# Patient Record
Sex: MALE | Race: WHITE | Employment: UNEMPLOYED | URBAN - METROPOLITAN AREA
[De-identification: names, ages, dates, MRNs, and addresses within clinical notes are randomized per-mention and may not be internally consistent; named-entity substitution may affect disease eponyms.]

---

## 2020-01-10 ENCOUNTER — HOSPITAL ENCOUNTER (INPATIENT)
Age: 50
LOS: 4 days | Discharge: HOME | DRG: 750 | End: 2020-01-14
Attending: EMERGENCY MEDICINE | Admitting: PSYCHIATRY & NEUROLOGY
Payer: MEDICAID

## 2020-01-10 PROBLEM — F20.0 PARANOID SCHIZOPHRENIA (HCC): Chronic | Status: ACTIVE | Noted: 2020-01-10

## 2020-01-10 LAB
ABSOLUTE EOS #: 0.3 K/UL (ref 0–0.4)
ABSOLUTE IMMATURE GRANULOCYTE: ABNORMAL K/UL (ref 0–0.3)
ABSOLUTE LYMPH #: 2.9 K/UL (ref 1–4.8)
ABSOLUTE MONO #: 1 K/UL (ref 0.1–1.3)
ACETAMINOPHEN LEVEL: <5 UG/ML (ref 10–30)
ALBUMIN SERPL-MCNC: 4.3 G/DL (ref 3.5–5.2)
ALBUMIN/GLOBULIN RATIO: ABNORMAL (ref 1–2.5)
ALP BLD-CCNC: 79 U/L (ref 40–129)
ALT SERPL-CCNC: 14 U/L (ref 5–41)
ANION GAP SERPL CALCULATED.3IONS-SCNC: 12 MMOL/L (ref 9–17)
AST SERPL-CCNC: 15 U/L
BASOPHILS # BLD: 1 % (ref 0–2)
BASOPHILS ABSOLUTE: 0.1 K/UL (ref 0–0.2)
BILIRUB SERPL-MCNC: 0.3 MG/DL (ref 0.3–1.2)
BUN BLDV-MCNC: 19 MG/DL (ref 6–20)
BUN/CREAT BLD: ABNORMAL (ref 9–20)
CALCIUM SERPL-MCNC: 9.5 MG/DL (ref 8.6–10.4)
CHLORIDE BLD-SCNC: 97 MMOL/L (ref 98–107)
CO2: 26 MMOL/L (ref 20–31)
CREAT SERPL-MCNC: 0.96 MG/DL (ref 0.7–1.2)
DIFFERENTIAL TYPE: ABNORMAL
EOSINOPHILS RELATIVE PERCENT: 2 % (ref 0–4)
ETHANOL PERCENT: <0.01 %
ETHANOL: <10 MG/DL
GFR AFRICAN AMERICAN: >60 ML/MIN
GFR NON-AFRICAN AMERICAN: >60 ML/MIN
GFR SERPL CREATININE-BSD FRML MDRD: ABNORMAL ML/MIN/{1.73_M2}
GFR SERPL CREATININE-BSD FRML MDRD: ABNORMAL ML/MIN/{1.73_M2}
GLUCOSE BLD-MCNC: 105 MG/DL (ref 70–99)
HCT VFR BLD CALC: 41.4 % (ref 41–53)
HEMOGLOBIN: 14.1 G/DL (ref 13.5–17.5)
IMMATURE GRANULOCYTES: ABNORMAL %
LYMPHOCYTES # BLD: 20 % (ref 24–44)
MAGNESIUM: 1.8 MG/DL (ref 1.6–2.6)
MCH RBC QN AUTO: 31.7 PG (ref 26–34)
MCHC RBC AUTO-ENTMCNC: 34.1 G/DL (ref 31–37)
MCV RBC AUTO: 92.9 FL (ref 80–100)
MONOCYTES # BLD: 7 % (ref 1–7)
NRBC AUTOMATED: ABNORMAL PER 100 WBC
PDW BLD-RTO: 13.9 % (ref 11.5–14.9)
PLATELET # BLD: 333 K/UL (ref 150–450)
PLATELET ESTIMATE: ABNORMAL
PMV BLD AUTO: 8.2 FL (ref 6–12)
POTASSIUM SERPL-SCNC: 4 MMOL/L (ref 3.7–5.3)
RBC # BLD: 4.45 M/UL (ref 4.5–5.9)
RBC # BLD: ABNORMAL 10*6/UL
SALICYLATE LEVEL: <1 MG/DL (ref 3–10)
SEG NEUTROPHILS: 70 % (ref 36–66)
SEGMENTED NEUTROPHILS ABSOLUTE COUNT: 10.3 K/UL (ref 1.3–9.1)
SODIUM BLD-SCNC: 135 MMOL/L (ref 135–144)
TOTAL PROTEIN: 7 G/DL (ref 6.4–8.3)
WBC # BLD: 14.5 K/UL (ref 3.5–11)
WBC # BLD: ABNORMAL 10*3/UL

## 2020-01-10 PROCEDURE — 6370000000 HC RX 637 (ALT 250 FOR IP): Performed by: NURSE PRACTITIONER

## 2020-01-10 PROCEDURE — 36415 COLL VENOUS BLD VENIPUNCTURE: CPT

## 2020-01-10 PROCEDURE — 80053 COMPREHEN METABOLIC PANEL: CPT

## 2020-01-10 PROCEDURE — 99285 EMERGENCY DEPT VISIT HI MDM: CPT

## 2020-01-10 PROCEDURE — G0480 DRUG TEST DEF 1-7 CLASSES: HCPCS

## 2020-01-10 PROCEDURE — 85025 COMPLETE CBC W/AUTO DIFF WBC: CPT

## 2020-01-10 PROCEDURE — 1240000000 HC EMOTIONAL WELLNESS R&B

## 2020-01-10 PROCEDURE — 80307 DRUG TEST PRSMV CHEM ANLYZR: CPT

## 2020-01-10 PROCEDURE — 83735 ASSAY OF MAGNESIUM: CPT

## 2020-01-10 RX ORDER — HYDROXYZINE HYDROCHLORIDE 25 MG/1
25 TABLET, FILM COATED ORAL 3 TIMES DAILY PRN
Status: DISCONTINUED | OUTPATIENT
Start: 2020-01-10 | End: 2020-01-14 | Stop reason: HOSPADM

## 2020-01-10 RX ORDER — FOLIC ACID 1 MG/1
1 TABLET ORAL DAILY
Status: DISCONTINUED | OUTPATIENT
Start: 2020-01-11 | End: 2020-01-14 | Stop reason: HOSPADM

## 2020-01-10 RX ORDER — PAROXETINE 10 MG/1
10 TABLET, FILM COATED ORAL EVERY MORNING
Status: COMPLETED | OUTPATIENT
Start: 2020-01-10 | End: 2020-01-10

## 2020-01-10 RX ORDER — ARIPIPRAZOLE 5 MG/1
5 TABLET ORAL DAILY
Status: ON HOLD | COMMUNITY
End: 2020-01-14 | Stop reason: SDUPTHER

## 2020-01-10 RX ORDER — TRAZODONE HYDROCHLORIDE 100 MG/1
100 TABLET ORAL NIGHTLY
Status: DISCONTINUED | OUTPATIENT
Start: 2020-01-10 | End: 2020-01-14 | Stop reason: HOSPADM

## 2020-01-10 RX ORDER — CLONIDINE HYDROCHLORIDE 0.1 MG/1
0.1 TABLET ORAL 2 TIMES DAILY
Status: ON HOLD | COMMUNITY
End: 2021-01-25 | Stop reason: HOSPADM

## 2020-01-10 RX ORDER — ACETAMINOPHEN 325 MG/1
650 TABLET ORAL EVERY 4 HOURS PRN
Status: DISCONTINUED | OUTPATIENT
Start: 2020-01-10 | End: 2020-01-14 | Stop reason: HOSPADM

## 2020-01-10 RX ORDER — MAGNESIUM HYDROXIDE/ALUMINUM HYDROXICE/SIMETHICONE 120; 1200; 1200 MG/30ML; MG/30ML; MG/30ML
30 SUSPENSION ORAL EVERY 6 HOURS PRN
Status: DISCONTINUED | OUTPATIENT
Start: 2020-01-10 | End: 2020-01-14 | Stop reason: HOSPADM

## 2020-01-10 RX ORDER — DOCUSATE SODIUM 100 MG/1
100 CAPSULE, LIQUID FILLED ORAL 2 TIMES DAILY
Status: ON HOLD | COMMUNITY
End: 2021-01-25 | Stop reason: HOSPADM

## 2020-01-10 RX ORDER — DOCUSATE SODIUM 100 MG/1
100 CAPSULE, LIQUID FILLED ORAL 2 TIMES DAILY
Status: DISCONTINUED | OUTPATIENT
Start: 2020-01-10 | End: 2020-01-14 | Stop reason: HOSPADM

## 2020-01-10 RX ORDER — PANTOPRAZOLE SODIUM 40 MG/1
40 TABLET, DELAYED RELEASE ORAL DAILY
Status: DISCONTINUED | OUTPATIENT
Start: 2020-01-11 | End: 2020-01-14 | Stop reason: HOSPADM

## 2020-01-10 RX ORDER — M-VIT,TX,IRON,MINS/CALC/FOLIC 27MG-0.4MG
1 TABLET ORAL DAILY
Status: DISCONTINUED | OUTPATIENT
Start: 2020-01-11 | End: 2020-01-14 | Stop reason: HOSPADM

## 2020-01-10 RX ORDER — BENZTROPINE MESYLATE 1 MG/ML
2 INJECTION INTRAMUSCULAR; INTRAVENOUS 2 TIMES DAILY PRN
Status: DISCONTINUED | OUTPATIENT
Start: 2020-01-10 | End: 2020-01-14 | Stop reason: HOSPADM

## 2020-01-10 RX ORDER — NICOTINE 21 MG/24HR
1 PATCH, TRANSDERMAL 24 HOURS TRANSDERMAL DAILY
Status: DISCONTINUED | OUTPATIENT
Start: 2020-01-10 | End: 2020-01-10 | Stop reason: ALTCHOICE

## 2020-01-10 RX ORDER — PAROXETINE 10 MG/1
10 TABLET, FILM COATED ORAL EVERY MORNING
Status: ON HOLD | COMMUNITY
End: 2020-01-14 | Stop reason: HOSPADM

## 2020-01-10 RX ORDER — TRAZODONE HYDROCHLORIDE 50 MG/1
50 TABLET ORAL NIGHTLY PRN
Status: DISCONTINUED | OUTPATIENT
Start: 2020-01-10 | End: 2020-01-10 | Stop reason: ALTCHOICE

## 2020-01-10 RX ORDER — ARIPIPRAZOLE 5 MG/1
5 TABLET ORAL DAILY
Status: DISCONTINUED | OUTPATIENT
Start: 2020-01-10 | End: 2020-01-14 | Stop reason: HOSPADM

## 2020-01-10 RX ORDER — TRAZODONE HYDROCHLORIDE 100 MG/1
100 TABLET ORAL NIGHTLY
Status: ON HOLD | COMMUNITY
End: 2021-01-25 | Stop reason: HOSPADM

## 2020-01-10 RX ORDER — M-VIT,TX,IRON,MINS/CALC/FOLIC 27MG-0.4MG
1 TABLET ORAL DAILY
Status: ON HOLD | COMMUNITY
End: 2020-01-14 | Stop reason: HOSPADM

## 2020-01-10 RX ORDER — ALBUTEROL SULFATE 90 UG/1
2 AEROSOL, METERED RESPIRATORY (INHALATION) EVERY 6 HOURS PRN
Status: DISCONTINUED | OUTPATIENT
Start: 2020-01-10 | End: 2020-01-14 | Stop reason: HOSPADM

## 2020-01-10 RX ORDER — PAROXETINE HYDROCHLORIDE 20 MG/1
20 TABLET, FILM COATED ORAL DAILY
Status: DISCONTINUED | OUTPATIENT
Start: 2020-01-11 | End: 2020-01-14 | Stop reason: HOSPADM

## 2020-01-10 RX ORDER — DOCUSATE SODIUM 100 MG/1
100 CAPSULE, LIQUID FILLED ORAL 2 TIMES DAILY
COMMUNITY
End: 2020-01-10

## 2020-01-10 RX ORDER — THIAMINE MONONITRATE (VIT B1) 100 MG
100 TABLET ORAL DAILY
Status: DISCONTINUED | OUTPATIENT
Start: 2020-01-11 | End: 2020-01-14 | Stop reason: HOSPADM

## 2020-01-10 RX ADMIN — NICOTINE POLACRILEX 2 MG: 2 GUM, CHEWING BUCCAL at 18:03

## 2020-01-10 RX ADMIN — ARIPIPRAZOLE 5 MG: 5 TABLET ORAL at 15:47

## 2020-01-10 RX ADMIN — PAROXETINE HYDROCHLORIDE 10 MG: 10 TABLET, FILM COATED ORAL at 15:47

## 2020-01-10 ASSESSMENT — ENCOUNTER SYMPTOMS
COUGH: 0
ABDOMINAL PAIN: 0
BACK PAIN: 0

## 2020-01-10 ASSESSMENT — PAIN - FUNCTIONAL ASSESSMENT: PAIN_FUNCTIONAL_ASSESSMENT: ACTIVITIES ARE NOT PREVENTED

## 2020-01-10 ASSESSMENT — SLEEP AND FATIGUE QUESTIONNAIRES
DO YOU HAVE DIFFICULTY SLEEPING: YES
DIFFICULTY STAYING ASLEEP: YES
AVERAGE NUMBER OF SLEEP HOURS: 2
DIFFICULTY FALLING ASLEEP: YES
RESTFUL SLEEP: NO
DIFFICULTY ARISING: NO
SLEEP PATTERN: DIFFICULTY FALLING ASLEEP;DISTURBED/INTERRUPTED SLEEP;RESTLESSNESS
DO YOU USE A SLEEP AID: YES

## 2020-01-10 ASSESSMENT — LIFESTYLE VARIABLES: HISTORY_ALCOHOL_USE: YES

## 2020-01-10 ASSESSMENT — PAIN DESCRIPTION - ONSET: ONSET: ON-GOING

## 2020-01-10 ASSESSMENT — PAIN DESCRIPTION - PROGRESSION: CLINICAL_PROGRESSION: NOT CHANGED

## 2020-01-10 ASSESSMENT — PAIN DESCRIPTION - LOCATION: LOCATION: GENERALIZED

## 2020-01-10 ASSESSMENT — PAIN SCALES - GENERAL: PAINLEVEL_OUTOF10: 3

## 2020-01-10 ASSESSMENT — PAIN DESCRIPTION - ORIENTATION: ORIENTATION: OTHER (COMMENT)

## 2020-01-10 ASSESSMENT — PAIN DESCRIPTION - PAIN TYPE: TYPE: CHRONIC PAIN

## 2020-01-10 ASSESSMENT — PAIN DESCRIPTION - DESCRIPTORS: DESCRIPTORS: ACHING

## 2020-01-10 ASSESSMENT — PAIN DESCRIPTION - FREQUENCY: FREQUENCY: CONTINUOUS

## 2020-01-10 ASSESSMENT — PATIENT HEALTH QUESTIONNAIRE - PHQ9: SUM OF ALL RESPONSES TO PHQ QUESTIONS 1-9: 16

## 2020-01-10 NOTE — PROGRESS NOTES
Medication History completed:    New medications: aripiprazole, docusate, multivitamin, paroxetine, trazodone    Medications discontinued: Maalox, bupropion, pantoprazole 20 mg,simethicone, tizanidine    Changes to dosing: none    Stated allergies: NKDA    Other pertinent information: Medications confirmed with patient's prescription packaging and Rescue discharge paperwork.      Thank you,  Meka Tinsley PharmD, BCPS  [phone]

## 2020-01-10 NOTE — ED NOTES
Report given to MYRA Hutchinson from [facility]. Report method by phone   The following was reviewed with receiving RN:   Current vital signs:  BP (!) 144/89   Pulse 84   Temp 98.6 °F (37 °C) (Temporal)   Resp 16   Ht 5' 8\" (1.727 m)   Wt 164 lb (74.4 kg)   SpO2 99%   BMI 24.94 kg/m²                MEWS Score: 1     Any medication or safety alerts were reviewed. Any pending diagnostics and notifications were also reviewed, as well as any safety concerns or issues, abnormal labs, abnormal imaging, and abnormal assessment findings. Questions were answered.             Eunice Dominguez RN  01/10/20 9231

## 2020-01-10 NOTE — GROUP NOTE
Group Therapy Note    Date: 1/10/2020    Group Start Time: 1430  Group End Time: 4191  Group Topic: Psychoeducation    ALESSANDRA Long    Patient was unable to attend due to admission processing with nursing staff.      Signature:  Ya Cervantes

## 2020-01-10 NOTE — ED TRIAGE NOTES
C= \"Have you ever felt that you should Cut down on your drinking? \"  Yes  A= \"Have people Annoyed you by criticizing your drinking? \"  Yes  G= \"Have you ever felt bad or Guilty about your drinking? \"  Yes  E= \"Have you ever had a drink as an Eye-opener first thing in the morning to steady your nerves or to help a hangover? \"  Yes      Deferred []      Reason for deferring: SW notified. *If yes to two or more: probable alcohol abuse. *

## 2020-01-10 NOTE — ED PROVIDER NOTES
surgical history. CURRENT MEDICATIONS       Previous Medications    ALBUTEROL (PROVENTIL HFA) 108 (90 BASE) MCG/ACT INHALER    Inhale 2 puffs into the lungs every 6 hours as needed for Wheezing    ALUMINUM & MAGNESIUM HYDROXIDE-SIMETHICONE (MAALOX MAX) 400-400-40 MG/5ML SUSP    Take 30 mLs by mouth every 6 hours as needed    BUPROPION (WELLBUTRIN) 75 MG TABLET    Take 1 tablet by mouth 2 times daily    CALCIUM CARBONATE ANTACID (MAALOX) 600 MG CHEW    Take by mouth As needed for stomach pain, heartburn    FOLIC ACID (FOLVITE) 1 MG TABLET    Take 1 tablet by mouth daily    PANTOPRAZOLE (PROTONIX) 20 MG TABLET    Take 2 tablets by mouth daily for 30 doses    PANTOPRAZOLE (PROTONIX) 20 MG TABLET    Take 2 tablets by mouth daily for 30 doses    PANTOPRAZOLE (PROTONIX) 40 MG TABLET    Take 1 tablet by mouth daily    SIMETHICONE (MYLICON) 80 MG CHEWABLE TABLET    Take 1 tablet by mouth 4 times daily as needed for Flatulence    TIZANIDINE (ZANAFLEX) 2 MG CAPSULE    Take 1 capsule by mouth 3 times daily as needed for Muscle spasms    VITAMIN B-1 100 MG TABLET    Take 1 tablet by mouth daily       ALLERGIES     is allergic to toradol [ketorolac tromethamine] and compazine [prochlorperazine maleate]. FAMILY HISTORY     He indicated that his mother is . He indicated that his father is . He indicated that both of his sisters are alive. SOCIAL HISTORY      reports that he has been smoking. He has a 60.00 pack-year smoking history. He has never used smokeless tobacco. He reports current alcohol use of about 90.0 standard drinks of alcohol per week. He reports current drug use. Drug: Marijuana.     PHYSICAL EXAM     INITIAL VITALS: BP (!) 144/89   Pulse 84   Temp 98.6 °F (37 °C) (Temporal)   Resp 16   Ht 5' 8\" (1.727 m)   Wt 164 lb (74.4 kg)   SpO2 99%   BMI 24.94 kg/m²   Gen: NAD  Head: Normocephalic, atraumatic  Eye: Pupils equal round reactive to light, no conjunctivitis  Heart: Regular rate and rhythm no murmurs  Lungs: Clear to auscultation bilaterally, no respiratory distress  Chest wall: No crepitus, no tenderness palpation  Abdomen: Soft, nontender, nondistended, with no peritoneal signs  Skin: No diaphoresis. No lacerations. Neurologic: Patient is alert and oriented x3, motor and sensation is intact in all 4 extremities, speech is fluent  Extremities: Full range of motion, no cyanosis, no edema, scars to the left forearm from previous cutting, no tenderness to palpation    MEDICAL DECISION MAKING:     MDM  47 y.o. male with depression, suicidal thoughts, suicidal plan. previous suicide attempt. The patient does not appear intoxicated. The patient is showing no signs of any acute active toxidrome. The patient denies any overdose attempt or  medical complaints at this time. We'll screen for any acetaminophen or salicylate ingestion, we'll check baseline renal function, liver function, a drug screen, cbc and reassess. Hospital Course:   1:15 PM  Laboratory studies reviewed, no significant abnormalities. Pt medically clear for psychiatric evaluation.  will d/w psychiatry service, anticipate inpatient admission. DIAGNOSTIC RESULTS     RADIOLOGY:All plain film, CT, MRI, and formal ultrasound images (except ED bedside ultrasound) are read by the radiologist and the images and interpretations are directly viewed by the emergency physician. No orders to display       LABS: All lab results were reviewed by myself, and all abnormals are listed below.   Labs Reviewed   CBC WITH AUTO DIFFERENTIAL   COMPREHENSIVE METABOLIC PANEL   MAGNESIUM   ETHANOL   URINE DRUG SCREEN   ACETAMINOPHEN LEVEL   SALICYLATE LEVEL       EMERGENCY DEPARTMENT COURSE:   Vitals:    Vitals:    01/10/20 1154   BP: (!) 144/89   Pulse: 84   Resp: 16   Temp: 98.6 °F (37 °C)   TempSrc: Temporal   SpO2: 99%   Weight: 164 lb (74.4 kg)   Height: 5' 8\" (1.727 m)       The patient was given the following

## 2020-01-10 NOTE — CARE COORDINATION
TEE faxed voluntarily admission form and bed placement form to [facility]. TEE spoke with charge nurse--Cassandra--who states she will call back with unit and room.

## 2020-01-10 NOTE — CARE COORDINATION
[address], being linked to [facility], being medically compliance, and identifies his [facility] --Lam--as a support person. Risk Factors: Patient lacks stable income, has hx of AoD, has hx of trauma, has hx of suicidal ideation, and has poor coping skills. Substance Abuse:  Patient reports history of alcohol and marijuana abuse. Patient states that he will typically drink at least a fifth of vodka and a 12-pack of beer daily; patient began drinking at age [de-identified]. Patient states that he also smoked about three joint daily, in addition to edibles; patient states that he began using marijuana at age 14. Patient states that he has maintained sobriety for over 30-days. Clinical Summary:  Patient was previously admitted to the [facility] in July 2013. Patient presented to the ED voluntarily via his [address] [facility]  due to suicidal ideation with a plan to hang himself. Patient states that he cannot identify any specific trigger, only that he is experiencing auditory command hallucinations calling him worthless and that he should kill himself. Patient also reports visual hallucinations in the form of shadows; patient demonstrates good insight by stating that he typically knows depression is becoming more severe when he experiences visual hallucinations. Patient also reports increased paranoia with thoughts that [facility] are \"out to get [him}; however, patient recognizes that thoughts are not accurate or reality. Patient denies current homicidal ideation. Patient presents upon admission alert and oriented x4, with flat affect, preoccupied, circumstantial thought process, and normal speech. Patient appears cooperative and friendly throughout assessment. Patient is currently in rehab for alcohol and marijuana abuse; patient has reportedly been clean for over 30 days.   Patient was told, prior to admission, by Silke that he can return to the facility upon discharge. Patient reports that he is medication compliant at si time, but does not believe that his medication is working effectively. Patient states that he does not have a form of stable income, nor receives government assistance. Patient currently has Yue Arita. Patient reports paternal family history of mental illness and substance abuse. Patient also reports history of relatives completing suicide--specifically his two uncles on his father's side. Patient denies any current or past legal charges. Patient reports highest level of education completed being 8th grade. Level of Care Disposition:. Writer consulted with Percy Arthur CNP from psychiatry. Patient accepted for an inpatient admission to the [facility] for safety and stabilization.   Attending physician is Heber Ling MD.

## 2020-01-10 NOTE — ED NOTES
Pt arrives to the [facility] stating that he is having suicidal thoughts. Patient states that he has \"been suicidal my whole life\" but reports that over the last couple of weeks the thoughts have gotten worse for him. Patient states that about a month ago he attempted suicide by drinking himself to death but was found by a friend and started living at the [facility] for rehab. Patient states that he has been clean for the last 30 days. Patient reports that he always hears voices but states \"when the shadows come that's when I know it is getting bad. \" Patient reports seeing shadows. Patient states that the voices tell him \"I am worthless, it'd be better off without me. \" Patient states that the voices also tell him to hang himself. Patient reports taking his medications as prescribed. Patient denies homicidal ideation. Patient c/o \"bone pain\" stating that he thinks that he has arthritis.       Tina Castro RN  01/10/20 2580 operating room

## 2020-01-11 LAB
AMPHETAMINE SCREEN URINE: NEGATIVE
BARBITURATE SCREEN URINE: NEGATIVE
BENZODIAZEPINE SCREEN, URINE: NEGATIVE
BUPRENORPHINE URINE: NORMAL
CANNABINOID SCREEN URINE: NEGATIVE
CHOLESTEROL/HDL RATIO: 5.6
CHOLESTEROL: 222 MG/DL
COCAINE METABOLITE, URINE: NEGATIVE
HDLC SERPL-MCNC: 40 MG/DL
LDL CHOLESTEROL: 155 MG/DL (ref 0–130)
MDMA URINE: NORMAL
METHADONE SCREEN, URINE: NEGATIVE
METHAMPHETAMINE, URINE: NORMAL
OPIATES, URINE: NEGATIVE
OXYCODONE SCREEN URINE: NEGATIVE
PHENCYCLIDINE, URINE: NEGATIVE
PROPOXYPHENE, URINE: NORMAL
TEST INFORMATION: NORMAL
THYROXINE, FREE: 0.86 NG/DL (ref 0.93–1.7)
TRICYCLIC ANTIDEPRESSANTS, UR: NORMAL
TRIGL SERPL-MCNC: 135 MG/DL
TSH SERPL DL<=0.05 MIU/L-ACNC: 1.78 MIU/L (ref 0.3–5)
VLDLC SERPL CALC-MCNC: ABNORMAL MG/DL (ref 1–30)

## 2020-01-11 PROCEDURE — 1240000000 HC EMOTIONAL WELLNESS R&B

## 2020-01-11 PROCEDURE — 84439 ASSAY OF FREE THYROXINE: CPT

## 2020-01-11 PROCEDURE — 90792 PSYCH DIAG EVAL W/MED SRVCS: CPT | Performed by: NURSE PRACTITIONER

## 2020-01-11 PROCEDURE — 36415 COLL VENOUS BLD VENIPUNCTURE: CPT

## 2020-01-11 PROCEDURE — 84443 ASSAY THYROID STIM HORMONE: CPT

## 2020-01-11 PROCEDURE — 6370000000 HC RX 637 (ALT 250 FOR IP): Performed by: NURSE PRACTITIONER

## 2020-01-11 PROCEDURE — 80061 LIPID PANEL: CPT

## 2020-01-11 PROCEDURE — 83036 HEMOGLOBIN GLYCOSYLATED A1C: CPT

## 2020-01-11 RX ADMIN — FOLIC ACID 1 MG: 1 TABLET ORAL at 08:39

## 2020-01-11 RX ADMIN — PANTOPRAZOLE SODIUM 40 MG: 40 TABLET, DELAYED RELEASE ORAL at 08:40

## 2020-01-11 RX ADMIN — PAROXETINE HYDROCHLORIDE 20 MG: 20 TABLET, FILM COATED ORAL at 08:40

## 2020-01-11 RX ADMIN — HYDROXYZINE HYDROCHLORIDE 25 MG: 25 TABLET, FILM COATED ORAL at 21:36

## 2020-01-11 RX ADMIN — DOCUSATE SODIUM 100 MG: 100 CAPSULE, LIQUID FILLED ORAL at 08:39

## 2020-01-11 RX ADMIN — TRAZODONE HYDROCHLORIDE 100 MG: 100 TABLET ORAL at 21:36

## 2020-01-11 RX ADMIN — NICOTINE POLACRILEX 2 MG: 2 GUM, CHEWING BUCCAL at 18:13

## 2020-01-11 RX ADMIN — MULTIPLE VITAMINS W/ MINERALS TAB 1 TABLET: TAB at 08:39

## 2020-01-11 RX ADMIN — ARIPIPRAZOLE 5 MG: 5 TABLET ORAL at 08:39

## 2020-01-11 RX ADMIN — THIAMINE HCL TAB 100 MG 100 MG: 100 TAB at 08:39

## 2020-01-11 ASSESSMENT — ENCOUNTER SYMPTOMS
COUGH: 0
FACIAL SWELLING: 0
NAUSEA: 0
DIARRHEA: 0
SHORTNESS OF BREATH: 0
ABDOMINAL PAIN: 0
TROUBLE SWALLOWING: 0
SINUS PRESSURE: 0
VOMITING: 0
SINUS PAIN: 0
CONSTIPATION: 0
RHINORRHEA: 0
WHEEZING: 1

## 2020-01-11 ASSESSMENT — SLEEP AND FATIGUE QUESTIONNAIRES
AVERAGE NUMBER OF SLEEP HOURS: 2
DO YOU USE A SLEEP AID: YES
SLEEP PATTERN: DIFFICULTY FALLING ASLEEP
DO YOU HAVE DIFFICULTY SLEEPING: YES
DIFFICULTY ARISING: NO
DIFFICULTY STAYING ASLEEP: YES
RESTFUL SLEEP: NO
DIFFICULTY FALLING ASLEEP: YES

## 2020-01-11 ASSESSMENT — PATIENT HEALTH QUESTIONNAIRE - PHQ9: SUM OF ALL RESPONSES TO PHQ QUESTIONS 1-9: 13

## 2020-01-11 ASSESSMENT — LIFESTYLE VARIABLES: HISTORY_ALCOHOL_USE: YES

## 2020-01-11 NOTE — PLAN OF CARE
Problem: Altered Mood, Psychotic Behavior:  Goal: Able to verbalize decrease in frequency and intensity of hallucinations  Description  Able to verbalize decrease in frequency and intensity of hallucinations  1/11/2020 1159 by Roberto Montes RN  Outcome: Ongoing  Note:   Pt states he is hearing \"voices that sound like mumbles\". Pt states his voices in his head keep him awake at night. Pt is cooperative with assessments and medications. He is isolative to his room. Safety checks maintained q15 min and irregular rounding maintained. Problem: Altered Mood, Psychotic Behavior:  Goal: Absence of self-harm  Description  Absence of self-harm  1/11/2020 1159 by Roberto Montes RN  Outcome: Ongoing  Note:   Pt denies thoughts to harm himself at this time. He has not attempted self harm thus far during shift. Pt is isolative to his room and reports depression/anxiety. Safety checks maintained q15 min and irregular rounding maintained.

## 2020-01-11 NOTE — H&P
HISTORY and Ganesh Yao [id number]       NAME:  Rocío Ugarte. MRN: [id number]   YOB: 1970   Date: 1/11/2020   Age: 47 y.o. Gender: male     COMPLAINT AND PRESENT HISTORY:      Rocío London is 47 y.o.,  male, admitted because of Schizophrenia. Patient minute via the ED with suicidal ideation, plan to hang himself. Patient does admit to a history of previous suicide attempts. Patient denies homicidal ideation. Patient admits to command auditory hallucinations telling him to hurt himself. States he also has auditory hallucinations asking him Shefali Day can happen next? \"  Reports this causes him to have an increase in racing thoughts difficulty concentrating. Patient also reports seeing shadows frequently. Patient reports he has been on his medication but feels it is not working. Patient has been living at the [facility] for alcohol rehabilitation, sober 30 days. He was living in [facility] previously and was admitted in a psychiatric hospital that referred him to the [facility]. Patient has poor to fair insight. Reports poor sleep, tossing and turning throughout the night. Appetite is fair. Memory is poor after his stroke. Patient denies drug use. States he has been sober for 30 days but prior to this was drinking 1/5 of vodka and a 12 pack of beer day. Patient reports compliance with medication. See psychiatric admission note for further psychiatric history. Patient denies any somatic complaints other than he feels he has arthritis in his hands and his knees. He has a history of COPD, uses inhalers at home. Denies home oxygen use. No chest pain or shortness of breath. No URI symptoms no fever or chills. No nausea, vomiting, diarrhea, constipation. No urinary complaints at this time.     DIAGNOSTIC RESULTS   Labs:  CBC:   Recent Labs     01/10/20  1248   WBC 14.5*   HGB 14.1        BMP:    Recent Labs     01/10/20  1248    Attends meetings of clubs or organizations: None     Relationship status: None    Intimate partner violence:     Fear of current or ex partner: None     Emotionally abused: None     Physically abused: None     Forced sexual activity: None   Other Topics Concern    None   Social History Narrative    None           REVIEW OF SYSTEMS      Allergies   Allergen Reactions    Prochlorperazine Other (See Comments)    Toradol [Ketorolac Tromethamine] Itching and Rash     Able to take Aleve with no allergic reactions      Ketorolac     Compazine [Prochlorperazine Maleate] Anxiety       No current facility-administered medications on file prior to encounter. Current Outpatient Medications on File Prior to Encounter   Medication Sig Dispense Refill    ARIPiprazole (ABILIFY) 5 MG tablet Take 5 mg by mouth daily      PARoxetine (PAXIL) 10 MG tablet Take 10 mg by mouth every morning      docusate sodium (COLACE) 100 MG capsule Take 100 mg by mouth 2 times daily      Multiple Vitamins-Minerals (THERAPEUTIC MULTIVITAMIN-MINERALS) tablet Take 1 tablet by mouth daily      traZODone (DESYREL) 100 MG tablet Take 100 mg by mouth nightly      folic acid (FOLVITE) 1 MG tablet Take 1 tablet by mouth daily 30 tablet 3    pantoprazole (PROTONIX) 40 MG tablet Take 1 tablet by mouth daily 30 tablet 3    vitamin B-1 100 MG tablet Take 1 tablet by mouth daily 30 tablet 3    albuterol (PROVENTIL HFA) 108 (90 BASE) MCG/ACT inhaler Inhale 2 puffs into the lungs every 6 hours as needed for Wheezing 1 Inhaler 3    cloNIDine (CATAPRES) 0.1 MG tablet Take 0.1 mg by mouth 2 times daily          Review of Systems   Constitutional: Negative for chills and fever. HENT: Positive for dental problem (poor). Negative for congestion, facial swelling, postnasal drip, rhinorrhea, sinus pressure, sinus pain and trouble swallowing. Eyes: Positive for visual disturbance (glasses). Respiratory: Positive for wheezing (copd).  Negative for cough and shortness of breath. Cardiovascular: Negative for chest pain and palpitations. Gastrointestinal: Negative for abdominal pain, constipation, diarrhea, nausea and vomiting. Genitourinary: Negative for dysuria, frequency and urgency. Musculoskeletal: Positive for arthralgias (knees, hands hx arthritis). Negative for myalgias. Neurological: Positive for weakness (right sided s/p CVA). Negative for dizziness, speech difficulty and light-headedness. Psychiatric/Behavioral: Positive for decreased concentration, hallucinations, sleep disturbance and suicidal ideas. Negative for agitation. The patient is nervous/anxious. GENERAL PHYSICAL EXAM:     Vitals: BP (!) 119/92   Pulse 80   Temp 97.9 °F (36.6 °C) (Oral)   Resp 14   Ht 5' 8\" (1.727 m)   Wt 158 lb (71.7 kg)   SpO2 100%   BMI 24.02 kg/m²  Body mass index is 24.02 kg/m². Pt was examined with a nurse present in the room. GENERAL APPEARANCE:  Claudene Staple. is 47 y.o.,  male, not obese, nourished, conscious, alert. Does not appear to be distress or pain at this time. SKIN:  Warm, dry, no cyanosis or jaundice. HEAD:  Glasses. Normocephalic, atraumatic, no swelling or tenderness. EYES:  Pupils equal, reactive to light, Conjunctiva is clear, EOMs intact niesha. eyelids WNL. EARS:  No discharge, no marked hearing loss. NOSE:  No rhinorrhea, epistaxis or septal deformity. THROAT:  Poor dentition. Uvula midline. No ulceration bleeding or discharge. NECK:  No stiffness, trachea central.    CHEST:  Symmetrical and equal on expansion. HEART:  Regular rate and rhythm. S1 > S2, No audible murmurs or gallops. LUNGS:  Equal on expansion, expiratory wheezes noted posterior upper lobes, otherwise clear. ABDOMEN:  Soft on palpation. No localized tenderness. No guarding or rigidity. LYMPHATICS:  No palpable anterior cervical lymphadenopathy.      LOCOMOTOR, BACK AND SPINE:  No

## 2020-01-11 NOTE — CARE COORDINATION
BHI Biopsychosocial Assessment    Current Level of Psychosocial Functioning     Independent x  Dependent    Minimal Assist     Comments:    Psychosocial High Risk Factors (check all that apply)    Unable to obtain meds x  Chronic illness/pain  x  Substance abuse x  Lack of Family Support   Financial stress x  Isolation   Inadequate Community Resources  Suicide attempt(s) x  Not taking medications   Victim of crime   Developmental Delay  Unable to manage personal needs    Age 72 or older   Homeless  No transportation   Readmission within 30 days  Unemployment  Traumatic Event    Comments:   Psychiatric Advanced Directives: pt denies     Family to Involve in Treatment: pt states his siblings are supportive but do not live locally     Sexual Orientation:  N/A    Patient Strengths: pt has been receiving treatment and support at [facility], has been sober from the last 30  Days, pt has insurance, supportive siblings     Patient Barriers: pt is not linked with local [facility] Ambassador Liam Garcia since coming to [facility], pt has history of alcohol abuse       Opiate Education Provided:  N/A      CMHC/mental health history: Pt to be linked with Meseret Jon as [place] [facility] [place] [facility]   medication management, group/individual therapies, family meetings, psycho -education, treatment team meetings to assist with stabilization    Initial Discharge Plan:  Pt to return to [facility] at discharge. Clinical Summary:  Mark Rueda is a 47year old single male who has been admitted to [address] [place] [address] Dr. Yohan JACOME with report of increase in auditory hallucinations, feelings of helplessness and hopelessness, and states this date that he has no local access to his outpatient mental health services as he has been living in [place] at [facility] for the last 4 weeks. Pt reports history of alcohol abuse, reports history of hallucinations since his teens. Pt reports family history of alcohol abuse and mental health issues.  Pt reports his parents are , siblings are supportive. Pt reports he feels appreciative of being referred to [facility], states his treatment is going well there and that staff are supportive. Pt is observed as pleasant and cooperative for assessment, with good insight and ability to discuss his symptoms which patient also includes sleep disturbance. Pt reports history of suicidal ideation, history of attempt, last attempt by drinking. Pt denies suicidal ideation this date. SW offered supportive listening and encouragement. SW offered ongoing support.

## 2020-01-11 NOTE — BH NOTE
Patient refused to attend Relaxation Group after encouragement from staff. 1:1 talk time offered but patient refused.

## 2020-01-11 NOTE — BH NOTE
Patient refused to attend Wellness Group after encouragement from staff. 1:1 talk time offered but patient refused.

## 2020-01-11 NOTE — BH NOTE
Psychiatric Admission Note         Rachel Eisenberg is a 47 y.o. male who was admitted from [facility] with suicidal ideation, increase auditory hallucinations, and depression. He states that the increase in voices are bothersome and they are telling him to harm himself. He states the voices are worse at night. He his medication compliant, but feel that he needs adjustments. He ha been sober for 30 days and is currently at [facility]. He states he feels like he cannot turn his mind off at night. He endorses poor sleep and appetite. He denies any symptoms of paranoia and he is not delusional.  Patient feels helpless and hopeless at times and cannot contract for safety outside of the hospital setting. There is no safe alternative at this time than inpatient stabilization. Past Psychiatric History   Patient reports he is linked with [facility] and takes his medications. . Reported history of psychiatric inpatient hospitalizations. Reported history of suicide attempts. History of Substance Abuse     Reports 30 days of sobriety. Currently at [facility] for sober living. Family History of psychiatric disorders    Family history: states that Uncle and Dad have hsitory of mental illness, but not sure of diagnosis. .      Medical History   Allergies:  Prochlorperazine; Toradol [ketorolac tromethamine]; Ketorolac; and Compazine [prochlorperazine maleate]   Past Medical History:   Diagnosis Date    Arthritis     Asthma     [facility]     GERD (gastroesophageal reflux disease)     Psychiatric problem     Schizophrenia ([facility]     Unspecified cerebral artery occlusion with cerebral infarction       History reviewed. No pertinent surgical history.   Neurologic Exam    Labs  Recent Results (from the past 72 hour(s))   CBC with DIFF    Collection Time: 01/10/20 12:48 PM   Result Value Ref Range    WBC 14.5 (H) 3.5 - 11.0 aluminum & magnesium hydroxide-simethicone, nicotine polacrilex, hydrOXYzine, albuterol sulfate HFA    Treatment Plan:    1. Admit to inpatient psychiatric treatment  2. Supportive therapy with medication management. Reviewed risks and benefits as well as potential side effects with patient. 3. Therapeutic activities and groups  4. Follow up at [facility] after symptoms stabilized. 5. Increase Paxil to 20 mg PO daily, continue Abilify 5 mg PO daily. 6. Social work for discharge planning, patient plans to return to [facility] upon discharge. Estimated length of stay: 5-7 days    RINA Rivera - CNP  Psychiatric Nurse Practitioner. Dragon voice recognition software used in portions of this document.  Occasionally words are mis-transcribed

## 2020-01-11 NOTE — PLAN OF CARE
[id number] Cait Arzola  Initial Interdisciplinary Treatment Plan NOTE    Original treatment plan Date & Time: 1/11/2020 0741    Admission Type:  Admission Type: Voluntary    Reason for admission:   Reason for Admission: voices getting worse, seeing shadows and feeling paranoid people are out to get him    Estimated Length of Stay:  5-7days  Estimated Discharge Date:   to be determined by physician    PATIENT STRENGTHS:  Patient Strengths:Strengths: Medication Compliance, Connection to output provider, No significant Physical Illness  Patient Strengths and Limitations:Limitations: Difficult relationships / poor social skills  Addictive Behavior: Addictive Behavior  In the past 3 months, have you felt or has someone told you that you have a problem with:  : None  Do you have a history of Chemical Use?: No  Do you have a history of Alcohol Use?: Yes  Do you have a history of Street Drug Abuse?: No  Histroy of Prescripton Drug Abuse?: No  Medical Problems:  Past Medical History:   Diagnosis Date    Arthritis     Asthma     COPD (chronic obstructive pulmonary disease) ([facility])     GERD (gastroesophageal reflux disease)     Psychiatric problem     [facility]     Unspecified cerebral artery occlusion with cerebral infarction      Status EXAM:Status and Exam  Normal: No  Facial Expression: Flat  Affect: Blunt  Level of Consciousness: Alert  Mood:Normal: No  Mood: Anxious  Motor Activity:Normal: Yes  Interview Behavior: Cooperative  Preception: [place] to Person, [place] to Time, [place] to Place, [place] to Situation  Attention:Normal: No  Attention: Distractible  Thought Processes: Flt.of Ideas, Loose Assoc. Thought Content:Normal: No  Thought Content: Preoccupations, Poverty of Content  Hallucinations:  Auditory (Comment), Visual (Comment)(hearing voices being negative and seeing shadows)  Delusions: Yes  Delusions: Persecution  Memory:Normal: No  Memory: Poor Recent, Poor Remote  Insight and Judgment: Yes  Present Suicidal Ideation: No  Present Homicidal Ideation: No    EDUCATION:   Learner Progress Toward Treatment Goals:  Reviewed group plans and strategies for care    Method:  Group therapy, medication compliance, individualized assessments and care planning    Outcome:  Needs reinforcement    PATIENT GOALS:  Pt did not identify, to be discussed with patient within 72 hours.     PLAN/TREATMENT RECOMMENDATIONS:   Group therapy, medications compliance, goal setting, individualized assessments and care, continue to monitor pt on unit      SHORT-TERM GOALS:   Time frame for Short-Term Goals:  5-7 days    LONG-TERM GOALS:  Time frame for Long-Term Goals:  6 months    Members Present in Team Meeting:       Shawn Adjutant Day

## 2020-01-12 LAB
ESTIMATED AVERAGE GLUCOSE: 120 MG/DL
HBA1C MFR BLD: 5.8 % (ref 4–6)

## 2020-01-12 PROCEDURE — 99232 SBSQ HOSP IP/OBS MODERATE 35: CPT | Performed by: PSYCHIATRY & NEUROLOGY

## 2020-01-12 PROCEDURE — 1240000000 HC EMOTIONAL WELLNESS R&B

## 2020-01-12 PROCEDURE — 6370000000 HC RX 637 (ALT 250 FOR IP): Performed by: NURSE PRACTITIONER

## 2020-01-12 RX ADMIN — THIAMINE HCL TAB 100 MG 100 MG: 100 TAB at 08:58

## 2020-01-12 RX ADMIN — NICOTINE POLACRILEX 2 MG: 2 GUM, CHEWING BUCCAL at 18:37

## 2020-01-12 RX ADMIN — MULTIPLE VITAMINS W/ MINERALS TAB 1 TABLET: TAB at 08:58

## 2020-01-12 RX ADMIN — FOLIC ACID 1 MG: 1 TABLET ORAL at 08:58

## 2020-01-12 RX ADMIN — DOCUSATE SODIUM 100 MG: 100 CAPSULE, LIQUID FILLED ORAL at 20:29

## 2020-01-12 RX ADMIN — PANTOPRAZOLE SODIUM 40 MG: 40 TABLET, DELAYED RELEASE ORAL at 08:58

## 2020-01-12 RX ADMIN — PAROXETINE HYDROCHLORIDE 20 MG: 20 TABLET, FILM COATED ORAL at 08:58

## 2020-01-12 RX ADMIN — ACETAMINOPHEN 650 MG: 325 TABLET, FILM COATED ORAL at 20:29

## 2020-01-12 RX ADMIN — DOCUSATE SODIUM 100 MG: 100 CAPSULE, LIQUID FILLED ORAL at 08:58

## 2020-01-12 RX ADMIN — TRAZODONE HYDROCHLORIDE 100 MG: 100 TABLET ORAL at 20:29

## 2020-01-12 RX ADMIN — ARIPIPRAZOLE 5 MG: 5 TABLET ORAL at 08:58

## 2020-01-12 RX ADMIN — NICOTINE POLACRILEX 2 MG: 2 GUM, CHEWING BUCCAL at 09:00

## 2020-01-12 ASSESSMENT — PAIN SCALES - GENERAL
PAINLEVEL_OUTOF10: 0
PAINLEVEL_OUTOF10: 7

## 2020-01-12 NOTE — PROGRESS NOTES
Department of Psychiatry  Attending Progress Note      SUBJECTIVE:  Found him in his room. Reports still hearing voices. Isolating and withdrawing. Not going to many groups. Medication complaint. OBJECTIVE    Physical  VITALS:  BP (!) 121/91   Pulse 92   Temp 97.4 °F (36.3 °C) (Oral)   Resp 14   Ht 5' 8\" (1.727 m)   Wt 158 lb (71.7 kg)   SpO2 100%   BMI 24.02 kg/m²     Mental Status Examination:    Pt. was alert, fully oriented, and cooperative. Appearance and hygiene weredisheveled, poor hygiene . Mood was depressed. Affect was \"dysthymic and poorly reactive Thought process was linear and well-organized. Patient endorsed auditory hallucinations.     Patient denied suicidal ideations. Patient denied homicidal ideations . Patient's gross cognitive functions were intact. Insight and judgement were poor.  Both recent and remote memory were intact.     Psychomotor status was slowed      Diagnostic Impression  Principal Problem:    Paranoid schizophrenia (HCC)    Medications  Current Facility-Administered Medications: acetaminophen (TYLENOL) tablet 650 mg, 650 mg, Oral, Q4H PRN  benztropine mesylate (COGENTIN) injection 2 mg, 2 mg, Intramuscular, BID PRN  magnesium hydroxide (MILK OF MAGNESIA) 400 MG/5ML suspension 30 mL, 30 mL, Oral, Daily PRN  aluminum & magnesium hydroxide-simethicone (MAALOX) 200-200-20 MG/5ML suspension 30 mL, 30 mL, Oral, Q6H PRN  nicotine polacrilex (NICORETTE) gum 2 mg, 2 mg, Oral, Q2H PRN  hydrOXYzine (ATARAX) tablet 25 mg, 25 mg, Oral, TID PRN  albuterol sulfate  (90 Base) MCG/ACT inhaler 2 puff, 2 puff, Inhalation, Q6H PRN  ARIPiprazole (ABILIFY) tablet 5 mg, 5 mg, Oral, Daily  docusate sodium (COLACE) capsule 100 mg, 100 mg, Oral, BID  folic acid (FOLVITE) tablet 1 mg, 1 mg, Oral, Daily  therapeutic multivitamin-minerals 1 tablet, 1 tablet, Oral, Daily  pantoprazole (PROTONIX) tablet 40 mg, 40 mg, Oral, Daily  traZODone (DESYREL) tablet 100 mg, 100 mg, Oral, Nightly  vitamin B-1 (THIAMINE) tablet 100 mg, 100 mg, Oral, Daily  PARoxetine (PAXIL) tablet 20 mg, 20 mg, Oral, Daily    ASSESSMENT AND PLAN    Will continue to adjust medications accordingly

## 2020-01-12 NOTE — GROUP NOTE
Group Therapy Note    Date: 1/12/2020    Group Start Time: 1600  Group End Time: 1645  Group Topic: Group Therapy    ALESSANDRA Austin        Group Therapy Note    Attendees: 11/21         Patient's Goal:   patient refused to attend Stress managment group at 1600 after encouragement from staff.  1:1 talk time provided as alternative to group session

## 2020-01-12 NOTE — PLAN OF CARE
Persecution  Memory:Normal: Yes  Memory: Poor Recent, Poor Remote  Insight and Judgment: No  Insight and Judgment: Poor Judgment, Poor Insight  Present Suicidal Ideation: No  Present Homicidal Ideation: No    Daily Assessment Last Entry:   Daily Sleep (WDL): Within Defined Limits         Patient Currently in Pain: Denies  Daily Nutrition (WDL): Within Defined Limits    Patient Monitoring:  Frequency of Checks: 4 times per hour, close    Psychiatric Symptoms:   Depression Symptoms  Depression Symptoms: Isolative, Loss of interest  Anxiety Symptoms  Anxiety Symptoms: Generalized  Payton Symptoms  Payton Symptoms: No problems reported or observed. Psychosis Symptoms  Delusion Type: No problems reported or observed. Suicide Risk CSSR-S:  1) Within the past month, have you wished you were dead or wished you could go to sleep and not wake up? : Yes  2) Have you actually had any thoughts of killing yourself? : Yes  3) Have you been thinking about how you might kill yourself? : Yes  5) Have you started to work out or worked out the details of how to kill yourself? Do you intend to carry out this plan? : No  6) Have you ever done anything, started to do anything, or prepared to do anything to end your life?: No  Change in Result:  no Change in Plan of care:  no      EDUCATION:   Learner Progress Toward Treatment Goals:   Reviewed results and recommendations of this team, Reviewed group plan and strategies, Reviewed signs, symptoms and risk of self harm and violent behavior, Reviewed goals and plan of care    Method:  small group, individual verbal education    Outcome:   Verbalized by patient but needs reinforcement to obtain goals    PATIENT GOALS:  Short term:  Medication stabilization. Long term:   Follow up with HC    PLAN/TREATMENT RECOMMENDATIONS UPDATE:  continue with group therapies, increased socialization, continue planning for after discharge goals, continue with medication compliance    SHORT-TERM GOALS

## 2020-01-12 NOTE — GROUP NOTE
Group Therapy Note    Date: 1/12/2020    Group Start Time: 1000  Group End Time: 2359  Group Topic: Psychoeducation    Χαλκοκονδύλη Sugey, LSW        Group Therapy Note    Attendees: 7         Patient's Goal:  Relationship building     Notes:      Status After Intervention:  Unchanged    Participation Level:  Active Listener and Interactive    Participation Quality: Appropriate and Attentive      Speech:  normal      Thought Process/Content: Logical      Affective Functioning: Congruent      Mood: euthymic      Level of consciousness:  Alert and Oriented x4      Response to Learning: Able to verbalize current knowledge/experience and Able to verbalize/acknowledge new learning      Endings: None Reported    Modes of Intervention: Education and Support      Discipline Responsible: /Counselor      Signature:  LINO Wise

## 2020-01-12 NOTE — GROUP NOTE
Group Therapy Note    Date: 1/12/2020    Group Start Time: 1330  Group End Time: 5849  Group Topic: Recreational    STCZ AYAZ GILL    Amando Day    Group Therapy Note    Attendees: 6         Patient's Goal:  To increase interpersonal interaction. Notes:  Pt attended and participated in group. Status After Intervention:  Improved    Participation Level:  Active Listener and Interactive    Participation Quality: Appropriate, Attentive, Sharing and Supportive      Speech:  normal      Thought Process/Content: Logical  Linear      Affective Functioning: Congruent      Mood: euthymic      Level of consciousness:  Alert, Oriented x4 and Attentive      Response to Learning: Able to verbalize current knowledge/experience, Able to verbalize/acknowledge new learning, Able to retain information and Progressing to goal      Endings: None Reported    Modes of Intervention: Education, Support, Socialization, Exploration, Clarifying, Problem-solving, Activity and Reality-testing      Discipline Responsible: Psychoeducational Specialist      Signature:  Chavez Interiano

## 2020-01-12 NOTE — GROUP NOTE
Group Therapy Note    Date: 1/12/2020    Group Start Time: 8851  Group End Time: 8400  Group Topic: Community Meeting    LISE GILL    Amando Day    Group Therapy Note    Attendees: 5         Patient's Goal:  To increase interpersonal interaction and identify a goal for the day. Notes:  Pt attended group and identified a goal of \"attending groups\". Status After Intervention:  Improved    Participation Level:  Active Listener and Interactive    Participation Quality: Appropriate, Attentive, Sharing and Supportive      Speech:  normal      Thought Process/Content: Logical  Linear      Affective Functioning: Congruent      Mood: euthymic      Level of consciousness:  Alert, Oriented x4 and Attentive      Response to Learning: Able to verbalize current knowledge/experience, Able to verbalize/acknowledge new learning, Able to retain information and Progressing to goal      Endings: None Reported    Modes of Intervention: Education, Support, Socialization, Exploration, Clarifying, Problem-solving, Limit-setting and Reality-testing      Discipline Responsible: Psychoeducational Specialist      Signature:  Ignacia Interiano

## 2020-01-12 NOTE — PLAN OF CARE
Patient was able to verbalize a decrease in intensity of hallucinations. Patient remains free from self harm.

## 2020-01-13 PROCEDURE — 99232 SBSQ HOSP IP/OBS MODERATE 35: CPT | Performed by: PSYCHIATRY & NEUROLOGY

## 2020-01-13 PROCEDURE — 6370000000 HC RX 637 (ALT 250 FOR IP): Performed by: NURSE PRACTITIONER

## 2020-01-13 PROCEDURE — 1240000000 HC EMOTIONAL WELLNESS R&B

## 2020-01-13 RX ADMIN — NICOTINE POLACRILEX 2 MG: 2 GUM, CHEWING BUCCAL at 21:17

## 2020-01-13 RX ADMIN — NICOTINE POLACRILEX 2 MG: 2 GUM, CHEWING BUCCAL at 16:45

## 2020-01-13 RX ADMIN — MULTIPLE VITAMINS W/ MINERALS TAB 1 TABLET: TAB at 08:53

## 2020-01-13 RX ADMIN — HYDROXYZINE HYDROCHLORIDE 25 MG: 25 TABLET, FILM COATED ORAL at 21:04

## 2020-01-13 RX ADMIN — NICOTINE POLACRILEX 2 MG: 2 GUM, CHEWING BUCCAL at 06:33

## 2020-01-13 RX ADMIN — DOCUSATE SODIUM 100 MG: 100 CAPSULE, LIQUID FILLED ORAL at 08:53

## 2020-01-13 RX ADMIN — FOLIC ACID 1 MG: 1 TABLET ORAL at 08:53

## 2020-01-13 RX ADMIN — THIAMINE HCL TAB 100 MG 100 MG: 100 TAB at 08:53

## 2020-01-13 RX ADMIN — ACETAMINOPHEN 650 MG: 325 TABLET, FILM COATED ORAL at 16:44

## 2020-01-13 RX ADMIN — TRAZODONE HYDROCHLORIDE 100 MG: 100 TABLET ORAL at 21:04

## 2020-01-13 RX ADMIN — PANTOPRAZOLE SODIUM 40 MG: 40 TABLET, DELAYED RELEASE ORAL at 08:53

## 2020-01-13 RX ADMIN — NICOTINE POLACRILEX 2 MG: 2 GUM, CHEWING BUCCAL at 18:49

## 2020-01-13 RX ADMIN — PAROXETINE HYDROCHLORIDE 20 MG: 20 TABLET, FILM COATED ORAL at 08:53

## 2020-01-13 RX ADMIN — ARIPIPRAZOLE 5 MG: 5 TABLET ORAL at 08:53

## 2020-01-13 RX ADMIN — NICOTINE POLACRILEX 2 MG: 2 GUM, CHEWING BUCCAL at 09:37

## 2020-01-13 ASSESSMENT — PAIN DESCRIPTION - PAIN TYPE: TYPE: CHRONIC PAIN

## 2020-01-13 ASSESSMENT — PAIN SCALES - GENERAL
PAINLEVEL_OUTOF10: 2
PAINLEVEL_OUTOF10: 8

## 2020-01-13 ASSESSMENT — PAIN DESCRIPTION - LOCATION: LOCATION: GENERALIZED

## 2020-01-13 NOTE — GROUP NOTE
Group Therapy Note    Date: 1/13/2020    Group Start Time: 0900  Group End Time: 0930  Group Topic: Community Meeting    ST [address], CTRS        Group Therapy Note    Attendees: 9         Patient's Goal: to increase interpersonal skills     Notes:  Patient attended group and participated     Status After Intervention:  Improved    Participation Level:  Active Listener and Interactive    Participation Quality: Appropriate, Attentive and Sharing      Speech:  normal      Thought Process/Content: Logical      Affective Functioning: Congruent      Mood: euthymic      Level of consciousness:  Alert and Oriented x4      Response to Learning: Progressing to goal      Endings: None Reported    Modes of Intervention: Education, Support and Socialization      Discipline Responsible: Psychoeducational Specialist      Signature:  Rigo Bob

## 2020-01-13 NOTE — PLAN OF CARE
Patient remains in a safe environment, free from self-harm. Reports he has visual/auditory hallucinations but they are manageable at this time. Reports no issues with sleep or appetite. Fifteen minute checks maintained for patient safety. Patient states he feels ready for discharge tomorrow, satisfied with medications.

## 2020-01-13 NOTE — GROUP NOTE
Group Therapy Note    Date: 1/13/2020    Group Start Time: 1000  Group End Time: 2210  Group Topic: Psychoeducation    LINO Hooper    Pt declined to attend psychoeducation at 1000 am despite encouragement. Pt offered 1:1 and refused.      Signature:  LINO Saavedra

## 2020-01-13 NOTE — GROUP NOTE
Group Therapy Note    Date: 1/13/2020    Group Start Time: 1000  Group End Time: 6312  Group Topic: Psychoeducation    ALESSANDRA BHKAYLEEN Ferrell, CHRISS    Group Therapy Note    Attendees: 5    Patient's Goal:  Pt will demonstrate improved interpersonal skills. Notes:  Pt attended group and participated. Status After Intervention:  Improved    Participation Level:  Active Listener and Interactive    Participation Quality: Appropriate, Attentive and Sharing      Speech:  normal      Thought Process/Content: Logical  Linear      Affective Functioning: Congruent      Mood: euthymic      Level of consciousness:  Alert, Oriented x4 and Attentive      Response to Learning: Able to verbalize current knowledge/experience, Able to verbalize/acknowledge new learning, Able to retain information, Able to change behavior and Progressing to goal      Endings: None Reported    Modes of Intervention: Education, Support, Socialization and Exploration      Discipline Responsible: Psychoeducational Specialist      Signature:  Caridad Vicente, [address]

## 2020-01-13 NOTE — PROGRESS NOTES
Department of Psychiatry  Attending Progress Note      SUBJECTIVE:  Found him in his room. Reports feeling better. His psychoses has started resolving. Isolating and withdrawing. Not going to many groups. Medication complaint. OBJECTIVE    Physical  VITALS:  /84   Pulse 76   Temp 97.6 °F (36.4 °C) (Oral)   Resp 14   Ht 5' 8\" (1.727 m)   Wt 158 lb (71.7 kg)   SpO2 100%   BMI 24.02 kg/m²     Mental Status Examination:    Pt. was alert, fully oriented, and cooperative. Appearance and hygiene weredisheveled, poor hygiene . Mood was depressed. Affect was \"dysthymic and poorly reactive Thought process was linear and well-organized. Patient endorsed auditory hallucinations.     Patient denied suicidal ideations. Patient denied homicidal ideations . Patient's gross cognitive functions were intact. Insight and judgement were poor.  Both recent and remote memory were intact.     Psychomotor status was slowed      Diagnostic Impression  Principal Problem:    Paranoid schizophrenia (HCC)    Medications  Current Facility-Administered Medications: acetaminophen (TYLENOL) tablet 650 mg, 650 mg, Oral, Q4H PRN  benztropine mesylate (COGENTIN) injection 2 mg, 2 mg, Intramuscular, BID PRN  magnesium hydroxide (MILK OF MAGNESIA) 400 MG/5ML suspension 30 mL, 30 mL, Oral, Daily PRN  aluminum & magnesium hydroxide-simethicone (MAALOX) 200-200-20 MG/5ML suspension 30 mL, 30 mL, Oral, Q6H PRN  nicotine polacrilex (NICORETTE) gum 2 mg, 2 mg, Oral, Q2H PRN  hydrOXYzine (ATARAX) tablet 25 mg, 25 mg, Oral, TID PRN  albuterol sulfate  (90 Base) MCG/ACT inhaler 2 puff, 2 puff, Inhalation, Q6H PRN  ARIPiprazole (ABILIFY) tablet 5 mg, 5 mg, Oral, Daily  docusate sodium (COLACE) capsule 100 mg, 100 mg, Oral, BID  folic acid (FOLVITE) tablet 1 mg, 1 mg, Oral, Daily  therapeutic multivitamin-minerals 1 tablet, 1 tablet, Oral, Daily  pantoprazole (PROTONIX) tablet 40 mg, 40 mg, Oral, Daily  traZODone (DESYREL) tablet 100 mg, 100 mg, Oral, Nightly  vitamin B-1 (THIAMINE) tablet 100 mg, 100 mg, Oral, Daily  PARoxetine (PAXIL) tablet 20 mg, 20 mg, Oral, Daily    ASSESSMENT AND PLAN    Will continue to adjust medications accordingly

## 2020-01-13 NOTE — GROUP NOTE
Group Therapy Note    Date: 1/13/2020    Group Start Time: 2030  Group End Time: 2115  Group Topic: Wrap-Up    ALESSANDRA GILL    Michelle Rosales LPN        Group Therapy Note    Attendees: 6/20       patient refused to attend wrap up group at 2030 after encouragement from staff.   1:1 talk time provided as alternative to group session    Signature:  Michelle Rosales LPN

## 2020-01-13 NOTE — GROUP NOTE
Group Therapy Note    Date: January 13, 2020    Group Start Time: 1100    Group End Time: 3462  Group Topic: Psychotherapy    [facility]    Group Therapy Note    Attendees: 5/10    Patient's Goal: relationships with family/others and improving insight and support systems     Notes:  participated in group fully    Status After Intervention:  Improved    Participation Level:  Active Listener and Interactive    Participation Quality: Appropriate, Attentive and Sharing    Speech:  Normal    Thought Process/Content: Logical    Affective Functioning: Congruent    Mood: Euthymic     Level of consciousness:  Alert, Oriented x4 and Attentive    Response to Learning: Able to verbalize current knowledge/experience, Able to verbalize/acknowledge new learning, Able to retain information, Capable of insight, Able to change behavior and Progressing to goal    Endings: None Reported    Modes of Intervention: Support, Exploration, Clarifying and Problem-solving    Discipline Responsible: /Counselor    Signature:  Herve Obregon MRC, CRC, LPC

## 2020-01-14 VITALS
RESPIRATION RATE: 14 BRPM | TEMPERATURE: 98 F | HEART RATE: 109 BPM | WEIGHT: 158 LBS | BODY MASS INDEX: 23.95 KG/M2 | HEIGHT: 68 IN | OXYGEN SATURATION: 100 % | SYSTOLIC BLOOD PRESSURE: 143 MMHG | DIASTOLIC BLOOD PRESSURE: 90 MMHG

## 2020-01-14 PROCEDURE — 6370000000 HC RX 637 (ALT 250 FOR IP): Performed by: NURSE PRACTITIONER

## 2020-01-14 PROCEDURE — 99238 HOSP IP/OBS DSCHRG MGMT 30/<: CPT | Performed by: PSYCHIATRY & NEUROLOGY

## 2020-01-14 PROCEDURE — 5130000000 HC BRIDGE APPOINTMENT: Performed by: COUNSELOR

## 2020-01-14 RX ORDER — PAROXETINE HYDROCHLORIDE 20 MG/1
20 TABLET, FILM COATED ORAL DAILY
Qty: 30 TABLET | Refills: 0 | Status: ON HOLD | OUTPATIENT
Start: 2020-01-15 | End: 2021-01-25 | Stop reason: HOSPADM

## 2020-01-14 RX ORDER — ARIPIPRAZOLE 5 MG/1
5 TABLET ORAL DAILY
Qty: 30 TABLET | Refills: 0 | Status: ON HOLD | OUTPATIENT
Start: 2020-01-14 | End: 2021-01-25 | Stop reason: HOSPADM

## 2020-01-14 RX ORDER — M-VIT,TX,IRON,MINS/CALC/FOLIC 27MG-0.4MG
1 TABLET ORAL DAILY
Qty: 30 TABLET | Refills: 0 | Status: ON HOLD | OUTPATIENT
Start: 2020-01-15 | End: 2021-01-25 | Stop reason: HOSPADM

## 2020-01-14 RX ORDER — PANTOPRAZOLE SODIUM 40 MG/1
40 TABLET, DELAYED RELEASE ORAL DAILY
Qty: 30 TABLET | Refills: 0 | Status: ON HOLD | OUTPATIENT
Start: 2020-01-15 | End: 2021-01-25 | Stop reason: HOSPADM

## 2020-01-14 RX ADMIN — DOCUSATE SODIUM 100 MG: 100 CAPSULE, LIQUID FILLED ORAL at 07:52

## 2020-01-14 RX ADMIN — THIAMINE HCL TAB 100 MG 100 MG: 100 TAB at 07:52

## 2020-01-14 RX ADMIN — PAROXETINE HYDROCHLORIDE 20 MG: 20 TABLET, FILM COATED ORAL at 07:52

## 2020-01-14 RX ADMIN — FOLIC ACID 1 MG: 1 TABLET ORAL at 07:52

## 2020-01-14 RX ADMIN — NICOTINE POLACRILEX 2 MG: 2 GUM, CHEWING BUCCAL at 12:35

## 2020-01-14 RX ADMIN — ARIPIPRAZOLE 5 MG: 5 TABLET ORAL at 07:52

## 2020-01-14 RX ADMIN — MULTIPLE VITAMINS W/ MINERALS TAB 1 TABLET: TAB at 07:52

## 2020-01-14 RX ADMIN — PANTOPRAZOLE SODIUM 40 MG: 40 TABLET, DELAYED RELEASE ORAL at 07:52

## 2020-01-14 NOTE — CARE COORDINATION
Bridge Appointment completed: Reviewed Discharge Instructions with patient. Patient verbalizes understanding and agreement with the discharge plan using the teachback method.        Discharge Arrangements: to [facility] and follow up with Ad Dave notified: n/a    Discharge destination/address: [address] [address]    Transported by:  [facility]

## 2020-01-14 NOTE — DISCHARGE SUMMARY
Physician Discharge Summary     Patient ID:  Braydon Garcia  [id number]  47 y.o.  1970    Admit date: 1/10/2020    Discharge date and time: No discharge date for patient encounter. Admitting Physician: Una Miranda MD     Discharge Physician: Tyrone Jenkins MD    Admission Diagnoses: Paranoid schizophrenia ([facility] [F20.0]  Paranoid schizophrenia ([facility] [F20.0]    Discharge Diagnoses: same as above     Admission Condition: serious     Discharged Condition: fair     Indication for Admission: Suicidal ideation     Hospital Course: Admitted to psych unit. Resumed and adjusted medications.  Mood improved and later denied any suicidal ideation.     Consults: none     Significant Diagnostic Studies: labs         Outstanding Order Results      No orders found from 12/3/2019 to 1/2/2020.             Treatments: Mood stabilizers     Discharge Exam:  Orientation: alertness: alert   Mood:. euthymic      Affect:  constricted      Appearance:  bearded and casually dressed   Activity:  Within Normal Limits   Gait/Posture: Normal   Speech:  normal pitch and normal volume   Thought Process:  normal   Thought Content:  normal   Sensorium:  person, place and situation   Cognition:  grossly intact   Memory: intact   Insight:  fair   Judgment: fair   Suicidal Ideations: denies   Homicidal Ideations: Negative for homicidal ideation      Medication Side Effects: absent       Attention Span attention span appeared shorter than expected for age            Disposition: home    Patient Instructions:      Medication List      CHANGE how you take these medications    PARoxetine 20 MG tablet  Commonly known as:  PAXIL  Take 1 tablet by mouth daily  Start taking on:  January 15, 2020  What changed:    · medication strength  · how much to take  · when to take this        CONTINUE taking these medications    albuterol sulfate  (90 Base) MCG/ACT inhaler  Commonly known as:  PROVENTIL HFA  Inhale 2 puffs into the lungs every 6 hours

## 2020-01-14 NOTE — GROUP NOTE
Group Therapy Note    Date: 1/14/2020    Group Start Time: 4912  Group End Time: 3761  Group Topic: Psychoeducation    [facility]    Dago Dias, CTRS    Group Therapy Note    Attendees: 8    Patient's Goal:  Pt will demonstrate understanding of community peer support center [facility]    Notes:  Pt attended group and participated    Status After Intervention:  Improved    Participation Level:  Active Listener    Participation Quality: Appropriate and Attentive      Speech:  normal      Thought Process/Content: Logical  Linear      Affective Functioning: Congruent      Mood: euthymic      Level of consciousness:  Alert, Oriented x4 and Attentive      Response to Learning: Able to verbalize current knowledge/experience, Able to verbalize/acknowledge new learning, Able to retain information, Capable of insight, Able to change behavior and Progressing to goal      Endings: None Reported    Modes of Intervention: Education, Support, Socialization, Exploration, Activity and Limit-setting      Discipline Responsible: Psychoeducational Specialist      Signature:  Dago Dias, [address]

## 2020-01-14 NOTE — DISCHARGE INSTR - COC
Continuity of Care Form    Patient Name: Cleven Favre. :  1970  MRN:  [id number]    Admit date:  1/10/2020  Discharge date:  ***    Code Status Order: Full Code   Advance Directives:   Advance Care Flowsheet Documentation     Date/Time Healthcare Directive Type of Healthcare Directive Copy in [address] Agent's Name Healthcare Agent's Phone Number    01/10/20 1446  No, patient does not have an advance directive for healthcare treatment -- -- -- -- --          Admitting Physician:  Dinah Gotti MD  PCP: No primary care provider on file. Discharging Nurse: [facility] Unit/Room#: 0125/0125-01  Discharging Unit Phone Number: ***    Emergency Contact:   Extended Emergency Contact Information  Primary Emergency Contact: Zeferino De La Cruz  Home Phone: [phone]  Relation: Brother/Sister    Past Surgical History:  History reviewed. No pertinent surgical history.     Immunization History:   Immunization History   Administered Date(s) Administered    Influenza Virus Vaccine 2015, 2015    Pneumococcal Polysaccharide ([id number]) 03/10/2015    Tdap (Boostrix, Adacel) 2013       Active Problems:  Patient Active Problem List   Diagnosis Code    Ethanolism ([facility] F10.20    Alcohol withdrawal ([facility] F10.239    Marijuana use F12.90    Chest pain R07.9    Depression F32.9    Right sided weakness R53.1    Acute diarrhea R19.7    Hypokalemia E87.6    Myalgia M79.10    Impaired functional mobility, balance, gait, and endurance Z74.09    Noncompliance Z91.19    Alcoholism ([facility] F10.20    Syncope and collapse R55    Tobacco use disorder F17.200    [facility] J44.9    Abdominal pain, epigastric R10.13    Chronic obstructive pulmonary disease with acute exacerbation ([facility]) J44.1    Gastritis K29.70    Duodenitis K29.80    Paranoid schizophrenia ([facility]) F20.0       Isolation/Infection:   Isolation

## 2020-01-14 NOTE — GROUP NOTE
Group Therapy Note    Date: 1/14/2020    Group Start Time: 1330  Group End Time: 0175  Group Topic: Recreational    [address] [place] [address] Drive    Patient refused to attend coping skills/ leisure group at 1330 after encouragement from staff. 1:1 talk time provided as alternative to group session.      Signature:  Cande Rollins

## 2020-01-14 NOTE — GROUP NOTE
Group Therapy Note    Date: 1/14/2020    Group Start Time: 1000  Group End Time: 4426  Group Topic: Recreational    STC AYAZ Gage    Patient refused to attend leisure skills group at 1000 after encouragement from staff. 1:1 talk time provided as alternative to group session.      Signature:  Erna Gage

## 2020-01-29 ENCOUNTER — HOSPITAL ENCOUNTER (OUTPATIENT)
Age: 50
Setting detail: SPECIMEN
Discharge: HOME | End: 2020-01-29
Payer: MEDICAID

## 2020-01-29 LAB
ABSOLUTE EOS #: 0.39 K/UL (ref 0–0.44)
ABSOLUTE IMMATURE GRANULOCYTE: 0.03 K/UL (ref 0–0.3)
ABSOLUTE LYMPH #: 2.68 K/UL (ref 1.1–3.7)
ABSOLUTE MONO #: 0.97 K/UL (ref 0.1–1.2)
ALBUMIN SERPL-MCNC: 4.3 G/DL (ref 3.5–5.2)
ALBUMIN/GLOBULIN RATIO: 1.6 (ref 1–2.5)
ALP BLD-CCNC: 66 U/L (ref 40–129)
ALT SERPL-CCNC: 14 U/L (ref 5–41)
ANION GAP SERPL CALCULATED.3IONS-SCNC: 18 MMOL/L (ref 9–17)
AST SERPL-CCNC: 17 U/L
BASOPHILS # BLD: 1 % (ref 0–2)
BASOPHILS ABSOLUTE: 0.08 K/UL (ref 0–0.2)
BILIRUB SERPL-MCNC: 0.33 MG/DL (ref 0.3–1.2)
BUN BLDV-MCNC: 12 MG/DL (ref 6–20)
BUN/CREAT BLD: ABNORMAL (ref 9–20)
CALCIUM SERPL-MCNC: 9.9 MG/DL (ref 8.6–10.4)
CHLORIDE BLD-SCNC: 101 MMOL/L (ref 98–107)
CHOLESTEROL/HDL RATIO: 4.7
CHOLESTEROL: 215 MG/DL
CO2: 22 MMOL/L (ref 20–31)
CREAT SERPL-MCNC: 0.8 MG/DL (ref 0.7–1.2)
DIFFERENTIAL TYPE: NORMAL
EOSINOPHILS RELATIVE PERCENT: 4 % (ref 1–4)
FERRITIN: 96 UG/L (ref 30–400)
FOLATE: >20 NG/ML
GFR AFRICAN AMERICAN: >60 ML/MIN
GFR NON-AFRICAN AMERICAN: >60 ML/MIN
GFR SERPL CREATININE-BSD FRML MDRD: ABNORMAL ML/MIN/{1.73_M2}
GFR SERPL CREATININE-BSD FRML MDRD: ABNORMAL ML/MIN/{1.73_M2}
GLUCOSE BLD-MCNC: 130 MG/DL (ref 70–99)
HAV IGM SER IA-ACNC: NONREACTIVE
HCT VFR BLD CALC: 46.8 % (ref 40.7–50.3)
HDLC SERPL-MCNC: 46 MG/DL
HEMOGLOBIN: 14.7 G/DL (ref 13–17)
HEPATITIS B CORE IGM ANTIBODY: NONREACTIVE
HEPATITIS B SURFACE ANTIGEN: NONREACTIVE
HEPATITIS C ANTIBODY: NONREACTIVE
IMMATURE GRANULOCYTES: 0 %
IRON SATURATION: 44 % (ref 20–55)
IRON: 109 UG/DL (ref 59–158)
LDL CHOLESTEROL: 131 MG/DL (ref 0–130)
LYMPHOCYTES # BLD: 25 % (ref 24–43)
MCH RBC QN AUTO: 30.1 PG (ref 25.2–33.5)
MCHC RBC AUTO-ENTMCNC: 31.4 G/DL (ref 28.4–34.8)
MCV RBC AUTO: 95.9 FL (ref 82.6–102.9)
MONOCYTES # BLD: 9 % (ref 3–12)
NRBC AUTOMATED: 0 PER 100 WBC
PDW BLD-RTO: 12.9 % (ref 11.8–14.4)
PLATELET # BLD: 330 K/UL (ref 138–453)
PLATELET ESTIMATE: NORMAL
PMV BLD AUTO: 10.3 FL (ref 8.1–13.5)
POTASSIUM SERPL-SCNC: 3.9 MMOL/L (ref 3.7–5.3)
RBC # BLD: 4.88 M/UL (ref 4.21–5.77)
RBC # BLD: NORMAL 10*6/UL
SEG NEUTROPHILS: 61 % (ref 36–65)
SEGMENTED NEUTROPHILS ABSOLUTE COUNT: 6.7 K/UL (ref 1.5–8.1)
SODIUM BLD-SCNC: 141 MMOL/L (ref 135–144)
TOTAL IRON BINDING CAPACITY: 247 UG/DL (ref 250–450)
TOTAL PROTEIN: 7 G/DL (ref 6.4–8.3)
TRIGL SERPL-MCNC: 190 MG/DL
TSH SERPL DL<=0.05 MIU/L-ACNC: 2.01 MIU/L (ref 0.3–5)
UNSATURATED IRON BINDING CAPACITY: 138 UG/DL (ref 112–347)
VITAMIN B-12: 489 PG/ML (ref 232–1245)
VITAMIN D 25-HYDROXY: 31.4 NG/ML (ref 30–100)
VLDLC SERPL CALC-MCNC: ABNORMAL MG/DL (ref 1–30)
WBC # BLD: 10.9 K/UL (ref 3.5–11.3)
WBC # BLD: NORMAL 10*3/UL

## 2020-02-02 LAB — VITAMIN B1 WHOLE BLOOD: 243 NMOL/L (ref 70–180)

## 2021-01-13 ENCOUNTER — HOSPITAL ENCOUNTER (INPATIENT)
Age: 51
LOS: 11 days | Discharge: NURSING, REHAB OR LONG-TERM CARE | DRG: 751 | End: 2021-01-25
Attending: PSYCHIATRY & NEUROLOGY | Admitting: PSYCHIATRY & NEUROLOGY
Payer: MEDICAID

## 2021-01-13 DIAGNOSIS — F33.2 MAJOR DEPRESSIVE DISORDER, RECURRENT SEVERE WITHOUT PSYCHOTIC FEATURES (HCC): Primary | ICD-10-CM

## 2021-01-13 LAB
ACETAMINOPHEN LEVEL: < 5 UG/ML (ref 0–20)
ALBUMIN SERPL-MCNC: 3.9 G/DL (ref 3.5–5.1)
ALP BLD-CCNC: 73 U/L (ref 38–126)
ALT SERPL-CCNC: 21 U/L (ref 11–66)
AMPHETAMINE+METHAMPHETAMINE URINE SCREEN: NEGATIVE
ANION GAP SERPL CALCULATED.3IONS-SCNC: 14 MEQ/L (ref 8–16)
AST SERPL-CCNC: 27 U/L (ref 5–40)
BARBITURATE QUANTITATIVE URINE: NEGATIVE
BASOPHILS # BLD: 0.9 %
BASOPHILS ABSOLUTE: 0.1 THOU/MM3 (ref 0–0.1)
BENZODIAZEPINE QUANTITATIVE URINE: NEGATIVE
BILIRUB SERPL-MCNC: 0.7 MG/DL (ref 0.3–1.2)
BILIRUBIN DIRECT: < 0.2 MG/DL (ref 0–0.3)
BILIRUBIN URINE: NEGATIVE
BLOOD, URINE: NEGATIVE
BUN BLDV-MCNC: 9 MG/DL (ref 7–22)
CALCIUM SERPL-MCNC: 9 MG/DL (ref 8.5–10.5)
CANNABINOID QUANTITATIVE URINE: POSITIVE
CHARACTER, URINE: CLEAR
CHLORIDE BLD-SCNC: 99 MEQ/L (ref 98–111)
CO2: 23 MEQ/L (ref 23–33)
COCAINE METABOLITE QUANTITATIVE URINE: NEGATIVE
COLOR: YELLOW
CREAT SERPL-MCNC: 0.5 MG/DL (ref 0.4–1.2)
EKG ATRIAL RATE: 91 BPM
EKG P AXIS: 46 DEGREES
EKG P-R INTERVAL: 124 MS
EKG Q-T INTERVAL: 384 MS
EKG QRS DURATION: 88 MS
EKG QTC CALCULATION (BAZETT): 472 MS
EKG R AXIS: -41 DEGREES
EKG T AXIS: 70 DEGREES
EKG VENTRICULAR RATE: 91 BPM
EOSINOPHIL # BLD: 0.3 %
EOSINOPHILS ABSOLUTE: 0 THOU/MM3 (ref 0–0.4)
ERYTHROCYTE [DISTWIDTH] IN BLOOD BY AUTOMATED COUNT: 12.7 % (ref 11.5–14.5)
ERYTHROCYTE [DISTWIDTH] IN BLOOD BY AUTOMATED COUNT: 44.1 FL (ref 35–45)
ETHYL ALCOHOL, SERUM: 0.21 %
GFR SERPL CREATININE-BSD FRML MDRD: > 90 ML/MIN/1.73M2
GLUCOSE BLD-MCNC: 94 MG/DL (ref 70–108)
GLUCOSE URINE: NEGATIVE MG/DL
HCT VFR BLD CALC: 41.1 % (ref 42–52)
HEMOGLOBIN: 14.3 GM/DL (ref 14–18)
IMMATURE GRANS (ABS): 0.06 THOU/MM3 (ref 0–0.07)
IMMATURE GRANULOCYTES: 0.5 %
KETONES, URINE: NEGATIVE
LEUKOCYTE ESTERASE, URINE: NEGATIVE
LYMPHOCYTES # BLD: 18.7 %
LYMPHOCYTES ABSOLUTE: 2.3 THOU/MM3 (ref 1–4.8)
MCH RBC QN AUTO: 32.8 PG (ref 26–33)
MCHC RBC AUTO-ENTMCNC: 34.8 GM/DL (ref 32.2–35.5)
MCV RBC AUTO: 94.3 FL (ref 80–94)
MONOCYTES # BLD: 6.4 %
MONOCYTES ABSOLUTE: 0.8 THOU/MM3 (ref 0.4–1.3)
NITRITE, URINE: NEGATIVE
NUCLEATED RED BLOOD CELLS: 0 /100 WBC
OPIATES, URINE: NEGATIVE
OSMOLALITY CALCULATION: 270.4 MOSMOL/KG (ref 275–300)
OXYCODONE: NEGATIVE
PH UA: 6.5 (ref 5–9)
PHENCYCLIDINE QUANTITATIVE URINE: NEGATIVE
PLATELET # BLD: 376 THOU/MM3 (ref 130–400)
PMV BLD AUTO: 10 FL (ref 9.4–12.4)
POTASSIUM SERPL-SCNC: 3.6 MEQ/L (ref 3.5–5.2)
PROTEIN UA: NEGATIVE
RBC # BLD: 4.36 MILL/MM3 (ref 4.7–6.1)
SALICYLATE, SERUM: < 0.3 MG/DL (ref 2–10)
SEG NEUTROPHILS: 73.2 %
SEGMENTED NEUTROPHILS ABSOLUTE COUNT: 9 THOU/MM3 (ref 1.8–7.7)
SODIUM BLD-SCNC: 136 MEQ/L (ref 135–145)
SPECIFIC GRAVITY, URINE: < 1.005 (ref 1–1.03)
TOTAL PROTEIN: 7.1 G/DL (ref 6.1–8)
TROPONIN T: < 0.01 NG/ML
TSH SERPL DL<=0.05 MIU/L-ACNC: 0.49 UIU/ML (ref 0.4–4.2)
UROBILINOGEN, URINE: 0.2 EU/DL (ref 0–1)
WBC # BLD: 12.3 THOU/MM3 (ref 4.8–10.8)

## 2021-01-13 PROCEDURE — 85025 COMPLETE CBC W/AUTO DIFF WBC: CPT

## 2021-01-13 PROCEDURE — 99285 EMERGENCY DEPT VISIT HI MDM: CPT

## 2021-01-13 PROCEDURE — 84484 ASSAY OF TROPONIN QUANT: CPT

## 2021-01-13 PROCEDURE — 93005 ELECTROCARDIOGRAM TRACING: CPT | Performed by: NURSE PRACTITIONER

## 2021-01-13 PROCEDURE — 80053 COMPREHEN METABOLIC PANEL: CPT

## 2021-01-13 PROCEDURE — 81003 URINALYSIS AUTO W/O SCOPE: CPT

## 2021-01-13 PROCEDURE — 36415 COLL VENOUS BLD VENIPUNCTURE: CPT

## 2021-01-13 PROCEDURE — 82248 BILIRUBIN DIRECT: CPT

## 2021-01-13 PROCEDURE — 80179 DRUG ASSAY SALICYLATE: CPT

## 2021-01-13 PROCEDURE — 84443 ASSAY THYROID STIM HORMONE: CPT

## 2021-01-13 PROCEDURE — 80143 DRUG ASSAY ACETAMINOPHEN: CPT

## 2021-01-13 PROCEDURE — 82077 ASSAY SPEC XCP UR&BREATH IA: CPT

## 2021-01-13 PROCEDURE — 80307 DRUG TEST PRSMV CHEM ANLYZR: CPT

## 2021-01-13 ASSESSMENT — PAIN DESCRIPTION - LOCATION: LOCATION: CHEST

## 2021-01-13 ASSESSMENT — SLEEP AND FATIGUE QUESTIONNAIRES
RESTFUL SLEEP: NO
AVERAGE NUMBER OF SLEEP HOURS: 0
DIFFICULTY ARISING: YES
DIFFICULTY STAYING ASLEEP: YES
DO YOU HAVE DIFFICULTY SLEEPING: YES

## 2021-01-13 ASSESSMENT — PAIN DESCRIPTION - ONSET: ONSET: GRADUAL

## 2021-01-13 ASSESSMENT — PAIN DESCRIPTION - DESCRIPTORS: DESCRIPTORS: ACHING

## 2021-01-13 ASSESSMENT — PATIENT HEALTH QUESTIONNAIRE - PHQ9: SUM OF ALL RESPONSES TO PHQ QUESTIONS 1-9: 23

## 2021-01-13 ASSESSMENT — PAIN SCALES - GENERAL: PAINLEVEL_OUTOF10: 10

## 2021-01-13 ASSESSMENT — PAIN DESCRIPTION - FREQUENCY: FREQUENCY: CONTINUOUS

## 2021-01-13 NOTE — Clinical Note
Patient Class: Inpatient [101]   REQUIRED: Diagnosis: Major depressive disorder, recurrent severe without psychotic features ([facility] [[id number]]   Estimated Length of Stay: Estimated stay of more than 2 midnights   Admitting Provider: Ephraim Martel [[id number]]   Preferred Department: [facility]   Telemetry Bed Required?: No

## 2021-01-13 NOTE — ED NOTES
Pt to the ED via EMS. Patient presents with complaints of chest pain after trying to starve himself. Patient states that he is trying to kill himself and has been trying to for awhile. Patient expresses abdominal pain after not eating. Patient states that he is an alcoholic. Patient states that he is currently not taking any of his medications. EKG done. Patient is alert and oriented x 4. Respirations are regular and unlabored. Patient provided blanket. Call light within reach.      Rodney Cortes RN  01/13/21 89 Anthony Muñoz RN  01/13/21 [id number]

## 2021-01-13 NOTE — ED NOTES
Bed: 041A  Expected date:   Expected time:   Means of arrival:   Comments:  [address], RN  01/13/21 1436

## 2021-01-13 NOTE — ED NOTES
Patient admits to voices in his head telling him to kill himself here and just end it. Patient moved back to the safe rooms at this time.      Ramsey Guzman RN  01/13/21 2806

## 2021-01-14 PROBLEM — F33.2 MAJOR DEPRESSIVE DISORDER, RECURRENT SEVERE WITHOUT PSYCHOTIC FEATURES (HCC): Status: ACTIVE | Noted: 2021-01-14

## 2021-01-14 PROBLEM — F33.3 SEVERE EPISODE OF RECURRENT MAJOR DEPRESSIVE DISORDER, WITH PSYCHOTIC FEATURES (HCC): Status: ACTIVE | Noted: 2021-01-14

## 2021-01-14 PROBLEM — E43 SEVERE MALNUTRITION (HCC): Chronic | Status: ACTIVE | Noted: 2021-01-14

## 2021-01-14 LAB
ETHYL ALCOHOL, SERUM: 0.05 %
SARS-COV-2, NAAT: NOT DETECTED

## 2021-01-14 PROCEDURE — 36415 COLL VENOUS BLD VENIPUNCTURE: CPT

## 2021-01-14 PROCEDURE — 1240000000 HC EMOTIONAL WELLNESS R&B

## 2021-01-14 PROCEDURE — 6370000000 HC RX 637 (ALT 250 FOR IP): Performed by: REGISTERED NURSE

## 2021-01-14 PROCEDURE — U0002 COVID-19 LAB TEST NON-CDC: HCPCS

## 2021-01-14 PROCEDURE — 6370000000 HC RX 637 (ALT 250 FOR IP): Performed by: PSYCHIATRY & NEUROLOGY

## 2021-01-14 PROCEDURE — 99223 1ST HOSP IP/OBS HIGH 75: CPT | Performed by: PSYCHIATRY & NEUROLOGY

## 2021-01-14 PROCEDURE — 82077 ASSAY SPEC XCP UR&BREATH IA: CPT

## 2021-01-14 RX ORDER — FOLIC ACID 1 MG/1
1 TABLET ORAL DAILY
Status: DISCONTINUED | OUTPATIENT
Start: 2021-01-14 | End: 2021-01-25 | Stop reason: HOSPADM

## 2021-01-14 RX ORDER — ACETAMINOPHEN 325 MG/1
650 TABLET ORAL EVERY 4 HOURS PRN
Status: DISCONTINUED | OUTPATIENT
Start: 2021-01-14 | End: 2021-01-25 | Stop reason: HOSPADM

## 2021-01-14 RX ORDER — ONDANSETRON 4 MG/1
8 TABLET, ORALLY DISINTEGRATING ORAL EVERY 8 HOURS PRN
Status: DISCONTINUED | OUTPATIENT
Start: 2021-01-14 | End: 2021-01-25 | Stop reason: HOSPADM

## 2021-01-14 RX ORDER — LORAZEPAM 2 MG/ML
4 INJECTION INTRAMUSCULAR
Status: DISCONTINUED | OUTPATIENT
Start: 2021-01-14 | End: 2021-01-17

## 2021-01-14 RX ORDER — NICOTINE 21 MG/24HR
1 PATCH, TRANSDERMAL 24 HOURS TRANSDERMAL DAILY
Status: DISCONTINUED | OUTPATIENT
Start: 2021-01-14 | End: 2021-01-25 | Stop reason: HOSPADM

## 2021-01-14 RX ORDER — SUCRALFATE 1 G/1
1 TABLET ORAL
Status: DISCONTINUED | OUTPATIENT
Start: 2021-01-14 | End: 2021-01-25 | Stop reason: HOSPADM

## 2021-01-14 RX ORDER — LANOLIN ALCOHOL/MO/W.PET/CERES
100 CREAM (GRAM) TOPICAL DAILY
Status: DISCONTINUED | OUTPATIENT
Start: 2021-01-14 | End: 2021-01-25 | Stop reason: HOSPADM

## 2021-01-14 RX ORDER — LORAZEPAM 1 MG/1
1 TABLET ORAL
Status: DISCONTINUED | OUTPATIENT
Start: 2021-01-14 | End: 2021-01-17

## 2021-01-14 RX ORDER — LORAZEPAM 2 MG/ML
2 INJECTION INTRAMUSCULAR
Status: DISCONTINUED | OUTPATIENT
Start: 2021-01-14 | End: 2021-01-17

## 2021-01-14 RX ORDER — PANTOPRAZOLE SODIUM 40 MG/1
40 TABLET, DELAYED RELEASE ORAL
Status: DISCONTINUED | OUTPATIENT
Start: 2021-01-15 | End: 2021-01-25 | Stop reason: HOSPADM

## 2021-01-14 RX ORDER — TRAZODONE HYDROCHLORIDE 50 MG/1
50 TABLET ORAL NIGHTLY PRN
Status: DISCONTINUED | OUTPATIENT
Start: 2021-01-14 | End: 2021-01-25 | Stop reason: HOSPADM

## 2021-01-14 RX ORDER — MAGNESIUM HYDROXIDE/ALUMINUM HYDROXICE/SIMETHICONE 120; 1200; 1200 MG/30ML; MG/30ML; MG/30ML
30 SUSPENSION ORAL EVERY 6 HOURS PRN
Status: DISCONTINUED | OUTPATIENT
Start: 2021-01-14 | End: 2021-01-25 | Stop reason: HOSPADM

## 2021-01-14 RX ORDER — LORAZEPAM 1 MG/1
2 TABLET ORAL
Status: DISCONTINUED | OUTPATIENT
Start: 2021-01-14 | End: 2021-01-17

## 2021-01-14 RX ORDER — SUCRALFATE ORAL 1 G/10ML
1 SUSPENSION ORAL
Status: DISCONTINUED | OUTPATIENT
Start: 2021-01-14 | End: 2021-01-14 | Stop reason: RX

## 2021-01-14 RX ORDER — LORAZEPAM 1 MG/1
3 TABLET ORAL
Status: DISCONTINUED | OUTPATIENT
Start: 2021-01-14 | End: 2021-01-17

## 2021-01-14 RX ORDER — LORAZEPAM 2 MG/ML
3 INJECTION INTRAMUSCULAR
Status: DISCONTINUED | OUTPATIENT
Start: 2021-01-14 | End: 2021-01-17

## 2021-01-14 RX ORDER — LORAZEPAM 1 MG/1
4 TABLET ORAL
Status: DISCONTINUED | OUTPATIENT
Start: 2021-01-14 | End: 2021-01-17

## 2021-01-14 RX ORDER — HYDROXYZINE HYDROCHLORIDE 25 MG/1
50 TABLET, FILM COATED ORAL 3 TIMES DAILY PRN
Status: DISCONTINUED | OUTPATIENT
Start: 2021-01-14 | End: 2021-01-25 | Stop reason: HOSPADM

## 2021-01-14 RX ORDER — LORAZEPAM 2 MG/ML
1 INJECTION INTRAMUSCULAR
Status: DISCONTINUED | OUTPATIENT
Start: 2021-01-14 | End: 2021-01-17

## 2021-01-14 RX ADMIN — LORAZEPAM 1 MG: 1 TABLET ORAL at 21:00

## 2021-01-14 RX ADMIN — LORAZEPAM 1 MG: 1 TABLET ORAL at 15:12

## 2021-01-14 RX ADMIN — ACETAMINOPHEN 650 MG: 325 TABLET ORAL at 08:33

## 2021-01-14 RX ADMIN — SUCRALFATE 1 G: 1 TABLET ORAL at 21:00

## 2021-01-14 RX ADMIN — LORAZEPAM 1 MG: 1 TABLET ORAL at 08:33

## 2021-01-14 RX ADMIN — SERTRALINE 25 MG: 50 TABLET, FILM COATED ORAL at 17:45

## 2021-01-14 RX ADMIN — LORAZEPAM 1 MG: 1 TABLET ORAL at 04:47

## 2021-01-14 RX ADMIN — LORAZEPAM 2 MG: 1 TABLET ORAL at 22:41

## 2021-01-14 RX ADMIN — FOLIC ACID 1 MG: 1 TABLET ORAL at 14:10

## 2021-01-14 RX ADMIN — TRAZODONE HYDROCHLORIDE 50 MG: 50 TABLET ORAL at 21:00

## 2021-01-14 RX ADMIN — SUCRALFATE 1 G: 1 TABLET ORAL at 16:38

## 2021-01-14 RX ADMIN — ONDANSETRON 8 MG: 4 TABLET, ORALLY DISINTEGRATING ORAL at 16:27

## 2021-01-14 RX ADMIN — Medication 100 MG: at 14:10

## 2021-01-14 RX ADMIN — LORAZEPAM 1 MG: 1 TABLET ORAL at 12:00

## 2021-01-14 RX ADMIN — ALUMINUM HYDROXIDE, MAGNESIUM HYDROXIDE, AND SIMETHICONE 30 ML: 200; 200; 20 SUSPENSION ORAL at 04:47

## 2021-01-14 RX ADMIN — LORAZEPAM 2 MG: 1 TABLET ORAL at 16:37

## 2021-01-14 RX ADMIN — LORAZEPAM 1 MG: 1 TABLET ORAL at 14:16

## 2021-01-14 ASSESSMENT — PAIN SCALES - GENERAL
PAINLEVEL_OUTOF10: 7
PAINLEVEL_OUTOF10: 10
PAINLEVEL_OUTOF10: 9
PAINLEVEL_OUTOF10: 0

## 2021-01-14 ASSESSMENT — PAIN DESCRIPTION - DIRECTION: RADIATING_TOWARDS: UPPER

## 2021-01-14 ASSESSMENT — ENCOUNTER SYMPTOMS
VOMITING: 0
ABDOMINAL PAIN: 1
COUGH: 0
NAUSEA: 0
RHINORRHEA: 0
BACK PAIN: 0
CHEST TIGHTNESS: 0
SHORTNESS OF BREATH: 0

## 2021-01-14 ASSESSMENT — SLEEP AND FATIGUE QUESTIONNAIRES
DIFFICULTY ARISING: NO
DO YOU HAVE DIFFICULTY SLEEPING: YES
AVERAGE NUMBER OF SLEEP HOURS: 2
DIFFICULTY ARISING: YES
DO YOU HAVE DIFFICULTY SLEEPING: YES
SLEEP PATTERN: DIFFICULTY FALLING ASLEEP;DISTURBED/INTERRUPTED SLEEP;INSOMNIA;DIFFICULTY ARISING
DIFFICULTY STAYING ASLEEP: YES
DO YOU USE A SLEEP AID: NO
DIFFICULTY FALLING ASLEEP: YES
SLEEP PATTERN: DIFFICULTY FALLING ASLEEP;DISTURBED/INTERRUPTED SLEEP;INSOMNIA;RESTLESSNESS;NIGHTMARES/TERRORS

## 2021-01-14 ASSESSMENT — PAIN DESCRIPTION - PROGRESSION: CLINICAL_PROGRESSION: GRADUALLY WORSENING

## 2021-01-14 ASSESSMENT — PAIN DESCRIPTION - FREQUENCY
FREQUENCY: INTERMITTENT
FREQUENCY: CONTINUOUS

## 2021-01-14 ASSESSMENT — PAIN - FUNCTIONAL ASSESSMENT
PAIN_FUNCTIONAL_ASSESSMENT: ACTIVITIES ARE NOT PREVENTED
PAIN_FUNCTIONAL_ASSESSMENT: ACTIVITIES ARE NOT PREVENTED

## 2021-01-14 ASSESSMENT — PAIN DESCRIPTION - DESCRIPTORS
DESCRIPTORS: ACHING;BURNING;CONSTANT
DESCRIPTORS: ACHING
DESCRIPTORS: CONSTANT;BURNING;ACHING
DESCRIPTORS: ACHING

## 2021-01-14 ASSESSMENT — PATIENT HEALTH QUESTIONNAIRE - PHQ9: SUM OF ALL RESPONSES TO PHQ QUESTIONS 1-9: 23

## 2021-01-14 ASSESSMENT — PAIN DESCRIPTION - LOCATION
LOCATION: STERNUM
LOCATION: STERNUM

## 2021-01-14 ASSESSMENT — PAIN DESCRIPTION - ONSET: ONSET: GRADUAL

## 2021-01-14 ASSESSMENT — PAIN DESCRIPTION - ORIENTATION
ORIENTATION: MID
ORIENTATION: MID;UPPER

## 2021-01-14 NOTE — BH NOTE
INPATIENT RECREATIONAL THERAPY  ADULT BEHAVIORAL SERVICES  EVALUATION    REFERRING PHYSICIAN:   Dr. Ml Johnson  DIAGNOSIS:   Major Depressive Disorder, Recurrent Severe Without Psychotic Features  PRECAUTIONS:   Standard precautions    HISTORY OF PRESENT ILLNESS/INJURY:   Patient was admitted to the unit due to suicidal ideation and depression. Patient reported having command auditory hallucinations telling him to kill himself prior to admission. Patient reported that he has been attempting to kill himself by starving himself for the past 4 days. Patient stated that he has recently lost over 40 lbs. Patient reported financial stress and a limited support system. Patient has been abusing alcohol. Patient was cooperative but guarded at time of evaluation. 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 LIVING SITUATION/SUPPORT:  Lives alone. EDUCATIONAL LEVEL:  8th grade educations    MEDICATION/DRUG USE:  Noncompliance with medications. Alcohol abuse. History of cocaine and marijuana. LEISURE INTERESTS:  watching TV and movies, pet care, playing video games, listening to music, playing cards and games   ACTIVITY PREFERENCE:  Individual   ACTIVITY TYPES:  Passive. Indoor. COGNITION:   A&Ox4    COPING:   poor  ATTENTION:  fair  RELAXATION:  Patient reported that he has not eaten or slept for 4 days. SELF-ESTEEM:   poor  MOTIVATION:   Fair to poor    SOCIAL SKILLS:   Fair - guarded  FRUSTRATION TOLERANCE:   Fair - history of cutting. ATTENTION SEEKING: isolating in room -  History of cutting.    COOPERATION:  Cooperative but guarded  AFFECT:  flat  APPEARANCE:  appropriate    HEARING:    No problems noted VISION:   No problems noted   VERBAL COMMUNICATION:   No problems - guarded  WRITTEN COMMUNICATION:   No problems noted    COORDINATION:  Patient reported upper abdominal pain, nausea and vomiting. MOBILITY:  Ambulates independently but currently resting in bed. GOALS:  (1) Increase socialization by attending groups on the unit daily. (2) identify 2 new positive coping skills by time of discharge.

## 2021-01-14 NOTE — PROGRESS NOTES
Comprehensive Nutrition Assessment    Type and Reason for Visit:  Initial, Positive Nutrition Screen, Consult(poor appetite/intake, unplanned weight loss)    Nutrition Recommendations/Plan:   Consider MVI, thiamin, and folic acid supplementation given alcohol abuse history. Continue current diet. ONS initiated: Ensure Enlive TID. Nutrition Assessment:    Pt. severely malnourished AEB criteria listed below. At risk for further nutritional compromise r/t belly pain, poor appetite, alcohol withdrawal, admit with depressive disorder, suicidal thoughts, and underlying medical condition (hx: GERD, COPD, schizophrenia, CVA, HTN, alcohol abuse). Nutrition recommendations/interventions as per above. Malnutrition Assessment:  Malnutrition Status:  Severe malnutrition    Context:  Social/Environmental Circumstances(alcohol abuse)     Findings of the 6 clinical characteristics of malnutrition:  Energy Intake:  7 - 50% or less estimated energy requirements for 1 month or longer  Weight Loss:  7 - Greater than 20% over 1 year(24.7% loss)     Body Fat Loss:  7 - Severe body fat loss Orbital   Muscle Mass Loss:  Unable to assess(pt wrapped up in blanket, not feeling well, alcohol withdrawal)    Fluid Accumulation:  No significant fluid accumulation     Strength:  Not Performed    Estimated Daily Nutrient Needs:  Energy (kcal):  5185-5630 kcals (30-35 kcals/kg/day) or more to promote weight gain; Weight Used for Energy Requirements:  Current(54 kg 1/14/21)     Protein (g):  81 grams or more (1.5 grams/kg/day);  Weight Used for Protein Requirements:  Current(54 kg) Nutrition Related Findings:  Pt seen, thin, lying in bed, going through alcohol withdrawl. States having stomach pain, started 2 days prior to admit, RN questions if possible ulcer/gastritis from alcohol abuse, discussed suggesting carafate trial, she plans to discuss with MD.  Pt reports he was drinking alcohol all day and not eating, has lost 39# in the last year per EMR documentation unplanned. He was agreeable to trial ONS. Has been sipping on Gatorade. Positive for cannabinoid. Rx; maalox, MOM, ativan. Wounds:  None       Current Nutrition Therapies:    DIET GENERAL;  Dietary Nutrition Supplements: Standard High Calorie Oral Supplement    Anthropometric Measures:  · Height: 5' 8\" (172.7 cm)  · Current Body Weight: 119 lb (54 kg)(1/14/21 actual, no edema)   · Admission Body Weight: 119 lb (54 kg)(1/14/21 actual, no edema)    · Usual Body Weight: (~ 176# per pt. Per EMR: 1/10/20: 158# actual.)     · Ideal Body Weight: 154 lbs;   · BMI: 18.1  · Adjusted Body Weight:  ; No Adjustment   · BMI Categories: Underweight (BMI less than 18.5)       Nutrition Diagnosis:   · Severe malnutrition, In context of social or environmental circumstances related to inadequate protein-energy intake(alcohol abuse) as evidenced by poor intake prior to admission, weight loss greater than or equal to 20% in 1 year, severe loss of subcutaneous fat      Nutrition Interventions:   Food and/or Nutrient Delivery:  Continue Current Diet, Start Oral Nutrition Supplement  Nutrition Education/Counseling:  Education initiated(Encouraged oral intake and ONS use.)   Coordination of Nutrition Care:  Continue to monitor while inpatient    Goals:  Pt will consume 75% or more of meals during LOS.        Nutrition Monitoring and Evaluation:   Behavioral-Environmental Outcomes:  None Identified   Food/Nutrient Intake Outcomes:  Food and Nutrient Intake, Supplement Intake Physical Signs/Symptoms Outcomes:  Biochemical Data, GI Status, Nutrition Focused Physical Findings, Skin, Weight     Discharge Planning:     Too soon to determine     Electronically signed by Brown Brand RD, LD on 1/14/21 at 10:47 AM EST    Contact: [phone]

## 2021-01-14 NOTE — H&P
Department of Psychiatry  Attending Physician Psychiatric Assessment     Reason for Admission to Psychiatric Unit:  Threat to self requiring 24 hour professional observation  Concerns about patient's safety in the community    CHIEF COMPLAINT:  Suicidal ideation with plan to starve himself to death    History obtained from:  patient, electronic medical record and family members    HISTORY OF PRESENT ILLNESS:    Nisreen Maxwell is a 45 y.o. male with significant past psychiatric history of alcohol use disorder, schizophrenia, depression who presented to the ED via EMS. Per ED note: \"patient presents with complaints of chest pain after trying to starve himself. Patient states that he is trying to kill himself and has been trying to for awhile. Patient expresses abdominal pain after not eating. Patient states that he is an alcoholic. Patient states that he is currently not taking any of his medications. \"    Per SW note: \"Patient reports that he feels like his is done living because he is so tired. Patient reports that he has not ate or slept the past 4 days. He is connected to [employer] [facility] in [facility]. He reports his sister used to take him to his appointment, but she does not anymore. He has been off his medication for 6 months. Patient reports visual and auditory hallucinations. Patient hears voices telling him to kill himself. Patient describes voices as evil/ horrible. Patient has began acting on these commands by staving himself. Patient sees shadows of people. Patient does identify that he is seeing shadows and hearing voices because he has been off his medication for 6months. Patient denies active visual or auditory hallucinations while present in the [facility]. Last hallucination was prior to coming in. Patient reports that he drinks a 12 pack of beer daily. Patient admits to drinking a 12 pack of beer and a half bottle of vodka today. Patient reports that he smokes marijuana daily. Homicidal specific thoughts and/or plans denied. Patient states that he will kill people if they mess with him. No specific person identified. \"    Upon admission to : Shaggy Lombardi is very difficult to assess this morning. He immediately falls asleep each time he is woken up. He only nods and mumbles throughout interview. History is obtained from medical records for the most part. PSYCHIATRIC HISTORY:  yes   Currently follows with [facility] in [place]  3 lifetime suicide attempts  2 psychiatric hospital admissions--admitted to [facility] 1/10/2020-1/14/2020 and to  7/19/2013-8/01/2013    Past psychiatric medications includes:   Zoloft  Paxil  Abilify  Wellbutrin  buspar  remeron      Adverse reactions from psychotropic medications: no    Lifetime Psychiatric Review of Systems         Payton or Hypomania: denies      Panic Attacks: denies      Phobias: denies     Obsessions and Compulsions:denies     Body or Vocal Tics:  denies     Hallucinations: history of auditory and visual hallucinations; history of command hallucinations     Delusions: history of paranoia      Past Medical History:        Diagnosis Date    Arthritis     Asthma     [facility]     GERD (gastroesophageal reflux disease)     Hypertension     Psychiatric problem     Schizophrenia [facility]     Severe malnutrition ([facility] 1/14/2021    Unspecified cerebral artery occlusion with cerebral infarction        Past Surgical History:    History reviewed. No pertinent surgical history.     Allergies:  Prochlorperazine, Toradol [ketorolac tromethamine], Ketorolac, and Compazine [prochlorperazine maleate]      DRUG USE HISTORY  Social History     Tobacco Use   Smoking Status Current Every Day Smoker    Packs/day: 2.00    Years: 30.00    Pack years: 60.00   Smokeless Tobacco Never Used     Social History     Substance and Sexual Activity Alcohol Use Yes    Comment: 12 pack and 5th vodka daily     Social History     Substance and Sexual Activity   Drug Use Yes    Types: Marijuana    Comment: occasional     denies  LEGAL HISTORY:   HISTORY OF INCARCERATION: unable to assess     Family History:       Problem Relation Age of Onset    Cancer Mother         lung    Other Mother     Cancer Father         copd/lung    Other Sister         pancreatitis    Other Sister         pancreatitis       Psychiatric Family History  denies  denies suicides in family  denies substance use in family    PHYSICAL EXAM:  Vitals:  BP (!) 122/94   Pulse 121   Temp 99.1 °F (37.3 °C) (Oral)   Resp 20   Ht 5' 8\" (1.727 m)   Wt 119 lb (54 kg)   SpO2 98%   BMI 18.09 kg/m²      Review of Systems   Constitutional: Negative for chills and weight loss. HENT: Negative for ear pain and nosebleeds. Eyes: Negative for blurred vision and photophobia. Respiratory: Negative for cough, shortness of breath and wheezing. Cardiovascular: Negative for chest pain and palpitations. Gastrointestinal: Negative for abdominal pain, diarrhea and vomiting. Genitourinary: Negative for dysuria and urgency. Musculoskeletal: Negative for falls and joint pain. Skin: Negative for itching and rash. Neurological: Negative for tremors, seizures and weakness. Endo/Heme/Allergies: Does not bruise/bleed easily. Physical Exam:      Constitutional:  Appears well-developed and well-nourished, no acute distress  HENT:   Head: Normocephalic and atraumatic. Eyes: Conjunctivae are normal. Right eye exhibits no discharge. Left eye exhibits no discharge. No scleral icterus. Neck: Normal range of motion. Neck supple. Pulmonary/Chest:  No respiratory distress or accessory muscle use, no wheezing. Abdominal: Soft. Exhibits no distension. Musculoskeletal: Normal range of motion. Exhibits no edema.    Neurological: cranial nerves II-XII grossly in tact, normal gait and station Skin: Skin is warm and dry. Patient is not diaphoretic. No erythema. Mental Status Exam:   Level of consciousness:  Severe dysattention (reduced clarity of awareness with impaired ability to focus, sustain, or shift attention)  Appearance:  hospital attire, lying in bed, fair grooming and poor hygiene  Behavior/Motor:  no abnormalities noted  Attitude toward examiner:  Withdrawn, unable to wake  Speech:  low productivity  Mood:  \"tired\"  Affect:  mood congruent, blunted and flat  Thought processes:  goal directed and incoherent  Thought content:  Denies homicidal ideation  Suicidal Ideation:  passive, without plan and without intent  Delusions:  no evidence of delusions  Perceptual Disturbance:  denies any perceptual disturbance  Cognition: Patient is oriented to person, place, time and situation  Concentration: poor  Memory: intact  Insight & Judgement: impaired due to severity of condition     DSM-5 Diagnosis  Major depressive disorder recurrent severe with psychosis  Alcohol use disorder, severe dependence  Acute alcohol withdrawals     Psychosocial and Contextual factors:  Financial  Occupational  Relationship  Legal   Living situation  Educational     Past Medical History:   Diagnosis Date    Arthritis     Asthma     [facility]     GERD (gastroesophageal reflux disease)     Hypertension     Psychiatric problem     [facility]     Severe malnutrition ([facility] 1/14/2021    Unspecified cerebral artery occlusion with cerebral infarction         TREATMENT PLAN    Risk Management:  close watch per standard protocol      Psychotherapy:  participation in milieu and group and individual sessions with Attending Physician,  and Physician Assistant/CNP      Estimated length of stay: It might take more than 2 midnights to stabilize patient's mood/thoughts and titrate medications to effect.        GENERAL PATIENT/FAMILY EDUCATION Patient will understand basic signs and symptoms, Patient will understand benefits/risks and potential side effects from proposed meds and Patient will understand their role in recovery. Family is  active in patient's care. Patient assets that may be helpful during treatment include: Intent to participate and engage in treatment, sufficient fund of knowledge and intellect to understand and utilize treatments. Risk level: High     Goals:    Reviewed labs  Reviewed EKG  Will obtain records and review them today. Start CIWA protocol and additional withdrawal protocol  Medication adjustment: start Zoloft 25mg daily  Consults: social work        Behavioral Services  Medicare Certification     Admission Day 1  I certify that this patient's inpatient psychiatric hospital admission is medically necessary for:    x (1) treatment which could reasonably be expected to improve this patient's condition, or    x (2) diagnostic study or its equivalent.        Electronically signed by Emelia Sandhu MD on 1/14/21 at 5:42 PM EST

## 2021-01-14 NOTE — PROGRESS NOTES
Provisional Diagnosis:   Unspecified depressive disorder     Risk, Psychosocial and Contextual Factors:  Financial stressor, Limited support, Lack of transportation, No local outpatient provider    Current  Treatment: [facility] in [facility]      Present Suicidal Behavior:      Verbal:   Yes        Attempt:  Yes -Patient has been attempting to starve himself for the past 4 days. Access to Weapons:  Denied    Current Suicide Risk: Low, Moderate or High:    Moderate    Past Suicidal Behavior:       Verbal: Yes    Attempt:Yes    Self-Injurious/Self-Mutilation: Denied    Traumatic Event Within Past 2 Weeks:   Denied     Current Abuse: Denied     Legal: Denied     Violence: Denied     Protective Factors:  Adequate housing, Connected outpatient     Housing:    Patient is living in [facility] [place]    CPAP/Oxygen/Ambulation Difficulties: Denied    Basic Vital Signs Normal?: Check with Patients Nurse prior to [address]?: Check with Patients Nurse prior to Calling Psychiatry    Clinical Summary:      Patient is a 45year old male who presents to the ED voluntarily reporting suicidal attempt by staving himself for the past 4 days. Patient reports that he feels like his is done living because he is so tired. Patient reports that he has not ate or slept the past 4 days. He is connected to [facility] in [facility]. He reports his sister used to take him to his appointment, but she does not anymore. He has been off his medication for 6 months. Patient reports visual and auditory hallucinations. Patient hears voices telling him to kill himself. Patient describes voices as evil/ horrible. Patient has began acting on these commands by staving himself. Patient sees shadows of people. Patient does identify that he is seeing shadows and hearing voices because he has been off his medication for 6months. Patient denies active visual or auditory hallucinations while present in the [facility]. Last hallucination was prior to coming in. Patient reports that he drinks a 12 pack of beer daily. Patient admits to drinking a 12 pack of beer and a half bottle of vodka today. Patient reports that he smokes marijuana daily. Homicidal specific thoughts and/or plans denied. Patient states that he will kill people if they mess with him. No specific person identified. No delusions noted. Hallucinations denied. AOD denied. Level of Care Disposition:      1819 Consulted with medical provider. Patient BAL is .21 Patient is redraw in 6 1/2 hours from the time of draw. Redraw around 0100  1900 Patient given food   2000 Patient laying in bed. Given blanket. 2032 patient laying in bed. 2132 Patient in bed  2232 Patient in bed   2332 Patient in bed   [id number] Patient in bed. Patient passed to oncoming clinician   4954 Patient passed to oncoming clinician.

## 2021-01-14 NOTE — PROGRESS NOTES
BAL Re-Assess:   Status & Exam & Behavior Support Service Tabs      Present Suicidal Behavior:      Verbal: 'Thoughts    Plan: Denies      Current Suicide Risk: Low, Moderate or High: Moderate    Present Homicidal Behavior:    Verbal: Denies    Plan: Denies      Psychosis:    Hallucinations: Denies    Delusions: Denies      Clinical Re-Assessment Summary including Current Mental State of Patient:     Patient reports history of mental health/substance use concerns. Patient reports he has been consuming alcohol daily for the past six months. Patient reports he was receiving services with [facility] and doing well. Patient reports he has been off his medications for the past six months after returning to [address] [place] [address]. Patient is very cooperative with appropriate eye contact. Patient reports suicidal thoughts but denies a plan. Patient reports future plans to stop drinking and feel well. Patient states with his stay at [facility] he gained' weight and felt good. Updated Level of Care Disposition:    Consulted with Nayana Sorensen CNP concerning the mental status of patient. Consulted with Dr. Jonel Maguire concerning the mental status of patient. Patient will be admitted to the care of Dr. Jonel Maguire under voluntary status. Patient signed voluntary form. Report provided to [employer]. Charge Nurse Alivia Colmenares updated on need for COVID-19.

## 2021-01-14 NOTE — BH NOTE
Behavioral Health   Admission Note     Admission Type:   Admission Type: Voluntary    Reason for admission:  Reason for Admission: suicidal thoughts    PATIENT STRENGTHS:  Strengths: Communication, Motivated    Patient Strengths and Limitations:  Limitations: Tendency to isolate self, Difficulty problem solving/relies on others to help solve problems    Addictive Behavior:   Addictive Behavior  In the past 3 months, have you felt or has someone told you that you have a problem with:  : None  Do you have a history of Chemical Use?: No  Do you have a history of Alcohol Use?: Yes  Do you have a history of Street Drug Abuse?: Yes  Histroy of Prescripton Drug Abuse?: No    Medical Problems:   Past Medical History:   Diagnosis Date    Arthritis     Asthma     [facility]) [facility])     GERD (gastroesophageal reflux disease)     Hypertension     Psychiatric problem     Sch[facility]     Unspecified cerebral artery occlusion with cerebral infarction        Status EXAM:  Status and Exam  Normal: No  Facial Expression: Avoids Gaze, Worried, Sad  Affect: Blunt  Level of Consciousness: Alert  Mood:Normal: No  Mood: Depressed, Anxious  Motor Activity:Normal: Yes  Interview Behavior: Cooperative  Preception: [place] to Person, [place] to Time, [place] to Place, [place] to Situation  Attention:Normal: Yes  Thought Processes: Circumstantial  Thought Content:Normal: Yes  Hallucinations: None, Other (Comment)(was hearing negative voices and seeing shadows pta)  Delusions: No  Memory:Normal: Yes  Insight and Judgment: No  Insight and Judgment: Poor Insight  Present Suicidal Ideation: Yes  Present Homicidal Ideation: No    Pt admitted with followings belongings:  Dentures: None  Vision - Corrective Lenses: None  Hearing Aid: None  Jewelry: None  Body Piercings Removed: N/A  Clothing: Pants, Socks, Other (Comment), Undergarments (Comment)(belt,hat) Were All Patient Medications Collected?: Not Applicable  Other Valuables: Shubham Fitzgerald, Other (Comment), Cell phone(lighter)     Admission order obtained yes. Valuables placed in lock up on 4E. Patient's home medications were none. Patient oriented to surroundings and program expectations and copy of patient rights given. Received admission packet:  yes. Consents reviewed, signed yes. Patient verbalize understanding:  yes. Patient education on precautions: yes           Patient screened positive for suicide risk on CSSR-S (\"yes\" to question #4, 5, OR 6)  Dr POST notified of risk score. Orders received for close observation   . 2 person skin assessment completed upon admission pt refused 2 nurse assessment, denies any skin issues. Explained patients right to have family, representative or physician notified of their admission. Patient has Declined for physician to be notified. Patient has Declined for family/representative to be notified. Provided pt with [employer] handout entitled \"Quitting Smoking. \"  Reviewed handout with pt addressing dangers of smoking, developing coping skills, and providing basic information about quitting.   Pt response to counseling:  Pt does not want to stop smoking at this time 46 yo male admitted from ED, voluntary admit. Pt reports he is depressed, having suicidal thoughts, denies a plan. Pt denies homicidal thoughts, denies hallucinations at present but reports was hearing negative voices and seeing shadows at home. Pt was at [employer] and was sober but 6 months ago pt  returned to [facility] and started drinking and has not taken any meds since then. Pt reports he drinks 12 pack beer and 5th of vodka daily. CIWA=9 and ativan 1 mg po was given. Pt states he has his own house and has electric but no heat or water. Pt has lost over 40# in last 6 months, pt c/o mid to upper abd pain, reports has nausea, vomiting, poor appetite, not sleeping. Pt oriented to room and the unit. Pt is resting in bed at present.           Latisha Ramirez RN

## 2021-01-14 NOTE — PLAN OF CARE
Problem: Depressive Behavior With or Without Suicide Precautions:  Goal: Able to verbalize and/or display a decrease in depressive symptoms  Description: Able to verbalize and/or display a decrease in depressive symptoms  Outcome: Ongoing  Note: Patient unable to participate in programming this shift due to withdrawal symptoms of alcohol. Patient verbalizes continued feelings of depression this shift. Goal: Ability to disclose and discuss suicidal ideas will improve  Description: Ability to disclose and discuss suicidal ideas will improve  Outcome: Ongoing  Note: Patient able verbalize continued thoughts of suicide without plan for action. Patient verbally contracts for safety with writer. Goal: Able to verbalize support systems  Description: Able to verbalize support systems  Outcome: Met This Shift  Note: Patient verbalizes that his kids' mom is his support system this shift. Goal: Participates in care planning  Description: Participates in care planning  Outcome: Ongoing  Note: Patient participates in care planning with  and care team this shift. Problem: Altered Mood, Psychotic Behavior:  Goal: Able to verbalize decrease in frequency and intensity of hallucinations  Description: Able to verbalize decrease in frequency and intensity of hallucinations  Outcome: Met This Shift  Note: Patient denies having hallucinations this shift. Problem: Substance Abuse:  Goal: Absence of drug withdrawal signs and symptoms  Description: Absence of drug withdrawal signs and symptoms  Outcome: Not Met This Shift  Note: Patient experiences elevated blood pressure, excessive sweating, nausea, and abdominal cramping this shift. Patient being treated with Ativan per CIWA protocol this shift. Will continue to monitor.       Problem: Discharge Planning:  Goal: Discharged to appropriate level of care  Description: Discharged to appropriate level of care  Outcome: Not Met This Shift Note: Patient not discharged this shift. Patient continues to work toward discharge goal with social work and care team this shift. Problem: Pain:  Goal: Pain level will decrease  Description: Pain level will decrease  Outcome: Ongoing  Note: Patient reports 10/10 pain in his abdomen and sternum . Patient being treated with protonix and karafate for stomach pain along with tylenol. Patient resting in bed at this time. Respirations are easy. Problem: Nutrition  Goal: Optimal nutrition therapy  Outcome: Not Met This Shift  Note: Patient not eating this shift. Patient reports being too nauseous. Patient offered nutritonal supplement along with treatments for stomach pain. MD aware. Will continue to monitor.

## 2021-01-14 NOTE — PLAN OF CARE
Patient has been isolating in his room this shift and has not come to any of the groups so far today so he has not met his socialization goal. Patient will be encouraged to come out of his room to socialize with others and to attend groups on the unit daily.

## 2021-01-14 NOTE — PROGRESS NOTES
[facility]  Initial Interdisciplinary Treatment Plan NOTE    Review Date & Time: 01/14/21  1510    Patient was in treatment team.  See Multidisciplinary Treatment Team sheet for participants. Admission Type:   Admission Type: Voluntary    Reason for admission:  Reason for Admission: suicidal thoughts      Estimated Length of Stay Update:  01/16/21  Estimated Discharge Date Update: 01/20/21    PATIENT STRENGTHS:  Patient Strengths Strengths: Communication, Motivated  Patient Strengths and Limitations:Limitations: Tendency to isolate self, Difficulty problem solving/relies on others to help solve problems  Addictive Behavior:Addictive Behavior  In the past 3 months, have you felt or has someone told you that you have a problem with:  : None  Do you have a history of Chemical Use?: No  Do you have a history of Alcohol Use?: Yes  Do you have a history of Street Drug Abuse?: Yes  Histroy of Prescripton Drug Abuse?: No  Medical Problems:  Past Medical History:   Diagnosis Date    Arthritis     Asthma     [facility]ch[facility] obstructive [facility] disease) [facility]     GERD (gastroesophageal reflux disease)     Hypertension     Psychiatric problem     Schizophrenia ([facility]     [facility] 1/14/2021    Unspecified cerebral artery occlusion with cerebral infarction        EDUCATION:   Learner Progress Toward Treatment Goals: Reviewed results and recommendations of this team, Reviewed group plan and strategies, Reviewed signs, symptoms and risk of self harm and violent behavior and Reviewed goals and plan of care    Method: Individual    Outcome: Verbalized understanding and Demonstrated Understanding    PATIENT GOALS: See below    PLAN/TREATMENT RECOMMENDATIONS UPDATE:   1. What is the most important thing we can help you with while you are here? To stop drinking, resume medication. 2. Who is your support system? Support available  3. Do you have follow-up providers?  [facility]

## 2021-01-14 NOTE — ED PROVIDER NOTES
[facility] Emergency Department    CHIEF COMPLAINT       Chief Complaint   Patient presents with    Suicidal    Chest Pain       Nurses Notes reviewed and I agree except as noted in the HPI. HISTORY OF PRESENT ILLNESS    Mariely Guallpa. david 45 y.o. male who presents to the ED for evaluation of suicidal ideation. The patient states he has been trying to starve himself to death. Has not eaten in four days. Has been drinking ETOH. Reports chest and epigastric discomfort whenever he tries to eat something now. HPI was provided by the patient. REVIEW OF SYSTEMS     Review of Systems   Constitutional: Negative for chills, fatigue and fever. HENT: Negative for congestion, ear discharge, ear pain, postnasal drip and rhinorrhea. Respiratory: Negative for cough, chest tightness and shortness of breath. Cardiovascular: Positive for chest pain. Gastrointestinal: Positive for abdominal pain. Negative for nausea and vomiting. Genitourinary: Negative for difficulty urinating, dysuria, enuresis, flank pain and hematuria. Musculoskeletal: Negative for back pain and joint swelling. Skin: Negative for rash. Neurological: Negative for dizziness, light-headedness, numbness and headaches. Psychiatric/Behavioral: Positive for self-injury and suicidal ideas. Negative for agitation, behavioral problems and confusion. All other systems negative except as noted. PAST MEDICAL HISTORY     Past Medical History:   Diagnosis Date    Arthritis     Asthma     [facility]) [facility] [facility]     GERD (gastroesophageal reflux disease)     Hypertension     Psychiatric problem     Schizophrenia ([facility] [facility]     Unspecified cerebral artery occlusion with cerebral infarction        SURGICALHISTORY      has no past surgical history on file.     CURRENT MEDICATIONS       Current Discharge Medication List      CONTINUE these medications which have NOT CHANGED    Details PARoxetine (PAXIL) 20 MG tablet Take 1 tablet by mouth daily  Qty: 30 tablet, Refills: 0      Multiple Vitamins-Minerals (THERAPEUTIC MULTIVITAMIN-MINERALS) tablet Take 1 tablet by mouth daily  Qty: 30 tablet, Refills: 0      pantoprazole (PROTONIX) 40 MG tablet Take 1 tablet by mouth daily  Qty: 30 tablet, Refills: 0      ARIPiprazole (ABILIFY) 5 MG tablet Take 1 tablet by mouth daily  Qty: 30 tablet, Refills: 0      docusate sodium (COLACE) 100 MG capsule Take 100 mg by mouth 2 times daily      traZODone (DESYREL) 100 MG tablet Take 100 mg by mouth nightly      cloNIDine (CATAPRES) 0.1 MG tablet Take 0.1 mg by mouth 2 times daily      folic acid (FOLVITE) 1 MG tablet Take 1 tablet by mouth daily  Qty: 30 tablet, Refills: 3      vitamin B-1 100 MG tablet Take 1 tablet by mouth daily  Qty: 30 tablet, Refills: 3      albuterol (PROVENTIL HFA) 108 (90 BASE) MCG/ACT inhaler Inhale 2 puffs into the lungs every 6 hours as needed for Wheezing  Qty: 1 Inhaler, Refills: 3    Associated Diagnoses: COPD (chronic obstructive pulmonary disease) ([facility])             ALLERGIES     is allergic to prochlorperazine; toradol [ketorolac tromethamine]; ketorolac; and compazine [prochlorperazine maleate]. FAMILY HISTORY     He indicated that his mother is . He indicated that his father is . He indicated that both of his sisters are alive. family history includes Cancer in his father and mother; Other in his mother, sister, and sister.     SOCIAL HISTORY       Social History     Socioeconomic History    Marital status: Single     Spouse name: Not on file    Number of children: 2    Years of education: 9    Highest education level: Not on file   Occupational History    Not on file   Social Needs    Financial resource strain: Not on file    Food insecurity     Worry: Not on file     Inability: Not on file    Transportation needs     Medical: Not on file     Non-medical: Not on file   Tobacco Use  Smoking status: Current Every Day Smoker     Packs/day: 2.00     Years: 30.00     Pack years: 60.00    Smokeless tobacco: Never Used   Substance and Sexual Activity    Alcohol use: Yes     Comment: 12 pack and 5th vodka daily    Drug use: Yes     Types: Marijuana     Comment: occasional    Sexual activity: Yes     Partners: Female   Lifestyle    Physical activity     Days per week: Not on file     Minutes per session: Not on file    Stress: Not on file   Relationships    Social connections     Talks on phone: Not on file     Gets together: Not on file     Attends Shinto service: Not on file     Active member of club or organization: Not on file     Attends meetings of clubs or organizations: Not on file     Relationship status: Not on file    Intimate partner violence     Fear of current or ex partner: Not on file     Emotionally abused: Not on file     Physically abused: Not on file     Forced sexual activity: Not on file   Other Topics Concern    Not on file   Social History Narrative    Not on file       PHYSICAL EXAM     INITIAL VITALS:  height is 5' 8\" (1.727 m) and weight is 119 lb (54 kg). His tympanic temperature is 97.4 °F (36.3 °C). His blood pressure is 145/95 (abnormal) and his pulse is 88. His respiration is 18 and oxygen saturation is 96%. Physical Exam  Vitals signs and nursing note reviewed. Constitutional:       General: He is not in acute distress. Appearance: He is well-developed. He is not diaphoretic. HENT:      Head: Normocephalic and atraumatic. Eyes:      General:         Right eye: No discharge. Left eye: No discharge. Conjunctiva/sclera: Conjunctivae normal.   Neck:      Musculoskeletal: Normal range of motion. Trachea: No tracheal deviation. Cardiovascular:      Rate and Rhythm: Normal rate and regular rhythm. Heart sounds: Normal heart sounds. No murmur. No gallop.        Comments: Normal capillary refill  Pulmonary: Effort: Pulmonary effort is normal. No respiratory distress. Breath sounds: Normal breath sounds. No stridor. Abdominal:      General: Bowel sounds are normal.      Palpations: Abdomen is soft. Musculoskeletal: Normal range of motion. General: No tenderness or deformity. Skin:     General: Skin is warm and dry. Capillary Refill: Capillary refill takes less than 2 seconds. Coloration: Skin is not pale. Findings: No erythema or rash. Neurological:      General: No focal deficit present. Mental Status: He is alert and oriented to person, place, and time. Cranial Nerves: No cranial nerve deficit. Psychiatric:         Mood and Affect: Mood is depressed. Affect is flat. Behavior: Behavior normal.         Thought Content: Thought content includes suicidal ideation. Thought content includes suicidal plan.          DIFFERENTIAL DIAGNOSIS:   Depression, SI, ETOH abuse, gastritis    DIAGNOSTIC RESULTS     EKG: All EKG's are interpreted by the Emergency Department Physician who eithersigns or Co-signs this chart in the absence of a cardiologist.     EKG 12 Lead (Preliminary result)   Component (Lab Inquiry)  Collection Time Result Time Ventricular Rate Atrial Rate P-R Interval QRS Duration Q-T Interval QTc Calculation (Bazett) P Axis R Axis T Axis   01/13/21 17:48:23 01/13/21 18:43:56 91 91 124 88 384 472 46 -41 70   Previous Results   11/22/16 17:02:35 11/23/16 20:39:28 85 85 120 90 372 442 63 81 75   12/31/15 13:42:50 01/01/16 11:01:06 103 103 132 84 354 463 78 17 79   12/31/15 13:42:50 12/31/15 17:31:31 103 103 132 84 354 463 78 17 79   11/10/15 17:51:48 11/10/15 22:30:52 68 68 138 92 420 446 69 13 70   11/10/15 17:51:48 11/10/15 19:35:14 68 68 138 92 420 446 69 13 70         Preliminary result                Narrative:    Normal sinus rhythm   Left axis deviation   Septal infarct (cited on or before 22-NOV-2016)   Abnormal ECG When compared with ECG of 22-NOV-2016 17:02,   The axis Shifted left   Questionable change in initial forces of Septal leads                    RADIOLOGY: non-plainfilm images(s) such as CT, Ultrasound and MRI are read by the radiologist.  Plain radiographic images are visualized and preliminarily interpreted by the emergency physician unless otherwise stated below.   No orders to display         LABS:   Labs Reviewed   CBC WITH AUTO DIFFERENTIAL - Abnormal; Notable for the following components:       Result Value    WBC 12.3 (*)     RBC 4.36 (*)     Hematocrit 41.1 (*)     MCV 94.3 (*)     Segs Absolute 9.0 (*)     All other components within normal limits   SALICYLATE LEVEL - Abnormal; Notable for the following components:    Salicylate, Serum < 0.3 (*)     All other components within normal limits   OSMOLALITY - Abnormal; Notable for the following components:    Osmolality Calc 270.4 (*)     All other components within normal limits   BASIC METABOLIC PANEL   ACETAMINOPHEN LEVEL   ETHANOL   HEPATIC FUNCTION PANEL   TSH WITHOUT REFLEX   URINE DRUG SCREEN   URINE RT REFLEX TO CULTURE   TROPONIN   ANION GAP   GLOMERULAR FILTRATION RATE, ESTIMATED   ETHANOL   COVID-19       EMERGENCY DEPARTMENT COURSE:   Vitals:    Vitals:    01/13/21 1743 01/13/21 2210 01/14/21 0233 01/14/21 0413   BP: (!) 140/107 (!) 101/55 134/88 (!) 145/95   Pulse: 90 112 100 88   Resp: 22 15 15 18   Temp: 98.8 °F (37.1 °C)  97.9 °F (36.6 °C) 97.4 °F (36.3 °C)   TempSrc: Oral  Oral Tympanic   SpO2: 94% 93% 95% 96%   Weight: 145 lb (65.8 kg)   119 lb (54 kg)   Height: 5' 8\" (1.727 m)   5' 8\" (1.727 m)          [facility] The patient was seen and evaluated within the ED today for the evaluation of suicidal ideation. Physical exam revealed no significant abnormalities or concerns. I completed a medical evaluation of the patient and ordered appropriate labs which were unremarkable. GABRIELA and social work completed a full psychiatric evaluation of the patient and determined that he met admission criteria. I medically cleared the patient. GABRIELA and social work's noted should be consulted for the psychiatric evaluation and reason for admission to the inpatient psychiatric unit. Chest pain appears to be from not eating and drinking ETOH. Likely a gastritis. Troponin negative, EKG unremarkable.         Medications   LORazepam (ATIVAN) tablet 1 mg (1 mg Oral Given 1/14/21 0447)     Or   LORazepam (ATIVAN) injection 1 mg ( Intravenous See Alternative 1/14/21 0447)     Or   LORazepam (ATIVAN) tablet 2 mg ( Oral See Alternative 1/14/21 0447)     Or   LORazepam (ATIVAN) injection 2 mg ( Intravenous See Alternative 1/14/21 0447)     Or   LORazepam (ATIVAN) tablet 3 mg ( Oral See Alternative 1/14/21 0447)     Or   LORazepam (ATIVAN) injection 3 mg ( Intravenous See Alternative 1/14/21 0447)     Or   LORazepam (ATIVAN) tablet 4 mg ( Oral See Alternative 1/14/21 0447)     Or   LORazepam (ATIVAN) injection 4 mg ( Intravenous See Alternative 1/14/21 0447)   acetaminophen (TYLENOL) tablet 650 mg (has no administration in time range)   magnesium hydroxide (MILK OF MAGNESIA) 400 MG/5ML suspension 30 mL (has no administration in time range)   aluminum & magnesium hydroxide-simethicone (MAALOX) 200-200-20 MG/5ML suspension 30 mL (30 mLs Oral Given 1/14/21 0447)   hydrOXYzine (ATARAX) tablet 50 mg (has no administration in time range)   traZODone (DESYREL) tablet 50 mg (has no administration in time range)   nicotine (NICODERM CQ) 14 MG/24HR 1 patch (has no administration in time range) Patient was seen independently by myself. The patient's final impression and disposition and plan was determined by myself. Strict return precautions and follow up instructions were discussed with the patient prior to discharge, with which the patient agrees. Physical assessment findings, diagnostic testing(s) if applicable, and vital signs reviewed with patient/patient representative. Questions answered. Medications asdirected, including OTC medications for supportive care. Education provided on medications. Differential diagnosis(s) discussed with patient/patient representative. Home care/self care instructions reviewed withpatient/patient representative. Patient is to follow-up with family care provider in 2-3 days if no improvement. Patient is to go to the emergency department if symptoms worsen. Patient/patient representative isaware of care plan, questions answered, verbalizes understanding and is in agreement. CRITICAL CARE:   None    CONSULTS:  silvia    PROCEDURES:  None    FINAL IMPRESSION     1. Major depressive disorder, recurrent severe without psychotic features [facility])          DISPOSITION/PLAN   DISPOSITION Admitted 01/14/2021 02:49:05 AM      PATIENT REFERREDTO:  No follow-up provider specified.     DISCHARGE MEDICATIONS:  Current Discharge Medication List          (Please note that portions of this note were completed with a voice recognition program.  Efforts were made to edit the dictations but occasionally words are mis-transcribed.)         RINA Tirado CNP, APRN - CNP  01/14/21 5837

## 2021-01-14 NOTE — ED NOTES
Upon first contact with patient this RN receives bedside shift report from MYRA [place]. Pt resting on cot with easy respirations and blanket over his face.         Loretta Fields CKGWPI, MYRA  01/13/21 1942

## 2021-01-14 NOTE — ED NOTES
ED to inpatient nurses report    Chief Complaint   Patient presents with    Suicidal    Chest Pain      Present to ED from home  LOC: alert and orientated to name, place, date  Vital signs   Vitals:    01/13/21 1743 01/13/21 2210 01/14/21 0233   BP: (!) 140/107 (!) 101/55 134/88   Pulse: 90 112 100   Resp: 22 15 15   Temp: 98.8 °F (37.1 °C)  97.9 °F (36.6 °C)   TempSrc: Oral  Oral   SpO2: 94% 93% 95%   Weight: 145 lb (65.8 kg)     Height: 5' 8\" (1.727 m)        Oxygen Baseline room air    Current needs required room air Bipap/Cpap No  LDAs:    Mobility: Independent  Pending ED orders: none  Present condition: resting on cot with snacks and unlabored respiration    Electronically signed by Niurka Cameron RN on 1/14/2021 at 3:05 AM       Irma Sullivan RN  01/14/21 [id number]

## 2021-01-14 NOTE — PROGRESS NOTES
Belongings delivered from ED that were left downstairs after patient transferred to . Belongings inventoried by Paula Gonzalez. Belongings placed in locker with exception of dentures. Dentures returned to patient in patient room in denture cup that was marked by writer with patient sticker.

## 2021-01-14 NOTE — PROGRESS NOTES
[facility] Biopsychosocial Assessment    Current Level of Psychosocial Functioning     Independent   Dependent      XXX  Minimal Assist     Comments:      Psychosocial High Risk Factors (check all that apply)    Unable to obtain meds   Chronic illness/pain    Substance abuse     XXX  Lack of Family Support    XXX  Financial stress   Isolation      XXX  Inadequate Community Resources  XXX  Suicide attempt(s)  Not taking medications    XXX  Victim of crime   Developmental Delay  Unable to manage personal needs    XXX  Age 72 or older   Homeless  No transportation   Readmission within 30 days  Unemployment    XXX  Traumatic Event    Psychiatric Advanced Directive:   None    Family to involve in treatment:   None    Sexual Orientation:      Heterosexual    Patient Strengths:     Desire to return to [facility], past sobriety with AA    Patient Barriers:      Chronic alcoholism, poor living environment, nutrition. Opiate education provided:   Not drug of choice.      Safety plan:      Contracts for safety    CMHC/MH history:     XXX    Plan of Care:  medication management, group/individual therapies, family meetings, psycho -education, treatment team meetings to assist with stabilization    Initial Discharge Plan:  [facility] in [place] Clinical Summary:  Luis Enrique Dave is a 45year old male who presented to the ED voluntarily. He describes a life long history of mental illness and chronic alcoholism. He identified onset of auditory hallucinations at the age of 5. He began alcohol and marijuana use by age 14 to 14 due to the auditory hallucinations. Pt had been sober for 1 year with medication compliance while in the [facility] in [place]. Pt returned to [place] and ran out of his medication and resumed drinking. Pt has been drinking a 12 pack of beer and a fifth of vodka daily. He has lost approximately 60 pounds in 6 moths due to his continuous alcohol intake. Pt also identified auditory hallucinations. Pt has a history of alcohol withdrawals including seizures. Denies DT's. Pt describes a very unstable childhood due to his father who was a \"violent alcoholic. \" Pt would like to return to the [facility] in [place] and has reached out to them.

## 2021-01-15 PROCEDURE — 6370000000 HC RX 637 (ALT 250 FOR IP): Performed by: REGISTERED NURSE

## 2021-01-15 PROCEDURE — 99232 SBSQ HOSP IP/OBS MODERATE 35: CPT | Performed by: PSYCHIATRY & NEUROLOGY

## 2021-01-15 PROCEDURE — APPSS30 APP SPLIT SHARED TIME 16-30 MINUTES: Performed by: REGISTERED NURSE

## 2021-01-15 PROCEDURE — 6370000000 HC RX 637 (ALT 250 FOR IP): Performed by: PSYCHIATRY & NEUROLOGY

## 2021-01-15 PROCEDURE — 90833 PSYTX W PT W E/M 30 MIN: CPT | Performed by: PSYCHIATRY & NEUROLOGY

## 2021-01-15 PROCEDURE — 1240000000 HC EMOTIONAL WELLNESS R&B

## 2021-01-15 RX ADMIN — ACETAMINOPHEN 650 MG: 325 TABLET ORAL at 15:14

## 2021-01-15 RX ADMIN — ACETAMINOPHEN 650 MG: 325 TABLET ORAL at 08:09

## 2021-01-15 RX ADMIN — SUCRALFATE 1 G: 1 TABLET ORAL at 21:07

## 2021-01-15 RX ADMIN — SUCRALFATE 1 G: 1 TABLET ORAL at 16:47

## 2021-01-15 RX ADMIN — FOLIC ACID 1 MG: 1 TABLET ORAL at 08:09

## 2021-01-15 RX ADMIN — Medication 100 MG: at 08:09

## 2021-01-15 RX ADMIN — HYDROXYZINE HYDROCHLORIDE 50 MG: 25 TABLET ORAL at 15:13

## 2021-01-15 RX ADMIN — SERTRALINE 25 MG: 50 TABLET, FILM COATED ORAL at 08:09

## 2021-01-15 RX ADMIN — SUCRALFATE 1 G: 1 TABLET ORAL at 11:46

## 2021-01-15 RX ADMIN — SUCRALFATE 1 G: 1 TABLET ORAL at 06:46

## 2021-01-15 RX ADMIN — LORAZEPAM 1 MG: 1 TABLET ORAL at 08:09

## 2021-01-15 RX ADMIN — LORAZEPAM 1 MG: 1 TABLET ORAL at 04:41

## 2021-01-15 RX ADMIN — LORAZEPAM 1 MG: 1 TABLET ORAL at 16:54

## 2021-01-15 RX ADMIN — LORAZEPAM 1 MG: 1 TABLET ORAL at 21:07

## 2021-01-15 RX ADMIN — PANTOPRAZOLE SODIUM 40 MG: 40 TABLET, DELAYED RELEASE ORAL at 06:47

## 2021-01-15 RX ADMIN — TRAZODONE HYDROCHLORIDE 50 MG: 50 TABLET ORAL at 21:07

## 2021-01-15 ASSESSMENT — PAIN DESCRIPTION - LOCATION: LOCATION: OTHER (COMMENT);GENERALIZED

## 2021-01-15 ASSESSMENT — PAIN DESCRIPTION - FREQUENCY: FREQUENCY: INTERMITTENT

## 2021-01-15 ASSESSMENT — PAIN DESCRIPTION - PAIN TYPE: TYPE: ACUTE PAIN

## 2021-01-15 ASSESSMENT — PAIN DESCRIPTION - ONSET
ONSET: GRADUAL
ONSET: GRADUAL

## 2021-01-15 ASSESSMENT — PAIN SCALES - GENERAL
PAINLEVEL_OUTOF10: 10
PAINLEVEL_OUTOF10: 7

## 2021-01-15 ASSESSMENT — PAIN DESCRIPTION - PROGRESSION: CLINICAL_PROGRESSION: NOT CHANGED

## 2021-01-15 NOTE — PROGRESS NOTES
Department of Psychiatry  Progress Note     Chief Complaint:  Severe episode of recurrent major depressive disorder, with psychotic features ([address]     SUBJECTIVE:    PROGRESS:  Lovely Molina is feeling \"pretty bad\" over all. Rates mood as 1/10  Depression and anxiety continue to be quite severe  Slept \"okay\" last night  Appetite is improving, although he is complaining of nausea at times  Has been experiencing withdrawal symptoms: indigestion, shakes and tremors, hot & cold sweats, runny nose, nightmares, body aches, and increased anxiety--using Ativan as prescribed  Reports auditory hallucinations \"before bedtime\"   Has been experiencing some paranoia;  reports it is improving  Reports fleeting thoughts to harm self without plan or intent  Denies homicidal ideations    Caprice Ricks has been isolating in bed most of the day and states \"it's just too much for me--too many people\". He agrees to try to attend groups and spend time out in the common area. He feels the [facility] in [place] will be the best place for him to go upon discharge. He has been taking his meds and denies side effects.        Suicidal ideations: denies    Compliance with medications: good   Medication side effects: absent  ROS: Patient has new complaints:  no  Sleep quality: 7 hours broken per nursing sleep sheet  Attending groups: no      OBJECTIVE      Medications  Current Facility-Administered Medications: LORazepam (ATIVAN) tablet 1 mg, 1 mg, Oral, Q1H PRN **OR** LORazepam (ATIVAN) injection 1 mg, 1 mg, Intravenous, Q1H PRN **OR** LORazepam (ATIVAN) tablet 2 mg, 2 mg, Oral, Q1H PRN **OR** LORazepam (ATIVAN) injection 2 mg, 2 mg, Intravenous, Q1H PRN **OR** LORazepam (ATIVAN) tablet 3 mg, 3 mg, Oral, Q1H PRN **OR** LORazepam (ATIVAN) injection 3 mg, 3 mg, Intravenous, Q1H PRN **OR** LORazepam (ATIVAN) tablet 4 mg, 4 mg, Oral, Q1H PRN **OR** LORazepam (ATIVAN) injection 4 mg, 4 mg, Intravenous, Q1H PRN acetaminophen (TYLENOL) tablet 650 mg, 650 mg, Oral, Q4H PRN  magnesium hydroxide (MILK OF MAGNESIA) 400 MG/5ML suspension 30 mL, 30 mL, Oral, Daily PRN  aluminum & magnesium hydroxide-simethicone (MAALOX) 200-200-20 MG/5ML suspension 30 mL, 30 mL, Oral, Q6H PRN  hydrOXYzine (ATARAX) tablet 50 mg, 50 mg, Oral, TID PRN  traZODone (DESYREL) tablet 50 mg, 50 mg, Oral, Nightly PRN  nicotine (NICODERM CQ) 14 MG/24HR 1 patch, 1 patch, Transdermal, Daily  thiamine tablet 100 mg, 100 mg, Oral, Daily  folic acid (FOLVITE) tablet 1 mg, 1 mg, Oral, Daily  pantoprazole (PROTONIX) tablet 40 mg, 40 mg, Oral, QAM AC  ondansetron (ZOFRAN-ODT) disintegrating tablet 8 mg, 8 mg, Oral, Q8H PRN  sucralfate (CARAFATE) tablet 1 g, 1 g, Oral, 4x Daily AC & HS  sertraline (ZOLOFT) tablet 25 mg, 25 mg, Oral, Daily     Physical     height is 5' 8\" (1.727 m) and weight is 119 lb (54 kg). His tympanic temperature is 96.8 °F (36 °C). His blood pressure is 111/85 and his pulse is 96. His respiration is 18 and oxygen saturation is 96%.    Lab Results   Component Value Date    WBC 12.3 (H) 01/13/2021    HGB 14.3 01/13/2021    HCT 41.1 (L) 01/13/2021     01/13/2021    CHOL 215 (H) 01/29/2020    TRIG 190 (H) 01/29/2020    HDL 46 01/29/2020    ALT 21 01/13/2021    AST 27 01/13/2021     01/13/2021    K 3.6 01/13/2021    CL 99 01/13/2021    CREATININE 0.5 01/13/2021    BUN 9 01/13/2021    CO2 23 01/13/2021    TSH 0.491 01/13/2021    INR 0.83 (L) 03/09/2015    LABA1C 5.8 01/11/2020          Mental Status Exam:   Level of consciousness:  Mild dysattention (reduced clarity of awareness with impaired ability to focus, sustain, or shift attention)  Appearance:  ill-appearing, hospital attire, lying in bed, poor grooming and poor hygiene  Behavior/Motor:  no abnormalities noted  Attitude toward examiner:  cooperative, attentive, good eye contact and withdrawn  Speech:  normal rate and whispered  Mood:  \"not too good\" Affect:  mood congruent, blunted, flat and anxious  Thought processes:  linear, goal directed and coherent  Thought content:  Denies homicidal ideation  Suicidal Ideation:  Passive, without plan, without intent  Delusions:  paranoid  Perceptual Disturbance:  auditory  Cognition: Patient is oriented to person, place, time and situation  Concentration: poor  Memory: intact  Insight & Judgement: fair       ASSESSMENT     Severe episode of recurrent major depressive disorder, with psychotic features ([address]     PLAN    Patient's symptoms are slightly improved as he is better oriented this morning and he is making plans for discharge  Medication adjustments: continue medications as prescribed  Continue CIWA/Ativan protocol  Attempt to develop insight, psycho-education and supportive therapy conducted. Probable discharge: TBD  Follow-up:  [facility] [place]  in [place]. Pt is accepted for readmission when discharged from this unit. He is to be transported to [address] [place] [address]. If pt leaves without physically having his medicaions in hand, they are to be sent to [facility] , [phone]. Pt will need a 30 day supply. Peter Torres asked to be called at discharge, [phone]. Electronically signed by Tejas DE LEON on 1/15/2021 at 3:11 PM Reviewed patient's current plan of care and vital signs with nursing staff. Psychiatry Attending Attestation     I assessed this patient and reviewed the case and plan of care with Tejas Cruz CNP. I have reviewed the above documentation and I agree with the findings and treatment plan with the following updates. Patient continues to go through severe withdrawals. Reports he is having some active visual hallucinations. Tolerating Ativan well and denies any side effects. Continues to have severe tremors restless and dealing with insomnia. His blood pressure continues to fluctuate. We will continue CIWA protocol today. Continues to report active suicidal thoughts this morning. Denies any intent. We will continue to titrate his medications to help with his mood. More than 16 mins of the session was spent doing Motivational enhancement therapy and coordinating patient's care. Session started at 9:30am and ended at 10:00am.     Electronically signed by Dionne Foster MD on 1/15/21 at 4:57 PM EST    **This report has been created using voice recognition software. It may contain minor errors which are inherent in voice recognition technology. **

## 2021-01-15 NOTE — PROGRESS NOTES
Discharge Planning 1012-Telephoned Saranya Levi at the [facility] in [facility]. Pt is accepted for readmission when discharged from this unit. He is to be transported to [address] [place] [address], [address] [address]. If pt leaves without physically having his medicaions in hand, they are to be sent to [facility] , [phone]. Pt will need a 30 day supply. Saranya Levi asked to be called at discharge, [phone].

## 2021-01-15 NOTE — PLAN OF CARE
Problem: Depressive Behavior With or Without Suicide Precautions:  Goal: Able to verbalize and/or display a decrease in depressive symptoms  Description: Able to verbalize and/or display a decrease in depressive symptoms  Outcome: Ongoing  Note: Patient reports continued feelings of depression this shift. Goal: Ability to disclose and discuss suicidal ideas will improve  Description: Ability to disclose and discuss suicidal ideas will improve  Outcome: Ongoing  Note: Patient reports continued suicidal thoughts without plan or intent. Goal: Able to verbalize support systems  Description: Able to verbalize support systems  Outcome: Ongoing  Note: Patient reports he \"has friends\" that are his support system. Goal: Participates in care planning  Description: Participates in care planning  Outcome: Ongoing  Note: Patient participates in care planning with social work and care team this shift. Problem: Altered Mood, Psychotic Behavior:  Goal: Able to verbalize decrease in frequency and intensity of hallucinations  Description: Able to verbalize decrease in frequency and intensity of hallucinations  Outcome: Not Met This Shift  Note: Patient reports hearing voices that are telling him \"Get up you piece of crap. \"     Problem: Substance Abuse:  Goal: Absence of drug withdrawal signs and symptoms  Description: Absence of drug withdrawal signs and symptoms  Outcome: Ongoing  Note: Patient reports continued feelings withdrawal from alcohol. Patient reports these feelings are \"better than yesterday but still there. \"      Problem: Discharge Planning:  Goal: Discharged to appropriate level of care  Description: Discharged to appropriate level of care  Outcome: Not Met This Shift  Note: Patient not discharged this shift.  Patient continues to work toward discharge goal with social work and care team.      Problem: Pain:  Goal: Pain level will decrease  Description: Pain level will decrease  Outcome: Not Met This Shift

## 2021-01-15 NOTE — PROGRESS NOTES
Thought Content:Normal: Yes  Hallucinations: None(Patient reports hearing voices telling him \"get up you piece of crap. \")  Delusions: Yes  Delusions: Other(See Comment)(None observed)  Memory:Normal: No  Memory: Poor Recent  Insight and Judgment: No  Insight and Judgment: Poor Judgment, Poor Insight  Present Suicidal Ideation: Yes(No plan or intent.)  Present Homicidal Ideation: No(Denies)    Daily Assessment Last Entry:   Daily Sleep (WDL): Exceptions to WDL         Patient Currently in Pain: Yes  Daily Nutrition (WDL): Within Defined Limits  Appetite Change: Other (Comment)(Improving.)  Barriers to Nutrition: None  Level of Assistance: Independent/Self    Patient Monitoring:  Frequency of Checks: 4 times per hour, close    Psychiatric Symptoms:   Depression Symptoms  Depression Symptoms: Appetite change, Change in energy level, Crying, Feelings of helplessness, Feelings of worthlessness, Impaired concentration, Isolative, Loss of interest  Anxiety Symptoms  Anxiety Symptoms: Generalized  Payton Symptoms  Payton Symptoms: No problems reported or observed. Psychosis Symptoms  Delusion Type: No problems reported or observed. Suicide Risk CSSR-S:  1) Within the past month, have you wished you were dead or wished you could go to sleep and not wake up? : Yes  2) Have you actually had any thoughts of killing yourself? : No  3) Have you been thinking about how you might kill yourself? : No  5) Have you started to work out or worked out the details of how to kill yourself?  Do you intend to carry out this plan? : No  6) Have you ever done anything, started to do anything, or prepared to do anything to end your life?: Yes          EDUCATION:   Learner Progress Toward Treatment Goals: Reviewed results and recommendations of this team, Reviewed group plan and strategies, Reviewed signs, symptoms and risk of self harm and violent behavior and Reviewed goals and plan of care    Method: Individual Outcome: Verbalized understanding and Demonstrated Understanding    PATIENT GOALS:Stap alcohol, Resume medication. PLAN/TREATMENT RECOMMENDATIONS UPDATE:  1. How are you progressing toward meeting your main treatment goal? Does not feel well, Alcohol withdrawals and auditory hallucinations. 2.  Are there discharge barriers/lingering problems that need to be addressed? None. Accepted into  [facility]      3. Do you have the ability to pay for your medications? Medicaid      4. How is your group participation?   Not attending due to alcohol withdrawal    GOALS UPDATE:   Time frame for Short-Term Goals: Daily      LINO Saldana

## 2021-01-15 NOTE — PLAN OF CARE
Problem: Depressive Behavior With or Without Suicide Precautions:  Goal: Ability to disclose and discuss suicidal ideas will improve  Description: Ability to disclose and discuss suicidal ideas will improve  1/14/2021 2354 by Dana Sun RN  Outcome: Ongoing  Note: Pt denies self harm thoughts and remains safe   1/14/2021 1338 by Karla Chang RN  Outcome: Ongoing  Note: Patient able verbalize continued thoughts of suicide without plan for action. Patient verbally contracts for safety with writer. Goal: Able to verbalize support systems  Description: Able to verbalize support systems  1/14/2021 2354 by Dana Sun RN  Outcome: Ongoing  Note: Pt states he does have a positive support system  1/14/2021 1338 by Karla Chang RN  Outcome: Met This Shift  Note: Patient verbalizes that his kids' mom is his support system this shift. Problem: Discharge Planning:  Goal: Discharged to appropriate level of care  Description: Discharged to appropriate level of care  1/14/2021 2354 by Dana Sun RN  Outcome: Ongoing  Note: Pt plans to be discharged home and follow up with the [facility]  1/14/2021 1338 by Karla Chang RN  Outcome: Not Met This Shift  Note: Patient not discharged this shift. Patient continues to work toward discharge goal with social work and care team this shift. Problem: Nutrition  Goal: Optimal nutrition therapy  1/14/2021 2354 by Dana Sun RN  Outcome: Ongoing  Note: Pt ate 100% of snacks on 3 separate occassions   1/14/2021 1338 by Karla Chang RN  Outcome: Not Met This Shift  Note: Patient not eating this shift. Patient reports being too nauseous. Patient offered nutritonal supplement along with treatments for stomach pain. MD aware. Will continue to monitor.       Problem: Depressive Behavior With or Without Suicide Precautions:  Goal: Able to verbalize and/or display a decrease in depressive symptoms Description: Able to verbalize and/or display a decrease in depressive symptoms  1/14/2021 2354 by Rg Moctezuma RN  Outcome: Not Met This Shift  Note: Pt denies depression or anxiety but this is incongruent, pt is flat, sad and withdrawn  1/14/2021 1338 by Albino Simms RN  Outcome: Ongoing  Note: Patient unable to participate in programming this shift due to withdrawal symptoms of alcohol. Patient verbalizes continued feelings of depression this shift. Goal: Participates in care planning  Description: Participates in care planning  1/14/2021 2354 by Rg Moctezuma RN  Outcome: Not Met This Shift  Note: Pt taking medication, but not attending groups or socializing   1/14/2021 1338 by Albino Simms RN  Outcome: Ongoing  Note: Patient participates in care planning with  and care team this shift. Problem: Altered Mood, Psychotic Behavior:  Goal: Able to verbalize decrease in frequency and intensity of hallucinations  Description: Able to verbalize decrease in frequency and intensity of hallucinations  1/14/2021 2354 by Rg Moctezuma RN  Outcome: Not Met This Shift  Note: Pt reports seeing shadows and hearing voices telling voices telling him he is worthless and not worth living  1/14/2021 1338 by Albino Simms RN  Outcome: Met This Shift  Note: Patient denies having hallucinations this shift.       Problem: Substance Abuse:  Goal: Absence of drug withdrawal signs and symptoms  Description: Absence of drug withdrawal signs and symptoms  1/14/2021 2354 by Rg Moctezuma RN  Outcome: Not Met This Shift  Note: Pt is having alcohol withdrawal symptoms of indigestion, shakes and tremors, hot and cold sweats, runny nose, night harvey, body aches, increased anxiety  1/14/2021 1338 by Albino Simms RN  Outcome: Not Met This Shift Note: Patient experiences elevated blood pressure, excessive sweating, nausea, and abdominal cramping this shift. Patient being treated with Ativan per [facility] protocol this shift. Will continue to monitor. Problem: Pain:  Goal: Pain level will decrease  Description: Pain level will decrease  1/14/2021 2354 by Kristan Mendoza RN  Outcome: Not Met This Shift  Note: Pt reports mid sternal discomfort that has not improved  1/14/2021 1338 by Abhilash Akers RN  Outcome: Ongoing  Note: Patient reports 10/10 pain in his abdomen and sternum . Patient being treated with protonix and karafate for stomach pain along with tylenol. Patient resting in bed at this time. Respirations are easy. Goal: Control of acute pain  Description: Control of acute pain  Outcome: Not Met This Shift  Note: Pt reports mid sternal discomfort that has not improved  Goal: Control of chronic pain  Description: Control of chronic pain  Outcome: Not Met This Shift  Note: Pt reports mid sternal discomfort that has not improved     Problem: OTHER  Goal: Other  Description: No c/o indigestion  Outcome: Not Met This Shift  Note: Pt reports mid sternal pain that does not improve     Problem: Role Relationship:  Goal: Ability to verbalize awareness of feelings that lead to decreased social interaction will improve  Description: Ability to verbalize awareness of feelings that lead to decreased social interaction will improve  Outcome: Not Met This Shift  Note: Pt remained isolative to his bed accept for medication needs   Care plan reviewed with patient and verbalize understanding of the plan of care and contribute to goal setting.

## 2021-01-16 PROCEDURE — APPSS15 APP SPLIT SHARED TIME 0-15 MINUTES: Performed by: NURSE PRACTITIONER

## 2021-01-16 PROCEDURE — 6370000000 HC RX 637 (ALT 250 FOR IP): Performed by: REGISTERED NURSE

## 2021-01-16 PROCEDURE — 99253 IP/OBS CNSLTJ NEW/EST LOW 45: CPT | Performed by: PHYSICIAN ASSISTANT

## 2021-01-16 PROCEDURE — 90833 PSYTX W PT W E/M 30 MIN: CPT | Performed by: PSYCHIATRY & NEUROLOGY

## 2021-01-16 PROCEDURE — 99232 SBSQ HOSP IP/OBS MODERATE 35: CPT | Performed by: PSYCHIATRY & NEUROLOGY

## 2021-01-16 PROCEDURE — 1240000000 HC EMOTIONAL WELLNESS R&B

## 2021-01-16 PROCEDURE — 6370000000 HC RX 637 (ALT 250 FOR IP): Performed by: PSYCHIATRY & NEUROLOGY

## 2021-01-16 RX ORDER — IBUPROFEN 400 MG/1
400 TABLET ORAL EVERY 6 HOURS PRN
Status: DISCONTINUED | OUTPATIENT
Start: 2021-01-16 | End: 2021-01-25 | Stop reason: HOSPADM

## 2021-01-16 RX ORDER — CLONIDINE HYDROCHLORIDE 0.1 MG/1
0.1 TABLET ORAL 2 TIMES DAILY
Status: DISCONTINUED | OUTPATIENT
Start: 2021-01-16 | End: 2021-01-25 | Stop reason: HOSPADM

## 2021-01-16 RX ORDER — DICYCLOMINE HYDROCHLORIDE 10 MG/1
10 CAPSULE ORAL
Status: DISCONTINUED | OUTPATIENT
Start: 2021-01-16 | End: 2021-01-25 | Stop reason: HOSPADM

## 2021-01-16 RX ADMIN — Medication 100 MG: at 09:10

## 2021-01-16 RX ADMIN — CLONIDINE HYDROCHLORIDE 0.1 MG: 0.1 TABLET ORAL at 20:34

## 2021-01-16 RX ADMIN — ACETAMINOPHEN 650 MG: 325 TABLET ORAL at 15:15

## 2021-01-16 RX ADMIN — LORAZEPAM 1 MG: 1 TABLET ORAL at 01:39

## 2021-01-16 RX ADMIN — SUCRALFATE 1 G: 1 TABLET ORAL at 17:02

## 2021-01-16 RX ADMIN — LORAZEPAM 2 MG: 1 TABLET ORAL at 09:13

## 2021-01-16 RX ADMIN — SUCRALFATE 1 G: 1 TABLET ORAL at 09:09

## 2021-01-16 RX ADMIN — SUCRALFATE 1 G: 1 TABLET ORAL at 20:34

## 2021-01-16 RX ADMIN — FOLIC ACID 1 MG: 1 TABLET ORAL at 09:09

## 2021-01-16 RX ADMIN — ONDANSETRON 8 MG: 4 TABLET, ORALLY DISINTEGRATING ORAL at 15:18

## 2021-01-16 RX ADMIN — TRAZODONE HYDROCHLORIDE 50 MG: 50 TABLET ORAL at 20:33

## 2021-01-16 RX ADMIN — DICYCLOMINE HYDROCHLORIDE 10 MG: 10 CAPSULE ORAL at 20:33

## 2021-01-16 RX ADMIN — SUCRALFATE 1 G: 1 TABLET ORAL at 12:24

## 2021-01-16 RX ADMIN — SERTRALINE 25 MG: 50 TABLET, FILM COATED ORAL at 09:09

## 2021-01-16 RX ADMIN — ACETAMINOPHEN 650 MG: 325 TABLET ORAL at 09:11

## 2021-01-16 RX ADMIN — IBUPROFEN 400 MG: 400 TABLET, FILM COATED ORAL at 20:38

## 2021-01-16 RX ADMIN — LORAZEPAM 1 MG: 1 TABLET ORAL at 21:20

## 2021-01-16 RX ADMIN — HYDROXYZINE HYDROCHLORIDE 50 MG: 25 TABLET ORAL at 23:06

## 2021-01-16 RX ADMIN — PANTOPRAZOLE SODIUM 40 MG: 40 TABLET, DELAYED RELEASE ORAL at 09:11

## 2021-01-16 RX ADMIN — LORAZEPAM 4 MG: 1 TABLET ORAL at 17:49

## 2021-01-16 RX ADMIN — LORAZEPAM 2 MG: 1 TABLET ORAL at 15:18

## 2021-01-16 ASSESSMENT — PAIN DESCRIPTION - FREQUENCY: FREQUENCY: CONTINUOUS

## 2021-01-16 ASSESSMENT — PAIN SCALES - GENERAL
PAINLEVEL_OUTOF10: 9
PAINLEVEL_OUTOF10: 10

## 2021-01-16 ASSESSMENT — PAIN DESCRIPTION - ONSET: ONSET: GRADUAL

## 2021-01-16 ASSESSMENT — PAIN DESCRIPTION - PAIN TYPE: TYPE: ACUTE PAIN

## 2021-01-16 ASSESSMENT — PAIN DESCRIPTION - PROGRESSION
CLINICAL_PROGRESSION: NOT CHANGED
CLINICAL_PROGRESSION: NOT CHANGED

## 2021-01-16 ASSESSMENT — PAIN DESCRIPTION - ORIENTATION
ORIENTATION: OTHER (COMMENT)
ORIENTATION: OTHER (COMMENT)

## 2021-01-16 NOTE — PLAN OF CARE
Problem: Depressive Behavior With or Without Suicide Precautions:  Goal: Ability to disclose and discuss suicidal ideas will improve  Description: Ability to disclose and discuss suicidal ideas will improve  1/16/2021 0154 by Taylor Reaves RN  Outcome: Met This Shift  Note: States he has suicidal thoughts with no plan   1/15/2021 1428 by Albino Simms RN  Outcome: Ongoing  Note: Patient reports continued suicidal thoughts without plan or intent. Goal: Able to verbalize support systems  Description: Able to verbalize support systems  1/16/2021 0154 by Taylor Reaves RN  Outcome: Met This Shift  Note: States he has support   1/15/2021 1428 by Albino Simms RN  Outcome: Ongoing  Note: Patient reports he \"has friends\" that are his support system. Goal: Participates in care planning  Description: Participates in care planning  1/16/2021 0154 by Taylor Reaves RN  Outcome: Met This Shift  Note: Care plan reviewed with patient. Patient verbalize understanding of the plan of care and contribute to goal setting. 1/15/2021 1428 by Albino Simms RN  Outcome: Ongoing  Note: Patient participates in care planning with social work and care team this shift. Problem: Discharge Planning:  Goal: Discharged to appropriate level of care  Description: Discharged to appropriate level of care  1/16/2021 0154 by Taylor Reaves RN  Outcome: Met This Shift  Note: Works with an interdisciplinary team towards meeting discharge needs   1/15/2021 1428 by Albino Simms RN  Outcome: Not Met This Shift  Note: Patient not discharged this shift. Patient continues to work toward discharge goal with social work and care team.      Problem: Pain:  Goal: Pain level will decrease  Description: Pain level will decrease  1/16/2021 0154 by Taylor Reaves RN  Outcome: Met This Shift  Note: Reports generalized body aches.   Refuses medications or nonpharmacological pain interventions  1/15/2021 1428 by Albino Simms RN Outcome: Not Met This Shift  Note: Patient reports continued Pain. Patient rates pain #10/10 and states it is generalized all over body aching. Problem: OTHER  Goal: Other  Description: No c/o indigestion  Outcome: Met This Shift  Note: No c/o indigestion     Problem: Nutrition  Goal: Optimal nutrition therapy  1/16/2021 0154 by Iza Jimenez RN  Outcome: Met This Shift  Note: Ate 100% snack  1/15/2021 1428 by Michael Strong RN  Outcome: Ongoing  Note: Patient able to eat adequate amount of breakfast this shift. Patient slept through lunch. Problem: Altered Mood, Psychotic Behavior:  Goal: Able to verbalize decrease in frequency and intensity of hallucinations  Description: Able to verbalize decrease in frequency and intensity of hallucinations  1/16/2021 0154 by Iza Jimenez RN  Outcome: Ongoing  Note:  States that he hears voices    1/15/2021 1428 by Michael Strong RN  Outcome: Not Met This Shift  Note: Patient reports hearing voices that are telling him \"Get up you piece of crap. \"     Problem: Substance Abuse:  Goal: Absence of drug withdrawal signs and symptoms  Description: Absence of drug withdrawal signs and symptoms  1/16/2021 0154 by Iza Jimenez RN  Outcome: Ongoing  Note: CIWA utilized   1/15/2021 1428 by Michael Strong RN  Outcome: Ongoing  Note: Patient reports continued feelings withdrawal from alcohol. Patient reports these feelings are \"better than yesterday but still there. \"      Problem: Role Relationship:  Goal: Ability to verbalize awareness of feelings that lead to decreased social interaction will improve  Description: Ability to verbalize awareness of feelings that lead to decreased social interaction will improve  1/16/2021 0154 by Iza Jimenez RN  Outcome: Ongoing  1/15/2021 1428 by Michael Strong RN  Outcome: Not Met This Shift  Note: Patient did not interact with peers this shift. Patient sleeping to this point in the shift.    1/15/2021 1246 by Erasmo Miller Outcome: Not Met This Shift     Problem: Depressive Behavior With or Without Suicide Precautions:  Goal: Able to verbalize and/or display a decrease in depressive symptoms  Description: Able to verbalize and/or display a decrease in depressive symptoms  1/16/2021 0154 by Tristian Lilly RN  Outcome: Not Met This Shift  Note: Rates mood a 2/10  1/15/2021 1428 by Deniz Golden RN  Outcome: Ongoing  Note: Patient reports continued feelings of depression this shift.       Problem: Pain:  Goal: Control of acute pain  Description: Control of acute pain  Outcome: Completed  Goal: Control of chronic pain  Description: Control of chronic pain  Outcome: Completed

## 2021-01-16 NOTE — PROGRESS NOTES
Psychiatry Progress Note      CC: Severe episode of recurrent major depressive disorder, with psychotic features ([facility] [address]    Alcohol Use D/O severe dependence    Subjective    Progress:  Eunice Das is seen at bedside with Nurse Ny Taylor. Eunice Das was up and ate breakfast and now states he is week and cannot come to interview room. Reports depression is the same. Denies feelings of harm towards self or others. Denies side effects from meds. Good med compliance is verified. Reports appetite is \"ok\" but is reported to be eating every meal. Reports sleep was \"up and down\" But verified slept 8 hours and woke up once during the night. Denies going to groups. Denies getting any visits or talking to anyone on phone. Objective  /84   Pulse 92   Temp 96.7 °F (35.9 °C) (Oral)   Resp 16   Ht 5' 8\" (1.727 m)   Wt 119 lb (54 kg)   SpO2 96%   BMI 18.09 kg/m²      MSE:  Level of consciousness: Alert  Appearance: hospital attire, in bed and fair grooming   Behavior/Motor: no abnormalities noted   Attitude toward examiner: cooperative   Speech: Normal volume, goal directed, NRR  Mood: Euthymic  Affect: Reactive  Thought processes: Linear and goal directed   Suicidal Ideation: Denies suicidal ideations  Homicidal ideation: Denies homicidal ideations  Delusions: No evidence of delusions is observed  Perceptual Disturbance: Denies AH/VH;  No evidence of psychosis is observed. Cognition: Oriented to person, place, time and situation   Concentration fair   Memory intact   Insight: Poor  Judgment: Poor    Assessment:  Severe episode of recurrent major depressive disorder, with psychotic features ([facility] [address]    Alcohol Use D/O severe dependence    Plan:  Continue current meds as ordered  Continue to encourage group attendance.       Miranda Cheung, BHAVANI  2-                                         Psychiatry Attending Attestation I assessed this patient and reviewed the case and plan of care with Jerome Mora CNP. I have reviewed the above documentation and I agree with the findings and treatment plan with the following updates. Patient was seen by Jerome Mora CNPon the unit and I evaluated patient as Tele visit. Patient continues to go through severe withdrawals. He is requiring over 15 mg of Ativan. Will consider switching him to Librium tomorrow. His blood pressure stabilized today. We will consult internal medicine to see if they can assist this further with her withdrawals. Continues to report feeling sad down and low. Rates his mood 4 out of 10 with 10 being the best mood. Endorses suicidal thoughts. He reports he has some plans crossing his mind but denies any intent or plan. We will also continue to titrate his antidepressant medication. More than 16 mins of the session was spent doing Supportive psychotherapy and coordinating care. Session lasted for over 30 mins. Julianna Falcon is a 45 y.o. male being evaluated by a Virtual Visit (video visit) encounter to address concerns as mentioned above. A caregiver was present in the room along with the patient. Pursuant to the emergency declaration under the [facility], [address] [place] [address] authority and the [employer] Act, this Virtual Visit was conducted with patient's (and/or legal guardian's) consent, to reduce the patient's risk of exposure to COVID-19 and provide necessary medical care. Services were provided through a video synchronous discussion virtually to substitute for in-person visit by provider. Patient is present at [address] [place] [address] [address] and I am physically present at my home in [facility]     --Louis Marie MD on 1/16/2021 at 7:55 PM    An electronic signature was used to authenticate this note. **This report has been created using voice recognition software. It may contain minor errors which are inherent in voice recognition technology. **

## 2021-01-16 NOTE — PLAN OF CARE
Problem: Depressive Behavior With or Without Suicide Precautions:  Goal: Able to verbalize and/or display a decrease in depressive symptoms  Description: Able to verbalize and/or display a decrease in depressive symptoms  1/16/2021 1337 by Kee Rey LPN  Outcome: Ongoing  Note: Patient reports depressive symptoms during shift assessment. Patient rates depression 10/10.  1/16/2021 0154 by Taylor Reaves RN  Outcome: Not Met This Shift  Note: Rates mood a 2/10  Goal: Ability to disclose and discuss suicidal ideas will improve  Description: Ability to disclose and discuss suicidal ideas will improve  1/16/2021 1337 by Kee Rey LPN  Outcome: Ongoing  Note: Patient reports suicidal ideations during shift assessment. Patient has no plan but does contract for safety. 1/16/2021 0154 by Taylor Reaves RN  Outcome: Met This Shift  Note: States he has suicidal thoughts with no plan   Goal: Able to verbalize support systems  Description: Able to verbalize support systems  1/16/2021 1337 by Kee Rey LPN  Outcome: Ongoing  Note: Patient unable to verbalize support system. Patient report \"not really\". 1/16/2021 0154 by Taylor Reaves RN  Outcome: Met This Shift  Note: States he has support   Goal: Participates in care planning  Description: Participates in care planning  1/16/2021 1337 by Kee Rey LPN  Outcome: Ongoing  Note: Patient participates in care planning with staff during shift. 1/16/2021 0154 by Taylor Reaves RN  Outcome: Met This Shift  Note: Care plan reviewed with patient. Patient verbalize understanding of the plan of care and contribute to goal setting.        Problem: Altered Mood, Psychotic Behavior:  Goal: Able to verbalize decrease in frequency and intensity of hallucinations  Description: Able to verbalize decrease in frequency and intensity of hallucinations  1/16/2021 1337 by Kee Rey LPN  Outcome: Ongoing Note: Patient reports audible and visual hallucinations. Reports hearing whispering. States they say \"negative things\" and seeing \"shadows\". 1/16/2021 0154 by Elvie Humphrey RN  Outcome: Ongoing  Note:  States that he hears voices       Problem: Substance Abuse:  Goal: Absence of drug withdrawal signs and symptoms  Description: Absence of drug withdrawal signs and symptoms  1/16/2021 1337 by Beatriz Rouse LPN  Outcome: Ongoing  Note: Patient reports drinking alcohol prior to admission. Patient reports having audible and visual hallucinations, hot and cold chills with sweating, all over aching and throbbing pain, feeling SOB, increased anxiety and bowel symptoms, such as stomach aches, diarrhea and loose stools, and feeling nausea before and after eating. 1/16/2021 0154 by Elvie Humphrey RN  Outcome: Ongoing  Note: CIWA utilized      Problem: Discharge Planning:  Goal: Discharged to appropriate level of care  Description: Discharged to appropriate level of care  1/16/2021 1337 by Beatriz Rouse LPN  Outcome: Ongoing  Note: No discharge plans noted at this time during shift. 1/16/2021 0154 by Elvie Humphrey RN  Outcome: Met This Shift  Note: Works with an interdisciplinary team towards meeting discharge needs      Problem: Pain:  Goal: Pain level will decrease  Description: Pain level will decrease  1/16/2021 1337 by Beatriz Rouse LPN  Outcome: Ongoing  Note: Patient reports all over pain. Rates pain 10/10 and described as \"throbbing\" and \"aching\". PRN medication available for pain management. 1/16/2021 0154 by Elvie Humphrey RN  Outcome: Met This Shift  Note: Reports generalized body aches.   Refuses medications or nonpharmacological pain interventions     Problem: Nutrition  Goal: Optimal nutrition therapy  1/16/2021 1337 by Beatriz Rouse LPN  Outcome: Ongoing

## 2021-01-16 NOTE — PROGRESS NOTES
6355 Clinician made rounds. Clinician introduced self and encouraged patient to check in with needs, questions, or concerns as they arise with . 1410 Patient reports during group that he has no gas or running water. Patient encouraged to talk to weekday .

## 2021-01-17 LAB
ALBUMIN SERPL-MCNC: 3.4 G/DL (ref 3.5–5.1)
ALP BLD-CCNC: 60 U/L (ref 38–126)
ALT SERPL-CCNC: 19 U/L (ref 11–66)
ANION GAP SERPL CALCULATED.3IONS-SCNC: 7 MEQ/L (ref 8–16)
AST SERPL-CCNC: 21 U/L (ref 5–40)
BILIRUB SERPL-MCNC: 0.3 MG/DL (ref 0.3–1.2)
BUN BLDV-MCNC: 15 MG/DL (ref 7–22)
CALCIUM SERPL-MCNC: 8.9 MG/DL (ref 8.5–10.5)
CHLORIDE BLD-SCNC: 105 MEQ/L (ref 98–111)
CO2: 29 MEQ/L (ref 23–33)
CREAT SERPL-MCNC: 0.6 MG/DL (ref 0.4–1.2)
ERYTHROCYTE [DISTWIDTH] IN BLOOD BY AUTOMATED COUNT: 13.2 % (ref 11.5–14.5)
ERYTHROCYTE [DISTWIDTH] IN BLOOD BY AUTOMATED COUNT: 48.4 FL (ref 35–45)
GFR SERPL CREATININE-BSD FRML MDRD: > 90 ML/MIN/1.73M2
GLUCOSE BLD-MCNC: 91 MG/DL (ref 70–108)
HCT VFR BLD CALC: 37.7 % (ref 42–52)
HEMOGLOBIN: 12.3 GM/DL (ref 14–18)
MAGNESIUM: 1.8 MG/DL (ref 1.6–2.4)
MCH RBC QN AUTO: 32.5 PG (ref 26–33)
MCHC RBC AUTO-ENTMCNC: 32.6 GM/DL (ref 32.2–35.5)
MCV RBC AUTO: 99.7 FL (ref 80–94)
PLATELET # BLD: 289 THOU/MM3 (ref 130–400)
PMV BLD AUTO: 10.3 FL (ref 9.4–12.4)
POTASSIUM REFLEX MAGNESIUM: 4.3 MEQ/L (ref 3.5–5.2)
RBC # BLD: 3.78 MILL/MM3 (ref 4.7–6.1)
SODIUM BLD-SCNC: 141 MEQ/L (ref 135–145)
TOTAL PROTEIN: 5.1 G/DL (ref 6.1–8)
WBC # BLD: 7.8 THOU/MM3 (ref 4.8–10.8)

## 2021-01-17 PROCEDURE — 6370000000 HC RX 637 (ALT 250 FOR IP): Performed by: PSYCHIATRY & NEUROLOGY

## 2021-01-17 PROCEDURE — 1240000000 HC EMOTIONAL WELLNESS R&B

## 2021-01-17 PROCEDURE — APPSS15 APP SPLIT SHARED TIME 0-15 MINUTES: Performed by: NURSE PRACTITIONER

## 2021-01-17 PROCEDURE — 6370000000 HC RX 637 (ALT 250 FOR IP): Performed by: REGISTERED NURSE

## 2021-01-17 PROCEDURE — 99232 SBSQ HOSP IP/OBS MODERATE 35: CPT | Performed by: PSYCHIATRY & NEUROLOGY

## 2021-01-17 PROCEDURE — 85027 COMPLETE CBC AUTOMATED: CPT

## 2021-01-17 PROCEDURE — 36415 COLL VENOUS BLD VENIPUNCTURE: CPT

## 2021-01-17 PROCEDURE — 99232 SBSQ HOSP IP/OBS MODERATE 35: CPT | Performed by: NURSE PRACTITIONER

## 2021-01-17 PROCEDURE — 83735 ASSAY OF MAGNESIUM: CPT

## 2021-01-17 PROCEDURE — 80053 COMPREHEN METABOLIC PANEL: CPT

## 2021-01-17 PROCEDURE — 6370000000 HC RX 637 (ALT 250 FOR IP): Performed by: NURSE PRACTITIONER

## 2021-01-17 RX ORDER — MULTIVITAMIN WITH IRON
1 TABLET ORAL DAILY
Status: DISCONTINUED | OUTPATIENT
Start: 2021-01-17 | End: 2021-01-25 | Stop reason: HOSPADM

## 2021-01-17 RX ORDER — CHLORDIAZEPOXIDE HYDROCHLORIDE 5 MG/1
10 CAPSULE, GELATIN COATED ORAL 3 TIMES DAILY
Status: DISCONTINUED | OUTPATIENT
Start: 2021-01-17 | End: 2021-01-25

## 2021-01-17 RX ADMIN — ACETAMINOPHEN 650 MG: 325 TABLET ORAL at 13:24

## 2021-01-17 RX ADMIN — ONDANSETRON 8 MG: 4 TABLET, ORALLY DISINTEGRATING ORAL at 16:38

## 2021-01-17 RX ADMIN — CHLORDIAZEPOXIDE HYDROCHLORIDE 10 MG: 5 CAPSULE ORAL at 13:25

## 2021-01-17 RX ADMIN — HYDROXYZINE HYDROCHLORIDE 50 MG: 25 TABLET ORAL at 13:25

## 2021-01-17 RX ADMIN — HYDROXYZINE HYDROCHLORIDE 50 MG: 25 TABLET ORAL at 19:34

## 2021-01-17 RX ADMIN — IBUPROFEN 400 MG: 400 TABLET, FILM COATED ORAL at 09:33

## 2021-01-17 RX ADMIN — DICYCLOMINE HYDROCHLORIDE 10 MG: 10 CAPSULE ORAL at 10:44

## 2021-01-17 RX ADMIN — DICYCLOMINE HYDROCHLORIDE 10 MG: 10 CAPSULE ORAL at 17:02

## 2021-01-17 RX ADMIN — CLONIDINE HYDROCHLORIDE 0.1 MG: 0.1 TABLET ORAL at 22:18

## 2021-01-17 RX ADMIN — FOLIC ACID 1 MG: 1 TABLET ORAL at 09:32

## 2021-01-17 RX ADMIN — LORAZEPAM 1 MG: 1 TABLET ORAL at 03:36

## 2021-01-17 RX ADMIN — SUCRALFATE 1 G: 1 TABLET ORAL at 10:44

## 2021-01-17 RX ADMIN — CLONIDINE HYDROCHLORIDE 0.1 MG: 0.1 TABLET ORAL at 09:32

## 2021-01-17 RX ADMIN — SUCRALFATE 1 G: 1 TABLET ORAL at 09:32

## 2021-01-17 RX ADMIN — DICYCLOMINE HYDROCHLORIDE 10 MG: 10 CAPSULE ORAL at 09:32

## 2021-01-17 RX ADMIN — Medication 1 TABLET: at 10:44

## 2021-01-17 RX ADMIN — CHLORDIAZEPOXIDE HYDROCHLORIDE 10 MG: 5 CAPSULE ORAL at 09:33

## 2021-01-17 RX ADMIN — SUCRALFATE 1 G: 1 TABLET ORAL at 22:18

## 2021-01-17 RX ADMIN — SERTRALINE 50 MG: 50 TABLET, FILM COATED ORAL at 13:24

## 2021-01-17 RX ADMIN — TRAZODONE HYDROCHLORIDE 50 MG: 50 TABLET ORAL at 22:18

## 2021-01-17 RX ADMIN — SUCRALFATE 1 G: 1 TABLET ORAL at 17:02

## 2021-01-17 RX ADMIN — CHLORDIAZEPOXIDE HYDROCHLORIDE 10 MG: 5 CAPSULE ORAL at 22:18

## 2021-01-17 RX ADMIN — ACETAMINOPHEN 650 MG: 325 TABLET ORAL at 03:36

## 2021-01-17 RX ADMIN — HYDROXYZINE HYDROCHLORIDE 50 MG: 25 TABLET ORAL at 09:34

## 2021-01-17 RX ADMIN — Medication 100 MG: at 09:32

## 2021-01-17 RX ADMIN — IBUPROFEN 400 MG: 400 TABLET, FILM COATED ORAL at 19:35

## 2021-01-17 RX ADMIN — PANTOPRAZOLE SODIUM 40 MG: 40 TABLET, DELAYED RELEASE ORAL at 10:44

## 2021-01-17 ASSESSMENT — PAIN DESCRIPTION - LOCATION: LOCATION: GENERALIZED

## 2021-01-17 ASSESSMENT — PAIN DESCRIPTION - ORIENTATION: ORIENTATION: OTHER (COMMENT)

## 2021-01-17 ASSESSMENT — PAIN DESCRIPTION - PAIN TYPE
TYPE: ACUTE PAIN
TYPE: ACUTE PAIN

## 2021-01-17 ASSESSMENT — PAIN DESCRIPTION - ONSET: ONSET: ON-GOING

## 2021-01-17 ASSESSMENT — PAIN SCALES - GENERAL
PAINLEVEL_OUTOF10: 10
PAINLEVEL_OUTOF10: 9

## 2021-01-17 ASSESSMENT — PAIN DESCRIPTION - FREQUENCY: FREQUENCY: CONTINUOUS

## 2021-01-17 ASSESSMENT — PAIN DESCRIPTION - PROGRESSION
CLINICAL_PROGRESSION: NOT CHANGED
CLINICAL_PROGRESSION: NOT CHANGED

## 2021-01-17 ASSESSMENT — PAIN - FUNCTIONAL ASSESSMENT: PAIN_FUNCTIONAL_ASSESSMENT: ACTIVITIES ARE NOT PREVENTED

## 2021-01-17 NOTE — GROUP NOTE
Group Therapy Note    Date: 1/17/2021    Group Start Time: 1100  Group End Time: 6915  Group Topic: Recreational    [facility] 4[facility]    NOAH Ag    Group Therapy Note    Attendees: 6         Patient's Goal:  Pt will improve socialization and knowledge of coping skills    Notes:  Pt had minimal interaction with peers during the session. Pt was prompted to participated in discussions about where they would like to be in the future, steps they could take to get there, and advice he would give himself. Pt identified a positive attribute about himself, but did not participate in the rest of the discussion topics. Status After Intervention:  Improved    Participation Level:  Active Listener and Interactive    Participation Quality: Appropriate, Attentive and Sharing      Speech:  hesitant      Thought Process/Content: Linear      Affective Functioning: Flat      Mood: euthymic      Level of consciousness:  Alert, Oriented x4, Attentive and Drowsy      Response to Learning: Able to verbalize current knowledge/experience, Able to verbalize/acknowledge new learning and Progressing to goal      Endings: None Reported    Modes of Intervention: Education, Support, Socialization, Exploration, Clarifying, Activity, Confrontation and Reality-testing      Discipline Responsible: Psychoeducational Specialist      Signature:  NOAH Cohen

## 2021-01-17 NOTE — BH NOTE
Pt did not attend 1000 goal group and community meeting. Pt received maximum encouragement to attend group. 1:1 session was offered for a group alternative.     Signature: Ralph Castillo

## 2021-01-17 NOTE — PROGRESS NOTES
Hospitalist Progress Note    Patient:  Evangelista Foy. Unit/Bed:-67/067-A    YOB: 1970    MRN: [id number]       Acct: [de-identified]     PCP: No primary care provider on file. Date of Admission: 1/13/2021    Assessment/Plan:    1. Acute alcohol withdrawal--on CIWA scale with Ativan; has used 10 mg of Ativan in the last 24 hours~was changed to Librium today per psychiatry; checked labs this morning and normal including potassium 4.3 and magnesium at 1.8, LFTs are normal; on folic acid, thiamine; will add multivitamin  2. Marijuana use  3. Essential hypertension, controlled--stable; monitor  4. COPD--stable, on room air  5. Severe malnutrition--per dietitian  6. Tobacco abuse--on NicoDerm 14 mg topically daily, needs cessation counseling  7.  History stroke    Expected discharge date: Per psychiatry    Disposition:    [x] Home       [] TCU       [] Rehab       [] Psych       [] SNF       [] [place]       [] Other-    Chief Complaint: Alcohol withdrawal    Hospital Course: Patient was admitted to  secondary to suicide ideation as a plan to starve himself to death; this patient is an alcoholic and states he drinks a 12 pack of beer a day and 1/5 of vodka; he was admitted to  and was started on Ativan with CIWA protocol and today was switched over to Librium per psychiatry; he is fully alert and oriented states he feels weak and tired complains of \"aches all over\" and rates 8 out of 10    Subjective (past 24 hours): Chronically ill-appearing, complains of aches all over and rates 8 out of 10, is to some nausea as well    Medications:  Reviewed    Infusion Medications   Scheduled Medications    multivitamin  1 tablet Oral Daily    chlordiazePOXIDE  10 mg Oral TID    sertraline  50 mg Oral Daily    dicyclomine  10 mg Oral TID AC    cloNIDine  0.1 mg Oral BID    nicotine  1 patch Transdermal Daily    thiamine  100 mg Oral Daily    folic acid  1 mg Oral Daily  pantoprazole  40 mg Oral QAM AC    sucralfate  1 g Oral 4x Daily AC & HS     PRN Meds: ibuprofen, acetaminophen, magnesium hydroxide, aluminum & magnesium hydroxide-simethicone, hydrOXYzine, traZODone, ondansetron      Intake/Output Summary (Last 24 hours) at 1/17/2021 1037  Last data filed at 1/16/2021 2015  Gross per 24 hour   Intake 500 ml   Output    Net 500 ml       Diet:  DIET GENERAL;  Dietary Nutrition Supplements: Standard High Calorie Oral Supplement    Exam:  BP (!) 120/91   Pulse 89   Temp 96.4 °F (35.8 °C) (Oral)   Resp 16   Ht 5' 8\" (1.727 m)   Wt 119 lb (54 kg)   SpO2 97%   BMI 18.09 kg/m²     General appearance: No apparent distress, appears older than stated age and cooperative. Chronically ill-appearing; minimal bilateral arm tremoring noted  HEENT: Pupils equal, round, and reactive to light. Conjunctivae/corneas clear. Neck: Supple, with full range of motion. No jugular venous distention. Trachea midline. Respiratory:  Normal respiratory effort. Diminished to auscultation, bilaterally without Rales/Wheezes/Rhonchi. Cardiovascular: Regular rate and rhythm with normal S1/S2 without murmurs, rubs or gallops. Abdomen: Soft, non-tender, non-distended with normal bowel sounds. Musculoskeletal: passive and active ROM x 4 extremities. Skin: Skin color, texture, turgor normal.    Neurologic:  Neurovascularly intact without any focal sensory/motor deficits.  Cranial nerves: II-XII intact, grossly non-focal.  Psychiatric: Alert and oriented, thought content appropriate  Capillary Refill: Brisk,< 3 seconds   Peripheral Pulses: +2 palpable, equal bilaterally       Labs:   Recent Labs     01/17/21  0803   WBC 7.8   HGB 12.3*   HCT 37.7*        Recent Labs     01/17/21  0803      K 4.3      CO2 29   BUN 15   CREATININE 0.6   CALCIUM 8.9     Recent Labs     01/17/21  0803   AST 21   ALT 19   BILITOT 0.3   ALKPHOS 60     Microbiology: COVID-19 not detected on January 14, 2021    Urinalysis:      Lab Results   Component Value Date    NITRU NEGATIVE 01/13/2021    WBCUA 0-2 12/31/2015    BACTERIA NONE 12/31/2015    RBCUA 5-10 12/31/2015    BLOODU NEGATIVE 01/13/2021    SPECGRAV 1.011 07/16/2015    GLUCOSEU NEGATIVE 01/13/2021       Radiology:  No results found.     DVT prophylaxis: [] Lovenox                                 [] SCDs                                 [] SQ Heparin                                 [x] Encourage ambulation           [] Already on Anticoagulation     Code Status: Full Code    Tele:   [] yes             [x] no    Active Hospital Problems    Diagnosis Date Noted    Severe episode of recurrent major depressive disorder, with psychotic features ([address] [F33.3] 01/14/2021     Priority: High    Alcohol use disorder, severe, dependence ([address] [F10.20]     Major depressive disorder, recurrent severe without psychotic features ([address] [F33.2] 01/14/2021    Severe malnutrition ([address] [E43] 01/14/2021       Electronically signed by RINA Acevedo CNP on 1/17/2021 at 10:37 AM

## 2021-01-17 NOTE — GROUP NOTE
Group Therapy Note    Date: 1/17/2021    Group Start Time: 1330  Group End Time: [id number]  Group Topic: Psychotherapy    [facility] 4E    LINO Mueller; [address], LINO        Group Therapy Note    Attendees:       Patient's Goal:  To build patient relationships exploring empowerment cards    Notes:  Patient actively participated and shared answers to question cards. Patient joked with others and challenged other patients to answer questions and join the conversation. Status After Intervention:  Improved    Participation Level:  Active Listener and Interactive    Participation Quality: Appropriate, Attentive and Sharing      Speech:  normal      Thought Process/Content: Logical  Linear      Affective Functioning: Congruent      Mood: euphoric      Level of consciousness:  Alert, Oriented x4 and Attentive      Response to Learning: Able to verbalize current knowledge/experience, Able to verbalize/acknowledge new learning, Able to retain information, Capable of insight, Able to change behavior and Progressing to goal      Endings: None Reported    Modes of Intervention: Education, Support, Socialization, Exploration and Activity      Discipline Responsible: /Counselor      Signature:  LINO Mueller

## 2021-01-17 NOTE — PLAN OF CARE
Problem: Depressive Behavior With or Without Suicide Precautions:  Goal: Able to verbalize and/or display a decrease in depressive symptoms  Description: Able to verbalize and/or display a decrease in depressive symptoms  Outcome: Ongoing  Note: Patient reports depressive symptoms during shift assessment. Patient rates depression 10/10. Goal: Ability to disclose and discuss suicidal ideas will improve  Description: Ability to disclose and discuss suicidal ideas will improve  Outcome: Ongoing  Note: Patient reports having suicidal ideations \"sometimes\". No plan at this time. Patient is able to contract for safety. Goal: Able to verbalize support systems  Description: Able to verbalize support systems  Outcome: Ongoing  Note: Patient unable to verbalize support systems at this time. Patient reports having no support system. Goal: Participates in care planning  Description: Participates in care planning  Outcome: Ongoing  Note: Patient participates in care planning with staff during shift. Problem: Altered Mood, Psychotic Behavior:  Goal: Able to verbalize decrease in frequency and intensity of hallucinations  Description: Able to verbalize decrease in frequency and intensity of hallucinations  Outcome: Ongoing  Note: Patient reports hearing male and female voices. States the females just \"whisper\" and male voice say negative things such as Debby Lang are you here\". Patient states he sees shadows. Problem: Substance Abuse:  Goal: Absence of drug withdrawal signs and symptoms  Description: Absence of drug withdrawal signs and symptoms  Outcome: Ongoing  Note: Patient continues to have withdrawal symptoms from alcohol throughout the shift. Patient prescribed medication to help with symptoms and is compliant.      Problem: Discharge Planning:  Goal: Discharged to appropriate level of care  Description: Discharged to appropriate level of care  Outcome: Ongoing Note: No discharge plans noted at this time during shift. Problem: Pain:  Goal: Pain level will decrease  Description: Pain level will decrease  Outcome: Ongoing  Note: Patient reports all over pain during shift assessment related to withdrawal symptoms from alcohol. Patient rates pain as 9/10 and describes pain as \"aching\". Problem: Nutrition  Goal: Optimal nutrition therapy  Outcome: Ongoing  Note: Good appetite noted at this time during shift. Problem: Role Relationship:  Goal: Ability to verbalize awareness of feelings that lead to decreased social interaction will improve  Description: Ability to verbalize awareness of feelings that lead to decreased social interaction will improve  1/17/2021 1704 by Sol Lopez LPN  Outcome: Ongoing  Note: Patient has improved interaction noted with peers during shift. Isolated to bed during morning and afternoon but was out of room watching TV after room reflection and supper time. 1/17/2021 1301 by Wellington Granda  Outcome: Ongoing  Note: Pt has attended 1/2 groups that have been offered on the unit so far this shift. Pt has been in his room most of the day, and has not been seen interacting with peers. Pt will be encouraged to attend the groups that are offered and to interact with peers when out on the unit. Pt progress towards socialization goal is ongoing. Care plan reviewed with patient.   Patient does verbalize understanding of the plan of care and does contribute to goal setting

## 2021-01-17 NOTE — CONSULTS
Hospitalist Consult Note        Patient:  Lovely Restrepo. YOB: 1970  Date of Service: 1/16/2021  MRN: [id number]   Acct:  [de-identified]   Primary Care Physician: No primary care provider on file. Chief Complaint:  Major depression  Reason for consult  Alcohol withdrawal    Date of Service: Pt seen/examined in consultation on 1/16/2021     History Of Present Illness:      Radha Curry .50 y.o. male who we are asked to see/evaluate by Dionne Foster, * for medical management of alcohol withdrawal.  Patient is a daily drinker and indicates he drinks a 12 pack of beer and a fifth of vodka daily. Today he was feeling shaky and hot. He has been getting Ativan for withdrawal symptoms. In the previous 12 hours he has received 6 doses of Ativan but is out of his room getting a snack, eating, and drinking. Patient indicates he is tired. Assessment and Plan:-  1. Major depression: primary to manage  2. Alcohol use disorder with severe dependence: continue CIWA as ordered, patient seems to be doing well, will continue to follow, if his condition changes we will be happy to bring him to a medical floor for continued treatment. Past Medical History:        Diagnosis Date    Arthritis     Asthma     [facility] obstructive [facility] [address]     GERD (gastroesophageal reflux disease)     Hypertension     Psychiatric problem     Schizophrenia ([facility] [address]     Severe malnutrition ([facility] [address] 1/14/2021    Unspecified cerebral artery occlusion with cerebral infarction        Past Surgical History:    History reviewed. No pertinent surgical history. Home Medications:   No current facility-administered medications on file prior to encounter.       Current Outpatient Medications on File Prior to Encounter   Medication Sig Dispense Refill    PARoxetine (PAXIL) 20 MG tablet Take 1 tablet by mouth daily 30 tablet 0  Multiple Vitamins-Minerals (THERAPEUTIC MULTIVITAMIN-MINERALS) tablet Take 1 tablet by mouth daily 30 tablet 0    pantoprazole (PROTONIX) 40 MG tablet Take 1 tablet by mouth daily 30 tablet 0    ARIPiprazole (ABILIFY) 5 MG tablet Take 1 tablet by mouth daily 30 tablet 0    docusate sodium (COLACE) 100 MG capsule Take 100 mg by mouth 2 times daily      traZODone (DESYREL) 100 MG tablet Take 100 mg by mouth nightly      cloNIDine (CATAPRES) 0.1 MG tablet Take 0.1 mg by mouth 2 times daily      folic acid (FOLVITE) 1 MG tablet Take 1 tablet by mouth daily 30 tablet 3    vitamin B-1 100 MG tablet Take 1 tablet by mouth daily 30 tablet 3    albuterol (PROVENTIL HFA) 108 (90 BASE) MCG/ACT inhaler Inhale 2 puffs into the lungs every 6 hours as needed for Wheezing 1 Inhaler 3       Allergies:    Prochlorperazine, Toradol [ketorolac tromethamine], Ketorolac, and Compazine [prochlorperazine maleate]    Social History:    reports that he has been smoking. He has a 60.00 pack-year smoking history. He has never used smokeless tobacco. He reports current alcohol use. He reports current drug use. Drug: Marijuana. Family History:       Problem Relation Age of Onset   María Officer Cancer Mother         lung    Other Mother     Cancer Father         copd/lung    Other Sister         pancreatitis    Other Sister         pancreatitis       Diet:  DIET GENERAL;  Dietary Nutrition Supplements: Standard High Calorie Oral Supplement    Review of systems:   Pertinent positives as noted in the HPI. All other systems reviewed and negative. PHYSICAL EXAM:  BP (!) 138/105   Pulse 100   Temp 96.7 °F (35.9 °C) (Oral)   Resp 16   Ht 5' 8\" (1.727 m)   Wt 119 lb (54 kg)   SpO2 98%   BMI 18.09 kg/m²   General appearance: No apparent distress, appears stated age and cooperative. Patients clinical record, labs and radiological imaging reviewed. I agree with clinical findings , provisional diagnosis and management.

## 2021-01-17 NOTE — PLAN OF CARE
Problem: Depressive Behavior With or Without Suicide Precautions:  Goal: Able to verbalize and/or display a decrease in depressive symptoms  Description: Able to verbalize and/or display a decrease in depressive symptoms  1/17/2021 0010 by Gigi Benjamin RN  Outcome: Ongoing  Note: Patient states he continues to feel very depressed. Patient noted with a flat affect. 1/16/2021 1337 by Amanda Rosales LPN  Outcome: Ongoing  Note: Patient reports depressive symptoms during shift assessment. Patient rates depression 10/10. Goal: Ability to disclose and discuss suicidal ideas will improve  Description: Ability to disclose and discuss suicidal ideas will improve  1/17/2021 0010 by Gigi Benjamin RN  Outcome: Ongoing  Note: Patient states suicidal thoughts with no current plan. Patient contracts with staff for safety. 1/16/2021 1337 by Amanda Rosales LPN  Outcome: Ongoing  Note: Patient reports suicidal ideations during shift assessment. Patient has no plan but does contract for safety. Goal: Able to verbalize support systems  Description: Able to verbalize support systems  1/17/2021 0010 by Gigi Benjamin RN  Outcome: Ongoing  Note: Patient states his son is his current support system. 1/16/2021 1337 by Amanda Rosales LPN  Outcome: Ongoing  Note: Patient unable to verbalize support system. Patient report \"not really\". Goal: Participates in care planning  Description: Participates in care planning  1/17/2021 0010 by Gigi Benjamin RN  Outcome: Ongoing  Note: Care plan reviewed with patient and fair understanding verbalized. 1/16/2021 1337 by Amanda Rosales LPN  Outcome: Ongoing  Note: Patient participates in care planning with staff during shift.      Problem: Altered Mood, Psychotic Behavior:  Goal: Able to verbalize decrease in frequency and intensity of hallucinations  Description: Able to verbalize decrease in frequency and intensity of hallucinations 1/17/2021 0010 by Zackery Sales RN  Outcome: Ongoing  Note: Patient states he hears 1 male voice which tells him he should not be here but be dead. Patient states he sees shadows and continues to feel crawling on his skin. 1/16/2021 1337 by Romy Ramirez LPN  Outcome: Ongoing  Note: Patient reports audible and visual hallucinations. Reports hearing whispering. States they say \"negative things\" and seeing \"shadows\". Problem: Substance Abuse:  Goal: Absence of drug withdrawal signs and symptoms  Description: Absence of drug withdrawal signs and symptoms  1/17/2021 0010 by Zackery Sales RN  Outcome: Ongoing  Note: Patient states he continues with tremors, confusion,  hallucinations and delusions. 1/16/2021 1337 by Romy Ramirez LPN  Outcome: Ongoing  Note: Patient reports drinking alcohol prior to admission. Patient reports having audible and visual hallucinations, hot and cold chills with sweating, all over aching and throbbing pain, feeling SOB, increased anxiety and bowel symptoms, such as stomach aches, diarrhea and loose stools, and feeling nausea before and after eating. Problem: Discharge Planning:  Goal: Discharged to appropriate level of care  Description: Discharged to appropriate level of care  1/17/2021 0010 by Zackery Sales RN  Outcome: Ongoing  Note: Patient states he is hopeful to go to a rehab facility in [facility]. 1/16/2021 1337 by Romy Ramirez LPN  Outcome: Ongoing  Note: No discharge plans noted at this time during shift. Problem: Pain:  Goal: Pain level will decrease  Description: Pain level will decrease  1/17/2021 0010 by Zackery Sales RN  Outcome: Ongoing  Note: Patient states he continues with generalized aching of a 9.  1/16/2021 1337 by Romy Ramirez LPN  Outcome: Ongoing  Note: Patient reports all over pain. Rates pain 10/10 and described as \"throbbing\" and \"aching\". PRN medication available for pain management.      Problem: Nutrition

## 2021-01-17 NOTE — PROGRESS NOTES
Psychiatry Progress Note        CC: Severe episode of recurrent major depressive disorder, with psychotic features (HCC)    Alcohol Use D/O severe dependence     Subjective     Progress:  Kelly Knowles came to interview room today. Reports feeling about the same but appears to be doing better. .Reports still has depression. but is a little less. . Denies feelings of harm towards self or others. Reports meds do seem to be helping \"a little bit. \" Denies side effects from meds. Good med compliance is verified. Reports appetite is \"fair. \"  Unit staff reports Kelly Knowles is eating every meal.  Kelly Knowles reports slept better last night. verified slept 7 hours broken. States he attended groups yesterday. . Denies getting any visits or talking to anyone on phone. States he will be going to [facility] when discharged. States he is happy about this.      Objective  BP (!) 125/98   Pulse 89   Temp 96.9 °F (36.1 °C) (Tympanic)   Resp 18   Ht 5' 8\" (1.727 m)   Wt 119 lb (54 kg)   SpO2 97%   BMI 18.09 kg/m²       MSE:  Level of consciousness: Alert  Appearance: hospital attire, in chair and fair grooming   Behavior/Motor: no abnormalities noted   Attitude toward examiner: cooperative   Speech: Normal volume, goal directed, NRR  Mood: Euthymic  Affect: Reactive  Thought processes: Linear and goal directed   Suicidal Ideation: Denies suicidal ideations  Homicidal ideation: Denies homicidal ideations  Delusions: No evidence of delusions is observed  Perceptual Disturbance: Denies AH/VH;  No evidence of psychosis is observed.   Cognition: Oriented to person, place, time and situation   Concentration fair   Memory intact   Insight: Poor  Judgment: Poor     Assessment:  Severe episode of recurrent major depressive disorder, with psychotic features (HCC)    Alcohol Use D/O severe dependence     Plan:  Continue current meds as ordered  Continue to encourage group attendance.        Cady Ochoa, CNP  2- Psychiatry Attending Attestation     I assessed this patient and reviewed the case and plan of care with Frankey Loft, CNP. I have reviewed the above documentation and I agree with the findings and treatment plan with the following updates. Patient was seen by Frankey Loft, CNPon the unit and I evaluated patient as Tele visit. Patient continues to report severe withdrawal symptoms. His blood pressure is stabilized. We will switch him to Librium. Will discontinue Ativan. Continues to endorse fleeting suicidal thoughts. Rates his mood 5 out of 10 with 10 being the best mood ever. We will continue to titrate his antidepressant medication. Aakash Barnes is a 45 y.o. male being evaluated by a Virtual Visit (video visit) encounter to address concerns as mentioned above. A caregiver was present in the room along with the patient. Pursuant to the emergency declaration under the [facility], [address] [place] [address] authority and the [employer] and [employer] Act, this Virtual Visit was conducted with patient's (and/or legal guardian's) consent, to reduce the patient's risk of exposure to COVID-19 and provide necessary medical care. Services were provided through a video synchronous discussion virtually to substitute for in-person visit by provider. Patient is present at [address] [place] [address] [address] and I am physically present at my home in [facility] [place]     --Oniel Tinoco MD on 1/17/2021 at 8:52 PM    An electronic signature was used to authenticate this note. **This report has been created using voice recognition software. It may contain minor errors which are inherent in voice recognition technology. **

## 2021-01-17 NOTE — PLAN OF CARE
Problem: Role Relationship:  Goal: Ability to verbalize awareness of feelings that lead to decreased social interaction will improve  Description: Ability to verbalize awareness of feelings that lead to decreased social interaction will improve  Outcome: Ongoing  Note: Pt has attended 1/2 groups that have been offered on the unit so far this shift. Pt has been in his room most of the day, and has not been seen interacting with peers. Pt will be encouraged to attend the groups that are offered and to interact with peers when out on the unit. Pt progress towards socialization goal is ongoing.

## 2021-01-18 PROCEDURE — 99232 SBSQ HOSP IP/OBS MODERATE 35: CPT | Performed by: PSYCHIATRY & NEUROLOGY

## 2021-01-18 PROCEDURE — APPSS30 APP SPLIT SHARED TIME 16-30 MINUTES: Performed by: REGISTERED NURSE

## 2021-01-18 PROCEDURE — 6370000000 HC RX 637 (ALT 250 FOR IP): Performed by: NURSE PRACTITIONER

## 2021-01-18 PROCEDURE — 1240000000 HC EMOTIONAL WELLNESS R&B

## 2021-01-18 PROCEDURE — 6370000000 HC RX 637 (ALT 250 FOR IP): Performed by: REGISTERED NURSE

## 2021-01-18 PROCEDURE — 90833 PSYTX W PT W E/M 30 MIN: CPT | Performed by: PSYCHIATRY & NEUROLOGY

## 2021-01-18 PROCEDURE — 99231 SBSQ HOSP IP/OBS SF/LOW 25: CPT | Performed by: NURSE PRACTITIONER

## 2021-01-18 PROCEDURE — 6370000000 HC RX 637 (ALT 250 FOR IP): Performed by: PSYCHIATRY & NEUROLOGY

## 2021-01-18 RX ORDER — CLONIDINE HYDROCHLORIDE 0.1 MG/1
TABLET ORAL
Status: DISPENSED
Start: 2021-01-18 | End: 2021-01-19

## 2021-01-18 RX ADMIN — DICYCLOMINE HYDROCHLORIDE 10 MG: 10 CAPSULE ORAL at 12:27

## 2021-01-18 RX ADMIN — IBUPROFEN 400 MG: 400 TABLET, FILM COATED ORAL at 09:26

## 2021-01-18 RX ADMIN — CHLORDIAZEPOXIDE HYDROCHLORIDE 10 MG: 5 CAPSULE ORAL at 09:26

## 2021-01-18 RX ADMIN — DICYCLOMINE HYDROCHLORIDE 10 MG: 10 CAPSULE ORAL at 09:24

## 2021-01-18 RX ADMIN — CHLORDIAZEPOXIDE HYDROCHLORIDE 10 MG: 5 CAPSULE ORAL at 20:33

## 2021-01-18 RX ADMIN — CLONIDINE HYDROCHLORIDE 0.1 MG: 0.1 TABLET ORAL at 20:50

## 2021-01-18 RX ADMIN — HYDROXYZINE HYDROCHLORIDE 50 MG: 25 TABLET ORAL at 09:26

## 2021-01-18 RX ADMIN — Medication 1 TABLET: at 09:24

## 2021-01-18 RX ADMIN — FOLIC ACID 1 MG: 1 TABLET ORAL at 09:24

## 2021-01-18 RX ADMIN — CLONIDINE HYDROCHLORIDE 0.1 MG: 0.1 TABLET ORAL at 09:23

## 2021-01-18 RX ADMIN — TRAZODONE HYDROCHLORIDE 50 MG: 50 TABLET ORAL at 20:34

## 2021-01-18 RX ADMIN — ACETAMINOPHEN 650 MG: 325 TABLET ORAL at 12:29

## 2021-01-18 RX ADMIN — PANTOPRAZOLE SODIUM 40 MG: 40 TABLET, DELAYED RELEASE ORAL at 09:26

## 2021-01-18 RX ADMIN — SERTRALINE 50 MG: 50 TABLET, FILM COATED ORAL at 09:24

## 2021-01-18 RX ADMIN — Medication 100 MG: at 09:24

## 2021-01-18 RX ADMIN — IBUPROFEN 400 MG: 400 TABLET, FILM COATED ORAL at 20:34

## 2021-01-18 RX ADMIN — HYDROXYZINE HYDROCHLORIDE 50 MG: 25 TABLET ORAL at 16:43

## 2021-01-18 RX ADMIN — SUCRALFATE 1 G: 1 TABLET ORAL at 12:27

## 2021-01-18 RX ADMIN — DICYCLOMINE HYDROCHLORIDE 10 MG: 10 CAPSULE ORAL at 16:43

## 2021-01-18 RX ADMIN — CHLORDIAZEPOXIDE HYDROCHLORIDE 10 MG: 5 CAPSULE ORAL at 12:29

## 2021-01-18 RX ADMIN — HYDROXYZINE HYDROCHLORIDE 50 MG: 25 TABLET ORAL at 20:33

## 2021-01-18 RX ADMIN — ACETAMINOPHEN 650 MG: 325 TABLET ORAL at 19:00

## 2021-01-18 RX ADMIN — SUCRALFATE 1 G: 1 TABLET ORAL at 16:43

## 2021-01-18 RX ADMIN — SUCRALFATE 1 G: 1 TABLET ORAL at 09:24

## 2021-01-18 ASSESSMENT — PAIN DESCRIPTION - PAIN TYPE: TYPE: CHRONIC PAIN

## 2021-01-18 ASSESSMENT — PAIN DESCRIPTION - LOCATION
LOCATION: GENERALIZED

## 2021-01-18 ASSESSMENT — PAIN DESCRIPTION - ONSET
ONSET: ON-GOING

## 2021-01-18 ASSESSMENT — PAIN DESCRIPTION - FREQUENCY: FREQUENCY: CONTINUOUS

## 2021-01-18 ASSESSMENT — PAIN DESCRIPTION - PROGRESSION
CLINICAL_PROGRESSION: NOT CHANGED

## 2021-01-18 ASSESSMENT — PAIN SCALES - GENERAL
PAINLEVEL_OUTOF10: 6
PAINLEVEL_OUTOF10: 0
PAINLEVEL_OUTOF10: 8
PAINLEVEL_OUTOF10: 3

## 2021-01-18 ASSESSMENT — PAIN DESCRIPTION - ORIENTATION
ORIENTATION: OTHER (COMMENT)
ORIENTATION: OTHER (COMMENT)

## 2021-01-18 ASSESSMENT — PAIN DESCRIPTION - DESCRIPTORS
DESCRIPTORS: ACHING;CONSTANT
DESCRIPTORS: ACHING;THROBBING;CONSTANT

## 2021-01-18 ASSESSMENT — PAIN - FUNCTIONAL ASSESSMENT: PAIN_FUNCTIONAL_ASSESSMENT: ACTIVITIES ARE NOT PREVENTED

## 2021-01-18 NOTE — PROGRESS NOTES
Department of Psychiatry  Progress Note     Chief Complaint:  Severe episode of recurrent major depressive disorder, with psychotic features ([address]     SUBJECTIVE:    PROGRESS:  Evangelista Foy. is feeling \"better\" over all. Rates mood as 5/10  Depression is \"a little better\"  Reports some anxiety, although \"it isn't as bad\"  \"Slept off and on. Had some nightmares about random things. Woke up and had racing thoughts\". Reports he was able to return to sleep fairly easily  Appetite is \"getting better. Still having some problems keeping it down, but it's better. Starting to feel like eating now\". Denies current hallucinations and delusional thinking. Has continued to have some visual hallucinations of \"shadows--only at night\". Believes what he thought to be command hallucinations \"might actually be the voice in my head telling me I'm a piece of crap. Right now it's just jibber-jabber\"  Denies thoughts to harm self. Denies homicidal ideations  Continues with some withdrawal symptoms (\"the sweats, sometimes feeling hot and cold, nausea\"); reports they Chacho Abraham so much better\"    Tatum Meyers is observed out in the common area eating breakfast this morning. He appears to be feeling better. He expresses gratitude for the care he has received on the unit. States \"I am starting to feel more hopeful and I don't want to die now\". He is looking forward to returning to the [facility] for treatment because he feels it provides the support he needs to work toward maintaining sobriety. Reports \"I am so tired of feeling sick and worthless\". He has been making an effort to participate in groups and interact with peers and staff on the unit. He has been taking his medications and denies side effects. Appears to be tolerating the discontinuation of CIWA/Ativan therapy and start of Librium well.        Suicidal ideations: denies    Compliance with medications: good   Medication side effects: absent  ROS: Patient has new complaints:  no Sleep quality: 8.5 hours continuous per nursing sleep sheet  Attending groups: some      OBJECTIVE      Medications  Current Facility-Administered Medications: multivitamin 1 tablet, 1 tablet, Oral, Daily  chlordiazePOXIDE (LIBRIUM) capsule 10 mg, 10 mg, Oral, TID  sertraline (ZOLOFT) tablet 50 mg, 50 mg, Oral, Daily  dicyclomine (BENTYL) capsule 10 mg, 10 mg, Oral, TID AC  ibuprofen (ADVIL;MOTRIN) tablet 400 mg, 400 mg, Oral, Q6H PRN  cloNIDine (CATAPRES) tablet 0.1 mg, 0.1 mg, Oral, BID  acetaminophen (TYLENOL) tablet 650 mg, 650 mg, Oral, Q4H PRN  magnesium hydroxide (MILK OF MAGNESIA) 400 MG/5ML suspension 30 mL, 30 mL, Oral, Daily PRN  aluminum & magnesium hydroxide-simethicone (MAALOX) 200-200-20 MG/5ML suspension 30 mL, 30 mL, Oral, Q6H PRN  hydrOXYzine (ATARAX) tablet 50 mg, 50 mg, Oral, TID PRN  traZODone (DESYREL) tablet 50 mg, 50 mg, Oral, Nightly PRN  nicotine (NICODERM CQ) 14 MG/24HR 1 patch, 1 patch, Transdermal, Daily  thiamine tablet 100 mg, 100 mg, Oral, Daily  folic acid (FOLVITE) tablet 1 mg, 1 mg, Oral, Daily  pantoprazole (PROTONIX) tablet 40 mg, 40 mg, Oral, QAM AC  ondansetron (ZOFRAN-ODT) disintegrating tablet 8 mg, 8 mg, Oral, Q8H PRN  sucralfate (CARAFATE) tablet 1 g, 1 g, Oral, 4x Daily AC & HS     Physical     height is 5' 8\" (1.727 m) and weight is 119 lb (54 kg). His oral temperature is 97.8 °F (36.6 °C). His blood pressure is 133/90 (abnormal) and his pulse is 79. His respiration is 16 and oxygen saturation is 97%.    Lab Results   Component Value Date    WBC 7.8 01/17/2021    HGB 12.3 (L) 01/17/2021    HCT 37.7 (L) 01/17/2021     01/17/2021    CHOL 215 (H) 01/29/2020    TRIG 190 (H) 01/29/2020    HDL 46 01/29/2020    ALT 19 01/17/2021    AST 21 01/17/2021     01/17/2021    K 4.3 01/17/2021     01/17/2021    CREATININE 0.6 01/17/2021    BUN 15 01/17/2021    CO2 29 01/17/2021    TSH 0.491 01/13/2021    INR 0.83 (L) 03/09/2015    LABA1C 5.8 01/11/2020 Mental Status Exam:   Level of consciousness:  within normal limits and awake  Appearance:  ill-appearing--improving, hospital attire, lying in bed, fair grooming and fair hygiene  Behavior/Motor:  no abnormalities noted  Attitude toward examiner:  cooperative, attentive and good eye contact  Speech:  spontaneous, normal rate, normal volume and well articulated  Mood:  \"better\"  Affect:  mood congruent  Thought processes:  linear, goal directed and coherent  Thought content:  Denies homicidal ideation  Suicidal Ideation:  denies suicidal ideation  Delusions:  no evidence of delusions  Perceptual Disturbance:  denies any perceptual disturbance  Cognition: Patient is oriented to person, place, time and situation  Concentration: clinically adequate  Memory: intact  Insight & Judgement: improving      ASSESSMENT     Severe episode of recurrent major depressive disorder, with psychotic features ([address]   Alcohol use disorder, severe dependence  Acute alcohol withdrawals     PLAN    Patient's symptoms continue to improve  Medication adjustments: continue medications as prescribed  Attempt to develop insight, psycho-education and supportive therapy conducted. Probable discharge: TBD  Follow-up: [facility] in [facility] (per [facility] note dated 1/15/2021: \"pt is accepted for readmission when discharged from this unit. He is to be transported to [facility], [address] [facility]. If pt leaves without physically having his medicaions in hand, they are to be sent to [facility] , [phone]. Pt will need a 30 day supply. Sandra Pascual asked to be called at discharge, [phone]\")    Electronically signed by Paula DE LEON on 1/18/2021 at 9:05 AM Reviewed patient's current plan of care and vital signs with nursing staff.                                       Psychiatry Attending Attestation I assessed this patient and reviewed the case and plan of care with [facility]. I have reviewed the above documentation and I agree with the findings and treatment plan with the following updates. Patient was seen by Dave Lopez the unit and I evaluated patient as Tele visit. Patient continues to have active withdrawal symptoms. Reports Librium is helping significantly. Continues to report feeling sad down and low. Endorses fleeting suicidal thoughts. This remains unchanged. We will continue to titrate on his antidepressant medication. Patient has plans to go to a rehab facility after getting stabilized here. More than 16 mins of the session was spent doing Mindfulness based therapy and coordinating care. Session lasted for over 30 mins. Regulo Lopez. is a 45 y.o. male being evaluated by a Virtual Visit (video visit) encounter to address concerns as mentioned above. A caregiver was present in the room along with the patient. Pursuant to the emergency declaration under the [facility], [address] [place] [address] and the [employer] and [employer] Act, this Virtual Visit was conducted with patient's (and/or legal guardian's) consent, to reduce the patient's risk of exposure to COVID-19 and provide necessary medical care. Services were provided through a video synchronous discussion virtually to substitute for in-person visit by provider. Patient is present at [address] [place] [address] and I am physically present at my home in [facility]     --Sridhar Singleton MD on 1/18/2021 at 3:57 PM    An electronic signature was used to authenticate this note. **This report has been created using voice recognition software. It may contain minor errors which are inherent in voice recognition technology. **

## 2021-01-18 NOTE — PROGRESS NOTES
Hospitalist Progress Note    Patient:  Lovely Restreop. Unit/Bed:-67/067-A    YOB: 1970    MRN: [id number]       Acct: [de-identified]     PCP: No primary care provider on file. Date of Admission: 1/13/2021    Assessment/Plan:    1. Acute alcohol withdrawal--on Librium per psychiatry; on folic acid, thiamine, multivitamin  2. Marijuana use  3. Essential hypertension, controlled--stable; monitor  4. COPD--stable, on room air  5. Severe malnutrition--per dietitian  6. Tobacco abuse--on NicoDerm 14 mg topically daily, needs cessation counseling  7.  History stroke    Expected discharge date: Per psychiatry    Disposition:    [x] Home       [] TCU       [] Rehab       [] Psych       [] SNF       [] [facility]       [] Other-    Chief Complaint: Alcohol withdrawal    Hospital Course: Patient was admitted to  secondary to suicide ideation as a plan to starve himself to death; this patient is an alcoholic and states he drinks a 12 pack of beer a day and 1/5 of vodka; he was admitted to  and was started on Ativan with CIWA protocol and today was switched over to Librium per psychiatry; he is fully alert and oriented states he feels weak and tired complains of \"aches all over\" and rates 8 out of 10    1/18--> patient was able to eat some today, he is actually smiling, he states he is feeling a little bit better    Subjective (past 24 hours): Chronically ill-appearing, complains of aches all over and rates 5 out of 10, still with mild nausea    Medications:  Reviewed    Infusion Medications   Scheduled Medications    multivitamin  1 tablet Oral Daily    chlordiazePOXIDE  10 mg Oral TID    sertraline  50 mg Oral Daily    dicyclomine  10 mg Oral TID AC    cloNIDine  0.1 mg Oral BID    nicotine  1 patch Transdermal Daily    thiamine  100 mg Oral Daily    folic acid  1 mg Oral Daily    pantoprazole  40 mg Oral QAM AC    sucralfate  1 g Oral 4x Daily AC & HS PRN Meds: ibuprofen, acetaminophen, magnesium hydroxide, aluminum & magnesium hydroxide-simethicone, hydrOXYzine, traZODone, ondansetron      Intake/Output Summary (Last 24 hours) at 1/18/2021 0604  Last data filed at 1/17/2021 2000  Gross per 24 hour   Intake 400 ml   Output    Net 400 ml       Diet:  DIET GENERAL;  Dietary Nutrition Supplements: Standard High Calorie Oral Supplement    Exam:  /75   Pulse 87   Temp 96.3 °F (35.7 °C) (Tympanic)   Resp 18   Ht 5' 8\" (1.727 m)   Wt 119 lb (54 kg)   SpO2 98%   BMI 18.09 kg/m²     General appearance: No apparent distress, appears older than stated age and cooperative. Chronically ill-appearing; minimal bilateral arm tremoring noted; HEENT: Pupils equal, round, and reactive to light. Conjunctivae/corneas clear. Neck: Supple, with full range of motion. No jugular venous distention. Trachea midline. Respiratory:  Normal respiratory effort. Diminished to auscultation, bilaterally without Rales/Wheezes/Rhonchi. Cardiovascular: Regular rate and rhythm with normal S1/S2 without murmurs, rubs or gallops. Abdomen: Soft, non-tender, non-distended with normal bowel sounds. Musculoskeletal: passive and active ROM x 4 extremities. Skin: Skin color, texture, turgor normal.    Neurologic:  Neurovascularly intact without any focal sensory/motor deficits.  Cranial nerves: II-XII intact, grossly non-focal.  Psychiatric: Alert and oriented, thought content appropriate  Capillary Refill: Brisk,< 3 seconds   Peripheral Pulses: +2 palpable, equal bilaterally       Labs:   Recent Labs     01/17/21  0803   WBC 7.8   HGB 12.3*   HCT 37.7*        Recent Labs     01/17/21  0803      K 4.3      CO2 29   BUN 15   CREATININE 0.6   CALCIUM 8.9     Recent Labs     01/17/21  0803   AST 21   ALT 19   BILITOT 0.3   ALKPHOS 60     Microbiology:    [id number] not detected on January 14, 2021    Urinalysis:      Lab Results   Component Value Date NITRU NEGATIVE 01/13/2021    WBCUA 0-2 12/31/2015    BACTERIA NONE 12/31/2015    RBCUA 5-10 12/31/2015    BLOODU NEGATIVE 01/13/2021    SPECGRAV 1.011 07/16/2015    GLUCOSEU NEGATIVE 01/13/2021       Radiology:  No results found.     DVT prophylaxis: [] Lovenox                                 [] SCDs                                 [] SQ Heparin                                 [x] Encourage ambulation           [] Already on Anticoagulation     Code Status: Full Code    Tele:   [] yes             [x] no    Active Hospital Problems    Diagnosis Date Noted    Severe episode of recurrent major depressive disorder, with psychotic features ([facility] [F33.3] 01/14/2021     Priority: High    Alcohol use disorder, severe, dependence ([address] [F10.20]     Major depressive disorder, recurrent severe without psychotic features ([facility] [F33.2] 01/14/2021    Severe malnutrition ([facility] [E43] 01/14/2021       Electronically signed by RINA Tompkins CNP on 1/18/2021 at 6:04 AM

## 2021-01-18 NOTE — BH NOTE
Pt did not attend [id number] goal group and community meeting. Pt received maximum encouragement to attend group from staff. 1:1 session offered as group alternative. Pt did not attend 21 [phone] recreation therapy games group. Pt received maximum encouragement from staff to attend group. Independent leisure activities are available on the unit to utilize.     Signature: Lola Mark

## 2021-01-18 NOTE — PROGRESS NOTES
Comprehensive Nutrition Assessment    Type and Reason for Visit:  Reassess    Nutrition Recommendations/Plan: Continue current diet, ONS (Ensure Enlive TID), and given alcohol abuse history continue MVI, thiamine and folic acid supplementation. Nutrition Assessment:    Pt. Improving from nutritional standpoint AEB improving appetite/ intake, acceptance of ONS. At risk for further nutritional compromise r/t severe malnutrition, belly pain, poor appetite, alcohol withdrawal, admit with depressive disorder, suicidal thoughts, and underlying medical condition (hx: GERD, COPD, schizophrenia, CVA, HTN, alcohol abuse). Nutrition recommendations/interventions as per above. Malnutrition Assessment:  Malnutrition Status:  Severe malnutrition    Context:  Social/Environmental Circumstances(alcohol abuse)     Findings of the 6 clinical characteristics of malnutrition:  Energy Intake:  7 - 50% or less estimated energy requirements for 1 month or longer  Weight Loss:  7 - Greater than 20% over 1 year(24.7% loss)     Body Fat Loss:  7 - Severe body fat loss Orbital   Muscle Mass Loss:  Unable to assess(pt wrapped up in blanket, not feeling well, alcohol withdrawal)    Fluid Accumulation:  No significant fluid accumulation     Strength:  Not Performed    Estimated Daily Nutrient Needs:  Energy (kcal):  5557-5934 kcals (30-35 kcals/kg/day) or more to promote weight gain; Weight Used for Energy Requirements:  Current(54 kg 1/14/21)     Protein (g):  81 grams or more (1.5 grams/kg/day);  Weight Used for Protein Requirements:  Current(54 kg)          Nutrition Related Findings:  Patient seen lying in bed, reports eating few bites of each food item at meals, drinking most all of the Ensure Enlive, reports less nausea and abdominal pain; bentyl, folic acid, MVI, carafate, thiamine; glucose 91      Wounds:  None       Current Nutrition Therapies:    DIET GENERAL; Dietary Nutrition Supplements: Standard High Calorie Oral Supplement    Anthropometric Measures:  · Height: 5' 8\" (172.7 cm)  · Current Body Weight: 119 lb (54 kg)(1/14/21 actual, no edema)   · Admission Body Weight: 119 lb (54 kg)(1/14/21 actual, no edema)    · Usual Body Weight: (~ 176# per pt. Per EMR: 1/10/20: 158# actual.)     · Ideal Body Weight: 154 lbs;     · BMI: 18.1  · Adjusted Body Weight:  ; No Adjustment   · BMI Categories: Underweight (BMI less than 18.5)       Nutrition Diagnosis:   · Severe malnutrition, In context of social or environmental circumstances related to inadequate protein-energy intake(alcohol abuse) as evidenced by poor intake prior to admission, weight loss greater than or equal to 20% in 1 year, severe loss of subcutaneous fat      Nutrition Interventions:   Food and/or Nutrient Delivery:  Continue Current Diet, Continue Oral Nutrition Supplement  Nutrition Education/Counseling:  Education initiated(Encouraged oral intake and ONS use.)   Coordination of Nutrition Care:  Continue to monitor while inpatient    Goals:  Pt will consume 75% or more of meals during LOS. Nutrition Monitoring and Evaluation:   Behavioral-Environmental Outcomes:  None Identified   Food/Nutrient Intake Outcomes:  Food and Nutrient Intake, Supplement Intake  Physical Signs/Symptoms Outcomes:  Biochemical Data, GI Status, Nutrition Focused Physical Findings, Skin, Weight     Discharge Planning:     Too soon to determine     Electronically signed by Steven Wiley RD, LD on 1/18/21 at 9:12 AM EST    Contact: [phone]

## 2021-01-18 NOTE — PLAN OF CARE
Problem: Depressive Behavior With or Without Suicide Precautions:  Goal: Able to verbalize and/or display a decrease in depressive symptoms  Description: Able to verbalize and/or display a decrease in depressive symptoms  1/18/2021 1031 by Marvin Brush LPN  Outcome: Ongoing  Note: Patient reports depressive symptoms during shift assessment. Patient rates depression 10/10.  1/17/2021 2208 by Mckenzie Reed RN  Outcome: Ongoing  Note: Patient noted with a blunt affect and brightens at times with interaction. Goal: Ability to disclose and discuss suicidal ideas will improve  Description: Ability to disclose and discuss suicidal ideas will improve  1/18/2021 1031 by Marvin Brush LPN  Outcome: Ongoing  Note: Reports fleeting suicidal ideations during shift assessment. No active plan. Patient able to contract for safety. 1/17/2021 2208 by Mckenzie Reed RN  Outcome: Ongoing  Note: Patient states he continues with suicidal thoughts but no current plan. Patient contracts with staff for safety. Goal: Participates in care planning  Description: Participates in care planning  1/18/2021 1031 by Marvin Brush LPN  Outcome: Ongoing  Note: Patient participates in care planning with staff during shift. 1/17/2021 2208 by Mckenzie Reed RN  Outcome: Ongoing  Note: Care plan reviewed with patient and fair understanding verbalized. Problem: Altered Mood, Psychotic Behavior:  Goal: Able to verbalize decrease in frequency and intensity of hallucinations  Description: Able to verbalize decrease in frequency and intensity of hallucinations  1/18/2021 1031 by Marvin Brush LPN  Outcome: Ongoing  Note: Reports hearing male and female voices. States they are \"just whispering\" and \"shadows\". 1/17/2021 2208 by Mckenzie Reed RN  Outcome: Ongoing  Note: Patient states he continues to see shadows and has crawling sensations on his skin. Patient states the crawling sensations are less. Problem: Substance Abuse:  Goal: Absence of drug withdrawal signs and symptoms  Description: Absence of drug withdrawal signs and symptoms  1/18/2021 1031 by Gianluca Tellez LPN  Outcome: Ongoing  Note: Patient reports withdrawal symptoms to alcohol. Symptoms include sweating, increased anxiety, aches and cramps, and sensitivity to light and sound. Patient reports his appetite is still impaired but is improving and able to tolerate more foods. Patient reports feeling lightheaded and dizziness, but has been making attempts to increase fluid intake. Patient given PRN medication and scheduled librium to help with withdrawal symptoms. 1/17/2021 2208 by Ted Paul RN  Outcome: Ongoing  Note: Patient continues with shakes, anxiety and crawling sensations. Problem: Discharge Planning:  Goal: Discharged to appropriate level of care  Description: Discharged to appropriate level of care  1/18/2021 1031 by Gianluca Tellez LPN  Outcome: Ongoing  Note: No discharge plans noted at this time during shift. Patient had made contact with [facility] in [place] to go there after discharge for alcohol rehab. Will discuss transport and other needs when discharge date is determined. 1/17/2021 2208 by Ted Paul RN  Outcome: Ongoing  Note: Patient states he is hopeful to go to [facility] [place] at discharge. Problem: Pain:  Goal: Pain level will decrease  Description: Pain level will decrease  1/18/2021 1031 by Gianluca Tellez LPN  Outcome: Ongoing  Note: Patient reports generalized pain \"all over\". Rates pain 8/10 and described as \"aching\", \"throbbing\", and \"constant\". 1/17/2021 2208 by Ted Paul RN  Note: Patient states he continues with generalized aching of a 10. Problem: Nutrition  Goal: Optimal nutrition therapy  1/18/2021 1031 by Gianluca Tellez LPN  Outcome: Ongoing  Note: Appetite is approving. Minimal complaints of stomach cramps and pain. 1/18/2021 0929 by Tyree Silva RD, LD  Outcome: Ongoing  1/17/2021 2208 by Geovany Franco RN  Outcome: Ongoing  Note: Patient noted with improved food and fluid intake. Problem: Role Relationship:  Goal: Ability to verbalize awareness of feelings that lead to decreased social interaction will improve  Description: Ability to verbalize awareness of feelings that lead to decreased social interaction will improve  1/18/2021 1031 by Kenney Dobson LPN  Outcome: Ongoing  Note: Out on unit with peers for meals and needs but is on the fringe. Patient isolates and is encouraged to attend groups/activities. 1/17/2021 2208 by Geovany Franco RN  Outcome: Ongoing  Note: Ongoing. Problem: Depressive Behavior With or Without Suicide Precautions:  Goal: Able to verbalize support systems  Description: Able to verbalize support systems  1/18/2021 1031 by Kenney Dobson LPN  Outcome: Completed  Note: Patient able to verbalize support system. Patient reports his son and brother, Gilmar Galeano, is supportive. 1/17/2021 2208 by Geovany Franco RN  Outcome: Ongoing  Note: Patient states his son is his current support system. Care plan reviewed with patient.   Patient does verbalize understanding of the plan of care and does contribute to goal setting

## 2021-01-18 NOTE — PLAN OF CARE
Problem: Depressive Behavior With or Without Suicide Precautions:  Goal: Able to verbalize and/or display a decrease in depressive symptoms  Description: Able to verbalize and/or display a decrease in depressive symptoms  1/17/2021 2208 by Gigi Benjamin RN  Outcome: Ongoing  Note: Patient noted with a blunt affect and brightens at times with interaction. 1/17/2021 1704 by Amanda Rosales LPN  Outcome: Ongoing  Note: Patient reports depressive symptoms during shift assessment. Patient rates depression 10/10. Goal: Ability to disclose and discuss suicidal ideas will improve  Description: Ability to disclose and discuss suicidal ideas will improve  1/17/2021 2208 by Gigi Benjamin RN  Outcome: Ongoing  Note: Patient states he continues with suicidal thoughts but no current plan. Patient contracts with staff for safety. 1/17/2021 1704 by Amanda Rosales LPN  Outcome: Ongoing  Note: Patient reports having suicidal ideations \"sometimes\". No plan at this time. Patient is able to contract for safety. Goal: Able to verbalize support systems  Description: Able to verbalize support systems  1/17/2021 2208 by Gigi Benjamin RN  Outcome: Ongoing  Note: Patient states his son is his current support system. 1/17/2021 1704 by Amanda Rosales LPN  Outcome: Ongoing  Note: Patient unable to verbalize support systems at this time. Patient reports having no support system. Goal: Participates in care planning  Description: Participates in care planning  1/17/2021 2208 by Gigi Benjamin RN  Outcome: Ongoing  Note: Care plan reviewed with patient and fair understanding verbalized. 1/17/2021 1704 by Amanda Rosales LPN  Outcome: Ongoing  Note: Patient participates in care planning with staff during shift.      Problem: Altered Mood, Psychotic Behavior:  Goal: Able to verbalize decrease in frequency and intensity of hallucinations Description: Able to verbalize decrease in frequency and intensity of hallucinations  1/17/2021 2208 by Sarina Hurd RN  Outcome: Ongoing  Note: Patient states he continues to see shadows and has crawling sensations on his skin. Patient states the crawling sensations are less. 1/17/2021 1704 by Alejandrina Berry LPN  Outcome: Ongoing  Note: Patient reports hearing male and female voices. States the females just \"whisper\" and male voice say negative things such as [employer] are you here\". Patient states he sees shadows. Problem: Substance Abuse:  Goal: Absence of drug withdrawal signs and symptoms  Description: Absence of drug withdrawal signs and symptoms  1/17/2021 2208 by Sarina Hurd RN  Outcome: Ongoing  Note: Patient continues with shakes, anxiety and crawling sensations. 1/17/2021 1704 by Alejandrina Berry LPN  Outcome: Ongoing  Note: Patient continues to have withdrawal symptoms from alcohol throughout the shift. Patient prescribed medication to help with symptoms and is compliant. Problem: Discharge Planning:  Goal: Discharged to appropriate level of care  Description: Discharged to appropriate level of care  1/17/2021 2208 by Sarina Hurd RN  Outcome: Ongoing  Note: Patient states he is hopeful to go to the [facility] in [place] at discharge. 1/17/2021 1704 by Alejandrina Berry LPN  Outcome: Ongoing  Note: No discharge plans noted at this time during shift. Problem: Pain:  Goal: Pain level will decrease  Description: Pain level will decrease  1/17/2021 2208 by Sarina Hurd RN  Note: Patient states he continues with generalized aching of a 10.  1/17/2021 1704 by Alejandrina Berry LPN  Outcome: Ongoing  Note: Patient reports all over pain during shift assessment related to withdrawal symptoms from alcohol. Patient rates pain as 9/10 and describes pain as \"aching\".      Problem: Nutrition  Goal: Optimal nutrition therapy  1/17/2021 2208 by Sarina Hurd RN

## 2021-01-18 NOTE — PLAN OF CARE
Problem: Role Relationship:  Goal: Ability to verbalize awareness of feelings that lead to decreased social interaction will improve  Description: Ability to verbalize awareness of feelings that lead to decreased social interaction will improve  Outcome: Ongoing  Note: Pt has not attended any of the groups that have been offered on the unit so far this shift. Pt has been in his room throughout the day and has not been interacting with peers. Pt will be encouraged to attend groups that are offered and to come out onto the unit to interact with peers. Pt progress towards socialization goal is ongoing.

## 2021-01-18 NOTE — PLAN OF CARE
Problem: Nutrition  Goal: Optimal nutrition therapy  1/18/2021 0929 by Becca Ulloa RD, LD  Outcome: Ongoing   Nutrition Problem #1: Severe malnutrition, In context of social or environmental circumstances  Intervention: Food and/or Nutrient Delivery: Continue Current Diet, Continue Oral Nutrition Supplement  Nutritional Goals: Pt will consume 75% or more of meals during LOS.

## 2021-01-19 PROCEDURE — 99232 SBSQ HOSP IP/OBS MODERATE 35: CPT | Performed by: PSYCHIATRY & NEUROLOGY

## 2021-01-19 PROCEDURE — 6370000000 HC RX 637 (ALT 250 FOR IP): Performed by: NURSE PRACTITIONER

## 2021-01-19 PROCEDURE — 2580000003 HC RX 258: Performed by: PSYCHIATRY & NEUROLOGY

## 2021-01-19 PROCEDURE — 2580000003 HC RX 258: Performed by: NURSE PRACTITIONER

## 2021-01-19 PROCEDURE — 99233 SBSQ HOSP IP/OBS HIGH 50: CPT | Performed by: NURSE PRACTITIONER

## 2021-01-19 PROCEDURE — 6370000000 HC RX 637 (ALT 250 FOR IP): Performed by: PSYCHIATRY & NEUROLOGY

## 2021-01-19 PROCEDURE — 1240000000 HC EMOTIONAL WELLNESS R&B

## 2021-01-19 PROCEDURE — APPSS30 APP SPLIT SHARED TIME 16-30 MINUTES: Performed by: PHYSICIAN ASSISTANT

## 2021-01-19 PROCEDURE — 6370000000 HC RX 637 (ALT 250 FOR IP): Performed by: REGISTERED NURSE

## 2021-01-19 RX ORDER — LORAZEPAM 2 MG/1
2 TABLET ORAL ONCE
Status: COMPLETED | OUTPATIENT
Start: 2021-01-19 | End: 2021-01-19

## 2021-01-19 RX ORDER — 0.9 % SODIUM CHLORIDE 0.9 %
1000 INTRAVENOUS SOLUTION INTRAVENOUS ONCE
Status: COMPLETED | OUTPATIENT
Start: 2021-01-19 | End: 2021-01-19

## 2021-01-19 RX ORDER — SODIUM CHLORIDE 0.9 % (FLUSH) 0.9 %
10 SYRINGE (ML) INJECTION 2 TIMES DAILY
Status: DISCONTINUED | OUTPATIENT
Start: 2021-01-19 | End: 2021-01-21

## 2021-01-19 RX ADMIN — Medication 1 TABLET: at 10:18

## 2021-01-19 RX ADMIN — SODIUM CHLORIDE, PRESERVATIVE FREE 10 ML: 5 INJECTION INTRAVENOUS at 20:33

## 2021-01-19 RX ADMIN — SUCRALFATE 1 G: 1 TABLET ORAL at 10:18

## 2021-01-19 RX ADMIN — CHLORDIAZEPOXIDE HYDROCHLORIDE 10 MG: 5 CAPSULE ORAL at 20:23

## 2021-01-19 RX ADMIN — DICYCLOMINE HYDROCHLORIDE 10 MG: 10 CAPSULE ORAL at 06:26

## 2021-01-19 RX ADMIN — HYDROXYZINE HYDROCHLORIDE 50 MG: 25 TABLET ORAL at 17:17

## 2021-01-19 RX ADMIN — TRAZODONE HYDROCHLORIDE 50 MG: 50 TABLET ORAL at 20:31

## 2021-01-19 RX ADMIN — LORAZEPAM 2 MG: 2 TABLET ORAL at 12:06

## 2021-01-19 RX ADMIN — SUCRALFATE 1 G: 1 TABLET ORAL at 17:16

## 2021-01-19 RX ADMIN — ACETAMINOPHEN 650 MG: 325 TABLET ORAL at 17:18

## 2021-01-19 RX ADMIN — SODIUM CHLORIDE 1000 ML: 9 INJECTION, SOLUTION INTRAVENOUS at 10:27

## 2021-01-19 RX ADMIN — SUCRALFATE 1 G: 1 TABLET ORAL at 06:26

## 2021-01-19 RX ADMIN — HYDROXYZINE HYDROCHLORIDE 50 MG: 25 TABLET ORAL at 10:21

## 2021-01-19 RX ADMIN — DICYCLOMINE HYDROCHLORIDE 10 MG: 10 CAPSULE ORAL at 10:18

## 2021-01-19 RX ADMIN — SERTRALINE 50 MG: 50 TABLET, FILM COATED ORAL at 10:22

## 2021-01-19 RX ADMIN — CLONIDINE HYDROCHLORIDE 0.1 MG: 0.1 TABLET ORAL at 20:22

## 2021-01-19 RX ADMIN — FOLIC ACID 1 MG: 1 TABLET ORAL at 10:18

## 2021-01-19 RX ADMIN — CHLORDIAZEPOXIDE HYDROCHLORIDE 10 MG: 5 CAPSULE ORAL at 17:17

## 2021-01-19 RX ADMIN — IBUPROFEN 400 MG: 400 TABLET, FILM COATED ORAL at 23:08

## 2021-01-19 RX ADMIN — SUCRALFATE 1 G: 1 TABLET ORAL at 20:22

## 2021-01-19 RX ADMIN — IBUPROFEN 400 MG: 400 TABLET, FILM COATED ORAL at 10:21

## 2021-01-19 RX ADMIN — IBUPROFEN 400 MG: 400 TABLET, FILM COATED ORAL at 18:20

## 2021-01-19 RX ADMIN — PANTOPRAZOLE SODIUM 40 MG: 40 TABLET, DELAYED RELEASE ORAL at 10:21

## 2021-01-19 RX ADMIN — DICYCLOMINE HYDROCHLORIDE 10 MG: 10 CAPSULE ORAL at 17:17

## 2021-01-19 RX ADMIN — CHLORDIAZEPOXIDE HYDROCHLORIDE 10 MG: 5 CAPSULE ORAL at 10:21

## 2021-01-19 RX ADMIN — Medication 100 MG: at 10:18

## 2021-01-19 ASSESSMENT — PAIN DESCRIPTION - FREQUENCY
FREQUENCY: CONTINUOUS
FREQUENCY: CONTINUOUS

## 2021-01-19 ASSESSMENT — PAIN DESCRIPTION - LOCATION
LOCATION: BACK
LOCATION: GENERALIZED

## 2021-01-19 ASSESSMENT — PAIN DESCRIPTION - ORIENTATION
ORIENTATION: OTHER (COMMENT)
ORIENTATION: LOWER

## 2021-01-19 ASSESSMENT — PAIN SCALES - GENERAL
PAINLEVEL_OUTOF10: 0
PAINLEVEL_OUTOF10: 8
PAINLEVEL_OUTOF10: 0
PAINLEVEL_OUTOF10: 9
PAINLEVEL_OUTOF10: 10

## 2021-01-19 ASSESSMENT — PAIN DESCRIPTION - ONSET: ONSET: ON-GOING

## 2021-01-19 ASSESSMENT — PAIN DESCRIPTION - PROGRESSION: CLINICAL_PROGRESSION: NOT CHANGED

## 2021-01-19 NOTE — PATIENT CARE CONFERENCE
[facility]  Day 7/Weekly Interdisciplinary Treatment Plan NOTE    Patient was in treatment team    Admission Type:   Admission Type: Voluntary    Reason for admission:  Reason for Admission: suicidal thoughts  Estimated Length of Stay Update:  7-10 days  Estimated Discharge Date Update: 7-10 days    PATIENT STRENGTHS:  Patient Strengths Strengths: Communication, Social Skills, Motivated(1 year sobriety in the past)  Patient Strengths and Limitations:Limitations: Inappropriate/potentially harmful leisure interests, Lacks leisure interests, Perceives need for assistance with self-care, Tendency to isolate self, Difficulty problem solving/relies on others to help solve problems  Addictive Behavior:Addictive Behavior  In the past 3 months, have you felt or has someone told you that you have a problem with:  : None  Do you have a history of Chemical Use?: No  Do you have a history of Alcohol Use?: Yes  Do you have a history of Street Drug Abuse?: Yes  Histroy of Prescripton Drug Abuse?: No  Medical Problems:  Past Medical History:   Diagnosis Date    Arthritis     Asthma     COPD [facility]chronic obstructive pulmonary disease) ([facility]     GERD (gastroesophageal reflux disease)     Hypertension     Psychiatric problem     Schizophrenia ([facility]     Severe malnutrition ([facility] 1/14/2021    Unspecified cerebral artery occlusion with cerebral infarction        Risk:  Fall RiskTotal: 85  Ravi Scale Ravi Scale Score: 22  BVC Total: 0  Change in scores none. Changes to plan of Care none.     Status EXAM:   Status and Exam  Normal: No  Facial Expression: Brightened, Worried  Affect: Blunt  Level of Consciousness: Alert  Mood:Normal: No  Mood: Depressed, Anxious, Helpless  Motor Activity:Normal: No  Motor Activity: Repetitive Acts  Interview Behavior: Cooperative  Preception: [address] to Person, Obadiah Mena to Time, [address] to Place, [address] to Situation  Attention:Normal: No  Attention: Distractible Thought Processes: Circumstantial, Other(See comment)(racing thoughts)  Thought Content:Normal: Yes  Thought Content: Paranoia  Hallucinations: Auditory (Comment), Visual (Comment)(sees shadows, voices saying he is worthless)  Delusions: No  Delusions: (paranoid; reports it \"seems like everyone is out to get me\")  Memory:Normal: No  Memory: Poor Recent  Insight and Judgment: No  Insight and Judgment: Poor Judgment, Poor Insight  Present Suicidal Ideation: No  Present Homicidal Ideation: No    Daily Assessment Last Entry:   Daily Sleep (WDL): Within Defined Limits         Patient Currently in Pain: Yes  Daily Nutrition (WDL): Within Defined Limits  Appetite Change: Normal for patient  Barriers to Nutrition: Food intolerance  Level of Assistance: Independent/Self    Patient Monitoring:  Frequency of Checks: 4 times per hour, close    Psychiatric Symptoms:   Depression Symptoms  Depression Symptoms: Feelings of hopelessess, Feelings of helplessness, Impaired concentration  Anxiety Symptoms  Anxiety Symptoms: Generalized  Payton Symptoms  Payton Symptoms: No problems reported or observed. Psychosis Symptoms  Delusion Type: No problems reported or observed. Suicide Risk CSSR-S:  1) Within the past month, have you wished you were dead or wished you could go to sleep and not wake up? : Yes  2) Have you actually had any thoughts of killing yourself? : No  3) Have you been thinking about how you might kill yourself? : No  5) Have you started to work out or worked out the details of how to kill yourself? Do you intend to carry out this plan? : No  6) Have you ever done anything, started to do anything, or prepared to do anything to end your life?: Yes  Change in Result none. Change in Plan of care none.       EDUCATION: Learner Progress Toward Treatment Goals: Reviewed results and recommendations of this team, Reviewed group plan and strategies, Reviewed signs, symptoms and risk of self harm and violent behavior and Reviewed goals and plan of care    Method: Individual    Outcome: Verbalized understanding, Demonstrated Understanding and Needs reinforcement    PATIENT GOALS: Resume medication    PLAN/TREATMENT RECOMMENDATIONS UPDATE:  1. Are there any lingering problems that need to be addressed? Patient states that he does not think his medications are helping him. Patient states that he is not able to sleep because \"the noises are too loud\". 2. Discharge plans that are set-up or in process    Patient has been accepted for readmission to [facility] in [facility] when discharged from the unit.      GOALS UPDATE:   Time frame for Short-Term Goals: Daily      Chyrl Mandi

## 2021-01-19 NOTE — BH NOTE
Patient declined to attend the goal group this morning. Patient was also unable to attend the recreation group today due to getting an IV put in at time of group.

## 2021-01-19 NOTE — PROGRESS NOTES
This RN has reviewed and agrees with Jennifer Chand LPN's data collection and has collaborated with this LPN regarding the patient's care plan.

## 2021-01-19 NOTE — PLAN OF CARE
Patient has not attended any of the groups so far today so he has not met his socialization goal. Patient will be encouraged to attend all groups on the unit daily.

## 2021-01-19 NOTE — GROUP NOTE
Group Therapy Note    Date: 1/19/2021    Group Start Time: 1400  Group End Time: 9224  Group Topic: Psychotherapy    [facility] Adult [facility] 4E    Radha LINO De Leon    Patient did not attend 1400 psychotherapy group

## 2021-01-19 NOTE — PROGRESS NOTES
Department of Psychiatry  Progress Note     Chief Complaint:  Severe episode of recurrent major depressive disorder, with psychotic features ([address]     SUBJECTIVE:    PROGRESS:  Betsy De Jesus reports he is doing about the same today  He is ill-appearing and lying in bed. Staff reports he has not been drinking fluids or eating adequately. He has been having decreased urine output. Hospitalist ordered a one-time . 9 normal saline 1 L bolus today. He states his depression is about the same. He continues to have fleeting suicidal ideation without plan and intent. He is able to contract for safety on the unit. He continues to feel hopeless and helpless today. He denies any thoughts of harm to others. He reports the voices are a little better. He states he cannot understand fully what they are talking about but they have been talking about his past.  He reports at times, there is a male voice that breaks through and tells him he is worthless. he also has been seeing shadows. He denies any auditory and visual hallucinations at this time. He reports he did not sleep well last night because everything was running through his head. It was reported that he requested Ambien last night. Staff reports he slept 8 hours last night. He has been trying to eat but has been experiencing a lot of nausea. He continues to have significant alcohol withdrawal symptoms. He states he has been sweating, shaking a lot and having muscle cramps. The muscle cramping may be secondary to dehydration. He is overall been isolative to his bed. He has not been attending groups because he has not been feeling well. He reports a family friend came and visited him last night. His friends twin brother passed away and he was informed of the news last night.       Suicidal ideations: Fleeting without plan and intent  Compliance with medications: good   Medication side effects: absent  ROS: Patient has new complaints:  no Sleep quality: 8 hours last night per staff  Attending groups: No, isolative to room      OBJECTIVE      Medications  Current Facility-Administered Medications: multivitamin 1 tablet, 1 tablet, Oral, Daily  chlordiazePOXIDE (LIBRIUM) capsule 10 mg, 10 mg, Oral, TID  sertraline (ZOLOFT) tablet 50 mg, 50 mg, Oral, Daily  dicyclomine (BENTYL) capsule 10 mg, 10 mg, Oral, TID AC  ibuprofen (ADVIL;MOTRIN) tablet 400 mg, 400 mg, Oral, Q6H PRN  cloNIDine (CATAPRES) tablet 0.1 mg, 0.1 mg, Oral, BID  acetaminophen (TYLENOL) tablet 650 mg, 650 mg, Oral, Q4H PRN  magnesium hydroxide (MILK OF MAGNESIA) 400 MG/5ML suspension 30 mL, 30 mL, Oral, Daily PRN  aluminum & magnesium hydroxide-simethicone (MAALOX) 200-200-20 MG/5ML suspension 30 mL, 30 mL, Oral, Q6H PRN  hydrOXYzine (ATARAX) tablet 50 mg, 50 mg, Oral, TID PRN  traZODone (DESYREL) tablet 50 mg, 50 mg, Oral, Nightly PRN  nicotine (NICODERM CQ) 14 MG/24HR 1 patch, 1 patch, Transdermal, Daily  thiamine tablet 100 mg, 100 mg, Oral, Daily  folic acid (FOLVITE) tablet 1 mg, 1 mg, Oral, Daily  pantoprazole (PROTONIX) tablet 40 mg, 40 mg, Oral, QAM AC  ondansetron (ZOFRAN-ODT) disintegrating tablet 8 mg, 8 mg, Oral, Q8H PRN  sucralfate (CARAFATE) tablet 1 g, 1 g, Oral, 4x Daily AC & HS     Physical     height is 5' 8\" (1.727 m) and weight is 119 lb (54 kg). His oral temperature is 97.7 °F (36.5 °C). His blood pressure is 107/77 and his pulse is 74. His respiration is 16 and oxygen saturation is 98%.    Lab Results   Component Value Date    WBC 7.8 01/17/2021    HGB 12.3 (L) 01/17/2021    HCT 37.7 (L) 01/17/2021     01/17/2021    CHOL 215 (H) 01/29/2020    TRIG 190 (H) 01/29/2020    HDL 46 01/29/2020    ALT 19 01/17/2021    AST 21 01/17/2021     01/17/2021    K 4.3 01/17/2021     01/17/2021    CREATININE 0.6 01/17/2021    BUN 15 01/17/2021    CO2 29 01/17/2021    TSH 0.491 01/13/2021    INR 0.83 (L) 03/09/2015    LABA1C 5.8 01/11/2020 Patient was having a bad panic attack this morning. He did better after receiving small dose of Ativan. Reported very poor sleep last night. Continues to endorse withdrawal symptoms. Notes the Librium has been helping. Notes his mood is little better today however reports that he continues to struggle with suicidal thoughts. Patient has plans to go to [facility] in [facility] area after medical stabilization. Electronically signed by Xiomara Kern MD on 1/19/21 at 8:46 PM EST    **This report has been created using voice recognition software. It may contain minor errors which are inherent in voice recognition technology. **

## 2021-01-19 NOTE — PROGRESS NOTES
Group Therapy Note    Date: 1/18/2021  Start Time: 2000  End Time:  2020      Type of Group: Wrap-Up/relaxation    Patient's Goal:  \"stay positive. \"    Notes:  Partially met goal    Status After Intervention:  Unchanged    Participation Level:  Active Listener    Participation Quality: Appropriate and Attentive      Speech:  normal      Thought Process/Content: Hallucinating      Affective Functioning: Blunted      Mood: anxious and depressed      Level of consciousness:  Alert and Oriented x4      Response to Learning: Able to verbalize current knowledge/experience, Able to verbalize/acknowledge new learning, Able to retain information, Capable of insight, Able to change behavior and Progressing to goal      Endings: None Reported    Modes of Intervention: Education and Support      Discipline Responsible: Registered Nurse      Signature:  Hortensia Thompson RN

## 2021-01-19 NOTE — PLAN OF CARE
Problem: Depressive Behavior With or Without Suicide Precautions:  Goal: Able to verbalize and/or display a decrease in depressive symptoms  Description: Able to verbalize and/or display a decrease in depressive symptoms  Outcome: Ongoing  Note: Patient reports depressive symptoms during shift assessment. Patient rates depression 10/10. Goal: Ability to disclose and discuss suicidal ideas will improve  Description: Ability to disclose and discuss suicidal ideas will improve  Outcome: Ongoing  Note: Patient denies suicidal ideations during shift assessment. Goal: Participates in care planning  Description: Participates in care planning  Outcome: Ongoing  Note: Patient participates in care planning with staff during shift. Problem: Altered Mood, Psychotic Behavior:  Goal: Able to verbalize decrease in frequency and intensity of hallucinations  Description: Able to verbalize decrease in frequency and intensity of hallucinations  Outcome: Ongoing  Note: Patient reports audible and visual hallucinations. Patient reports hearing \"whispering\" and seeing \"shadows\" during shift assessment. Problem: Substance Abuse:  Goal: Absence of drug withdrawal signs and symptoms  Description: Absence of drug withdrawal signs and symptoms  Outcome: Ongoing  Note: Patient continues to report withdrawal symptoms. Patient reports hot and cold chills, sweats, sensitivity to lights and sounds, shakes/tremors, and nightmares. Scheduled medication ordered to help with symptoms. Problem: Discharge Planning:  Goal: Discharged to appropriate level of care  Description: Discharged to appropriate level of care  Outcome: Ongoing  Note: No discharge plans noted at this time during shift.      Problem: Pain:  Goal: Pain level will decrease  Description: Pain level will decrease  Outcome: Ongoing Note: Patient continues to complain of all over generalized pain. Rates pain 10/10 and describes as \"aching\" and \"cramping\". PRN medication available as needed for pain management. Problem: Nutrition  Goal: Optimal nutrition therapy  Outcome: Ongoing  Note: Good appetite noted during shift. Patient encouraged to drink more fluids due to low blood pressures and feeling weak. IV bolus given x 1 per orders from [facility]. Problem: Role Relationship:  Goal: Ability to verbalize awareness of feelings that lead to decreased social interaction will improve  Description: Ability to verbalize awareness of feelings that lead to decreased social interaction will improve  Outcome: Ongoing  Note: Out on unit for meals and needs. Patient attending groups with encouragement. Patient on the fringe with peers but is pleasant upon approach. Problem: OTHER  Goal: Other  Description: Free from indigestion  Outcome: Completed  Note: Patient compliant with medications. Symptoms are improving. Care plan reviewed with patient.   Patient does verbalize understanding of the plan of care and does contribute to goal setting

## 2021-01-19 NOTE — PROGRESS NOTES
Hospitalist Progress Note    Patient:  Regulo Lopez. Unit/Bed:[facility]/067-A    YOB: 1970    MRN: [id number]       Acct: [de-identified]     PCP: No primary care provider on file. Date of Admission: 1/13/2021    Assessment/Plan:    1. Possible dehydration--spoke with staff the nursing supervisor and will give 0.9 normal saline 1 L bolus x1 dose today; patient relates to decreased urine output is not eating the greatest  2. Acute alcohol withdrawal--on Librium per psychiatry; on folic acid, thiamine, multivitamin  3. Marijuana use  4. Essential hypertension, controlled--stable; monitor  5. COPD--stable, on room air  6. Severe malnutrition--per dietitian  7. Tobacco abuse--on NicoDerm 14 mg topically daily, needs cessation counseling  8.  History stroke    Expected discharge date: Per psychiatry    Disposition:    [x] Home       [] TCU       [] Rehab       [] Psych       [] SNF       [] [place]       [] Other-    Chief Complaint: Alcohol withdrawal    Hospital Course: Patient was admitted to  secondary to suicide ideation as a plan to starve himself to death; this patient is an alcoholic and states he drinks a 12 pack of beer a day and 1/5 of vodka; he was admitted to  and was started on Ativan with CIWA protocol and today was switched over to Librium per psychiatry; he is fully alert and oriented states he feels weak and tired complains of \"aches all over\" and rates 8 out of 10    1/18--> patient was able to eat some today, he is actually smiling, he states he is feeling a little bit better    1/19--> states he feels worse today, informed RN that he is not urinating and he is not really eating that well; plan to give a 1 L normal saline bolus his BP is mildly on the lower side    Subjective (past 24 hours): Chronically ill-appearing, complains of aches all over and rates 7 out of 10    Medications:  Reviewed    Infusion Medications   Scheduled Medications  multivitamin  1 tablet Oral Daily    chlordiazePOXIDE  10 mg Oral TID    sertraline  50 mg Oral Daily    dicyclomine  10 mg Oral TID AC    cloNIDine  0.1 mg Oral BID    nicotine  1 patch Transdermal Daily    thiamine  100 mg Oral Daily    folic acid  1 mg Oral Daily    pantoprazole  40 mg Oral QAM AC    sucralfate  1 g Oral 4x Daily AC & HS     PRN Meds: ibuprofen, acetaminophen, magnesium hydroxide, aluminum & magnesium hydroxide-simethicone, hydrOXYzine, traZODone, ondansetron      Intake/Output Summary (Last 24 hours) at 1/19/2021 0551  Last data filed at 1/18/2021 2000  Gross per 24 hour   Intake 1200 ml   Output    Net 1200 ml       Diet:  DIET GENERAL;  Dietary Nutrition Supplements: Standard High Calorie Oral Supplement    Exam:  /77   Pulse 74   Temp 97.7 °F (36.5 °C) (Oral)   Resp 16   Ht 5' 8\" (1.727 m)   Wt 119 lb (54 kg)   SpO2 98%   BMI 18.09 kg/m²     General appearance: No apparent distress, appears older than stated age and cooperative. Chronically ill-appearing; bilateral arm tremoring noted   HEENT: Pupils equal, round, and reactive to light. Conjunctivae/corneas clear. Neck: Supple, with full range of motion. No jugular venous distention. Trachea midline. Respiratory:  Normal respiratory effort. Diminished to auscultation, bilaterally without Rales/Wheezes/Rhonchi. Cardiovascular: Regular rate and rhythm with normal S1/S2 without murmurs, rubs or gallops. Abdomen: Soft, non-tender, non-distended with normal bowel sounds. Musculoskeletal: passive and active ROM x 4 extremities. Skin: Skin color, texture, turgor normal.    Neurologic:  Neurovascularly intact without any focal sensory/motor deficits.  Cranial nerves: II-XII intact, grossly non-focal.  Psychiatric: Alert and oriented, thought content appropriate  Capillary Refill: Brisk,< 3 seconds   Peripheral Pulses: +2 palpable, equal bilaterally       Labs:   Recent Labs     01/17/21  0803   WBC 7.8   HGB 12.3* HCT 37.7*        Recent Labs     01/17/21  0803      K 4.3      CO2 29   BUN 15   CREATININE 0.6   CALCIUM 8.9     Recent Labs     01/17/21  0803   AST 21   ALT 19   BILITOT 0.3   ALKPHOS 60     Microbiology:    SITZM-30 not detected on January 14, 2021    Urinalysis:      Lab Results   Component Value Date    NITRU NEGATIVE 01/13/2021    WBCUA 0-2 12/31/2015    BACTERIA NONE 12/31/2015    RBCUA 5-10 12/31/2015    BLOODU NEGATIVE 01/13/2021    SPECGRAV 1.011 07/16/2015    GLUCOSEU NEGATIVE 01/13/2021       Radiology:  No results found.     DVT prophylaxis: [] Lovenox                                 [] SCDs                                 [] SQ Heparin                                 [x] Encourage ambulation           [] Already on Anticoagulation     Code Status: Full Code    Tele:   [] yes             [x] no    Active Hospital Problems    Diagnosis Date Noted    Severe episode of recurrent major depressive disorder, with psychotic features ([facility] [address] [F33.3] 01/14/2021     Priority: High    Alcohol use disorder, severe, dependence ([address] [F10.20]     Major depressive disorder, recurrent severe without psychotic features ([address] [F33.2] 01/14/2021    Severe malnutrition ([address] [E43] 01/14/2021       Electronically signed by RINA Chandler CNP on 1/19/2021 at 5:51 AM

## 2021-01-19 NOTE — PLAN OF CARE
Problem: Depressive Behavior With or Without Suicide Precautions:  Goal: Able to verbalize and/or display a decrease in depressive symptoms  Description: Able to verbalize and/or display a decrease in depressive symptoms  1/18/2021 2137 by Jomar Lebron RN  Outcome: Ongoing  Note: Patient reports depressive thoughts as well as hopelessness. 1/18/2021 1031 by Pedro Chan LPN  Outcome: Ongoing  Note: Patient reports depressive symptoms during shift assessment. Patient rates depression 10/10. Goal: Ability to disclose and discuss suicidal ideas will improve  Description: Ability to disclose and discuss suicidal ideas will improve  1/18/2021 2137 by Jomar Lebron RN  Outcome: Ongoing  Note: Suicidal thoughts currently denied. 1/18/2021 1031 by Pedro Chan LPN  Outcome: Ongoing  Note: Reports fleeting suicidal ideations during shift assessment. No active plan. Patient able to contract for safety. Goal: Participates in care planning  Description: Participates in care planning  1/18/2021 2137 by Jomar Lebron RN  Outcome: Ongoing  Note: Patient participated this shift. 1/18/2021 1031 by Pedro Chan LPN  Outcome: Ongoing  Note: Patient participates in care planning with staff during shift. Problem: Altered Mood, Psychotic Behavior:  Goal: Able to verbalize decrease in frequency and intensity of hallucinations  Description: Able to verbalize decrease in frequency and intensity of hallucinations  1/18/2021 2137 by Jomar Lebron RN  Outcome: Ongoing  Note: Auditory and visual hallucinations reported. Patient states that he sees [place]" and [employer] and voices stating he is worthless. \"  1/18/2021 1031 by Pedro Chan LPN  Outcome: Ongoing  Note: Reports hearing male and female voices. States they are \"just whispering\" and \"shadows\".      Problem: Substance Abuse:  Goal: Absence of drug withdrawal signs and symptoms Description: Absence of drug withdrawal signs and symptoms  1/18/2021 2137 by Hang Edwards RN  Outcome: Ongoing  Note: Tremors and anxiety noted and reported. Librium given per orders. 1/18/2021 1031 by Sol Lopez LPN  Outcome: Ongoing  Note: Patient reports withdrawal symptoms to alcohol. Symptoms include sweating, increased anxiety, aches and cramps, and sensitivity to light and sound. Patient reports his appetite is still impaired but is improving and able to tolerate more foods. Patient reports feeling lightheaded and dizziness, but has been making attempts to increase fluid intake. Patient given PRN medication and scheduled librium to help with withdrawal symptoms. Problem: Discharge Planning:  Goal: Discharged to appropriate level of care  Description: Discharged to appropriate level of care  1/18/2021 2137 by Hang Edwards RN  Outcome: Ongoing  Note: Discharge planning is in progress. 1/18/2021 1031 by Sol Lopez LPN  Outcome: Ongoing  Note: No discharge plans noted at this time during shift. Patient had made contact with [facility] in [facility] to go there after discharge for alcohol rehab. Will discuss transport and other needs when discharge date is determined. Problem: Pain:  Goal: Pain level will decrease  Description: Pain level will decrease  1/18/2021 2137 by Hang Edwards RN  Outcome: Ongoing  Note: Generalized pain reported. 1/18/2021 1031 by Sol Lopez LPN  Outcome: Ongoing  Note: Patient reports generalized pain \"all over\". Rates pain 8/10 and described as \"aching\", \"throbbing\", and \"constant\". Problem: Nutrition  Goal: Optimal nutrition therapy  1/18/2021 2137 by Hang Edwards RN  Outcome: Ongoing  Note: Patient reports improved intake, had a snack this shift. 1/18/2021 1031 by Sol Lopez LPN  Outcome: Ongoing  Note: Appetite is approving. Minimal complaints of stomach cramps and pain.   1/18/2021 0929 by Audra Gannon RD, LD Outcome: Ongoing     Problem: Role Relationship:  Goal: Ability to verbalize awareness of feelings that lead to decreased social interaction will improve  Description: Ability to verbalize awareness of feelings that lead to decreased social interaction will improve  1/18/2021 2137 by Lucinda Lee RN  Outcome: Ongoing  1/18/2021 1222 by Navarro Menchaca  Outcome: Ongoing  Note: Pt has not attended any of the groups that have been offered on the unit so far this shift. Pt has been in his room throughout the day and has not been interacting with peers. Pt will be encouraged to attend groups that are offered and to come out onto the unit to interact with peers. Pt progress towards socialization goal is ongoing. 1/18/2021 1031 by Gianluca Tellez LPN  Outcome: Ongoing  Note: Out on unit with peers for meals and needs but is on the fringe. Patient isolates and is encouraged to attend groups/activities. Care plan reviewed with patient.   Patient does verbalize understanding of the plan of care and does contribute to goal setting

## 2021-01-20 PROCEDURE — 1240000000 HC EMOTIONAL WELLNESS R&B

## 2021-01-20 PROCEDURE — 6370000000 HC RX 637 (ALT 250 FOR IP): Performed by: PSYCHIATRY & NEUROLOGY

## 2021-01-20 PROCEDURE — 6370000000 HC RX 637 (ALT 250 FOR IP): Performed by: REGISTERED NURSE

## 2021-01-20 PROCEDURE — 6370000000 HC RX 637 (ALT 250 FOR IP): Performed by: PHYSICIAN ASSISTANT

## 2021-01-20 PROCEDURE — 99232 SBSQ HOSP IP/OBS MODERATE 35: CPT | Performed by: PSYCHIATRY & NEUROLOGY

## 2021-01-20 PROCEDURE — 2580000003 HC RX 258: Performed by: PSYCHIATRY & NEUROLOGY

## 2021-01-20 PROCEDURE — 6370000000 HC RX 637 (ALT 250 FOR IP): Performed by: NURSE PRACTITIONER

## 2021-01-20 PROCEDURE — 99232 SBSQ HOSP IP/OBS MODERATE 35: CPT | Performed by: PHYSICIAN ASSISTANT

## 2021-01-20 RX ORDER — QUETIAPINE FUMARATE 100 MG/1
100 TABLET, FILM COATED ORAL 2 TIMES DAILY
Status: DISCONTINUED | OUTPATIENT
Start: 2021-01-20 | End: 2021-01-21

## 2021-01-20 RX ADMIN — DICYCLOMINE HYDROCHLORIDE 10 MG: 10 CAPSULE ORAL at 10:50

## 2021-01-20 RX ADMIN — SERTRALINE 50 MG: 50 TABLET, FILM COATED ORAL at 07:46

## 2021-01-20 RX ADMIN — CLONIDINE HYDROCHLORIDE 0.1 MG: 0.1 TABLET ORAL at 20:36

## 2021-01-20 RX ADMIN — ACETAMINOPHEN 650 MG: 325 TABLET ORAL at 07:46

## 2021-01-20 RX ADMIN — SUCRALFATE 1 G: 1 TABLET ORAL at 16:23

## 2021-01-20 RX ADMIN — QUETIAPINE FUMARATE 100 MG: 100 TABLET ORAL at 20:34

## 2021-01-20 RX ADMIN — Medication 1 TABLET: at 07:46

## 2021-01-20 RX ADMIN — CHLORDIAZEPOXIDE HYDROCHLORIDE 10 MG: 5 CAPSULE ORAL at 13:31

## 2021-01-20 RX ADMIN — CHLORDIAZEPOXIDE HYDROCHLORIDE 10 MG: 5 CAPSULE ORAL at 07:46

## 2021-01-20 RX ADMIN — SUCRALFATE 1 G: 1 TABLET ORAL at 06:36

## 2021-01-20 RX ADMIN — SUCRALFATE 1 G: 1 TABLET ORAL at 10:50

## 2021-01-20 RX ADMIN — SODIUM CHLORIDE, PRESERVATIVE FREE 10 ML: 5 INJECTION INTRAVENOUS at 07:58

## 2021-01-20 RX ADMIN — DICYCLOMINE HYDROCHLORIDE 10 MG: 10 CAPSULE ORAL at 16:23

## 2021-01-20 RX ADMIN — DICYCLOMINE HYDROCHLORIDE 10 MG: 10 CAPSULE ORAL at 06:36

## 2021-01-20 RX ADMIN — FOLIC ACID 1 MG: 1 TABLET ORAL at 07:46

## 2021-01-20 RX ADMIN — TRAZODONE HYDROCHLORIDE 50 MG: 50 TABLET ORAL at 21:02

## 2021-01-20 RX ADMIN — PANTOPRAZOLE SODIUM 40 MG: 40 TABLET, DELAYED RELEASE ORAL at 07:46

## 2021-01-20 RX ADMIN — HYDROXYZINE HYDROCHLORIDE 50 MG: 25 TABLET ORAL at 19:33

## 2021-01-20 RX ADMIN — Medication 100 MG: at 07:47

## 2021-01-20 RX ADMIN — SUCRALFATE 1 G: 1 TABLET ORAL at 20:34

## 2021-01-20 RX ADMIN — CHLORDIAZEPOXIDE HYDROCHLORIDE 10 MG: 5 CAPSULE ORAL at 20:35

## 2021-01-20 ASSESSMENT — PAIN SCALES - GENERAL: PAINLEVEL_OUTOF10: 4

## 2021-01-20 ASSESSMENT — PAIN DESCRIPTION - DESCRIPTORS: DESCRIPTORS: ACHING

## 2021-01-20 ASSESSMENT — PAIN DESCRIPTION - PROGRESSION: CLINICAL_PROGRESSION: NOT CHANGED

## 2021-01-20 ASSESSMENT — PAIN DESCRIPTION - FREQUENCY: FREQUENCY: CONTINUOUS

## 2021-01-20 ASSESSMENT — PAIN - FUNCTIONAL ASSESSMENT: PAIN_FUNCTIONAL_ASSESSMENT: ACTIVITIES ARE NOT PREVENTED

## 2021-01-20 ASSESSMENT — PAIN DESCRIPTION - PAIN TYPE: TYPE: ACUTE PAIN

## 2021-01-20 NOTE — GROUP NOTE
Group Therapy Note    Date: 1/20/2021    Group Start Time: 1400  Group End Time: 1430  Group Topic: Healthy Living/Wellness    [facility] 4E    Kelvin Melvin RN        Group Therapy Note    Attendees: 4       Patient did not attend group.     Discipline Responsible: Registered Nurse      Signature:  Kelvin Melvin RN

## 2021-01-20 NOTE — PLAN OF CARE
Problem: Substance Abuse:  Goal: Absence of drug withdrawal signs and symptoms  Description: Absence of drug withdrawal signs and symptoms  1/20/2021 0957 by Johnathan Velasquez RN  Outcome: Met This Shift  Note: Pt denied any withdrawal symptoms at this time . 1/19/2021 2131 by Gayla Navarro RN  Outcome: Ongoing  Note: Patient reports having sweats and increased anxiety. Problem: Depressive Behavior With or Without Suicide Precautions:  Goal: Able to verbalize and/or display a decrease in depressive symptoms  Description: Able to verbalize and/or display a decrease in depressive symptoms  1/20/2021 0957 by Johnathan Velasquez RN  Outcome: Ongoing  Note: Pt affect flat but brightens . Pt calm and very pleasant   1/19/2021 2131 by Gayla Navarro RN  Outcome: Ongoing  Note: Patient continues he feels no hope for the future. Patient reports he continues to feel suicidal with no plan. Goal: Ability to disclose and discuss suicidal ideas will improve  Description: Ability to disclose and discuss suicidal ideas will improve  1/20/2021 0957 by Johnathan Velasquez RN  Outcome: Ongoing  Note: Pt denied any SI  1/19/2021 2131 by Gayla Navarro RN  Outcome: Not Met This Shift  Note: Patient reports that he continues to have suicidal thoughts although has no plan. Patient contracts verbally for safety. Goal: Participates in care planning  Description: Participates in care planning  1/20/2021 0957 by Johnathan Velasquez RN  Outcome: Ongoing  Note: Patient discussed treatment plan with physician/medical staff, attending group, and compliant with medications. 1/19/2021 2131 by Gayla Navarro RN  Outcome: Ongoing  Note: Care plan reviewed with patient. Patient does verbalize understanding of the plan of care and does contribute to goal setting .      Problem: Altered Mood, Psychotic Behavior:  Goal: Able to verbalize decrease in frequency and intensity of hallucinations Description: Able to verbalize decrease in frequency and intensity of hallucinations  1/20/2021 0957 by Lam Amin RN  Outcome: Ongoing  Note: Pt denied any hallucinations this am . Pt said that he does hear muffled voices at times during the day. 1/19/2021 2131 by Marcel Ca RN  Outcome: Ongoing  Note: Patient continues to report having auditory hallucinations. Patient reports the voices bring up the past.     Problem: Discharge Planning:  Goal: Discharged to appropriate level of care  Description: Discharged to appropriate level of care  1/20/2021 0957 by Lam Amin RN  Outcome: Ongoing  Note: Patient voices physical needs before discharge. Patient plans to be discharged to rehab at [facility] in [place]. Discharge planner working with patient to achieve optimal discharge plans, specific to individual needs. 1/19/2021 2131 by Marcel Ca RN  Outcome: Ongoing  Note: Discharge planning is in process. Problem: Pain:  Goal: Pain level will decrease  Description: Pain level will decrease  1/20/2021 0957 by Lam Amin RN  Outcome: Ongoing  Note: Patient reports all over  pain 4 out of 10. Patient taking Tylenol for pain. 1/19/2021 2131 by Marcel Ca RN  Outcome: Ongoing  Note: Patient continues to have complaints of lower back pain. Problem: Nutrition  Goal: Optimal nutrition therapy  1/20/2021 0957 by Lam Amin RN  Outcome: Ongoing  Note: Pt said that his appetite is improving . Pt denied any GI distress or discomfort. 1/19/2021 2131 by Marcel Ca RN  Outcome: Met This Shift  Note: Patient  had a bedtime snack this shift.      Problem: Role Relationship:  Goal: Ability to verbalize awareness of feelings that lead to decreased social interaction will improve  Description: Ability to verbalize awareness of feelings that lead to decreased social interaction will improve  1/20/2021 0957 by Lam Amin RN  Outcome: Ongoing Note: Pt on the fringe with peers  1/19/2021 2131 by Karine Brown RN  Outcome: Ongoing

## 2021-01-20 NOTE — PROGRESS NOTES
Department of Psychiatry  Progress Note     Chief Complaint:  Severe episode of recurrent major depressive disorder, with psychotic features ([address]     SUBJECTIVE:    PROGRESS:  Jeanne Drake. is feeling \"about the same\" over all. Rates mood as 6/10  Depression is \"getting a little better\"  Still experiencing high anxiety, although he reports the Atarax is helping  States \"my appetite is getting better, fighting through the [employer] \"pretty good. Better than last night\". Discussed trying to stick to eating very small amounts at a time to get his body used to eating again. He reports he has been trying to drink more fluids. Denies thoughts to harm self  Denies homicidal ideations  Denies current hallucinations; continues with visual hallucinations of \"bad shadows only at night, but they don't seem as scary now\"  Denies delusional thinking  Denies manic symptoms    Eunice Das continues to be quite pleasant and cooperative. He reports he is experiencing mild withdrawal symptoms this morning. When asked to describe them, replies \"really just the sweats. Drives me crazy\". Reports some racing thoughts at night when he is trying to sleep. He has been taking his medications and believes they are helping. Denies any other concerns at this time.      Suicidal ideations: denies    Compliance with medications: good   Medication side effects: absent  ROS: Patient has new complaints:  no  Sleep quality: 6.5 hours continuous per nursing report  Attending groups: yes      OBJECTIVE      Medications  Current Facility-Administered Medications: sodium chloride flush 0.9 % injection 10 mL, 10 mL, Intravenous, BID  multivitamin 1 tablet, 1 tablet, Oral, Daily  chlordiazePOXIDE (LIBRIUM) capsule 10 mg, 10 mg, Oral, TID  sertraline (ZOLOFT) tablet 50 mg, 50 mg, Oral, Daily  dicyclomine (BENTYL) capsule 10 mg, 10 mg, Oral, TID AC  ibuprofen (ADVIL;MOTRIN) tablet 400 mg, 400 mg, Oral, Q6H PRN Affect:  mood congruent and flat  Thought processes:  linear, goal directed and coherent  Thought content:  Denies homicidal ideation  Suicidal Ideation:  denies suicidal ideation  Delusions:  no evidence of delusions  Perceptual Disturbance:  denies any perceptual disturbance currently  Cognition: Patient is oriented to person, place, time and situation  Concentration: clinically adequate  Memory: intact  Insight & Judgement: fair       ASSESSMENT     Severe episode of recurrent major depressive disorder, with psychotic features ([address]     PLAN    Patient's symptoms continue to improve, although he reports depression and anxiety continue to bother him  Medication adjustments: continue medications as prescribed  Attempt to develop insight, psycho-education and supportive therapy conducted. Probable discharge: TBD  Follow-up: [facility] signed by Steffi DE LEON on 1/20/2021 at 4:51 PM Reviewed patient's current plan of care and vital signs with nursing staff. Psychiatry Attending Attestation     I independently saw and evaluated the patient. I reviewed the nurse practitioner's documentation above. Any additional comments or changes to the nurse practitioners documentation are stated below otherwise agree with assessment. Patient reports dealing with severe racing thoughts all night. Discussed with him about starting Seroquel to help with both anxiety and sleep. He understood and agreed to the plan. He also mentions that suicidal thoughts are still coming and going. Reports poor energy and motivation. Depression with very severe anxiety related.        Electronically signed by Dilia Navarro MD on 1/20/21 at 8:06 PM EST

## 2021-01-20 NOTE — PLAN OF CARE
Note: Patient reports audible and visual hallucinations. Patient reports hearing \"whispering\" and seeing \"shadows\" during shift assessment. Problem: Substance Abuse:  Goal: Absence of drug withdrawal signs and symptoms  Description: Absence of drug withdrawal signs and symptoms  1/19/2021 2131 by Lesia Medina RN  Outcome: Ongoing  Note: Patient reports having sweats and increased anxiety. 1/19/2021 1223 by Dalton Gabriel LPN  Outcome: Ongoing  Note: Patient continues to report withdrawal symptoms. Patient reports hot and cold chills, sweats, sensitivity to lights and sounds, shakes/tremors, and nightmares. Scheduled medication ordered to help with symptoms. Problem: Discharge Planning:  Goal: Discharged to appropriate level of care  Description: Discharged to appropriate level of care  1/19/2021 2131 by Lesia Medina RN  Outcome: Ongoing  Note: Discharge planning is in process. 1/19/2021 1223 by Dalton Gabriel LPN  Outcome: Ongoing  Note: No discharge plans noted at this time during shift. Problem: Pain:  Description: Pain management should include both nonpharmacologic and pharmacologic interventions. Goal: Pain level will decrease  Description: Pain level will decrease  1/19/2021 2131 by Lesia Medina RN  Outcome: Ongoing  Note: Patient continues to have complaints of lower back pain. 1/19/2021 1223 by Dalton Gabriel LPN  Outcome: Ongoing  Note: Patient continues to complain of all over generalized pain. Rates pain 10/10 and describes as \"aching\" and \"cramping\". PRN medication available as needed for pain management.      Problem: Role Relationship:  Goal: Ability to verbalize awareness of feelings that lead to decreased social interaction will improve  Description: Ability to verbalize awareness of feelings that lead to decreased social interaction will improve  1/19/2021 2131 by Lesia Medina RN  Outcome: Ongoing  1/19/2021 1528 by Manda Dumas Outcome: Not Met This Shift  1/19/2021 1223 by Jam Morrow LPN  Outcome: Ongoing  Note: Out on unit for meals and needs. Patient attending groups with encouragement. Patient on the fringe with peers but is pleasant upon approach. Problem: Depressive Behavior With or Without Suicide Precautions:  Goal: Ability to disclose and discuss suicidal ideas will improve  Description: Ability to disclose and discuss suicidal ideas will improve  1/19/2021 2131 by Steve Joseph RN  Outcome: Not Met This Shift  Note: Patient reports that he continues to have suicidal thoughts although has no plan. Patient contracts verbally for safety. 1/19/2021 1223 by Jam Morrow LPN  Outcome: Ongoing  Note: Patient denies suicidal ideations during shift assessment. Problem: OTHER  Description: Indigestion  Goal: Other  Description: Free from indigestion  1/19/2021 1223 by Jam Morrow LPN  Outcome: Completed  Note: Patient compliant with medications. Symptoms are improving.

## 2021-01-20 NOTE — PROGRESS NOTES
Group Therapy Note    Date: 1/19/2021  Start Time: 2000  End Time:  2020      Type of Group: Wrap-Up and relaxation      Patient's Goal:  To go to groups. Notes:  Progressing toward. Status After Intervention:  Unchanged    Participation Level:  Active Listener and Interactive    Participation Quality: Appropriate and Attentive      Speech:  normal      Thought Process/Content: Linear  Hallucinating      Affective Functioning: Congruent and Blunted      Mood: anxious and depressed      Level of consciousness:  Alert and Oriented x4      Response to Learning: Able to verbalize current knowledge/experience      Endings: Suicidal    Modes of Intervention: Support and Socialization      Discipline Responsible: Registered Nurse      Signature:  Steve Joseph RN

## 2021-01-20 NOTE — PROGRESS NOTES
Hospitalist Progress Note      Patient:  Leodan Naidu. Unit/Bed:[facility]67/067-A  YOB: 1970  MRN: [id number]   Acct: [de-identified]   PCP: No primary care provider on file. Date of Admission: 1/13/2021    Assessment/Plan:    1. Dehydration: Improving. Patient received 1 L fluid bolus on 1/19. Patient is eating and drinking better today. Patient having well urine output. 2. Alcohol withdrawal: Psych primary. 3. History of marijuana use: Aware. 4. Essential hypertension: Patient is chronically on clonidine. Parameters set. 5. COPD: on RA. Continue home medications. 6. Severe malnutrition: BMI 18.09  7. Tobacco abuse: Nicotine patch. 8. History of CVA: Aware. Chief Complaint: ETOH WD    Initial H and P:-    Patient was admitted to  secondary to suicide ideation as a plan to starve himself to death; this patient is an alcoholic and states he drinks a 12 pack of beer a day and 1/5 of vodka; he was admitted to  and was started on Ativan with CIWA protocol and today was switched over to Librium per psychiatry; he is fully alert and oriented states he feels weak and tired complains of \"aches all over\" and rates 8 out of 10     1/18--> patient was able to eat some today, he is actually smiling, he states he is feeling a little bit better     1/19--> states he feels worse today, informed RN that he is not urinating and he is not really eating that well; plan to give a 1 L normal saline bolus his BP is mildly on the lower side    Subjective (past 24 hours):   No acute events overnight. Patient did not go to group therapy today. Patient states that he is eating and drinking better today. Patient states that he feels less dizzy and feels well today. Past medical history, family history, social history and allergies reviewed again and is unchanged since admission.     ROS Pt denies CP, SOB, HA, abd pain     Medications:  Reviewed Infusion Medications   Scheduled Medications    sodium chloride flush  10 mL Intravenous BID    multivitamin  1 tablet Oral Daily    chlordiazePOXIDE  10 mg Oral TID    sertraline  50 mg Oral Daily    dicyclomine  10 mg Oral TID AC    cloNIDine  0.1 mg Oral BID    nicotine  1 patch Transdermal Daily    thiamine  100 mg Oral Daily    folic acid  1 mg Oral Daily    pantoprazole  40 mg Oral QAM AC    sucralfate  1 g Oral 4x Daily AC & HS     PRN Meds: ibuprofen, acetaminophen, magnesium hydroxide, aluminum & magnesium hydroxide-simethicone, hydrOXYzine, traZODone, ondansetron      Intake/Output Summary (Last 24 hours) at 1/20/2021 1438  Last data filed at 1/19/2021 2200  Gross per 24 hour   Intake 490 ml   Output    Net 490 ml       Diet:  DIET GENERAL;  Dietary Nutrition Supplements: Standard High Calorie Oral Supplement    Exam:  /71   Pulse 68   Temp 98.7 °F (37.1 °C) (Oral)   Resp 16   Ht 5' 8\" (1.727 m)   Wt 119 lb (54 kg)   SpO2 99%   BMI 18.09 kg/m²   General appearance: No apparent distress, appears stated age and cooperative. HEENT: Pupils equal, round, and reactive to light. Conjunctivae/corneas clear. Neck: Supple, with full range of motion. No jugular venous distention. Trachea midline. Respiratory:  Normal respiratory effort. Clear to auscultation, bilaterally without Rales/Wheezes/Rhonchi. Cardiovascular: Regular rate and rhythm with normal S1/S2 without murmurs, rubs or gallops. Abdomen: Soft, non-tender, non-distended with normal bowel sounds. Musculoskeletal: passive and active ROM x 4 extremities. Skin: Skin color, texture, turgor normal.  No rashes or lesions. Neurologic:  Neurovascularly intact without any focal sensory/motor deficits.  Cranial nerves: II-XII intact, grossly non-focal.  Psychiatric: Alert and oriented, thought content appropriate  Capillary Refill: Brisk,< 3 seconds   Peripheral Pulses: +2 palpable, equal bilaterally     Labs:       Recent Labs 01/17/21  0803   WBC 7.8   HGB 12.3*   HCT 37.7*             Recent Labs     01/17/21  0803      K 4.3      CO2 29   BUN 15   CREATININE 0.6   CALCIUM 8.9          Recent Labs     01/17/21  0803   AST 21   ALT 19   BILITOT 0.3   ALKPHOS 60      Microbiology:    [id number] not detected on January 14, 2021    Electronically signed by [employer], PA on 1/20/2021 at 2:38 PM

## 2021-01-21 LAB
ANION GAP SERPL CALCULATED.3IONS-SCNC: 10 MEQ/L (ref 8–16)
BUN BLDV-MCNC: 21 MG/DL (ref 7–22)
CALCIUM SERPL-MCNC: 9.7 MG/DL (ref 8.5–10.5)
CHLORIDE BLD-SCNC: 106 MEQ/L (ref 98–111)
CO2: 28 MEQ/L (ref 23–33)
CREAT SERPL-MCNC: 0.7 MG/DL (ref 0.4–1.2)
GFR SERPL CREATININE-BSD FRML MDRD: > 90 ML/MIN/1.73M2
GLUCOSE BLD-MCNC: 93 MG/DL (ref 70–108)
POTASSIUM REFLEX MAGNESIUM: 4.4 MEQ/L (ref 3.5–5.2)
SODIUM BLD-SCNC: 144 MEQ/L (ref 135–145)

## 2021-01-21 PROCEDURE — 6370000000 HC RX 637 (ALT 250 FOR IP): Performed by: REGISTERED NURSE

## 2021-01-21 PROCEDURE — 6370000000 HC RX 637 (ALT 250 FOR IP): Performed by: PSYCHIATRY & NEUROLOGY

## 2021-01-21 PROCEDURE — 6370000000 HC RX 637 (ALT 250 FOR IP): Performed by: PHYSICIAN ASSISTANT

## 2021-01-21 PROCEDURE — 6370000000 HC RX 637 (ALT 250 FOR IP): Performed by: NURSE PRACTITIONER

## 2021-01-21 PROCEDURE — 99232 SBSQ HOSP IP/OBS MODERATE 35: CPT | Performed by: PSYCHIATRY & NEUROLOGY

## 2021-01-21 PROCEDURE — 1240000000 HC EMOTIONAL WELLNESS R&B

## 2021-01-21 PROCEDURE — 80048 BASIC METABOLIC PNL TOTAL CA: CPT

## 2021-01-21 PROCEDURE — 36415 COLL VENOUS BLD VENIPUNCTURE: CPT

## 2021-01-21 RX ORDER — QUETIAPINE FUMARATE 100 MG/1
100 TABLET, FILM COATED ORAL 3 TIMES DAILY
Status: DISCONTINUED | OUTPATIENT
Start: 2021-01-21 | End: 2021-01-23

## 2021-01-21 RX ADMIN — QUETIAPINE FUMARATE 100 MG: 100 TABLET ORAL at 07:57

## 2021-01-21 RX ADMIN — CHLORDIAZEPOXIDE HYDROCHLORIDE 10 MG: 5 CAPSULE ORAL at 07:56

## 2021-01-21 RX ADMIN — DICYCLOMINE HYDROCHLORIDE 10 MG: 10 CAPSULE ORAL at 16:44

## 2021-01-21 RX ADMIN — SERTRALINE 50 MG: 50 TABLET, FILM COATED ORAL at 07:56

## 2021-01-21 RX ADMIN — DICYCLOMINE HYDROCHLORIDE 10 MG: 10 CAPSULE ORAL at 11:06

## 2021-01-21 RX ADMIN — TRAZODONE HYDROCHLORIDE 50 MG: 50 TABLET ORAL at 20:30

## 2021-01-21 RX ADMIN — CHLORDIAZEPOXIDE HYDROCHLORIDE 10 MG: 5 CAPSULE ORAL at 20:29

## 2021-01-21 RX ADMIN — QUETIAPINE FUMARATE 100 MG: 100 TABLET ORAL at 20:29

## 2021-01-21 RX ADMIN — SUCRALFATE 1 G: 1 TABLET ORAL at 16:44

## 2021-01-21 RX ADMIN — Medication 100 MG: at 07:56

## 2021-01-21 RX ADMIN — QUETIAPINE FUMARATE 100 MG: 100 TABLET ORAL at 13:28

## 2021-01-21 RX ADMIN — CHLORDIAZEPOXIDE HYDROCHLORIDE 10 MG: 5 CAPSULE ORAL at 13:28

## 2021-01-21 RX ADMIN — SUCRALFATE 1 G: 1 TABLET ORAL at 06:49

## 2021-01-21 RX ADMIN — CLONIDINE HYDROCHLORIDE 0.1 MG: 0.1 TABLET ORAL at 07:55

## 2021-01-21 RX ADMIN — SUCRALFATE 1 G: 1 TABLET ORAL at 20:29

## 2021-01-21 RX ADMIN — FOLIC ACID 1 MG: 1 TABLET ORAL at 08:03

## 2021-01-21 RX ADMIN — SUCRALFATE 1 G: 1 TABLET ORAL at 11:47

## 2021-01-21 RX ADMIN — Medication 1 TABLET: at 07:57

## 2021-01-21 RX ADMIN — DICYCLOMINE HYDROCHLORIDE 10 MG: 10 CAPSULE ORAL at 06:49

## 2021-01-21 RX ADMIN — PANTOPRAZOLE SODIUM 40 MG: 40 TABLET, DELAYED RELEASE ORAL at 07:56

## 2021-01-21 RX ADMIN — ACETAMINOPHEN 650 MG: 325 TABLET ORAL at 07:56

## 2021-01-21 NOTE — PLAN OF CARE
Problem: Depressive Behavior With or Without Suicide Precautions:  Goal: Able to verbalize and/or display a decrease in depressive symptoms  Description: Able to verbalize and/or display a decrease in depressive symptoms  1/20/2021 2235 by Wing Ovidio LPN  Outcome: Not Met This Shift  Note: Pt denies a decrease in depressive symptoms. 1/20/2021 0957 by Lisa Siegel RN  Outcome: Ongoing  Note: Pt affect flat but brightens . Pt calm and very pleasant   Goal: Ability to disclose and discuss suicidal ideas will improve  Description: Ability to disclose and discuss suicidal ideas will improve  1/20/2021 2235 by Wing Ovidio LPN  Outcome: Ongoing  Note: Pt denies a feeling of being suicidal but sates he is having thoughts about suicide. 1/20/2021 0957 by Lisa Siegel RN  Outcome: Ongoing  Note: Pt denied any SI  Goal: Participates in care planning  Description: Participates in care planning  1/20/2021 2235 by Wing Ovidio LPN  Outcome: Met This Shift  Note: Pt is an active member in pt care planning   1/20/2021 0957 by Lisa Siegel RN  Outcome: Ongoing  Note: Patient discussed treatment plan with physician/medical staff, attending group, and compliant with medications. Problem: Altered Mood, Psychotic Behavior:  Goal: Able to verbalize decrease in frequency and intensity of hallucinations  Description: Able to verbalize decrease in frequency and intensity of hallucinations  1/20/2021 2235 by Wing Ovidio LPN  Outcome: Met This Shift  Note: Pt verbalizes a decrease in the frequency and intensity of hallucinations but states that he sometimes sees shadows, but they are getting better. 1/20/2021 0957 by Lisa Siegel RN  Outcome: Ongoing  Note: Pt denied any hallucinations this am . Pt said that he does hear muffled voices at times during the day.       Problem: Substance Abuse:  Goal: Absence of drug withdrawal signs and symptoms Description: Absence of drug withdrawal signs and symptoms  1/20/2021 2235 by Dilia Smart LPN  Outcome: Met This Shift  Note: Pt denies s/s of withdrawal   1/20/2021 0957 by Kacye Barrios RN  Outcome: Met This Shift  Note: Pt denied any withdrawal symptoms at this time . Problem: Discharge Planning:  Goal: Discharged to appropriate level of care  Description: Discharged to appropriate level of care  1/20/2021 2235 by Dilia Smart LPN  Outcome: Ongoing  Note: Pt wishes to go to rehab for drinking. Discharge planner working to achieve optimal discharge specific to the pt's needs. 1/20/2021 0957 by Kacey Barrios RN  Outcome: Ongoing  Note: Patient voices physical needs before discharge. Patient plans to be discharged to rehab at [facility] in [facility]. Discharge planner working with patient to achieve optimal discharge plans, specific to individual needs. Problem: Pain:  Goal: Pain level will decrease  Description: Pain level will decrease  1/20/2021 2235 by Dilia Smart LPN  Outcome: Not Met This Shift  Note: Pt rates a pain of 8/10. Pt denies wanting anything for pain. States its all over his body   1/20/2021 0957 by Kacey Barrios RN  Outcome: Ongoing  Note: Patient reports all over  pain 4 out of 10. Patient taking Tylenol for pain. Problem: Nutrition  Goal: Optimal nutrition therapy  1/20/2021 2235 by Dilia Smart LPN  Outcome: Met This Shift  Note: Pt has an increased appetite and pt has ate 100% of his snack    1/20/2021 0957 by Kacey Barrios RN  Outcome: Ongoing  Note: Pt said that his appetite is improving . Pt denied any GI distress or discomfort.       Problem: Role Relationship:  Goal: Ability to verbalize awareness of feelings that lead to decreased social interaction will improve  Description: Ability to verbalize awareness of feelings that lead to decreased social interaction will improve  1/20/2021 2235 by Dilia Smart LPN  Outcome: Ongoing Note: Pt able to verbalize awareness of feelings that lead to decreases social interaction. Pt has been interacting with peers  1/20/2021 1450 by Erasmo Miller  Outcome: Not Met This Shift  1/20/2021 0957 by Carlos Still RN  Outcome: Ongoing  Note: Pt on the fringe with peers         Care plan reviewed with patient and does verbalize understanding of the plan of care and does contribute to goal setting.

## 2021-01-21 NOTE — GROUP NOTE
Group Therapy Note    Attendees: 12    Group Start Time: 6057  Group End Time: 1831  Group Topic: Healthy Living/Wellness     Notes:  Patient attended group with good participation; Handouts given: Coloring sheets~ \"You are stronger than your anxiety\" and \"Mistakes are proof that you are trying\"; Video- [employer] \"If you're feeling, depressed, anxious, sad or angry WATCH THIS!!!\"    Status After Intervention:  Improved    Participation Level:  Active Listener and Interactive    Participation Quality: Appropriate, Attentive, Sharing and Supportive    Speech:  normal    Thought Process/Content: Logical  Linear    Affective Functioning: Congruent    Mood: euthymic    Level of consciousness:  Alert, Oriented x4 and Attentive    Response to Learning: Able to verbalize current knowledge/experience, Able to verbalize/acknowledge new learning, Able to retain information, Capable of insight, Able to change behavior and Progressing to goal    Endings: None Reported    Modes of Intervention: Education, Support, Socialization, Activity and Media    Discipline Responsible: Licensed Practical Nurse    Signature:  Paulette Whitign LPN

## 2021-01-21 NOTE — PLAN OF CARE
Problem: Altered Mood, Psychotic Behavior:  Goal: Able to verbalize decrease in frequency and intensity of hallucinations  Description: Able to verbalize decrease in frequency and intensity of hallucinations  1/21/2021 0921 by Asim Santillan RN  Outcome: Met This Shift  Note: Pt denied any hallucinations   1/20/2021 2235 by Orion Almonte LPN  Outcome: Met This Shift  Note: Pt verbalizes a decrease in the frequency and intensity of hallucinations but states that he sometimes sees shadows, but they are getting better. Problem: Substance Abuse:  Goal: Absence of drug withdrawal signs and symptoms  Description: Absence of drug withdrawal signs and symptoms  1/21/2021 0921 by Asim Santillan RN  Outcome: Met This Shift  Note: Pt denied any withdrawals other than some night sweats . 1/20/2021 2235 by Orion Almonte LPN  Outcome: Met This Shift  Note: Pt denies s/s of withdrawal      Problem: Depressive Behavior With or Without Suicide Precautions:  Goal: Able to verbalize and/or display a decrease in depressive symptoms  Description: Able to verbalize and/or display a decrease in depressive symptoms  1/21/2021 0921 by Asim Santillan RN  Outcome: Ongoing  Note: Pt affect bright and pleasant   1/20/2021 2235 by Orion Almonte LPN  Outcome: Not Met This Shift  Note: Pt denies a decrease in depressive symptoms. Goal: Participates in care planning  Description: Participates in care planning  1/21/2021 0921 by Asim Santillan RN  Outcome: Ongoing  Note: Patient discussed treatment plan with physician/medical staff, attending group, and compliant with medications.    1/20/2021 2235 by Orion Almonte LPN  Outcome: Met This Shift  Note: Pt is an active member in pt care planning      Problem: Discharge Planning:  Goal: Discharged to appropriate level of care  Description: Discharged to appropriate level of care  1/21/2021 0921 by Asim Santillan RN  Outcome: Ongoing Note: Patient voices any  needs before discharge. Patient plans to be discharged to rehab in [facility] . Discharge planner working with patient to achieve optimal discharge plans, specific to individual needs. 1/20/2021 2235 by Missy Martínez LPN  Outcome: Ongoing  Note: Pt wishes to go to rehab for drinking. Discharge planner working to achieve optimal discharge specific to the pt's needs. Problem: Pain:  Goal: Pain level will decrease  Description: Pain level will decrease  1/21/2021 1285 by Corie Farfan RN  Outcome: Ongoing  Note: Patient reports generalized  pain 4 out of 10. Patient reports reduced pain after taking pain medications. 1/20/2021 2235 by Missy Martínez LPN  Outcome: Not Met This Shift  Note: Pt rates a pain of 8/10. Pt denies wanting anything for pain. States its all over his body      Problem: Role Relationship:  Goal: Ability to verbalize awareness of feelings that lead to decreased social interaction will improve  Description: Ability to verbalize awareness of feelings that lead to decreased social interaction will improve  1/21/2021 0921 by Corie Farfan RN  Outcome: Ongoing  Note: Pt pleasant and cooperative with staff and peers  1/20/2021 2235 by Missy Martínez LPN  Outcome: Ongoing  Note: Pt able to verbalize awareness of feelings that lead to decreases social interaction. Pt has been interacting with peers     Problem: Depressive Behavior With or Without Suicide Precautions:  Goal: Ability to disclose and discuss suicidal ideas will improve  Description: Ability to disclose and discuss suicidal ideas will improve  1/21/2021 0921 by Corie Farfan RN  Outcome: Completed  Note: Pt denied any SI   1/20/2021 2235 by Missy Martínez LPN  Outcome: Ongoing  Note: Pt denies a feeling of being suicidal but sates he is having thoughts about suicide.       Problem: Nutrition  Goal: Optimal nutrition therapy  1/21/2021 0921 by Corie Farfan RN  Outcome: Completed Note: Pt eating well and tolerating all meals   1/20/2021 2235 by Cesario Mcgarry LPN  Outcome: Met This Shift  Note: Pt has an increased appetite and pt has ate 100% of his snack

## 2021-01-21 NOTE — FLOWSHEET NOTE
Spiritual Support Group Note    Number of Participants in Group: 9                              Time: 1430    Goal: Relief from isolation and loneliness             Alyx Sharing             Self-understanding and gain insight              Acceptance and belonging            Recognize they are not alone                Socialization             Empowerment       Encouragement    Topic:  [x] Spiritual Wellness and Self Care                  [x] Hope                     [x] Connecting with Divine/Others        [x] Thankfulness and Gratitude               [x]  Meaningfulness and Purpose               [] Forgiveness               [] Peace               [] Connect to [employer]     [] Other:    Participation Level:   [x] Active Listener   [] Minimal   [] Monopolizing   [x] Interactive   [] No Participation   []  Other:     Attention:   [x] Alert   [] Distractible   [] Drowsy   [] Poor   [] Other:    Manner:   [x] Cooperative   [] Suspicious   [] Withdrawn   [] Guarded   [] Irritable   [] Inhospitable   [] Other:     Others Comments from Group: Shaggy Maria C participated in the spirituality group. We discussed coping mechanism, hobbies and what we have within our control and what is outside of our control before doing the [address]. He/she has the  mini ways of wellness paper. He shared what inge his grandchildren give him.

## 2021-01-21 NOTE — GROUP NOTE
Group Therapy Note    Date: 1/21/2021    Group Start Time: 1400  Group End Time: 1430  Group Topic: Psychotherapy    [facility] Adult Psych 4E      Patient did not attend 1400 psychotherapy group    Signature:  LINO Prado

## 2021-01-21 NOTE — BH NOTE
Group Therapy Note    Date: 1/20/2021  Start Time: 2000  End Time:  2020      Type of Group: Goal Wrap-up and Relaxation       Patient's Goal:  Quit drinking     Notes: Pt indicated he wanted to stop drinking because \"im too old to have withdrawal symptoms like that, they're killing me\"      Participation Level:  Active Listener and Interactive    Participation Quality: Appropriate      Speech:  normal      Thought Process/Content: Logical      Affective Functioning: Congruent      Mood: anxious      Level of consciousness:  Alert and Oriented x4      Response to Learning: Able to verbalize current knowledge/experience and Able to verbalize/acknowledge new learning    Discipline Responsible: Licensed Practical Nurse      Signature:  Nav Zamora LPN

## 2021-01-21 NOTE — PROGRESS NOTES
Department of Psychiatry  Progress Note     Chief Complaint:  Severe episode of recurrent major depressive disorder, with psychotic features ([address]     SUBJECTIVE:    PROGRESS:  Elisa Jean-Pierre. is feeling \"better\" over all. Rates mood as 6/10  Reports depression and anxiety have improved significantly since admission  Appetite continues to improve; nursing staff reports he has begun requesting additional food at mealtimes  Sleep is \"better, much better\"  Racing thoughts continue to bother him, but are not as pervasive as they were upon admission  Denies hallucinations  Denies delusional thinking  Denies thoughts to harm self or others    Swathi Peters appears to be feeling much better today. He is better able to hold a conversation and brightens when he reports he spoke with Nguyễn Rebolledo at the [facility]. States \"I am thankful for the chance to go back. I can't do what I did last time and leave too soon\". He has been out of his room more often; working toward meeting his socialization goals per recreational therapist note. He has been taking his medications; denies side effects.       Suicidal ideations: denies    Compliance with medications: good   Medication side effects: absent  ROS: Patient has new complaints:  no  Sleep quality: 7 hours per nursing report  Attending groups: yes      OBJECTIVE      Medications  Current Facility-Administered Medications: QUEtiapine (SEROQUEL) tablet 100 mg, 100 mg, Oral, TID  sodium chloride flush 0.9 % injection 10 mL, 10 mL, Intravenous, BID  multivitamin 1 tablet, 1 tablet, Oral, Daily  chlordiazePOXIDE (LIBRIUM) capsule 10 mg, 10 mg, Oral, TID  sertraline (ZOLOFT) tablet 50 mg, 50 mg, Oral, Daily  dicyclomine (BENTYL) capsule 10 mg, 10 mg, Oral, TID AC  ibuprofen (ADVIL;MOTRIN) tablet 400 mg, 400 mg, Oral, Q6H PRN  cloNIDine (CATAPRES) tablet 0.1 mg, 0.1 mg, Oral, BID  acetaminophen (TYLENOL) tablet 650 mg, 650 mg, Oral, Q4H PRN Perceptual Disturbance:  denies any perceptual disturbance  Cognition: Patient is oriented to person, place, time and situation  Concentration: clinically adequate  Memory: impaired recent memory and remote memory--reports that he is having troubles remembering events over the past few years during his alcohol use  Insight & Judgement: improving      ASSESSMENT     Severe episode of recurrent major depressive disorder, with psychotic features ([address]     PLAN    Patient's symptoms continue to improve  Medication adjustments: start Seroquel 200mg  Attempt to develop insight, psycho-education and supportive therapy conducted. Probable discharge: TBD  Follow-up: [facility] [employer] signed by German DE LEON on 1/21/2021 at 5:34 PM Reviewed patient's current plan of care and vital signs with nursing staff. Psychiatry Attending Attestation     I assessed this patient and reviewed the case and plan of care with German Gómez CNP. I have reviewed the above documentation and I agree with the findings and treatment plan with the following updates. Patient continues report dealing with very high anxiety. Endorses some vague auditory hallucinations and visual hallucinations during the daytime. Discussed with him about going up on the dose of Seroquel to help with that. He understood and agreed to the plan. Continues to rate his mood as 4 out of 10 with 10 being the best mood. Reports fleeting suicidal thoughts. Social work linked him up with [facility] in [place], where he will be going on discharge. Possible discharge is Monday morning. Electronically signed by Camelia Lowe MD on 1/21/21 at 9:39 PM EST    **This report has been created using voice recognition software. It may contain minor errors which are inherent in voice recognition technology. **

## 2021-01-21 NOTE — PLAN OF CARE
Patient has attended at least 2 groups today and has been out of his room for socialization with others so he has met his socialization goal for this shift.

## 2021-01-22 PROCEDURE — 6370000000 HC RX 637 (ALT 250 FOR IP): Performed by: REGISTERED NURSE

## 2021-01-22 PROCEDURE — 99232 SBSQ HOSP IP/OBS MODERATE 35: CPT | Performed by: PSYCHIATRY & NEUROLOGY

## 2021-01-22 PROCEDURE — 6370000000 HC RX 637 (ALT 250 FOR IP): Performed by: PSYCHIATRY & NEUROLOGY

## 2021-01-22 PROCEDURE — 1240000000 HC EMOTIONAL WELLNESS R&B

## 2021-01-22 PROCEDURE — 6370000000 HC RX 637 (ALT 250 FOR IP): Performed by: PHYSICIAN ASSISTANT

## 2021-01-22 PROCEDURE — 6370000000 HC RX 637 (ALT 250 FOR IP): Performed by: NURSE PRACTITIONER

## 2021-01-22 PROCEDURE — APPSS30 APP SPLIT SHARED TIME 16-30 MINUTES: Performed by: PHYSICIAN ASSISTANT

## 2021-01-22 PROCEDURE — 99232 SBSQ HOSP IP/OBS MODERATE 35: CPT | Performed by: PHYSICIAN ASSISTANT

## 2021-01-22 RX ADMIN — Medication 100 MG: at 09:05

## 2021-01-22 RX ADMIN — CHLORDIAZEPOXIDE HYDROCHLORIDE 10 MG: 5 CAPSULE ORAL at 13:55

## 2021-01-22 RX ADMIN — SERTRALINE 50 MG: 50 TABLET, FILM COATED ORAL at 09:05

## 2021-01-22 RX ADMIN — DICYCLOMINE HYDROCHLORIDE 10 MG: 10 CAPSULE ORAL at 06:15

## 2021-01-22 RX ADMIN — QUETIAPINE FUMARATE 100 MG: 100 TABLET ORAL at 20:23

## 2021-01-22 RX ADMIN — FOLIC ACID 1 MG: 1 TABLET ORAL at 09:05

## 2021-01-22 RX ADMIN — DICYCLOMINE HYDROCHLORIDE 10 MG: 10 CAPSULE ORAL at 17:21

## 2021-01-22 RX ADMIN — CLONIDINE HYDROCHLORIDE 0.1 MG: 0.1 TABLET ORAL at 09:10

## 2021-01-22 RX ADMIN — PANTOPRAZOLE SODIUM 40 MG: 40 TABLET, DELAYED RELEASE ORAL at 09:11

## 2021-01-22 RX ADMIN — CLONIDINE HYDROCHLORIDE 0.1 MG: 0.1 TABLET ORAL at 20:23

## 2021-01-22 RX ADMIN — HYDROXYZINE HYDROCHLORIDE 50 MG: 25 TABLET ORAL at 18:13

## 2021-01-22 RX ADMIN — CHLORDIAZEPOXIDE HYDROCHLORIDE 10 MG: 5 CAPSULE ORAL at 20:23

## 2021-01-22 RX ADMIN — TRAZODONE HYDROCHLORIDE 50 MG: 50 TABLET ORAL at 20:23

## 2021-01-22 RX ADMIN — QUETIAPINE FUMARATE 100 MG: 100 TABLET ORAL at 13:55

## 2021-01-22 RX ADMIN — CHLORDIAZEPOXIDE HYDROCHLORIDE 10 MG: 5 CAPSULE ORAL at 09:04

## 2021-01-22 RX ADMIN — SUCRALFATE 1 G: 1 TABLET ORAL at 20:23

## 2021-01-22 RX ADMIN — QUETIAPINE FUMARATE 100 MG: 100 TABLET ORAL at 09:05

## 2021-01-22 RX ADMIN — DICYCLOMINE HYDROCHLORIDE 10 MG: 10 CAPSULE ORAL at 13:55

## 2021-01-22 RX ADMIN — SUCRALFATE 1 G: 1 TABLET ORAL at 17:11

## 2021-01-22 RX ADMIN — Medication 1 TABLET: at 09:05

## 2021-01-22 RX ADMIN — HYDROXYZINE HYDROCHLORIDE 50 MG: 25 TABLET ORAL at 22:08

## 2021-01-22 RX ADMIN — SUCRALFATE 1 G: 1 TABLET ORAL at 06:15

## 2021-01-22 RX ADMIN — SUCRALFATE 1 G: 1 TABLET ORAL at 13:55

## 2021-01-22 ASSESSMENT — PAIN SCALES - GENERAL
PAINLEVEL_OUTOF10: 0
PAINLEVEL_OUTOF10: 0

## 2021-01-22 NOTE — PROGRESS NOTES
Group Therapy Note    Date: 1/21/2021  Start Time: 2000  End Time:  2020    Type of Group: Wrap-Up/Relaxation    Status After Intervention:  Unchanged    Participation Level:  Active Listener    Participation Quality: Attentive      Speech:  normal      Thought Process/Content: Linear      Affective Functioning: Congruent      Mood: euthymic      Level of consciousness:  Alert      Response to Learning: Able to verbalize current knowledge/experience      Endings: None Reported    Modes of Intervention: Support      Discipline Responsible: Registered Nurse      Signature:  Butch Royal RN

## 2021-01-22 NOTE — PROGRESS NOTES
Hospitalist Progress Note      Patient:  Len Daley. Unit/Bed:-67/067-A  YOB: 1970  MRN: [id number]   Acct: [de-identified]   PCP: No primary care provider on file. Date of Admission: 1/13/2021    Assessment/Plan:    1. Dehydration: Resolved. Patient received 1 L fluid bolus on 1/19. Patient is eating and drinking back to normal.  Patient having well urine output. 2. Alcohol withdrawal: Psych primary. 3. History of marijuana use: Aware. 4. Essential hypertension: Patient is chronically on clonidine. Parameters set. 5. COPD: on RA. Continue home medications. 6. Severe malnutrition: BMI 18.09  7. Tobacco abuse: Nicotine patch. 8. History of CVA: Aware. Hospitalist team will sign off at this time. Please feel free to contact us with any questions or concerns. Chief Complaint: ETOH WD    Initial H and P:-    Patient was admitted to  secondary to suicide ideation as a plan to starve himself to death; this patient is an alcoholic and states he drinks a 12 pack of beer a day and 1/5 of vodka; he was admitted to  and was started on Ativan with CIWA protocol and today was switched over to Librium per psychiatry; he is fully alert and oriented states he feels weak and tired complains of \"aches all over\" and rates 8 out of 10     1/18--> patient was able to eat some today, he is actually smiling, he states he is feeling a little bit better     1/19--> states he feels worse today, informed RN that he is not urinating and he is not really eating that well; plan to give a 1 L normal saline bolus his BP is mildly on the lower side    1/20 - No acute events overnight. Patient did not go to group therapy today. Patient states that he is eating and drinking better today. Patient states that he feels less dizzy and feels well today.     Subjective (past 24 hours): No acute events overnight. Patient was up eating and drinking lunch during evaluation. Patient states that he feels back to normal.  Patient is excited to be discharged on Monday. Patient had no issues or complaints. Past medical history, family history, social history and allergies reviewed again and is unchanged since admission. ROS Pt denies CP, SOB, HA, abd pain     Medications:  Reviewed    Infusion Medications   Scheduled Medications    QUEtiapine  100 mg Oral TID    multivitamin  1 tablet Oral Daily    chlordiazePOXIDE  10 mg Oral TID    sertraline  50 mg Oral Daily    dicyclomine  10 mg Oral TID AC    cloNIDine  0.1 mg Oral BID    nicotine  1 patch Transdermal Daily    thiamine  100 mg Oral Daily    folic acid  1 mg Oral Daily    pantoprazole  40 mg Oral QAM AC    sucralfate  1 g Oral 4x Daily AC & HS     PRN Meds: ibuprofen, acetaminophen, magnesium hydroxide, aluminum & magnesium hydroxide-simethicone, hydrOXYzine, traZODone, ondansetron      Intake/Output Summary (Last 24 hours) at 1/22/2021 1244  Last data filed at 1/21/2021 2030  Gross per 24 hour   Intake 600 ml   Output    Net 600 ml       Diet:  DIET GENERAL;  Dietary Nutrition Supplements: Standard High Calorie Oral Supplement    Exam:  BP (!) 136/91   Pulse 86   Temp 97.4 °F (36.3 °C) (Oral)   Resp 16   Ht 5' 8\" (1.727 m)   Wt 119 lb (54 kg)   SpO2 96%   BMI 18.09 kg/m²   General appearance: No apparent distress, appears stated age and cooperative. HEENT: Pupils equal, round, and reactive to light. Conjunctivae/corneas clear. Neck: Supple, with full range of motion. No jugular venous distention. Trachea midline. Respiratory:  Normal respiratory effort. Clear to auscultation, bilaterally without Rales/Wheezes/Rhonchi. Cardiovascular: Regular rate and rhythm with normal S1/S2 without murmurs, rubs or gallops. Abdomen: Soft, non-tender, non-distended with normal bowel sounds. Musculoskeletal: passive and active ROM x 4 extremities. Skin: Skin color, texture, turgor normal.  No rashes or lesions. Neurologic:  Neurovascularly intact without any focal sensory/motor deficits.  Cranial nerves: II-XII intact, grossly non-focal.  Psychiatric: Alert and oriented, thought content appropriate  Capillary Refill: Brisk,< 3 seconds   Peripheral Pulses: +2 palpable, equal bilaterally     Labs:       Recent Labs     01/17/21  0803   WBC 7.8   HGB 12.3*   HCT 37.7*             Recent Labs     01/17/21  0803      K 4.3      CO2 29   BUN 15   CREATININE 0.6   CALCIUM 8.9          Recent Labs     01/17/21  0803   AST 21   ALT 19   BILITOT 0.3   ALKPHOS 60      Microbiology:    [id number] not detected on January 14, 2021    Electronically signed by JOSE Arriaga on 1/22/2021 at 12:44 PM

## 2021-01-22 NOTE — PLAN OF CARE
Problem: Depressive Behavior With or Without Suicide Precautions:  Goal: Able to verbalize and/or display a decrease in depressive symptoms  Description: Able to verbalize and/or display a decrease in depressive symptoms  1/22/2021 1046 by CARMELO Hernandez RN  Outcome: Met This Shift  Note: Roxanne Goodrich denies feeling depressed as he rates his mood #8 on scale of 1-10 with 10 the happiest.  1/21/2021 2350 by Farida Rowland RN  Outcome: Met This Shift  Note: States that mood is good this shift. Goal: Participates in care planning  Description: Participates in care planning  1/22/2021 1046 by Radha Kim RN  Outcome: Met This Shift  Note: Patient is compliant with medications, unit protocols, and setting daily goals for improved mental and emotional health.    1/21/2021 2350 by Fairda Rowland RN  Outcome: Met This Shift  Note: Care plan reviewed with patient. Patient verbalize understanding of the plan of care and contribute to goal setting.        Problem: Substance Abuse:  Goal: Absence of drug withdrawal signs and symptoms  Description: Absence of drug withdrawal signs and symptoms  1/22/2021 1046 by CARMELO Hernandez RN  Outcome: Met This Shift  Note: Vital signs stable and no symptoms of detox this am.  1/21/2021 2350 by Farida Rowland RN  Outcome: Met This Shift  Note: No s/s withdrawal     Problem: Role Relationship:  Goal: Ability to verbalize awareness of feelings that lead to decreased social interaction will improve  Description: Ability to verbalize awareness of feelings that lead to decreased social interaction will improve  1/22/2021 1046 by CARMELO Hernandez RN  Outcome: Met This Shift  Note: Roxanne Goodrich is social with peers out watching TV this am and he is very cooperative and pleasant with staff.    1/21/2021 2350 by Farida Rowland RN  Outcome: Ongoing     Problem: Altered Mood, Psychotic Behavior:  Goal: Able to verbalize decrease in frequency and intensity of hallucinations Description: Able to verbalize decrease in frequency and intensity of hallucinations  1/22/2021 1046 by CARMELO Page RN  Outcome: Ongoing  Note: Patient denies a/v hallucinations this am, he reports has heard \"jib jabber\" and seen shadows in the past.   1/21/2021 2350 by James Guallpa RN  Outcome: Met This Shift  Note: No hallucinations this shift. Problem: Discharge Planning:  Goal: Discharged to appropriate level of care  Description: Discharged to appropriate level of care  1/22/2021 1046 by CARMELO Page RN  Outcome: Ongoing  Note: Working with multidisciplinary treatment team for ongoing continuity of care and relapse prevention. 1/21/2021 2350 by James Guallpa RN  Outcome: Met This Shift  Note: Works with an interdisciplinary team towards meeting discharge needs. Problem: Pain:  Goal: Pain level will decrease  Description: Pain level will decrease  1/22/2021 1046 by CARMELO Page RN  Outcome: Ongoing  Note: He denies pain to this nurse this am.  1/21/2021 2350 by James Guallpa RN  Outcome: Met This Shift  Note: Denies pain      Problem: Nutrition  Goal: Optimal nutrition therapy  1/22/2021 1046 by CARMELO Page RN  Outcome: Ongoing  Note: Patient is eating more with improved appetite. He says he ate too much at breakfast and felt \"sick\" to his stomach.    1/22/2021 1017 by Luis Ellis RD, LD  Outcome: Ongoing     Care plan reviewed with patient. Patient verbalized understanding of the plan of care and contribute to goal setting.

## 2021-01-22 NOTE — PLAN OF CARE
Problem: Nutrition  Goal: Optimal nutrition therapy  Outcome: Ongoing   Nutrition Problem #1: Severe malnutrition, In context of social or environmental circumstances  Intervention: Food and/or Nutrient Delivery: Continue Current Diet, Continue Oral Nutrition Supplement  Nutritional Goals: Pt will consume 75% or more of meals during LOS.

## 2021-01-22 NOTE — PROGRESS NOTES
Group Therapy Note    Date: 1/22/2021  Start Time: 1330  End Time: 1430  Number of Participants: 7    Type of Group: Psychotherapy      Notes:  Pt is present for group. Peer discussing explored personal experiences with mental and substance abuse related issues. Peers shared motivating events to seek help as well as what provides hope for the future. Peers also discussed the importance of making a decision. Status After Intervention:  Unchanged    Participation Level:  Active Listener, interactive    Participation Quality: Attentive      Speech:  Normal      Thought Process/Content: Linear and logical      Affective Functioning: normal      Mood: euthymic      Level of consciousness:  Alert and Attentive      Response to Learning: Able to retain information, oriented x3 and Able to change behavior, good personal insight      Endings: None Reported    Modes of Intervention: Education, Support, Socialization, Exploration, Clarifying and Problem-solving      Discipline Responsible: /Counselor

## 2021-01-22 NOTE — PROGRESS NOTES
Comprehensive Nutrition Assessment    Type and Reason for Visit:  Reassess    Nutrition Recommendations/Plan:   Continue current diet, ONS, MVI, thiamin, and folic acid. Nutrition Assessment:    Pt improving from a nutritional standpoint AEB reports of improved appetite/intake and continued consumption of ONS. Remains at risk for further nutritional compromise r/t severe malnutrition, previous poor appetite/intake, belly pain, alcohol withdrawal, admit with depressive disorder, suicidal thought and underlying medical condition (hx: GERD, COPD, schizophrenia, CVA, HTN, alcohol abuse). Nutrition recommendations/interventions as per above. Malnutrition Assessment:  Malnutrition Status:  Severe malnutrition    Context:  Social/Environmental Circumstances(alcohol abuse)     Findings of the 6 clinical characteristics of malnutrition:  Energy Intake:  7 - 50% or less estimated energy requirements for 1 month or longer  Weight Loss:  7 - Greater than 20% over 1 year(24.7% loss)     Body Fat Loss:  7 - Severe body fat loss Orbital   Muscle Mass Loss:  Unable to assess(pt wrapped up in blanket, not feeling well, alcohol withdrawal)    Fluid Accumulation:  No significant fluid accumulation     Strength:  Not Performed    Estimated Daily Nutrient Needs:  Energy (kcal):  8751-9124 kcals (30-35 kcals/kg/day) or more to promote weight gain; Weight Used for Energy Requirements:  Current(54 kg 1/14/21)     Protein (g):  81 grams or more (1.5 grams/kg/day); Weight Used for Protein Requirements:  Current(54 kg)          Nutrition Related Findings:  Patient seen in his room. Reports he is doing much better. Appetite has been good, a little stomach pain at times but not bad, no nausea, and has been drinking Ensure shakes. Labs noted. Rx: MVI, carafate, protonix, bentyl, folvite, thiamin.       Wounds:  None       Current Nutrition Therapies:    DIET GENERAL; Dietary Nutrition Supplements: Standard High Calorie Oral Supplement    Anthropometric Measures:  · Height: 5' 8\" (172.7 cm)  · Current Body Weight: 119 lb (54 kg)(1/14/21 actual, no edema)   · Admission Body Weight: 119 lb (54 kg)(1/14/21 actual, no edema)    · Usual Body Weight: (~ 176# per pt. Per EMR: 1/10/20: 158# actual.)     · Ideal Body Weight: 154 lbs;   · BMI: 18.1  · Adjusted Body Weight:  ; No Adjustment   · BMI Categories: Underweight (BMI less than 18.5)       Nutrition Diagnosis:   · Severe malnutrition, In context of social or environmental circumstances related to inadequate protein-energy intake(alcohol abuse) as evidenced by poor intake prior to admission, weight loss greater than or equal to 20% in 1 year, severe loss of subcutaneous fat      Nutrition Interventions:   Food and/or Nutrient Delivery:  Continue Current Diet, Continue Oral Nutrition Supplement  Nutrition Education/Counseling:  Education initiated(Encouraged oral intake and ONS use.)   Coordination of Nutrition Care:  Continue to monitor while inpatient    Goals:  Pt will consume 75% or more of meals during LOS. Nutrition Monitoring and Evaluation:   Behavioral-Environmental Outcomes:  None Identified   Food/Nutrient Intake Outcomes:  Food and Nutrient Intake, Supplement Intake  Physical Signs/Symptoms Outcomes:  Biochemical Data, GI Status, Nutrition Focused Physical Findings, Skin, Weight     Discharge Planning:     Too soon to determine     Electronically signed by Virgil Yanez RD, LD on 1/22/21 at 10:18 AM EST    Contact: [phone]

## 2021-01-22 NOTE — PROGRESS NOTES
Department of Psychiatry  Progress Note     Chief Complaint:  Severe episode of recurrent major depressive disorder, with psychotic features ([address]     SUBJECTIVE:    PROGRESS:  Jody Garnica. is feeling \"better\" over all. He states his mood is good today. He is trying to stay positive. Rates mood an 8/10 with 10 being the best   Reports depression and anxiety have improved significantly since admission. He rates his depression an 8/10 with 10 being the best today. Appetite continues to improve. Nursing staff reports he has been requesting snacks and additional food on his meal trays. He states he has been sleeping better. He feels rested this morning. Staff reports he slept 7.5 hours continuous last night. He continues to have auditory hallucinations but states they are improving. He has been hearing [employer] chatter but sometimes a voice will breakthrough and state \"why are you even here. \" He denies any command AH. Denies thoughts to harm self or others today  He is feeling less hopeless and helpless today. He is looking forward to going to the [employer] on Monday. He has been compliant with his medications. He denies any side effects. When asked if he is having any alcohol withdrawal symptoms, he states \"shakes a little and sweats at night. \"  He out stretches his arms. No significant tremoring is observed. He has been spending more time out on the unit interacting with peers. He has been attending groups. He reports groups are going well.       Suicidal ideations: denies    Compliance with medications: good   Medication side effects: absent  ROS: Patient has new complaints:  no  Sleep quality: 7.5 hours per nursing report  Attending groups: yes      OBJECTIVE      Medications  Current Facility-Administered Medications: QUEtiapine (SEROQUEL) tablet 100 mg, 100 mg, Oral, TID  multivitamin 1 tablet, 1 tablet, Oral, Daily  chlordiazePOXIDE (LIBRIUM) capsule 10 mg, 10 mg, Oral, TID Speech:  spontaneous, normal rate, normal volume and well articulated  Mood:  \" better\"  Affect:  mood congruent  Thought processes:  linear, goal directed and coherent  Thought content:  Denies homicidal ideation  Suicidal Ideation:  denies suicidal ideation  Delusions:  no evidence of delusions  Perceptual Disturbance: auditory, improving injury  Cognition: Patient is oriented to person, place, time and situation  Concentration: clinically adequate  Memory: impaired recent memory--reports that he is having troubles remembering events over the past few years during his alcohol use  Insight & Judgement: improving      ASSESSMENT     Severe episode of recurrent major depressive disorder, with psychotic features ([address]   Alcohol use disorder, severe dependence  Acute alcohol withdrawals, resolved    PLAN    Patient's symptoms continue to improve  Medication adjustments: Continue same medications and observe for tolerance and symptom improvement on recent increase of Seroquel  Attempt to develop insight, psycho-education and supportive therapy conducted. Probable discharge: Monday  Follow-[facility]: [facility]    Electronically signed by Efra Raymond PA-C on 1/22/2021 at 11:50 AM Reviewed patient's current plan of care and vital signs with nursing staff. Psychiatry Attending Attestation     I assessed this patient and reviewed the case and plan of care with Efra Raymond PA-C. I have reviewed the above documentation and I agree with the findings and treatment plan with the following updates. Patient reports that he continues to have some vague auditory hallucinations but no Seroquel has been has not. Continues to have fleeting suicidal thoughts at nighttime. Reports that he has been having constant racing thoughts that have been contributing to his depression. Continues to feel restless. Will decrease Librium to 5 mg 3 times daily and discontinue it soon. Probable discharge is Monday morning to [facility] in [place]. Electronically signed by Valentina Martins MD on 1/22/21 at 7:05 PM EST    **This report has been created using voice recognition software. It may contain minor errors which are inherent in voice recognition technology. **

## 2021-01-22 NOTE — PLAN OF CARE
Problem: Depressive Behavior With or Without Suicide Precautions:  Goal: Able to verbalize and/or display a decrease in depressive symptoms  Description: Able to verbalize and/or display a decrease in depressive symptoms  Outcome: Met This Shift  Note: States that mood is good this shift. Goal: Participates in care planning  Description: Participates in care planning  Outcome: Met This Shift  Note: Care plan reviewed with patient. Patient verbalize understanding of the plan of care and contribute to goal setting. Problem: Altered Mood, Psychotic Behavior:  Goal: Able to verbalize decrease in frequency and intensity of hallucinations  Description: Able to verbalize decrease in frequency and intensity of hallucinations  Outcome: Met This Shift  Note: No hallucinations this shift. Problem: Substance Abuse:  Goal: Absence of drug withdrawal signs and symptoms  Description: Absence of drug withdrawal signs and symptoms  Outcome: Met This Shift  Note: No s/s withdrawal     Problem: Discharge Planning:  Goal: Discharged to appropriate level of care  Description: Discharged to appropriate level of care  Outcome: Met This Shift  Note: Works with an interdisciplinary team towards meeting discharge needs.       Problem: Pain:  Goal: Pain level will decrease  Description: Pain level will decrease  Outcome: Met This Shift  Note: Denies pain      Problem: Role Relationship:  Goal: Ability to verbalize awareness of feelings that lead to decreased social interaction will improve  Description: Ability to verbalize awareness of feelings that lead to decreased social interaction will improve  1/21/2021 2460 by Farida Rowland RN  Outcome: Ongoing  1/21/2021 1332 by Chepe Peck  Outcome: Not Met This Shift

## 2021-01-22 NOTE — SUICIDE SAFETY PLAN
SAFETY PLAN    A suicide Safety Plan is a document that supports someone when they are having thoughts of suicide. Warning Signs that indicate a suicidal crisis may be developing: What (situations, thoughts, feelings, body sensations, behaviors, etc.) do you experience that lets you know you are beginning to think about suicide? 1. ***  2. ***  3. ***    Internal Coping Strategies:  What things can I do (relaxation techniques, hobbies, physical activities, etc.) to take my mind off my problems without contacting another person? 1. ***  2. ***  3. ***    People and social settings that provide distraction: Who can I call or where can I go to distract me? 1. Name: ***  Phone: ***  2. Name: ***  Phone: ***   3. Place: ***            4. Place: ***    People whom I can ask for help: Who can I call when I need help - for example, friends, family, clergy, someone else? 1. Name: ***                Phone: ***  2. Name: ***  Phone: ***  3. Name: ***  Phone: ***    Professionals or [facility] [place] agencies I can contact during a crisis: Who can I call for help - for example, my doctor, my psychiatrist, my psychologist, a mental health provider, a suicide hotline? 1. Clinician Name: ***   Phone: ***      Clinician Pager or Emergency Contact #: ***    2. Clinician Name: ***   Phone: ***      Clinician Pager or Emergency Contact #: ***    3. Suicide Prevention Lifeline: 6-553-948-TALK (6407)    4. [address] [place] [address] Emergency Services -  for example, [facility] suicide hotline, [facility] Hotline: ***      Emergency Services Address: ***      Emergency Services Phone: ***    Making the environment safe: How can I make my environment (house/apartment/living space) safer? For example, can I remove guns, medications, and other items?   1. ***  2. ***

## 2021-01-22 NOTE — PLAN OF CARE
Patient has attended one group so far and has been out of his room to socialize with others and to watch some TV so he is working toward his socialization goal for this shift.

## 2021-01-23 PROCEDURE — 6370000000 HC RX 637 (ALT 250 FOR IP): Performed by: NURSE PRACTITIONER

## 2021-01-23 PROCEDURE — 6370000000 HC RX 637 (ALT 250 FOR IP): Performed by: PSYCHIATRY & NEUROLOGY

## 2021-01-23 PROCEDURE — 6370000000 HC RX 637 (ALT 250 FOR IP): Performed by: PHYSICIAN ASSISTANT

## 2021-01-23 PROCEDURE — 6370000000 HC RX 637 (ALT 250 FOR IP): Performed by: REGISTERED NURSE

## 2021-01-23 PROCEDURE — 99232 SBSQ HOSP IP/OBS MODERATE 35: CPT | Performed by: PSYCHIATRY & NEUROLOGY

## 2021-01-23 PROCEDURE — 1240000000 HC EMOTIONAL WELLNESS R&B

## 2021-01-23 RX ORDER — OLANZAPINE 5 MG/1
5 TABLET ORAL NIGHTLY
Status: DISCONTINUED | OUTPATIENT
Start: 2021-01-23 | End: 2021-01-25 | Stop reason: HOSPADM

## 2021-01-23 RX ADMIN — TRAZODONE HYDROCHLORIDE 50 MG: 50 TABLET ORAL at 21:05

## 2021-01-23 RX ADMIN — HYDROXYZINE HYDROCHLORIDE 50 MG: 25 TABLET ORAL at 09:49

## 2021-01-23 RX ADMIN — DICYCLOMINE HYDROCHLORIDE 10 MG: 10 CAPSULE ORAL at 13:15

## 2021-01-23 RX ADMIN — QUETIAPINE FUMARATE 100 MG: 100 TABLET ORAL at 09:47

## 2021-01-23 RX ADMIN — CLONIDINE HYDROCHLORIDE 0.1 MG: 0.1 TABLET ORAL at 09:47

## 2021-01-23 RX ADMIN — DICYCLOMINE HYDROCHLORIDE 10 MG: 10 CAPSULE ORAL at 06:21

## 2021-01-23 RX ADMIN — HYDROXYZINE HYDROCHLORIDE 50 MG: 25 TABLET ORAL at 21:05

## 2021-01-23 RX ADMIN — SUCRALFATE 1 G: 1 TABLET ORAL at 06:21

## 2021-01-23 RX ADMIN — SUCRALFATE 1 G: 1 TABLET ORAL at 13:15

## 2021-01-23 RX ADMIN — QUETIAPINE FUMARATE 100 MG: 100 TABLET ORAL at 15:37

## 2021-01-23 RX ADMIN — SUCRALFATE 1 G: 1 TABLET ORAL at 15:37

## 2021-01-23 RX ADMIN — IBUPROFEN 400 MG: 400 TABLET, FILM COATED ORAL at 09:49

## 2021-01-23 RX ADMIN — CLONIDINE HYDROCHLORIDE 0.1 MG: 0.1 TABLET ORAL at 21:06

## 2021-01-23 RX ADMIN — Medication 100 MG: at 09:47

## 2021-01-23 RX ADMIN — CHLORDIAZEPOXIDE HYDROCHLORIDE 10 MG: 5 CAPSULE ORAL at 09:49

## 2021-01-23 RX ADMIN — FOLIC ACID 1 MG: 1 TABLET ORAL at 09:47

## 2021-01-23 RX ADMIN — PANTOPRAZOLE SODIUM 40 MG: 40 TABLET, DELAYED RELEASE ORAL at 09:49

## 2021-01-23 RX ADMIN — Medication 1 TABLET: at 09:47

## 2021-01-23 RX ADMIN — SERTRALINE 50 MG: 50 TABLET, FILM COATED ORAL at 09:47

## 2021-01-23 RX ADMIN — DICYCLOMINE HYDROCHLORIDE 10 MG: 10 CAPSULE ORAL at 15:37

## 2021-01-23 RX ADMIN — CHLORDIAZEPOXIDE HYDROCHLORIDE 10 MG: 5 CAPSULE ORAL at 21:05

## 2021-01-23 RX ADMIN — SUCRALFATE 1 G: 1 TABLET ORAL at 21:06

## 2021-01-23 RX ADMIN — CHLORDIAZEPOXIDE HYDROCHLORIDE 10 MG: 5 CAPSULE ORAL at 15:38

## 2021-01-23 RX ADMIN — HYDROXYZINE HYDROCHLORIDE 50 MG: 25 TABLET ORAL at 15:40

## 2021-01-23 RX ADMIN — OLANZAPINE 5 MG: 5 TABLET ORAL at 21:05

## 2021-01-23 ASSESSMENT — PAIN DESCRIPTION - FREQUENCY: FREQUENCY: CONTINUOUS

## 2021-01-23 ASSESSMENT — PAIN DESCRIPTION - PAIN TYPE: TYPE: ACUTE PAIN

## 2021-01-23 ASSESSMENT — PAIN DESCRIPTION - ONSET: ONSET: ON-GOING

## 2021-01-23 ASSESSMENT — PAIN DESCRIPTION - LOCATION: LOCATION: GENERALIZED

## 2021-01-23 ASSESSMENT — PAIN DESCRIPTION - DESCRIPTORS: DESCRIPTORS: ACHING

## 2021-01-23 ASSESSMENT — PAIN SCALES - GENERAL: PAINLEVEL_OUTOF10: 0

## 2021-01-23 NOTE — PLAN OF CARE
Problem: Depressive Behavior With or Without Suicide Precautions:  Goal: Able to verbalize and/or display a decrease in depressive symptoms  Description: Able to verbalize and/or display a decrease in depressive symptoms  Outcome: Ongoing  Note: Patient reports depressive symptoms during shift assessment. Patient rates depression 6/10. Goal: Participates in care planning  Description: Participates in care planning  Outcome: Ongoing  Note: Patient participates in care planning with staff during shift. Problem: Altered Mood, Psychotic Behavior:  Goal: Able to verbalize decrease in frequency and intensity of hallucinations  Description: Able to verbalize decrease in frequency and intensity of hallucinations  Outcome: Ongoing  Note: Patient reports seeing shadows and hearing voices. The voices are \"chatter\". States he hears a command voice at times \"telling me to do things\". Problem: Substance Abuse:  Goal: Absence of drug withdrawal signs and symptoms  Description: Absence of drug withdrawal signs and symptoms  Outcome: Ongoing  Note: Patient continues to reports withdrawal symptoms from alcohol. Symptoms include \"shaking\", \"night sweats\", and \"nightmares\". Problem: Discharge Planning:  Goal: Discharged to appropriate level of care  Description: Discharged to appropriate level of care  Outcome: Ongoing  Note: No discharge plans noted at this time during shift. Problem: Pain:  Goal: Pain level will decrease  Description: Pain level will decrease  Outcome: Ongoing  Note: Patient reports all over pain during shift assessment. Patient rates pain 7/10. Patient describes pain as \"aching\". PRN medication available as needed for management of pain. Problem: Nutrition  Goal: Optimal nutrition therapy  Outcome: Ongoing  Note: Good appetite noted at this time during shift assessment.      Problem: Role Relationship: Goal: Ability to verbalize awareness of feelings that lead to decreased social interaction will improve  Description: Ability to verbalize awareness of feelings that lead to decreased social interaction will improve  Outcome: Ongoing  Note: Good interaction with peers while out on unit. Care plan reviewed with patient.   Patient does verbalize understanding of the plan of care and does contribute to goal setting

## 2021-01-23 NOTE — PROGRESS NOTES
This RN has reviewed and agrees with Akhil Altman LPN's data collection and has collaborated with this LPN regarding the patient's care plan.

## 2021-01-23 NOTE — GROUP NOTE
Group Therapy Note    Date: 1/23/2021    Group Start Time: 1430  Group End Time: 3225  Group Topic: Psychoeducation    [facility]    BASSEM Abdullahi LSW        Group Therapy Note    Attendees: 12         Patient's Goal:  Patient were challenged to identify triggers and stressors. Patient identified coping skills. Notes:  Patient actively participated. Status After Intervention:  Improved    Participation Level:  Active Listener    Participation Quality: Appropriate, Attentive and Supportive      Speech:  normal      Thought Process/Content: Logical      Affective Functioning: Congruent      Mood: euthymic      Level of consciousness:  Alert, Oriented x4 and Attentive      Response to Learning: Able to verbalize current knowledge/experience, Able to verbalize/acknowledge new learning, Able to retain information and Capable of insight      Endings: None Reported    Modes of Intervention: Education, Support, Socialization, Exploration, Clarifying, Problem-solving and Activity      Discipline Responsible: /Counselor      Signature:  BASSEM Abdullahi LSW

## 2021-01-23 NOTE — PROGRESS NOTES
[id number] Clinician made rounds. Clinician introduced self and encouraged patient to check in with needs, questions, or concerns as they arise with .

## 2021-01-23 NOTE — PLAN OF CARE
Problem: Depressive Behavior With or Without Suicide Precautions:  Goal: Participates in care planning  Description: Participates in care planning  1/23/2021 0005 by Steve Joseph RN  Outcome: Met This Shift  Note: Care plan reviewed with patient. Patient does verbalize understanding of the plan of care and does contribute to goal setting .  1/22/2021 1046 by CARMELO Hernandez RN  Outcome: Met This Shift  Note: Patient is compliant with medications, unit protocols, and setting daily goals for improved mental and emotional health. Problem: Altered Mood, Psychotic Behavior:  Goal: Able to verbalize decrease in frequency and intensity of hallucinations  Description: Able to verbalize decrease in frequency and intensity of hallucinations  1/23/2021 0005 by Steve Joseph RN  Outcome: Met This Shift  Note: Patient denies hallucinations this shift. 1/22/2021 1046 by Radha Kim RN  Outcome: Ongoing  Note: Patient denies a/v hallucinations this am, he reports has heard \"jib jabber\" and seen shadows in the past.      Problem: Substance Abuse:  Goal: Absence of drug withdrawal signs and symptoms  Description: Absence of drug withdrawal signs and symptoms  1/23/2021 0005 by Steve Joseph RN  Outcome: Met This Shift  Note: Patient reports that he continues to have increased anxiety. 1/22/2021 1046 by Radha Kim RN  Outcome: Met This Shift  Note: Vital signs stable and no symptoms of detox this am.     Problem: Pain:  Description: Pain management should include both nonpharmacologic and pharmacologic interventions. Goal: Pain level will decrease  Description: Pain level will decrease  1/23/2021 0005 by Steve Joseph RN  Outcome: Met This Shift  Note: No complaints of pain this shift.   1/22/2021 1046 by Radha Kim RN  Outcome: Ongoing  Note: He denies pain to this nurse this am.     Problem: Nutrition  Goal: Optimal nutrition therapy  1/23/2021 0005 by Steve Joseph RN  Outcome: Met This Shift Note: Patient had a bedtime snack this shift. 1/22/2021 1046 by Sarah Valles RN  Outcome: Ongoing  Note: Patient is eating more with improved appetite. He says he ate too much at breakfast and felt \"sick\" to his stomach.    1/22/2021 1017 by Carline Gomez RD, LD  Outcome: Ongoing     Problem: Depressive Behavior With or Without Suicide Precautions:  Goal: Able to verbalize and/or display a decrease in depressive symptoms  Description: Able to verbalize and/or display a decrease in depressive symptoms  1/23/2021 0005 by Meghana Doe RN  Outcome: Ongoing  Note: Patient reports that he continues to feel depressed. Patient is worrisome in regards to discharge. Patient feels bad that he let his family down. Encouragement needed and given. 1/22/2021 1046 by Sarah Valles RN  Outcome: Met This Shift  Note: Teresa Hodges denies feeling depressed as he rates his mood #8 on scale of 1-10 with 10 the happiest.     Problem: Discharge Planning:  Goal: Discharged to appropriate level of care  Description: Discharged to appropriate level of care  1/23/2021 0005 by Meghana Doe RN  Outcome: Ongoing  Note: 24 hour chart review completed. 1/22/2021 1046 by Sarah Valles RN  Outcome: Ongoing  Note: Working with multidisciplinary treatment team for ongoing continuity of care and relapse prevention. Problem: Role Relationship:  Goal: Ability to verbalize awareness of feelings that lead to decreased social interaction will improve  Description: Ability to verbalize awareness of feelings that lead to decreased social interaction will improve  1/23/2021 0005 by Meghana Doe RN  Outcome: Ongoing  1/22/2021 1538 by Chiara St  Outcome: Ongoing  1/22/2021 1046 by CARMELO Dukes RN  Outcome: Met This Shift  Note: Teresa Hodges is social with peers out watching TV this am and he is very cooperative and pleasant with staff.

## 2021-01-23 NOTE — PROGRESS NOTES
Group Therapy Note    Date: 1/22/2021  Start Time: 2000  End Time:  2020      Type of Group: Wrap-Up and relaxation      Patient's Goal:  To stay positive and go to groiups  Notes:  Progressing toward    Status After Intervention:  Improved    Participation Level:  Active Listener and Interactive    Participation Quality: Appropriate and Attentive      Speech:  normal      Thought Process/Content: Logical      Affective Functioning: Congruent      Mood: anxious and depressed      Level of consciousness:  Alert and Oriented x4      Response to Learning: Able to verbalize current knowledge/experience      Endings: None Reported    Modes of Intervention: Support and Socialization      Discipline Responsible: Registered Nurse      Signature:  Steve Joseph RN

## 2021-01-23 NOTE — PROGRESS NOTES
Psychiatry Progress Note      CC: Severe episode of recurrent major depressive disorder, with psychotic features ([facility] [address]   Alcohol use disorder, severe dependence  Acute alcohol withdrawals, resolved                   Subjective    Progress:  Regulo Robles reports feeling better since admission Exctied to be going to [facility] in [place]. Reports depression is much better. Denies feelings of harm towards self or others. Reports meds are working \"great\" Denies having side effects. Good med compliance is verified. Reports appetite is good and slpet well last night. Verified slept 6.5 hours continuous. States he attends groups. Reports has been talking to his brther on phone. Reports still has AH that tell him he is no good. Denies AH giving him commands. States he has heard voices since he was a child. Objective  /71   Pulse 77   Temp 96.1 °F (35.6 °C) (Oral)   Resp 16   Ht 5' 8\" (1.727 m)   Wt 119 lb (54 kg)   SpO2 98%   BMI 18.09 kg/m²      MSE:  Level of consciousness: Alert  Appearance: hospital attire, in chair and fair grooming   Behavior/Motor: no abnormalities noted   Attitude toward examiner: cooperative   Speech: Normal volume, goal directed, NRR  Mood: Euthymic  Affect: Reactive  Thought processes: Linear and goal directed   Suicidal Ideation: Denies suicidal ideations  Homicidal ideation: Denies homicidal ideations  Delusions: No evidence of delusions is observed  Perceptual Disturbance: Denies VH Reports having AH that tell him he is no good. Denies AH giving him commands. No symptoms of psychosis noted .   Cognition: Oriented to person, place, time and situation   Concentration fair   Memory intact   Insight: Limited  Judgment: Limited    Assessment:  Severe episode of recurrent major depressive disorder, with psychotic features ([facility] [address]   Alcohol use disorder, severe dependence  Acute alcohol withdrawals, resolved    Plan:  Continue current meds as ordered Continue to encourage group attendance. Tana Pugh, BHAVANI  3-    Patient seen and interviewed in detail. Found him anxious and stressed out. He has not been sleeping good. He has trouble falling and staying asleep.  He is hearing voices     Major Depression with psychosis    Will switch seroquel to zyprexa 5 mg po qhs.

## 2021-01-24 PROCEDURE — 6370000000 HC RX 637 (ALT 250 FOR IP): Performed by: PHYSICIAN ASSISTANT

## 2021-01-24 PROCEDURE — 6370000000 HC RX 637 (ALT 250 FOR IP): Performed by: PSYCHIATRY & NEUROLOGY

## 2021-01-24 PROCEDURE — 1240000000 HC EMOTIONAL WELLNESS R&B

## 2021-01-24 PROCEDURE — 99231 SBSQ HOSP IP/OBS SF/LOW 25: CPT | Performed by: NURSE PRACTITIONER

## 2021-01-24 PROCEDURE — 6370000000 HC RX 637 (ALT 250 FOR IP): Performed by: REGISTERED NURSE

## 2021-01-24 PROCEDURE — 6370000000 HC RX 637 (ALT 250 FOR IP): Performed by: NURSE PRACTITIONER

## 2021-01-24 RX ADMIN — TRAZODONE HYDROCHLORIDE 50 MG: 50 TABLET ORAL at 20:49

## 2021-01-24 RX ADMIN — Medication 100 MG: at 08:53

## 2021-01-24 RX ADMIN — CLONIDINE HYDROCHLORIDE 0.1 MG: 0.1 TABLET ORAL at 07:50

## 2021-01-24 RX ADMIN — HYDROXYZINE HYDROCHLORIDE 50 MG: 25 TABLET ORAL at 14:25

## 2021-01-24 RX ADMIN — IBUPROFEN 400 MG: 400 TABLET, FILM COATED ORAL at 19:38

## 2021-01-24 RX ADMIN — SERTRALINE 50 MG: 50 TABLET, FILM COATED ORAL at 08:53

## 2021-01-24 RX ADMIN — OLANZAPINE 5 MG: 5 TABLET ORAL at 20:49

## 2021-01-24 RX ADMIN — HYDROXYZINE HYDROCHLORIDE 50 MG: 25 TABLET ORAL at 20:49

## 2021-01-24 RX ADMIN — CHLORDIAZEPOXIDE HYDROCHLORIDE 10 MG: 5 CAPSULE ORAL at 20:49

## 2021-01-24 RX ADMIN — Medication 1 TABLET: at 08:53

## 2021-01-24 RX ADMIN — DICYCLOMINE HYDROCHLORIDE 10 MG: 10 CAPSULE ORAL at 07:50

## 2021-01-24 RX ADMIN — CLONIDINE HYDROCHLORIDE 0.1 MG: 0.1 TABLET ORAL at 20:49

## 2021-01-24 RX ADMIN — SUCRALFATE 1 G: 1 TABLET ORAL at 06:39

## 2021-01-24 RX ADMIN — SUCRALFATE 1 G: 1 TABLET ORAL at 16:44

## 2021-01-24 RX ADMIN — CHLORDIAZEPOXIDE HYDROCHLORIDE 10 MG: 5 CAPSULE ORAL at 14:02

## 2021-01-24 RX ADMIN — SUCRALFATE 1 G: 1 TABLET ORAL at 11:31

## 2021-01-24 RX ADMIN — HYDROXYZINE HYDROCHLORIDE 50 MG: 25 TABLET ORAL at 06:44

## 2021-01-24 RX ADMIN — FOLIC ACID 1 MG: 1 TABLET ORAL at 08:53

## 2021-01-24 RX ADMIN — CHLORDIAZEPOXIDE HYDROCHLORIDE 10 MG: 5 CAPSULE ORAL at 07:49

## 2021-01-24 RX ADMIN — DICYCLOMINE HYDROCHLORIDE 10 MG: 10 CAPSULE ORAL at 11:31

## 2021-01-24 RX ADMIN — DICYCLOMINE HYDROCHLORIDE 10 MG: 10 CAPSULE ORAL at 16:44

## 2021-01-24 RX ADMIN — SUCRALFATE 1 G: 1 TABLET ORAL at 20:49

## 2021-01-24 RX ADMIN — PANTOPRAZOLE SODIUM 40 MG: 40 TABLET, DELAYED RELEASE ORAL at 06:39

## 2021-01-24 ASSESSMENT — PAIN DESCRIPTION - ORIENTATION: ORIENTATION: OTHER (COMMENT)

## 2021-01-24 ASSESSMENT — PAIN DESCRIPTION - PAIN TYPE: TYPE: ACUTE PAIN

## 2021-01-24 ASSESSMENT — PAIN DESCRIPTION - ONSET: ONSET: ON-GOING

## 2021-01-24 ASSESSMENT — PAIN - FUNCTIONAL ASSESSMENT: PAIN_FUNCTIONAL_ASSESSMENT: ACTIVITIES ARE NOT PREVENTED

## 2021-01-24 ASSESSMENT — PAIN DESCRIPTION - PROGRESSION: CLINICAL_PROGRESSION: NOT CHANGED

## 2021-01-24 ASSESSMENT — PAIN DESCRIPTION - LOCATION: LOCATION: GENERALIZED

## 2021-01-24 NOTE — PROGRESS NOTES
Group Therapy Note    Date: 1/23/2021  Start Time: 2000  End Time:  2020      Type of Group: Wrap-Up and relaxation      Patient's Goal:  Stay positive to go to groups    Notes:  Met    Status After Intervention:  Improved    Participation Level:  Active Listener and Interactive    Participation Quality: Appropriate and Attentive      Speech:  normal      Thought Process/Content: Logical      Affective Functioning: Congruent      Mood: anxious and depressed      Level of consciousness:  Alert and Oriented x4      Response to Learning: Able to verbalize current knowledge/experience      Endings: None Reported    Modes of Intervention: Support and Socialization      Discipline Responsible: Registered Nurse      Signature:  Eloy Villafuerte RN

## 2021-01-24 NOTE — PLAN OF CARE
Problem: Altered Mood, Psychotic Behavior:  Goal: Able to verbalize decrease in frequency and intensity of hallucinations  Description: Able to verbalize decrease in frequency and intensity of hallucinations  Outcome: Met This Shift  Note: Pt denies having hallucinations this shift     Problem: Pain:  Goal: Pain level will decrease  Description: Pain level will decrease  Outcome: Met This Shift  Note: Pt denies pain this shift     Problem: Depressive Behavior With or Without Suicide Precautions:  Goal: Able to verbalize and/or display a decrease in depressive symptoms  Description: Able to verbalize and/or display a decrease in depressive symptoms  Outcome: Ongoing  Note: Pt reports his mood is a 5 out of a scale of 1-10 with 10 being normal. Pt is compliant with medications reports benefits with attending groups   Goal: Participates in care planning  Description: Participates in care planning  Outcome: Ongoing  Note: Pt participates in treatment and shared he contacted his family today who was supportive of his recovery     Problem: Substance Abuse:  Goal: Absence of drug withdrawal signs and symptoms  Description: Absence of drug withdrawal signs and symptoms  Outcome: Ongoing  Note: Pt reports continued waves of anxiety that is observed by staff with outer tremors, reports at times feeling nauseated      Problem: Discharge Planning:  Goal: Discharged to appropriate level of care  Description: Discharged to appropriate level of care  Outcome: Ongoing  Note: Pt shared willingness to attend [facility] long term program He verbalized benefit in having structure and support in the long term treatment     Problem: Nutrition  Goal: Optimal nutrition therapy  Outcome: Ongoing  Note: Pt had optimal nutrition and fluids this shift   Care plan reviewed with patient. Patient does verbalize understanding of the plan of care and did contribute to goal setting.  Pt had good participation in group this shift

## 2021-01-24 NOTE — GROUP NOTE
Group Therapy Note    Date: 1/24/2021    Group Start Time: 0200  Group End Time: 5003  Group Topic: Psychotherapy    [facility] 4E    LINO Petersen        Group Therapy Note    Attendees:          Patient's Goal:  Identify triggers and reactions to triggers and how to rethink those reactions. Notes:  Patient was able to identify triggers and reactions to triggers. Patient shared personal experiences and related those experiences to other patients. Status After Intervention:  Improved    Participation Level:  Active Listener and Interactive    Participation Quality: Appropriate, Attentive, Sharing and Supportive      Speech:  normal      Thought Process/Content: Logical      Affective Functioning: Congruent      Mood: euthymic      Level of consciousness:  Alert and Oriented x4      Response to Learning: Able to verbalize current knowledge/experience, Able to verbalize/acknowledge new learning, Able to retain information, Capable of insight and Progressing to goal      Endings: None Reported    Modes of Intervention: Education, Support, Problem-solving and Activity      Discipline Responsible: /Counselor      Signature:  LINO Petersen

## 2021-01-24 NOTE — PROGRESS NOTES
Psychiatry Progress Note        CC: Severe episode of recurrent major depressive disorder, with psychotic features ([facility])   Alcohol use disorder, severe dependence  Acute alcohol withdrawals, resolved                                                                          Subjective     Progress:  Barb Mondragon reports he continues to feel better since admission Reports still being happy  to be going to [facility] in [facility]. Reports depression continues to be  much better. Denies feelings of harm towards self or others. Reports meds are still working \"great\" Denies having side effects. Good med compliance is verified. Reports appetite is good and slept well again ast night. Verified slept 8 hours continuous. States he attended groups yesterday. Reports brother visited yesterday and reports visit went well. Reports still has AH telling him he is no good. Denies AH giving him commands. Reports has heard voices for years. No overt psychosis noted.      Objective  /80   Pulse 88   Temp 98.1 °F (36.7 °C) (Oral)   Resp 16   Ht 5' 8\" (1.727 m)   Wt 119 lb (54 kg)   SpO2 99%   BMI 18.09 kg/m²      MSE:  Level of consciousness: Alert  Appearance: hospital attire, in chair and fair grooming   Behavior/Motor: no abnormalities noted   Attitude toward examiner: cooperative   Speech: Normal volume, goal directed, NRR  Mood: Euthymic  Affect: Reactive  Thought processes: Linear and goal directed   Suicidal Ideation: Denies suicidal ideations  Homicidal ideation: Denies homicidal ideations  Delusions: No evidence of delusions is observed  Perceptual Disturbance: Denies VH Reports having AH that tell him he is no good. Denies AH giving him commands. No symptoms of psychosis noted .   Cognition: Oriented to person, place, time and situation   Concentration fair   Memory intact   Insight: Limited  Judgment: Limited     Assessment: Severe episode of recurrent major depressive disorder, with psychotic features (HCC)   Alcohol use disorder, severe dependence  Acute alcohol withdrawals, resolved     Plan:  Continue current meds as ordered  Continue to encourage group attendance.        Makeda Santillan, CNP  7-    Major Depression, severe recurrent     I concur with above clinical findings and plan of care

## 2021-01-24 NOTE — PLAN OF CARE
Problem: Depressive Behavior With or Without Suicide Precautions:  Goal: Participates in care planning  Description: Participates in care planning  1/24/2021 0022 by Irma Boles RN  Outcome: Met This Shift  Note: Care plan reviewed with patient. Patient does verbalize understanding of the plan of care and does contribute to goal setting   1/23/2021 1709 by Romy Ramirez LPN  Outcome: Ongoing  Note: Patient participates in care planning with staff during shift. Problem: Altered Mood, Psychotic Behavior:  Goal: Able to verbalize decrease in frequency and intensity of hallucinations  Description: Able to verbalize decrease in frequency and intensity of hallucinations  1/24/2021 0022 by Irma Boles RN  Outcome: Met This Shift  Note: Patient denies hallucinations this shift. 1/23/2021 1709 by Romy Ramirez LPN  Outcome: Ongoing  Note: Patient reports seeing shadows and hearing voices. The voices are \"chatter\". States he hears a command voice at times \"telling me to do things\". Problem: Substance Abuse:  Goal: Absence of drug withdrawal signs and symptoms  Description: Absence of drug withdrawal signs and symptoms  1/24/2021 0022 by Irma Boles RN  Outcome: Met This Shift  Note: Patient reports increased anxiety although no withdraw symptoms. 1/23/2021 1709 by Romy Ramirez LPN  Outcome: Ongoing  Note: Patient continues to reports withdrawal symptoms from alcohol. Symptoms include \"shaking\", \"night sweats\", and \"nightmares\". Problem: Pain:  Description: Pain management should include both nonpharmacologic and pharmacologic interventions. Goal: Pain level will decrease  Description: Pain level will decrease  1/24/2021 0022 by Irma oBles RN  Outcome: Met This Shift  Note: No complaintws of pain this shift.   1/23/2021 1709 by Romy Ramirez LPN  Outcome: Ongoing Note: Patient reports all over pain during shift assessment. Patient rates pain 7/10. Patient describes pain as \"aching\". PRN medication available as needed for management of pain. Problem: Nutrition  Goal: Optimal nutrition therapy  1/24/2021 0022 by Anthony Sorto RN  Outcome: Met This Shift  Note: Patient had a bedtime snack this shift. 1/23/2021 1709 by Paulette Whiting LPN  Outcome: Ongoing  Note: Good appetite noted at this time during shift assessment. Problem: Depressive Behavior With or Without Suicide Precautions:  Goal: Able to verbalize and/or display a decrease in depressive symptoms  Description: Able to verbalize and/or display a decrease in depressive symptoms  1/24/2021 0022 by Anthony Sorto RN  Outcome: Ongoing  Note: Patient reports that he still feels depressed and worrisome about the future. The patient reports that he is hoping to have hope for the future. 1/23/2021 1709 by Paulette Whiting LPN  Outcome: Ongoing  Note: Patient reports depressive symptoms during shift assessment. Patient rates depression 6/10. Problem: Discharge Planning:  Goal: Discharged to appropriate level of care  Description: Discharged to appropriate level of care  1/24/2021 0022 by Anthony Sorto RN  Outcome: Ongoing  Note: 24 hour chart review completed. 1/23/2021 1709 by Paulette Whiting LPN  Outcome: Ongoing  Note: No discharge plans noted at this time during shift. Problem: Role Relationship:  Goal: Ability to verbalize awareness of feelings that lead to decreased social interaction will improve  Description: Ability to verbalize awareness of feelings that lead to decreased social interaction will improve  1/24/2021 0022 by Anthony Sorto RN  Outcome: Ongoing  1/23/2021 1709 by Paulette Whiting LPN  Outcome: Ongoing  Note: Good interaction with peers while out on unit.

## 2021-01-25 VITALS
BODY MASS INDEX: 18.04 KG/M2 | OXYGEN SATURATION: 98 % | TEMPERATURE: 96.2 F | DIASTOLIC BLOOD PRESSURE: 90 MMHG | WEIGHT: 119 LBS | RESPIRATION RATE: 16 BRPM | HEART RATE: 73 BPM | SYSTOLIC BLOOD PRESSURE: 118 MMHG | HEIGHT: 68 IN

## 2021-01-25 PROCEDURE — 6370000000 HC RX 637 (ALT 250 FOR IP): Performed by: PSYCHIATRY & NEUROLOGY

## 2021-01-25 PROCEDURE — 6370000000 HC RX 637 (ALT 250 FOR IP): Performed by: PHYSICIAN ASSISTANT

## 2021-01-25 PROCEDURE — 6370000000 HC RX 637 (ALT 250 FOR IP): Performed by: REGISTERED NURSE

## 2021-01-25 PROCEDURE — 5130000000 HC BRIDGE APPOINTMENT

## 2021-01-25 PROCEDURE — 6370000000 HC RX 637 (ALT 250 FOR IP): Performed by: NURSE PRACTITIONER

## 2021-01-25 PROCEDURE — 99239 HOSP IP/OBS DSCHRG MGMT >30: CPT | Performed by: PSYCHIATRY & NEUROLOGY

## 2021-01-25 RX ORDER — LORAZEPAM 1 MG/1
1 TABLET ORAL ONCE
Status: COMPLETED | OUTPATIENT
Start: 2021-01-25 | End: 2021-01-25

## 2021-01-25 RX ORDER — HYDROXYZINE 50 MG/1
50 TABLET, FILM COATED ORAL 3 TIMES DAILY PRN
Qty: 90 TABLET | Refills: 0 | Status: SHIPPED | OUTPATIENT
Start: 2021-01-25 | End: 2021-02-24

## 2021-01-25 RX ORDER — OLANZAPINE 5 MG/1
5 TABLET ORAL NIGHTLY
Qty: 30 TABLET | Refills: 0 | Status: SHIPPED | OUTPATIENT
Start: 2021-01-25

## 2021-01-25 RX ORDER — SUCRALFATE 1 G/1
1 TABLET ORAL
Qty: 120 TABLET | Refills: 0 | Status: SHIPPED | OUTPATIENT
Start: 2021-01-25

## 2021-01-25 RX ORDER — LANOLIN ALCOHOL/MO/W.PET/CERES
100 CREAM (GRAM) TOPICAL DAILY
Qty: 30 TABLET | Refills: 0 | Status: SHIPPED | OUTPATIENT
Start: 2021-01-25

## 2021-01-25 RX ORDER — CLONIDINE HYDROCHLORIDE 0.1 MG/1
0.1 TABLET ORAL 2 TIMES DAILY
Qty: 60 TABLET | Refills: 0 | Status: SHIPPED | OUTPATIENT
Start: 2021-01-25

## 2021-01-25 RX ORDER — PANTOPRAZOLE SODIUM 40 MG/1
40 TABLET, DELAYED RELEASE ORAL
Qty: 30 TABLET | Refills: 0 | Status: SHIPPED | OUTPATIENT
Start: 2021-01-26

## 2021-01-25 RX ORDER — TRAZODONE HYDROCHLORIDE 50 MG/1
50 TABLET ORAL NIGHTLY PRN
Qty: 30 TABLET | Refills: 0 | Status: SHIPPED | OUTPATIENT
Start: 2021-01-25

## 2021-01-25 RX ORDER — MULTIVITAMIN WITH IRON
1 TABLET ORAL DAILY
Qty: 30 TABLET | Refills: 0 | Status: SHIPPED | OUTPATIENT
Start: 2021-01-26

## 2021-01-25 RX ADMIN — SUCRALFATE 1 G: 1 TABLET ORAL at 06:15

## 2021-01-25 RX ADMIN — FOLIC ACID 1 MG: 1 TABLET ORAL at 07:59

## 2021-01-25 RX ADMIN — Medication 100 MG: at 07:59

## 2021-01-25 RX ADMIN — DICYCLOMINE HYDROCHLORIDE 10 MG: 10 CAPSULE ORAL at 10:51

## 2021-01-25 RX ADMIN — LORAZEPAM 1 MG: 1 TABLET ORAL at 10:51

## 2021-01-25 RX ADMIN — PANTOPRAZOLE SODIUM 40 MG: 40 TABLET, DELAYED RELEASE ORAL at 07:59

## 2021-01-25 RX ADMIN — SERTRALINE 50 MG: 50 TABLET, FILM COATED ORAL at 07:59

## 2021-01-25 RX ADMIN — SUCRALFATE 1 G: 1 TABLET ORAL at 10:51

## 2021-01-25 RX ADMIN — Medication 1 TABLET: at 07:59

## 2021-01-25 RX ADMIN — CHLORDIAZEPOXIDE HYDROCHLORIDE 10 MG: 5 CAPSULE ORAL at 07:59

## 2021-01-25 RX ADMIN — CLONIDINE HYDROCHLORIDE 0.1 MG: 0.1 TABLET ORAL at 07:59

## 2021-01-25 RX ADMIN — IBUPROFEN 400 MG: 400 TABLET, FILM COATED ORAL at 07:59

## 2021-01-25 RX ADMIN — DICYCLOMINE HYDROCHLORIDE 10 MG: 10 CAPSULE ORAL at 06:15

## 2021-01-25 ASSESSMENT — PAIN SCALES - GENERAL: PAINLEVEL_OUTOF10: 8

## 2021-01-25 NOTE — PROGRESS NOTES
Topic Purpose & benefits of working a 12 step program EA/AA/NA    Start 1100    End 1150    Participation active, involved      Response verbalized understanding of importance of having support in recovery with 12 step fellowship in [facility] & [employer]      Behavior appropriate, engaged with active listening

## 2021-01-25 NOTE — PROGRESS NOTES
Group Therapy Note    Date: 1/24/2021  Start Time: 2000  End Time:  2020  Number of Participants:     Type of Group: Wrap-Up    Wellness Binder Information  Module Name:    Session Number:      Patient's Goal:  To attend groups     Notes:  Goal met     Status After Intervention:  Unchanged    Participation Level:  Active Listener and Interactive    Participation Quality: Appropriate      Speech: normal       Thought Process/Content: Logical      Affective Functioning: Congruent      Mood: anxious and depressed      Level of consciousness:  Alert      Response to Learning: Able to verbalize current knowledge/experience, Able to verbalize/acknowledge new learning and Able to retain information      Endings: None Reported    Modes of Intervention: Support and Socialization      Discipline Responsible: Registered Nurse      Signature:  Ronan Milan RN

## 2021-01-25 NOTE — PLAN OF CARE
Problem: Substance Abuse:  Goal: Absence of drug withdrawal signs and symptoms  Description: Absence of drug withdrawal signs and symptoms  1/24/2021 2221 by Pepe Turk RN  Outcome: Completed  Note: Denies withdrawal signs or symptoms. 1/24/2021 1748 by Kirk Anna RN  Outcome: Ongoing  Note: Pt reports continued waves of anxiety that is observed by staff with outer tremors, reports at times feeling nauseated      Problem: Discharge Planning:  Goal: Discharged to appropriate level of care  Description: Discharged to appropriate level of care  1/24/2021 2221 by Pepe Turk RN  Outcome: Ongoing  Note: Discharge planning in progress. Plans on going to [facility] in [facility]. 1/24/2021 1748 by Kirk Anna RN  Outcome: Ongoing  Note: Pt shared willingness to attend [facility] long term program He verbalized benefit in having structure and support in the long term treatment     Problem: Pain:  Goal: Pain level will decrease  Description: Pain level will decrease  1/24/2021 2221 by Pepe Turk RN  Outcome: Not Met This Shift  Note: Complained of generalized pain of an 8 with 10 being the worst was given motrin with relief.    1/24/2021 1748 by Kirk Anna RN  Outcome: Met This Shift  Note: Pt denies pain this shift     Problem: Nutrition  Goal: Optimal nutrition therapy  1/24/2021 2221 by Pepe Turk RN  Outcome: Met This Shift  Note: Ate 100% snack   1/24/2021 1748 by Kirk Anna RN  Outcome: Ongoing  Note: Pt had optimal nutrition and fluids this shift     Problem: Role Relationship:  Goal: Ability to verbalize awareness of feelings that lead to decreased social interaction will improve  Description: Ability to verbalize awareness of feelings that lead to decreased social interaction will improve  Outcome: Ongoing     Problem: Anxiety:  Goal: Level of anxiety will decrease  Description: Level of anxiety will decrease

## 2021-01-25 NOTE — PLAN OF CARE
Problem: Depressive Behavior With or Without Suicide Precautions:  Goal: Able to verbalize and/or display a decrease in depressive symptoms  Description: Able to verbalize and/or display a decrease in depressive symptoms  1/24/2021 2221 by Tanya Espinosa RN  Outcome: Met This Shift  Note: States his depression is better. Rates his mood an 8 with 10 being the best. Affect flat, but brightens. Good eye contact. States he is \"kind of\" hopeful for the future but states he is glad he is going to [facility] for rehab. Good peer interaction. Goal: Participates in care planning  Description: Participates in care planning  1/24/2021 2221 by Tanya Espinosa RN  Outcome: Met This Shift  Note: Care plan reviewed with patient and verbalize understanding of the plan of care and contribute to goal setting. Problem: Altered Mood, Psychotic Behavior:  Goal: Able to verbalize decrease in frequency and intensity of hallucinations  Description: Able to verbalize decrease in frequency and intensity of hallucinations  1/24/2021 2221 by Tanya Espinosa RN  Outcome: Not Met This Shift  Note: States he continues to have auditory hallucinations states he is hearing locust and chattering but states it's worse at night when quite. States he is able to drown out the auditory hallucinations when out on unit watching tv or talking with peers. States has visual hallucinations of shadows out of the corner of his eyes. Problem: Substance Abuse:  Goal: Absence of drug withdrawal signs and symptoms  Description: Absence of drug withdrawal signs and symptoms  1/24/2021 2221 by Tanya Espinosa RN  Outcome: Completed  Note: Denies withdrawal signs or symptoms.       Problem: Discharge Planning:  Goal: Discharged to appropriate level of care  Description: Discharged to appropriate level of care  1/24/2021 2221 by Tanya Espinosa RN  Outcome: Ongoing

## 2021-01-25 NOTE — PROGRESS NOTES
CLINICAL PHARMACY NOTE: MEDS TO [address] Select Patient?: No  Total # of Prescriptions Filled: 8   The following medications were delivered to the patient:  Trazodone 50mg  Sucralfate 1gm  Hydroxyzine 50mg  MVI  Thiamine 100mg  Clonidine 0.1mg  Pantoprazole 40mg  Olanzapine 5mg  Total # of Interventions Completed: 2  Time Spent (min): 30    Additional Documentation:

## 2021-01-25 NOTE — PROGRESS NOTES
0900 Discharge Summary: Spoke to Nitin Gauthier at [facility] in intake. Confirmed admission is accepted. Requested 30 day supply of meds in hand. Address is [address] [place] [address]. [address]     If paper scripts need to be used, fax to [facility] [phone]    Phone # for  Pharmacy [phone]

## 2021-01-25 NOTE — SUICIDE SAFETY PLAN
SAFETY PLAN    A suicide Safety Plan is a document that supports someone when they are having thoughts of suicide. Warning Signs that indicate a suicidal crisis may be developing: What (situations, thoughts, feelings, body sensations, behaviors, etc.) do you experience that lets you know you are beginning to think about suicide? 1. Bills  2. Family situations  3. Internal Coping Strategies:  What things can I do (relaxation techniques, hobbies, physical activities, etc.) to take my mind off my problems without contacting another person? 1. Read  2. Watch TV  3. Work out    [employer] and social settings that provide distraction: Who can I call or where can I go to distract me? 1. Name: my brother  Phone: [phone]  2. Name:   Phone:    3. Place:             4. Place:     People whom I can ask for help: Who can I call when I need help - for example, friends, family, clergy, someone else? 1. Name: my brother                Phone: [phone]  2. Name:   Phone:   3. Name:   Phone:     Professionals or [address] [place] [address] I can contact during a crisis: Who can I call for help - for example, my doctor, my psychiatrist, my psychologist, a mental health provider, a suicide hotline? 1. Clinician Name: Harleen Browne   Phone:       Clinician Pager or Emergency Contact #:     2. Clinician Name:    Phone:       Clinician Pager or Emergency Contact #:     3. Suicide Prevention Lifeline: 6-412-684-TALK (0306)    4. [address] [place] [address] Emergency Services -  for example, [facility] suicide hotline, [facility] Hotline: Harleen Browne      Emergency Services Address:       Emergency Services Phone:     Making the environment safe: How can I make my environment (house/apartment/living space) safer? For example, can I remove guns, medications, and other items? 1. Sharp objects   2.  Guns

## 2021-01-26 NOTE — DISCHARGE SUMMARY
Provider Discharge Summary     Patient ID:  Lisa Banks  [id number]  45 y.o.  1970    Admit date: 1/13/2021    Discharge date and time: 1/25/2021  8:28 PM     Admitting Physician: Valentina Martins MD     Discharge Physician: Valentina Martins MD    Admission Diagnoses: Major depressive disorder, recurrent severe without psychotic features (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 F10.20    Syncope and collapse R55    Tobacco use disorder F17.200    COPD (chronic obstructive pulmonary disease) ([facility]) J44.9    Abdominal pain, epigastric R10.13    Chronic obstructive pulmonary disease with acute exacerbation ([facility]) J44.1    Gastritis K29.70    Duodenitis K29.80    Paranoid schizophrenia ([address] F20.0    Major depressive disorder, recurrent severe without psychotic features ([address] F33.2    Severe malnutrition ([address] E43    Severe episode of recurrent major depressive disorder, with psychotic features ([address] F33.3    Alcohol use disorder, severe, dependence ([address] F10.20        Admission Condition: poor    Discharged Condition: stable    Indication for Admission: threat to self    History of Present Illnes (present tense wording is of findings from admission exam and are not necessarily indicative of current findings): PSYCHIATRIC HISTORY:  yes   Currently follows with [facility] in [address]  3 lifetime suicide attempts  2 psychiatric hospital admissions--admitted to [address] [place] [address] 1/10/2020-1/14/2020 and to  7/19/2013-8/01/2013     Past psychiatric medications includes:   Hospital Course:   Upon admission, Gina Jane was provided a safe secure environment, introduced to unit milieu. Patient participated in groups and individual therapies. Meds were adjusted as noted below. After few days of hospital care, patient began to feel improvement. Depression lifted, thoughts to harm self ceased. Sleep improved, appetite was good. On morning rounds 1/25/2021, Gina Jane endorses feeling ready for discharge. Patient denies suicidal or homicidal ideations, denies hallucinations or delusions. Denies SE's from meds. It was decided that maximum benefit from hospital care had been achieved and patient can be discharged. Consults:   none    Significant Diagnostic Studies: Routine labs and diagnostics    Treatments: Psychotropic medications, therapy with group, milieu, and 1:1 with nurses, social workers, PA-C/CNP, and Attending physician.       Discharge Medications:  Discharge Medication List as of 1/25/2021 11:32 AM      START taking these medications    Details   hydrOXYzine (ATARAX) 50 MG tablet Take 1 tablet by mouth 3 times daily as needed for Anxiety, Disp-90 tablet, R-0Normal      sertraline (ZOLOFT) 50 MG tablet Take 1 tablet by mouth daily, Disp-30 tablet, R-0Normal      OLANZapine (ZYPREXA) 5 MG tablet Take 1 tablet by mouth nightly, Disp-30 tablet, R-0Normal      sucralfate (CARAFATE) 1 GM tablet Take 1 tablet by mouth 4 times daily (before meals and nightly), Disp-120 tablet, R-0Normal      Multiple Vitamin (MULTIVITAMIN) TABS tablet Take 1 tablet by mouth daily, Disp-30 tablet, R-0Normal         CONTINUE these medications which have CHANGED    Details traZODone (DESYREL) 50 MG tablet Take 1 tablet by mouth nightly as needed for Sleep, Disp-30 tablet, R-0Normal      cloNIDine (CATAPRES) 0.1 MG tablet Take 1 tablet by mouth 2 times daily, Disp-60 tablet, R-0Normal      pantoprazole (PROTONIX) 40 MG tablet Take 1 tablet by mouth every morning (before breakfast), Disp-30 tablet, R-0Normal      thiamine 100 MG tablet Take 1 tablet by mouth daily, Disp-30 tablet, R-0Normal         STOP taking these medications       PARoxetine (PAXIL) 20 MG tablet Comments:   Reason for Stopping:         Multiple Vitamins-Minerals (THERAPEUTIC MULTIVITAMIN-MINERALS) tablet Comments:   Reason for Stopping:         ARIPiprazole (ABILIFY) 5 MG tablet Comments:   Reason for Stopping:         docusate sodium (COLACE) 100 MG capsule Comments:   Reason for Stopping:         folic acid (FOLVITE) 1 MG tablet Comments:   Reason for Stopping:         albuterol (PROVENTIL HFA) 108 (90 BASE) MCG/ACT inhaler Comments:   Reason for Stopping:                Core Measures statement:   Not applicable    Discharge Exam:  Level of consciousness:  Within normal limits  Appearance: Street clothes, seated, with good grooming  Behavior/Motor: No abnormalities noted  Attitude toward examiner:  Cooperative, attentive, good eye contact  Speech:  spontaneous, normal rate, normal volume and well articulated  Mood:  euthymic  Affect:  Full range  Thought processes:  linear, goal directed and coherent  Thought content:  denies homicidal ideation  Suicidal Ideation:  denies suicidal ideation  Delusions:  no evidence of delusions  Perceptual Disturbance:  denies any perceptual disturbance  Cognition:  Intact  Memory: age appropriate  Insight & Judgement: fair  Medication side effects: denies     Disposition: home    Patient Instructions: Activity: activity as tolerated  1. Patient instructed to take medications regularly and follow up with outpatient appointments.      Follow-up as scheduled with Michael Signed:    Electronically signed by Korina Reid MD on 1/25/21 at 8:28 PM EST    Time Spent on discharge is more than 35 minutes in the examination, evaluation, counseling and review of medications and discharge plan.

## 2021-03-30 ENCOUNTER — HOSPITAL ENCOUNTER (OUTPATIENT)
Age: 51
Setting detail: SPECIMEN
Discharge: HOME | End: 2021-03-30
Payer: MEDICAID

## 2021-03-30 LAB
ABSOLUTE EOS #: 0.37 K/UL (ref 0–0.44)
ABSOLUTE IMMATURE GRANULOCYTE: 0.03 K/UL (ref 0–0.3)
ABSOLUTE LYMPH #: 2.71 K/UL (ref 1.1–3.7)
ABSOLUTE MONO #: 0.82 K/UL (ref 0.1–1.2)
ALBUMIN SERPL-MCNC: 4.2 G/DL (ref 3.5–5.2)
ALBUMIN/GLOBULIN RATIO: 1.6 (ref 1–2.5)
ALP BLD-CCNC: 76 U/L (ref 40–129)
ALT SERPL-CCNC: 13 U/L (ref 5–41)
ANION GAP SERPL CALCULATED.3IONS-SCNC: 15 MMOL/L (ref 9–17)
AST SERPL-CCNC: 17 U/L
BASOPHILS # BLD: 1 % (ref 0–2)
BASOPHILS ABSOLUTE: 0.07 K/UL (ref 0–0.2)
BILIRUB SERPL-MCNC: 0.39 MG/DL (ref 0.3–1.2)
BUN BLDV-MCNC: 16 MG/DL (ref 6–20)
BUN/CREAT BLD: ABNORMAL (ref 9–20)
CALCIUM SERPL-MCNC: 9 MG/DL (ref 8.6–10.4)
CHLORIDE BLD-SCNC: 104 MMOL/L (ref 98–107)
CHOLESTEROL/HDL RATIO: 4.3
CHOLESTEROL: 237 MG/DL
CO2: 23 MMOL/L (ref 20–31)
CREAT SERPL-MCNC: 0.74 MG/DL (ref 0.7–1.2)
DIFFERENTIAL TYPE: NORMAL
EOSINOPHILS RELATIVE PERCENT: 3 % (ref 1–4)
ESTIMATED AVERAGE GLUCOSE: 108 MG/DL
GFR AFRICAN AMERICAN: >60 ML/MIN
GFR NON-AFRICAN AMERICAN: >60 ML/MIN
GFR SERPL CREATININE-BSD FRML MDRD: ABNORMAL ML/MIN/{1.73_M2}
GFR SERPL CREATININE-BSD FRML MDRD: ABNORMAL ML/MIN/{1.73_M2}
GLUCOSE BLD-MCNC: 68 MG/DL (ref 70–99)
HBA1C MFR BLD: 5.4 % (ref 4–6)
HCT VFR BLD CALC: 41.5 % (ref 40.7–50.3)
HDLC SERPL-MCNC: 55 MG/DL
HEMOGLOBIN: 13.3 G/DL (ref 13–17)
IMMATURE GRANULOCYTES: 0 %
LDL CHOLESTEROL: 168 MG/DL (ref 0–130)
LYMPHOCYTES # BLD: 25 % (ref 24–43)
MCH RBC QN AUTO: 30.2 PG (ref 25.2–33.5)
MCHC RBC AUTO-ENTMCNC: 32 G/DL (ref 28.4–34.8)
MCV RBC AUTO: 94.1 FL (ref 82.6–102.9)
MONOCYTES # BLD: 8 % (ref 3–12)
NRBC AUTOMATED: 0 PER 100 WBC
PDW BLD-RTO: 12.4 % (ref 11.8–14.4)
PLATELET # BLD: 357 K/UL (ref 138–453)
PLATELET ESTIMATE: NORMAL
PMV BLD AUTO: 10.3 FL (ref 8.1–13.5)
POTASSIUM SERPL-SCNC: 4 MMOL/L (ref 3.7–5.3)
RBC # BLD: 4.41 M/UL (ref 4.21–5.77)
RBC # BLD: NORMAL 10*6/UL
SEG NEUTROPHILS: 63 % (ref 36–65)
SEGMENTED NEUTROPHILS ABSOLUTE COUNT: 6.84 K/UL (ref 1.5–8.1)
SODIUM BLD-SCNC: 142 MMOL/L (ref 135–144)
TOTAL PROTEIN: 6.8 G/DL (ref 6.4–8.3)
TRIGL SERPL-MCNC: 72 MG/DL
TSH SERPL DL<=0.05 MIU/L-ACNC: 2.3 MIU/L (ref 0.3–5)
VLDLC SERPL CALC-MCNC: ABNORMAL MG/DL (ref 1–30)
WBC # BLD: 10.8 K/UL (ref 3.5–11.3)
WBC # BLD: NORMAL 10*3/UL

## 2021-04-27 ENCOUNTER — APPOINTMENT (OUTPATIENT)
Dept: GENERAL RADIOLOGY | Age: 51
End: 2021-04-27
Payer: MEDICAID

## 2021-04-27 ENCOUNTER — HOSPITAL ENCOUNTER (EMERGENCY)
Age: 51
Discharge: HOME | End: 2021-04-27
Attending: EMERGENCY MEDICINE
Payer: MEDICAID

## 2021-04-27 VITALS
SYSTOLIC BLOOD PRESSURE: 131 MMHG | BODY MASS INDEX: 24.25 KG/M2 | DIASTOLIC BLOOD PRESSURE: 89 MMHG | TEMPERATURE: 98.1 F | RESPIRATION RATE: 18 BRPM | HEIGHT: 68 IN | HEART RATE: 79 BPM | WEIGHT: 160 LBS | OXYGEN SATURATION: 96 %

## 2021-04-27 DIAGNOSIS — J44.1 COPD EXACERBATION (HCC): Primary | ICD-10-CM

## 2021-04-27 LAB
SARS-COV-2, RAPID: NOT DETECTED
SPECIMEN DESCRIPTION: NORMAL

## 2021-04-27 PROCEDURE — 71045 X-RAY EXAM CHEST 1 VIEW: CPT

## 2021-04-27 PROCEDURE — 87635 SARS-COV-2 COVID-19 AMP PRB: CPT

## 2021-04-27 PROCEDURE — 6360000002 HC RX W HCPCS: Performed by: NURSE PRACTITIONER

## 2021-04-27 PROCEDURE — 99284 EMERGENCY DEPT VISIT MOD MDM: CPT

## 2021-04-27 PROCEDURE — 96372 THER/PROPH/DIAG INJ SC/IM: CPT

## 2021-04-27 PROCEDURE — 94640 AIRWAY INHALATION TREATMENT: CPT

## 2021-04-27 RX ORDER — BENZONATATE 100 MG/1
100 CAPSULE ORAL 3 TIMES DAILY PRN
Qty: 30 CAPSULE | Refills: 0 | Status: SHIPPED | OUTPATIENT
Start: 2021-04-27 | End: 2021-05-04

## 2021-04-27 RX ORDER — ALBUTEROL SULFATE 2.5 MG/3ML
2.5 SOLUTION RESPIRATORY (INHALATION) ONCE
Status: COMPLETED | OUTPATIENT
Start: 2021-04-27 | End: 2021-04-27

## 2021-04-27 RX ORDER — PREDNISONE 50 MG/1
50 TABLET ORAL DAILY
Qty: 5 TABLET | Refills: 0 | Status: ON HOLD | OUTPATIENT
Start: 2021-04-27 | End: 2022-10-12 | Stop reason: HOSPADM

## 2021-04-27 RX ORDER — AZITHROMYCIN 250 MG/1
TABLET, FILM COATED ORAL
Qty: 1 PACKET | Refills: 0 | Status: SHIPPED | OUTPATIENT
Start: 2021-04-27 | End: 2021-05-07

## 2021-04-27 RX ORDER — METHYLPREDNISOLONE SODIUM SUCCINATE 125 MG/2ML
125 INJECTION, POWDER, LYOPHILIZED, FOR SOLUTION INTRAMUSCULAR; INTRAVENOUS ONCE
Status: COMPLETED | OUTPATIENT
Start: 2021-04-27 | End: 2021-04-27

## 2021-04-27 RX ADMIN — ALBUTEROL SULFATE 2.5 MG: 2.5 SOLUTION RESPIRATORY (INHALATION) at 12:00

## 2021-04-27 RX ADMIN — METHYLPREDNISOLONE SODIUM SUCCINATE 125 MG: 125 INJECTION, POWDER, FOR SOLUTION INTRAMUSCULAR; INTRAVENOUS at 11:21

## 2021-04-27 ASSESSMENT — ENCOUNTER SYMPTOMS
ABDOMINAL PAIN: 0
DIARRHEA: 0
COUGH: 1
SORE THROAT: 0
RHINORRHEA: 0
SINUS PRESSURE: 0
WHEEZING: 0
CONSTIPATION: 0
NAUSEA: 0
SHORTNESS OF BREATH: 0
COLOR CHANGE: 0
VOMITING: 0

## 2021-04-27 ASSESSMENT — PAIN DESCRIPTION - LOCATION: LOCATION: GENERALIZED

## 2021-04-27 ASSESSMENT — PAIN DESCRIPTION - DESCRIPTORS: DESCRIPTORS: ACHING

## 2021-04-27 NOTE — ED PROVIDER NOTES
[address] [place] [address] ED  eMERGENCY dEPARTMENT eNCOUnter      Pt Name: Tessy Reyes. MRN: [id number]  Birthdate 1970  Date of evaluation: 4/27/2021  Provider: [address] [place] [address] NP, RINA Forrester [id number]       Chief Complaint   Patient presents with    Shortness of Breath     history of COPD onset yesterday    Fever         HISTORY OF PRESENT ILLNESS  (Location/Symptom, Timing/Onset, Context/Setting, Quality, Duration, Modifying Factors, Severity.)   Tessy Pavon is a 45 y.o. male who presents to the emergency department private vehicle for evaluation of shortness of breath and fever with cough. Patient states that he has had some shortness of breath with fever and cough since yesterday. He states that he is coughing up some yellow sputum. He does have a history of COPD. He states that he is currently at [facility] for recovery so he is not sure if he has had any Covid exposure. He denies any nausea or vomiting. Denies chest pain. Nursing Notes were reviewed.     ALLERGIES     Prochlorperazine, Toradol [ketorolac tromethamine], Ketorolac, and Compazine [prochlorperazine maleate]    CURRENT MEDICATIONS       Discharge Medication List as of 4/27/2021 12:18 PM      CONTINUE these medications which have NOT CHANGED    Details   sertraline (ZOLOFT) 50 MG tablet Take 1 tablet by mouth daily, Disp-30 tablet, R-0Normal      traZODone (DESYREL) 50 MG tablet Take 1 tablet by mouth nightly as needed for Sleep, Disp-30 tablet, R-0Normal      cloNIDine (CATAPRES) 0.1 MG tablet Take 1 tablet by mouth 2 times daily, Disp-60 tablet, R-0Normal      OLANZapine (ZYPREXA) 5 MG tablet Take 1 tablet by mouth nightly, Disp-30 tablet, R-0Normal      pantoprazole (PROTONIX) 40 MG tablet Take 1 tablet by mouth every morning (before breakfast), Disp-30 tablet, R-0Normal      sucralfate (CARAFATE) 1 GM tablet Take 1 tablet by mouth 4 times daily (before meals and nightly), Disp-120 tablet, R-0Normal      thiamine 100 negative. Except as noted above the remainder of the review of systems was reviewed and negative. PHYSICAL EXAM    (up to 7 for level 4, 8 or more for level 5)     ED Triage Vitals [04/27/21 1033]   BP Temp Temp Source Pulse Resp SpO2 Height Weight   (!) 135/95 98.1 °F (36.7 °C) Oral 104 18 97 % 5' 8\" (1.727 m) 160 lb (72.6 kg)       Physical Exam  Vitals signs reviewed. Constitutional:       Appearance: He is well-developed. HENT:      Head: Normocephalic and atraumatic. Eyes:      Conjunctiva/sclera: Conjunctivae normal.      Pupils: Pupils are equal, round, and reactive to light. Neck:      Musculoskeletal: Normal range of motion and neck supple. Cardiovascular:      Rate and Rhythm: Normal rate and regular rhythm. Pulmonary:      Effort: Pulmonary effort is normal. No respiratory distress. Breath sounds: No stridor. Examination of the right-lower field reveals wheezing and rhonchi. Examination of the left-lower field reveals wheezing. Wheezing and rhonchi present. Abdominal:      General: Bowel sounds are normal.      Palpations: Abdomen is soft. Musculoskeletal: Normal range of motion. Lymphadenopathy:      Cervical: No cervical adenopathy. Skin:     General: Skin is warm and dry. Findings: No rash. Neurological:      Mental Status: He is alert and oriented to person, place, and time. RADIOLOGY:   Non-plain film images such as CT, Ultrasound and MRI are read by the radiologist. [facility] radiographic images are visualized and preliminarily interpreted by the emergency physician with the below findings:    Xr Chest Portable    Result Date: 4/27/2021  EXAMINATION: ONE XRAY VIEW OF THE CHEST 4/27/2021 11:13 am COMPARISON: None. HISTORY: ORDERING SYSTEM PROVIDED HISTORY: Chest Pain TECHNOLOGIST PROVIDED HISTORY: Chest Pain Reason for Exam: Chest pain Acuity: Unknown Type of Exam: Unknown FINDINGS: The lungs are without acute focal process.   There is no effusion or pneumothorax. The cardiomediastinal silhouette is without acute process. The osseous structures are without acute process. No acute process. Interpretation per the Radiologist below, if available at the time of this note:    XR CHEST PORTABLE   Final Result   No acute process. LABS:  Labs Reviewed   COVID-19, RAPID       All other labs were within normal range or not returned as of this dictation. EMERGENCY DEPARTMENT COURSE and DIFFERENTIAL DIAGNOSIS/MDM:   Vitals:    Vitals:    04/27/21 1033 04/27/21 1229   BP: (!) 135/95 131/89   Pulse: 104 79   Resp: 18 18   Temp: 98.1 °F (36.7 °C)    TempSrc: Oral    SpO2: 97% 96%   Weight: 160 lb (72.6 kg)    Height: 5' 8\" (1.727 m)        Medical Decision Making: Patient was given a breathing treatment and S Solu-Medrol. Chest x-ray is negative for any acute findings and Covid swab was negative. He mostly is having a COPD exacerbation. He will be discharged home on some prednisone, a Z-Edison and some Tessalon Perles for the cough.   Follow-up with primary care physician  Medications   methylPREDNISolone sodium (SOLU-MEDROL) injection 125 mg (125 mg Intramuscular Given 4/27/21 1121)   albuterol (PROVENTIL) nebulizer solution 2.5 mg (2.5 mg Nebulization Given 4/27/21 1200)       FINAL IMPRESSION      1. COPD exacerbation ([facility]          DISPOSITION/PLAN   DISPOSITION Decision To Discharge 04/27/2021 12:16:53 PM      PATIENT REFERRED TO:   same day PCP  [phone]          DISCHARGE MEDICATIONS:     Discharge Medication List as of 4/27/2021 12:18 PM      START taking these medications    Details   predniSONE (DELTASONE) 50 MG tablet Take 1 tablet by mouth daily, Disp-5 tablet, R-0Print      azithromycin (ZITHROMAX) 250 MG tablet Take 2 tablets (500 mg) on Day 1, followed by 1 tablet (250 mg) once daily on Days 2 through 5., Disp-1 packet, R-0Print      benzonatate (TESSALON PERLES) 100 MG capsule Take 1 capsule by mouth 3 times daily as needed for

## 2021-04-27 NOTE — LETTER
[facility] ED  [address] [facility] [address]  Phone: [phone]             April 27, 2021    Patient: Shelby Latif. YOB: 1970   Date of Visit: 4/27/2021       To Whom It May Concern:    Clinton Cuadra was seen and treated in our emergency department on 4/27/2021. Please excuse him from group 4/27/2021      Sincerely,             Signature:__________________________________

## 2021-04-29 NOTE — ED PROVIDER NOTES
eMERGENCY dEPARTMENT eNCOUnter   [address] [facility]     Pt Name: Iman Lundberg. MRN: [id number]  Birthdate 1970  Date of evaluation: 4/28/21     Iman Myers is a 45 y.o. male with CC: Shortness of Breath (history of COPD onset yesterday) and Fever      Based on the medical record the care appears appropriate. I was personally available for consultation in the Emergency Department.     Saad Cowan MD  Attending Emergency Physician                    Saad Cowan MD  04/28/21 2031

## 2021-10-16 ENCOUNTER — APPOINTMENT (OUTPATIENT)
Dept: CT IMAGING | Age: 51
End: 2021-10-16
Payer: MEDICAID

## 2021-10-16 ENCOUNTER — HOSPITAL ENCOUNTER (EMERGENCY)
Age: 51
Discharge: HOME | End: 2021-10-16
Attending: STUDENT IN AN ORGANIZED HEALTH CARE EDUCATION/TRAINING PROGRAM
Payer: MEDICAID

## 2021-10-16 ENCOUNTER — APPOINTMENT (OUTPATIENT)
Dept: GENERAL RADIOLOGY | Age: 51
End: 2021-10-16
Payer: MEDICAID

## 2021-10-16 VITALS
BODY MASS INDEX: 24.25 KG/M2 | WEIGHT: 160 LBS | HEIGHT: 68 IN | RESPIRATION RATE: 15 BRPM | SYSTOLIC BLOOD PRESSURE: 132 MMHG | OXYGEN SATURATION: 98 % | TEMPERATURE: 97.5 F | HEART RATE: 105 BPM | DIASTOLIC BLOOD PRESSURE: 96 MMHG

## 2021-10-16 DIAGNOSIS — Z53.21 ELOPED FROM EMERGENCY DEPARTMENT: ICD-10-CM

## 2021-10-16 DIAGNOSIS — F10.10 ETOH ABUSE: ICD-10-CM

## 2021-10-16 DIAGNOSIS — T50.904A DRUG OVERDOSE, UNDETERMINED INTENT, INITIAL ENCOUNTER: Primary | ICD-10-CM

## 2021-10-16 DIAGNOSIS — R74.8 ELEVATED LIPASE: ICD-10-CM

## 2021-10-16 DIAGNOSIS — E87.6 HYPOKALEMIA: ICD-10-CM

## 2021-10-16 LAB
ALBUMIN SERPL-MCNC: 4 G/DL (ref 3.5–5.1)
ALP BLD-CCNC: 100 U/L (ref 38–126)
ALT SERPL-CCNC: 19 U/L (ref 11–66)
AMPHETAMINE+METHAMPHETAMINE URINE SCREEN: NEGATIVE
ANION GAP SERPL CALCULATED.3IONS-SCNC: 15 MEQ/L (ref 8–16)
AST SERPL-CCNC: 34 U/L (ref 5–40)
BARBITURATE QUANTITATIVE URINE: NEGATIVE
BASOPHILS # BLD: 1.3 %
BASOPHILS ABSOLUTE: 0.1 THOU/MM3 (ref 0–0.1)
BENZODIAZEPINE QUANTITATIVE URINE: NEGATIVE
BILIRUB SERPL-MCNC: 0.3 MG/DL (ref 0.3–1.2)
BILIRUBIN URINE: NEGATIVE
BLOOD, URINE: NEGATIVE
BUN BLDV-MCNC: 4 MG/DL (ref 7–22)
CALCIUM SERPL-MCNC: 8.6 MG/DL (ref 8.5–10.5)
CANNABINOID QUANTITATIVE URINE: POSITIVE
CHARACTER, URINE: CLEAR
CHLORIDE BLD-SCNC: 102 MEQ/L (ref 98–111)
CO2: 23 MEQ/L (ref 23–33)
COCAINE METABOLITE QUANTITATIVE URINE: POSITIVE
COLOR: YELLOW
CREAT SERPL-MCNC: 0.7 MG/DL (ref 0.4–1.2)
EOSINOPHIL # BLD: 3 %
EOSINOPHILS ABSOLUTE: 0.2 THOU/MM3 (ref 0–0.4)
ERYTHROCYTE [DISTWIDTH] IN BLOOD BY AUTOMATED COUNT: 13 % (ref 11.5–14.5)
ERYTHROCYTE [DISTWIDTH] IN BLOOD BY AUTOMATED COUNT: 46.2 FL (ref 35–45)
GFR SERPL CREATININE-BSD FRML MDRD: > 90 ML/MIN/1.73M2
GLUCOSE BLD-MCNC: 127 MG/DL (ref 70–108)
GLUCOSE URINE: NEGATIVE MG/DL
HCT VFR BLD CALC: 48.9 % (ref 42–52)
HEMOGLOBIN: 16.4 GM/DL (ref 14–18)
IMMATURE GRANS (ABS): 0.02 THOU/MM3 (ref 0–0.07)
IMMATURE GRANULOCYTES: 0.3 %
KETONES, URINE: NEGATIVE
LEUKOCYTE ESTERASE, URINE: NEGATIVE
LIPASE: 60.2 U/L (ref 5.6–51.3)
LYMPHOCYTES # BLD: 48 %
LYMPHOCYTES ABSOLUTE: 3.3 THOU/MM3 (ref 1–4.8)
MAGNESIUM: 1.9 MG/DL (ref 1.6–2.4)
MCH RBC QN AUTO: 32.4 PG (ref 26–33)
MCHC RBC AUTO-ENTMCNC: 33.5 GM/DL (ref 32.2–35.5)
MCV RBC AUTO: 96.6 FL (ref 80–94)
MONOCYTES # BLD: 9.5 %
MONOCYTES ABSOLUTE: 0.7 THOU/MM3 (ref 0.4–1.3)
NITRITE, URINE: NEGATIVE
NUCLEATED RED BLOOD CELLS: 0 /100 WBC
OPIATES, URINE: NEGATIVE
OSMOLALITY CALCULATION: 277.9 MOSMOL/KG (ref 275–300)
OXYCODONE: NEGATIVE
PH UA: 6 (ref 5–9)
PHENCYCLIDINE QUANTITATIVE URINE: NEGATIVE
PLATELET # BLD: 283 THOU/MM3 (ref 130–400)
PMV BLD AUTO: 10 FL (ref 9.4–12.4)
POTASSIUM REFLEX MAGNESIUM: 3.2 MEQ/L (ref 3.5–5.2)
PRO-BNP: 6.5 PG/ML (ref 0–900)
PROTEIN UA: NEGATIVE
RBC # BLD: 5.06 MILL/MM3 (ref 4.7–6.1)
SEG NEUTROPHILS: 37.9 %
SEGMENTED NEUTROPHILS ABSOLUTE COUNT: 2.6 THOU/MM3 (ref 1.8–7.7)
SODIUM BLD-SCNC: 140 MEQ/L (ref 135–145)
SPECIFIC GRAVITY, URINE: 1.01 (ref 1–1.03)
TOTAL PROTEIN: 6.9 G/DL (ref 6.1–8)
TROPONIN T: < 0.01 NG/ML
UROBILINOGEN, URINE: 0.2 EU/DL (ref 0–1)
WBC # BLD: 6.9 THOU/MM3 (ref 4.8–10.8)

## 2021-10-16 PROCEDURE — 93005 ELECTROCARDIOGRAM TRACING: CPT | Performed by: STUDENT IN AN ORGANIZED HEALTH CARE EDUCATION/TRAINING PROGRAM

## 2021-10-16 PROCEDURE — 83880 ASSAY OF NATRIURETIC PEPTIDE: CPT

## 2021-10-16 PROCEDURE — 80053 COMPREHEN METABOLIC PANEL: CPT

## 2021-10-16 PROCEDURE — 6360000002 HC RX W HCPCS: Performed by: STUDENT IN AN ORGANIZED HEALTH CARE EDUCATION/TRAINING PROGRAM

## 2021-10-16 PROCEDURE — 2580000003 HC RX 258: Performed by: STUDENT IN AN ORGANIZED HEALTH CARE EDUCATION/TRAINING PROGRAM

## 2021-10-16 PROCEDURE — 85025 COMPLETE CBC W/AUTO DIFF WBC: CPT

## 2021-10-16 PROCEDURE — 96360 HYDRATION IV INFUSION INIT: CPT

## 2021-10-16 PROCEDURE — 96376 TX/PRO/DX INJ SAME DRUG ADON: CPT

## 2021-10-16 PROCEDURE — 6360000004 HC RX CONTRAST MEDICATION: Performed by: STUDENT IN AN ORGANIZED HEALTH CARE EDUCATION/TRAINING PROGRAM

## 2021-10-16 PROCEDURE — 36415 COLL VENOUS BLD VENIPUNCTURE: CPT

## 2021-10-16 PROCEDURE — 84484 ASSAY OF TROPONIN QUANT: CPT

## 2021-10-16 PROCEDURE — 83690 ASSAY OF LIPASE: CPT

## 2021-10-16 PROCEDURE — 71260 CT THORAX DX C+: CPT

## 2021-10-16 PROCEDURE — 83735 ASSAY OF MAGNESIUM: CPT

## 2021-10-16 PROCEDURE — 80307 DRUG TEST PRSMV CHEM ANLYZR: CPT

## 2021-10-16 PROCEDURE — 71045 X-RAY EXAM CHEST 1 VIEW: CPT

## 2021-10-16 PROCEDURE — 81003 URINALYSIS AUTO W/O SCOPE: CPT

## 2021-10-16 PROCEDURE — 96361 HYDRATE IV INFUSION ADD-ON: CPT

## 2021-10-16 PROCEDURE — 96375 TX/PRO/DX INJ NEW DRUG ADDON: CPT

## 2021-10-16 PROCEDURE — 96374 THER/PROPH/DIAG INJ IV PUSH: CPT

## 2021-10-16 PROCEDURE — 99285 EMERGENCY DEPT VISIT HI MDM: CPT

## 2021-10-16 RX ORDER — LORAZEPAM 2 MG/ML
1 INJECTION INTRAMUSCULAR ONCE
Status: COMPLETED | OUTPATIENT
Start: 2021-10-16 | End: 2021-10-16

## 2021-10-16 RX ORDER — 0.9 % SODIUM CHLORIDE 0.9 %
2000 INTRAVENOUS SOLUTION INTRAVENOUS ONCE
Status: COMPLETED | OUTPATIENT
Start: 2021-10-16 | End: 2021-10-16

## 2021-10-16 RX ORDER — LORAZEPAM 1 MG/1
0.5 TABLET ORAL ONCE
Status: DISCONTINUED | OUTPATIENT
Start: 2021-10-16 | End: 2021-10-16 | Stop reason: HOSPADM

## 2021-10-16 RX ORDER — ONDANSETRON 2 MG/ML
4 INJECTION INTRAMUSCULAR; INTRAVENOUS ONCE
Status: COMPLETED | OUTPATIENT
Start: 2021-10-16 | End: 2021-10-16

## 2021-10-16 RX ORDER — POTASSIUM CHLORIDE 20 MEQ/1
30 TABLET, EXTENDED RELEASE ORAL ONCE
Status: DISCONTINUED | OUTPATIENT
Start: 2021-10-16 | End: 2021-10-16 | Stop reason: HOSPADM

## 2021-10-16 RX ORDER — NALOXONE HYDROCHLORIDE 4 MG/.1ML
1 SPRAY NASAL PRN
Qty: 1 EACH | Refills: 0 | Status: ON HOLD | OUTPATIENT
Start: 2021-10-16 | End: 2022-10-12 | Stop reason: HOSPADM

## 2021-10-16 RX ORDER — 0.9 % SODIUM CHLORIDE 0.9 %
1000 INTRAVENOUS SOLUTION INTRAVENOUS ONCE
Status: COMPLETED | OUTPATIENT
Start: 2021-10-16 | End: 2021-10-16

## 2021-10-16 RX ADMIN — SODIUM CHLORIDE 1000 ML: 9 INJECTION, SOLUTION INTRAVENOUS at 10:39

## 2021-10-16 RX ADMIN — SODIUM CHLORIDE 2000 ML: 9 INJECTION, SOLUTION INTRAVENOUS at 12:43

## 2021-10-16 RX ADMIN — ONDANSETRON 4 MG: 2 INJECTION INTRAMUSCULAR; INTRAVENOUS at 12:09

## 2021-10-16 RX ADMIN — LORAZEPAM 1 MG: 2 INJECTION INTRAMUSCULAR; INTRAVENOUS at 10:41

## 2021-10-16 RX ADMIN — ONDANSETRON 4 MG: 2 INJECTION INTRAMUSCULAR; INTRAVENOUS at 10:40

## 2021-10-16 RX ADMIN — IOPAMIDOL 80 ML: 755 INJECTION, SOLUTION INTRAVENOUS at 11:43

## 2021-10-16 ASSESSMENT — ENCOUNTER SYMPTOMS
SHORTNESS OF BREATH: 0
COUGH: 0
ABDOMINAL PAIN: 0
SINUS PAIN: 0
VOMITING: 1
CONSTIPATION: 0
BACK PAIN: 0
EYE PAIN: 0
DIARRHEA: 0
NAUSEA: 1

## 2021-10-16 NOTE — ED NOTES
Patient does admit to snorting a substance that he thought was cocaine.      Christine Escamilla RN  10/16/21 1025

## 2021-10-16 NOTE — ED TRIAGE NOTES
Patient to room 3 via [facility] for reported drug overdose. Per EMS, patient was found unresponsive in a dirt alley behind a building with snoring respirations. Patient was initially given one round of Narcan at which time his breathing stabilized, however he was still altered. A second round of Narcan was given and patient did wake up. Patient arrived actively vomiting. Patient denies using drugs but does state that he drinks frequently. Dr. Hafsa Fall to bedside for assessment.

## 2021-10-16 NOTE — ED PROVIDER NOTES
[facility] ENCOUN[facility]          Pt Name: Richard Clark MRN: [id number]  Birthdate 1970  Date of evaluation: 10/16/2021  Treating Resident Physician: Kelley Espitia MD  Supervising Physician: Dr. Rebekah Watt MD    [address] [place] [address]       Chief Complaint   Patient presents with    Drug Overdose     History obtained from the patient and EMS. HISTORY OF PRESENT ILLNESS    HPI  Richard Clark is a 45 y.o. male who presents to the emergency department for evaluation of unresponsive episode. Per nursing staff, EMS found patient in the alley way unresponsive with snoring respirations, they gave a dose of Narcan and patient began becoming more alert so they gave a second dose and patient became alert. Patient states he has had nausea and vomiting and states he threw up 4 times today without any blood or bile. Patient states he does not feel well but denies any chest pain shortness of breath abdominal pain. Patient states he drinks every day with his last drink being last night he states that he drinks \"a fair bit \"every day. Patient states that he is gone into alcohol withdrawal before but denies any seizures from this. Patient states he occasionally uses marijuana but denies any IV drug use. Patient later admitted to attending physician that he occasionally uses cocaine and reportedly snorted a powdery white substance last night. Patient states he has a history of COPD. Patient denies any pain. Patient denies any radiation. Patient denies any alleviating exacerbating factors. Patient denies any falls or trauma. Patient denies any known history of esophageal varices.     Chart review shows patient has history schizophrenia, depression, alcoholism, marijuana use, COPD  Chart review shows patient takes Zoloft trazodone clonidine Zyprexa    Patient denies any new headache fever chills cough chest pain shortness of breath abdominal pain diarrhea constipation leg swelling. The patient has no other acute complaints at this time. REVIEW OF SYSTEMS   Review of Systems   Constitutional: Positive for appetite change and fatigue. Negative for diaphoresis and fever. HENT: Negative for ear pain and sinus pain. Eyes: Negative for pain. Respiratory: Negative for cough and shortness of breath. Cardiovascular: Negative for chest pain and leg swelling. Gastrointestinal: Positive for nausea and vomiting. Negative for abdominal pain, constipation and diarrhea. Genitourinary: Negative for dysuria. Musculoskeletal: Negative for back pain and neck pain. Skin: Negative for wound. Neurological: Negative for headaches. Psychiatric/Behavioral: Negative for hallucinations and self-injury. The patient is nervous/anxious. PAST MEDICAL AND SURGICAL HISTORY     Past Medical History:   Diagnosis Date    Arthritis     Asthma     [facility] obstructive [facility]     GERD (gastroesophageal reflux disease)     Hypertension     Psychiatric problem     [facility]     Severe malnutrition ([facility] 1/14/2021    Unspecified cerebral artery occlusion with cerebral infarction      History reviewed. No pertinent surgical history.       MEDICATIONS     Current Facility-Administered Medications:     NALOXONE OPIATE OVERDOSE KIT 1 each, 1 kit, Nasal, Once, Sneha Leong MD    potassium chloride (KLOR-CON M) extended release tablet 30 mEq, 30 mEq, Oral, Once, Sneha Leong MD    LORazepam (ATIVAN) tablet 0.5 mg, 0.5 mg, Oral, Once, Sneha Leong MD    Current Outpatient Medications:     naloxone (NARCAN) 4 MG/0.1ML LIQD nasal spray, 1 spray by Nasal route as needed for Opioid Reversal, Disp: 1 each, Rfl: 0    predniSONE (DELTASONE) 50 MG tablet, Take 1 tablet by mouth daily, Disp: 5 tablet, Rfl: 0    sertraline (ZOLOFT) 50 MG tablet, Take 1 tablet by mouth daily, Disp: 30 tablet, Rfl: 0   traZODone (DESYREL) 50 MG tablet, Take 1 tablet by mouth nightly as needed for Sleep, Disp: 30 tablet, Rfl: 0    cloNIDine (CATAPRES) 0.1 MG tablet, Take 1 tablet by mouth 2 times daily, Disp: 60 tablet, Rfl: 0    OLANZapine (ZYPREXA) 5 MG tablet, Take 1 tablet by mouth nightly, Disp: 30 tablet, Rfl: 0    pantoprazole (PROTONIX) 40 MG tablet, Take 1 tablet by mouth every morning (before breakfast), Disp: 30 tablet, Rfl: 0    sucralfate (CARAFATE) 1 GM tablet, Take 1 tablet by mouth 4 times daily (before meals and nightly), Disp: 120 tablet, Rfl: 0    thiamine 100 MG tablet, Take 1 tablet by mouth daily, Disp: 30 tablet, Rfl: 0    Multiple Vitamin (MULTIVITAMIN) TABS tablet, Take 1 tablet by mouth daily, Disp: 30 tablet, Rfl: 0      SOCIAL HISTORY     Social History     Social History Narrative    Not on file     Social History     Tobacco Use    Smoking status: Current Every Day Smoker     Packs/day: 2.00     Years: 30.00     Pack years: 60.00    Smokeless tobacco: Never Used   Vaping Use    Vaping Use: Never used   Substance Use Topics    Alcohol use: Not Currently     Comment: 12 pack and 5th vodka daily    Drug use: Not Currently     Types: Marijuana     Comment: occasional         ALLERGIES     Allergies   Allergen Reactions    Prochlorperazine Other (See Comments)    Toradol [Ketorolac Tromethamine] Itching and Rash     Able to take Aleve with no allergic reactions      Ketorolac     Compazine [Prochlorperazine Maleate] Anxiety         FAMILY HISTORY     Family History   Problem Relation Age of Onset    Cancer Mother         lung    Other Mother     Cancer Father         copd/lung    Other Sister         pancreatitis    Other Sister         pancreatitis         PREVIOUS RECORDS   Previous records reviewed: Chart review shows patient was last seen in the emergency department on 4/27/2021 for COPD exacerbation.         PHYSICAL EXAM     ED Triage Vitals   BP Temp Temp src Pulse Resp SpO2 Height Weight   -- -- -- -- -- -- -- --     Initial vital signs and nursing assessment reviewed and vitals are/show: abnormal from Borderline tachycardia,  . Pulsoximetry is normal per my interpretation. Additional Vital Signs:  Vitals:    10/16/21 1349   BP: (!) 132/96   Pulse: 105   Resp: 15   Temp:    SpO2: 98%       Physical Exam  Constitutional:       General: He is not in acute distress. Appearance: He is not ill-appearing, toxic-appearing or diaphoretic. Comments: Appears slightly disheveled   HENT:      Head: Normocephalic and atraumatic. Comments: No obvious signs of skull fracture  No CSF rhinorrhea or otorrhea  No raccoon eyes       Right Ear: External ear normal.      Left Ear: External ear normal.   Eyes:      General: No scleral icterus. Right eye: No discharge. Left eye: No discharge. Extraocular Movements: Extraocular movements intact. Conjunctiva/sclera: Conjunctivae normal.      Pupils: Pupils are equal, round, and reactive to light. Cardiovascular:      Rate and Rhythm: Regular rhythm. Tachycardia present. Pulmonary:      Effort: Pulmonary effort is normal. No respiratory distress. Breath sounds: Normal breath sounds. No stridor. No wheezing, rhonchi or rales. Chest:      Chest wall: No tenderness. Abdominal:      General: Abdomen is flat. There is no distension. Palpations: Abdomen is soft. Tenderness: There is no abdominal tenderness. There is no guarding or rebound. Musculoskeletal:         General: Normal range of motion. Cervical back: Neck supple. Right lower leg: No edema. Left lower leg: No edema. Skin:     General: Skin is warm and dry. Neurological:      Mental Status: He is alert. Motor: No weakness.       Comments: Alert and oriented to person and place but not time  Bilateral upper extremity motor sensation intact  No tremors  Bilateral lower extremity motor and sensation intact  GCS 14  No gross focal deficits   Psychiatric:         Mood and Affect: Mood normal.         Behavior: Behavior normal.         Thought Content: Thought content normal.         Judgment: Judgment normal.      Comments: No homicidal or suicidal ideation             MEDICAL DECISION MAKING   Initial Assessment:     40-year-old male presenting found unresponsive in an alley way by EMS with a positive response to Narcan and a history of alcoholism and polysubstance use. Differential diagnoses include but not limited to: Drug withdrawal, drug intoxication, drug toxidrome, polysubstance use, hypoxia, CVA, infection, trauma, electrolyte abnormality, arrhythmia, ACS, pancreatitis        Plan:       EKG  Labs  Imaging: CXR  IV fluids  Ativan  ED observation for 4 to 6 hours due to Narcan use by EMS  [facility] ER consult  Narcan to go kit  Potassium replacement for hypokalemia. CT chest with IV contrast  Additional antiemetic  P.o. challenge  Additional PO ativan  Patient eloped      Patient is a 45 y.o. male who was seen and evaluated in the emergency department for unresponsive episode. Patient was found unresponsive per EMS and received 2 doses of Narcan with improvement in his mental status. Patient has a history of alcohol abuse and polysubstance abuse. Patient was initially mildly tachycardic around 105 with some slight diaphoresis but no tremors or significant sympathetic hyperactivity. Due to patient's history of alcoholism and withdrawal and his tachycardia I did administer Ativan. Patient's heart rate improved with Ativan. A Narcan kit was ordered but due to lack of pharmacy staffing this was unable to be provided. .  Patient received IV fluids and antiemetic in the emergency department with improvement of his symptoms. Patient did require a second dose of antiemetic. Patient was observed in the ED for rebound symptoms due to the recent administration of Narcan by EMS.   Patient received oral potassium replacement due to mild hypokalemia. Patient's lipase was mildly elevated but without any other clinical signs or symptoms of pancreatitis, this appeared to be noncontributory to patient's current presentation. Patient's chest x-ray showed possible mass in the right upper lobe with radiology recommending a CT chest for further evaluation, after discussion with patient he was agreeable to CT scan for further evaluation. Patient's CT chest showed lesions suspicious for malignancy. I explained the results with patient and emphasized that at this time it would not require inpatient work-up but he would benefit from follow-up outpatient and I emphasized the importance of this which patient understood and agreed. Patient does not have a PCP but I explained I can give him information to find one here and patient was agreeable to this. I did have our [facility] team see patient to provide resources regarding his drug use. Patient tolerated p.o. while in the ED. Patient was observed in the ED for 4 hours and prior to discharge slight increase in his heart rate so I ordered p.o. Ativan but patient then eloped prior to receiving Ativan. Patient eloped.             ED RESULTS   Laboratory results:  Labs Reviewed   CBC WITH AUTO DIFFERENTIAL - Abnormal; Notable for the following components:       Result Value    MCV 96.6 (*)     RDW-SD 46.2 (*)     All other components within normal limits   COMPREHENSIVE METABOLIC PANEL W/ REFLEX TO MG FOR LOW K - Abnormal; Notable for the following components:    Glucose 127 (*)     BUN 4 (*)     Potassium reflex Magnesium 3.2 (*)     All other components within normal limits   LIPASE - Abnormal; Notable for the following components:    Lipase 60.2 (*)     All other components within normal limits   TROPONIN   BRAIN NATRIURETIC PEPTIDE   URINE RT REFLEX TO CULTURE   URINE DRUG SCREEN   ANION GAP   GLOMERULAR FILTRATION RATE, ESTIMATED   MAGNESIUM   OSMOLALITY       Radiologic studies results:  CT CHEST W CONTRAST Final Result      1. Focal 3.4 cm abnormality in the right upper lobe laterally. This is suspicious for a primary pulmonary malignancy. 2. No mediastinal adenopathy. **This report has been created using voice recognition software. It may contain minor errors which are inherent in voice recognition technology. **      Final report electronically signed by Dr. Erin Villa on 10/16/2021 11:58 AM      XR CHEST PORTABLE   Final Result   Possible nodular density versus infiltrate in the right lung. This could also represent summation of normal structures. An underlying mass is a consideration. A CT of the chest with contrast is recommended for further evaluation. **This report has been created using voice recognition software. It may contain minor errors which are inherent in voice recognition technology. **      Final report electronically signed by Dr. Erin Villa on 10/16/2021 10:51 AM          ED Medications administered this visit:   Medications   NALOXONE OPIATE OVERDOSE KIT 1 each (has no administration in time range)   potassium chloride (KLOR-CON M) extended release tablet 30 mEq (has no administration in time range)   LORazepam (ATIVAN) tablet 0.5 mg (has no administration in time range)   0.9 % sodium chloride bolus (0 mLs IntraVENous Stopped 10/16/21 1139)   ondansetron (ZOFRAN) injection 4 mg (4 mg IntraVENous Given 10/16/21 1040)   LORazepam (ATIVAN) injection 1 mg (1 mg IntraVENous Given 10/16/21 1041)   ondansetron (ZOFRAN) injection 4 mg (4 mg IntraVENous Given 10/16/21 1209)   iopamidol (ISOVUE-370) 76 % injection 80 mL (80 mLs IntraVENous Given 10/16/21 1143)   0.9 % sodium chloride bolus (0 mLs IntraVENous Stopped 10/16/21 1454)         ED COURSE     ED Course as of Oct 16 1519   Sat Oct 16, 2021   1022 Patient received Narcan, will need to observe in ED for 4 to 6 hours = 2-4pm    [CR]   1035 CBC reviewed, unremarkable    [CR]   1058 CXR:IMPRESSION:  Possible nodular density versus infiltrate in the right lung. This could also represent summation of normal structures. An underlying mass is a consideration. A CT of the chest with contrast is recommended for further evaluation.    [CR]   1058 Potassium 3.2; oral replacement therapy ordered. [CR]   1059 Troponin within normal limits    [CR]   1059 BNP within normal limits    [CR]   1059 Lipase mildly elevated at 60.2    [CR]   1109 Reevaluated patient, he states he is feeling slightly better. After discussion with patient regarding his chest x-ray findings, patient is agreeable for CT chest for further evaluation of from x-ray imaging.    [CR]   1109 Additional Zofran ordered. [CR]   1114 Magnesium 1.9.    [CR]   1123 Notified by nursing staff that due to lack of pharmacy staffing unable to provide Narcan to go kit. [CR]   1140 EKG reviewed, no significant acute pathology    [CR]   1 Spoke with [facility], they will see patient. [CR]   1202 CT Chest:IMPRESSION:     1. Focal 3.4 cm abnormality in the right upper lobe laterally. This is suspicious for a primary pulmonary malignancy. 2. No mediastinal adenopathy.    [CR]   1218 I reevaluated patient, patient states his nausea is feeling better. I explained the results of the CT and that it would be important for him to follow-up outpatient and I would set him up with a PCP and patient was agreeable. I provided crackers and orange juice which patient states he would attempt to eat. [CR]   1258 UA was negative. UDS was positive for cannabinoids and cocaine.    [CR]   1423 Additional ativan ordered    [CR]      ED Course User Index  [CR] Sneha Leong MD        Strict return precautions and follow up instructions were discussed with the patient prior to discharge, with which the patient agrees.       MEDICATION CHANGES     Discharge Medication List as of 10/16/2021  2:55 PM      START taking these medications    Details   naloxone (NARCAN) 4 MG/0.1ML LIQD nasal spray 1 spray by Nasal route as needed for Opioid Reversal, Disp-1 each, R-0Normal               FINAL DISPOSITION     Final diagnoses:   Drug overdose, undetermined intent, initial encounter   ETOH abuse   Hypokalemia   Elevated lipase   Eloped from emergency department     Condition: condition: stable  Dispo: Other Disposition: Eloped      This transcription was electronically signed. Parts of this transcriptions may have been dictated by use of voice recognition software and electronically transcribed, and parts may have been transcribed with the assistance of an ED scribe. The transcription may contain errors not detected in proofreading. Please refer to my supervising physician's documentation if my documentation differs.     Electronically Signed: Niurka Mitchell MD, 10/16/21, 3:19 PM         Niurka Mitchell MD  Resident  10/16/21 [id number]

## 2021-10-16 NOTE — ED NOTES
In to complete orders, patient not in room. IV site taped to IV bag. Patient not able to be located in department.       Karlo Hernandez RN  10/16/21 2371

## 2021-10-16 NOTE — ED PROVIDER NOTES
[address] [facility]  EMERGENCY MEDICINE ATTENDING ATTESTATION      Evaluation of [facility] Jean. .   Case discussed and care plan developed with resident physician. I agree with the resident physician documentation and plan as documented by him, except if my documentation differs. Patient seen, interviewed and examined by me. I reviewed the medical, surgical, family and social history, medications and allergies. I have reviewed the nursing documentation. I have reviewed the patient's vital signs and are normal per my interpretation. There is no height or weight on file to calculate BMI. Pulsoxymetry is normal per my interpretation. Brief H&P   Patient c/o drug overdose, positive response to Narcan. Patient states he snorted a white powder that he thought was cocaine. Patient admits to drinking a 12 pack of beer daily with last drink last night. Patient complaining of nausea, malaise. Physical exam is notable for disheveled appearing male, mild tachycardia present. No tremors or tongue fasciculations. Medical Decision Making   MDM:   Patient is a 69-year-old male with a history of COPD, schizophrenia, alcohol abuse who presents to the emergency department after being found unresponsive with positive response to Narcan. Patient with mild tachycardia but is otherwise hemodynamically stable and in no distress. Patient complaining of nausea/vomiting so labs were checked. Labs significant for hypokalemia of 3.2 which was replaced, mild lipase elevation, UDS positive for cannabinoids and cocaine. CXR concerning for possible nodular density versus infiltrate in the right lung and CT scan was recommended. CT scan concerning for malignancy given spiculated appearance of 3.4cm mass. Patient notified of CT results and the importance of follow up due to concern for malignancy was stressed with the patient. Patient given follow up information for PCP/pulmonology. Patient observed 4 hours in ED without need for additional Narcan. Patient tolerating PO intake. Patient with mild tremors /tachycardia. Plan to give small dose of Ativan; however, prior to receiving Ativan, patient left emergency department. Patient removed own IV    Plan:    PCP/pulmonology follow up due to concern for lung malignancy   Narcan upon discharge   Patient eloped from ED prior to completion of care    Please see the resident physician completed note for final disposition except as documented on this attestation. I have reviewed and interpreted all available lab, radiology and ekg results available at the moment. Diagnosis, treatment and disposition plans were discussed and agreed upon by patient. This transcription was electronically signed. It was dictated by use of voice recognition software and electronically transcribed. The transcription may contain errors not detected in proofreading.      I performed direct supervision and was present for the critical portion following procedures: None  Critical care time on this case: None    Electronically signed by Francisco Galeano MD on 10/16/21 at 10:14 AM EDT       Francisco Galeano MD  10/16/21 0353

## 2021-10-16 NOTE — ED NOTES
Bed: 003A  Expected date:   Expected time:   Means of arrival: ANUSHA DAMIAN II[facility]  Comments:     Thom Peck RN  10/16/21 1002

## 2021-10-16 NOTE — PROGRESS NOTES
1154  MH/AOD resources discussed with and given to pt who is particularly interested in following up with outpatient services at [facility].

## 2021-10-16 NOTE — ED NOTES
Patient continues to report nausea, provider updated. Potassium held due to patient not feeling able to keep it down.      Romina Loo RN  10/16/21 4219

## 2021-10-17 LAB
EKG ATRIAL RATE: 89 BPM
EKG P AXIS: 49 DEGREES
EKG P-R INTERVAL: 170 MS
EKG Q-T INTERVAL: 390 MS
EKG QRS DURATION: 100 MS
EKG QTC CALCULATION (BAZETT): 474 MS
EKG R AXIS: -40 DEGREES
EKG T AXIS: 58 DEGREES
EKG VENTRICULAR RATE: 89 BPM

## 2021-10-17 PROCEDURE — 93010 ELECTROCARDIOGRAM REPORT: CPT | Performed by: INTERNAL MEDICINE

## 2022-02-15 ENCOUNTER — APPOINTMENT (OUTPATIENT)
Dept: GENERAL RADIOLOGY | Age: 52
End: 2022-02-15
Payer: MEDICAID

## 2022-02-15 ENCOUNTER — APPOINTMENT (OUTPATIENT)
Dept: CT IMAGING | Age: 52
End: 2022-02-15
Payer: MEDICAID

## 2022-02-15 ENCOUNTER — HOSPITAL ENCOUNTER (EMERGENCY)
Age: 52
Discharge: HOME | End: 2022-02-15
Attending: EMERGENCY MEDICINE
Payer: MEDICAID

## 2022-02-15 VITALS
HEART RATE: 99 BPM | WEIGHT: 145 LBS | DIASTOLIC BLOOD PRESSURE: 97 MMHG | BODY MASS INDEX: 21.98 KG/M2 | SYSTOLIC BLOOD PRESSURE: 136 MMHG | OXYGEN SATURATION: 99 % | RESPIRATION RATE: 18 BRPM | HEIGHT: 68 IN | TEMPERATURE: 98.3 F

## 2022-02-15 DIAGNOSIS — K29.90 GASTRITIS AND DUODENITIS: Primary | ICD-10-CM

## 2022-02-15 LAB
ALBUMIN SERPL-MCNC: 4.3 G/DL (ref 3.5–5.1)
ALP BLD-CCNC: 102 U/L (ref 38–126)
ALT SERPL-CCNC: 11 U/L (ref 11–66)
ANION GAP SERPL CALCULATED.3IONS-SCNC: 11 MEQ/L (ref 8–16)
AST SERPL-CCNC: 18 U/L (ref 5–40)
BASOPHILS # BLD: 0.7 %
BASOPHILS ABSOLUTE: 0.1 THOU/MM3 (ref 0–0.1)
BILIRUB SERPL-MCNC: 0.6 MG/DL (ref 0.3–1.2)
BILIRUBIN URINE: NEGATIVE
BLOOD, URINE: NEGATIVE
BUN BLDV-MCNC: 5 MG/DL (ref 7–22)
CALCIUM SERPL-MCNC: 8.8 MG/DL (ref 8.5–10.5)
CHARACTER, URINE: CLEAR
CHLORIDE BLD-SCNC: 102 MEQ/L (ref 98–111)
CO2: 24 MEQ/L (ref 23–33)
COLOR: YELLOW
CREAT SERPL-MCNC: 0.7 MG/DL (ref 0.4–1.2)
EOSINOPHIL # BLD: 1.2 %
EOSINOPHILS ABSOLUTE: 0.1 THOU/MM3 (ref 0–0.4)
ERYTHROCYTE [DISTWIDTH] IN BLOOD BY AUTOMATED COUNT: 12.3 % (ref 11.5–14.5)
ERYTHROCYTE [DISTWIDTH] IN BLOOD BY AUTOMATED COUNT: 43.5 FL (ref 35–45)
ETHYL ALCOHOL, SERUM: < 0.01 %
GFR SERPL CREATININE-BSD FRML MDRD: > 90 ML/MIN/1.73M2
GLUCOSE BLD-MCNC: 102 MG/DL (ref 70–108)
GLUCOSE URINE: NEGATIVE MG/DL
HCT VFR BLD CALC: 45.4 % (ref 42–52)
HEMOGLOBIN: 15.2 GM/DL (ref 14–18)
IMMATURE GRANS (ABS): 0.03 THOU/MM3 (ref 0–0.07)
IMMATURE GRANULOCYTES: 0.2 %
KETONES, URINE: NEGATIVE
LACTIC ACID, SEPSIS: 1.2 MMOL/L (ref 0.5–1.9)
LEUKOCYTE ESTERASE, URINE: NEGATIVE
LIPASE: 14.8 U/L (ref 5.6–51.3)
LYMPHOCYTES # BLD: 23.6 %
LYMPHOCYTES ABSOLUTE: 2.9 THOU/MM3 (ref 1–4.8)
MAGNESIUM: 1.7 MG/DL (ref 1.6–2.4)
MCH RBC QN AUTO: 31.9 PG (ref 26–33)
MCHC RBC AUTO-ENTMCNC: 33.5 GM/DL (ref 32.2–35.5)
MCV RBC AUTO: 95.2 FL (ref 80–94)
MONOCYTES # BLD: 8.4 %
MONOCYTES ABSOLUTE: 1 THOU/MM3 (ref 0.4–1.3)
NITRITE, URINE: NEGATIVE
NUCLEATED RED BLOOD CELLS: 0 /100 WBC
OSMOLALITY CALCULATION: 271.3 MOSMOL/KG (ref 275–300)
PH UA: 6.5 (ref 5–9)
PLATELET # BLD: 323 THOU/MM3 (ref 130–400)
PMV BLD AUTO: 10 FL (ref 9.4–12.4)
POTASSIUM REFLEX MAGNESIUM: 3.4 MEQ/L (ref 3.5–5.2)
PRO-BNP: 31.2 PG/ML (ref 0–900)
PROTEIN UA: NEGATIVE
RBC # BLD: 4.77 MILL/MM3 (ref 4.7–6.1)
REASON FOR REJECTION: NORMAL
REJECTED TEST: NORMAL
SEG NEUTROPHILS: 65.9 %
SEGMENTED NEUTROPHILS ABSOLUTE COUNT: 8 THOU/MM3 (ref 1.8–7.7)
SODIUM BLD-SCNC: 137 MEQ/L (ref 135–145)
SPECIFIC GRAVITY, URINE: 1.01 (ref 1–1.03)
TOTAL PROTEIN: 6.9 G/DL (ref 6.1–8)
TROPONIN T: < 0.01 NG/ML
UROBILINOGEN, URINE: 0.2 EU/DL (ref 0–1)
WBC # BLD: 12.1 THOU/MM3 (ref 4.8–10.8)

## 2022-02-15 PROCEDURE — 80053 COMPREHEN METABOLIC PANEL: CPT

## 2022-02-15 PROCEDURE — 83880 ASSAY OF NATRIURETIC PEPTIDE: CPT

## 2022-02-15 PROCEDURE — 6370000000 HC RX 637 (ALT 250 FOR IP): Performed by: STUDENT IN AN ORGANIZED HEALTH CARE EDUCATION/TRAINING PROGRAM

## 2022-02-15 PROCEDURE — 82077 ASSAY SPEC XCP UR&BREATH IA: CPT

## 2022-02-15 PROCEDURE — 84484 ASSAY OF TROPONIN QUANT: CPT

## 2022-02-15 PROCEDURE — 83605 ASSAY OF LACTIC ACID: CPT

## 2022-02-15 PROCEDURE — 99285 EMERGENCY DEPT VISIT HI MDM: CPT

## 2022-02-15 PROCEDURE — 2580000003 HC RX 258: Performed by: STUDENT IN AN ORGANIZED HEALTH CARE EDUCATION/TRAINING PROGRAM

## 2022-02-15 PROCEDURE — 96374 THER/PROPH/DIAG INJ IV PUSH: CPT

## 2022-02-15 PROCEDURE — 6360000004 HC RX CONTRAST MEDICATION: Performed by: STUDENT IN AN ORGANIZED HEALTH CARE EDUCATION/TRAINING PROGRAM

## 2022-02-15 PROCEDURE — 74177 CT ABD & PELVIS W/CONTRAST: CPT

## 2022-02-15 PROCEDURE — 96375 TX/PRO/DX INJ NEW DRUG ADDON: CPT

## 2022-02-15 PROCEDURE — 36415 COLL VENOUS BLD VENIPUNCTURE: CPT

## 2022-02-15 PROCEDURE — 85025 COMPLETE CBC W/AUTO DIFF WBC: CPT

## 2022-02-15 PROCEDURE — 83690 ASSAY OF LIPASE: CPT

## 2022-02-15 PROCEDURE — 83735 ASSAY OF MAGNESIUM: CPT

## 2022-02-15 PROCEDURE — 6360000002 HC RX W HCPCS: Performed by: STUDENT IN AN ORGANIZED HEALTH CARE EDUCATION/TRAINING PROGRAM

## 2022-02-15 PROCEDURE — 96361 HYDRATE IV INFUSION ADD-ON: CPT

## 2022-02-15 PROCEDURE — 81003 URINALYSIS AUTO W/O SCOPE: CPT

## 2022-02-15 PROCEDURE — 93005 ELECTROCARDIOGRAM TRACING: CPT | Performed by: STUDENT IN AN ORGANIZED HEALTH CARE EDUCATION/TRAINING PROGRAM

## 2022-02-15 PROCEDURE — 71045 X-RAY EXAM CHEST 1 VIEW: CPT

## 2022-02-15 RX ORDER — OMEPRAZOLE 40 MG/1
40 CAPSULE, DELAYED RELEASE ORAL
Qty: 10 CAPSULE | Refills: 0 | Status: ON HOLD | OUTPATIENT
Start: 2022-02-15 | End: 2022-10-12 | Stop reason: HOSPADM

## 2022-02-15 RX ORDER — PHENOBARBITAL SODIUM 65 MG/ML
130 INJECTION INTRAMUSCULAR
Status: DISCONTINUED | OUTPATIENT
Start: 2022-02-15 | End: 2022-02-15 | Stop reason: HOSPADM

## 2022-02-15 RX ORDER — 0.9 % SODIUM CHLORIDE 0.9 %
1000 INTRAVENOUS SOLUTION INTRAVENOUS ONCE
Status: COMPLETED | OUTPATIENT
Start: 2022-02-15 | End: 2022-02-15

## 2022-02-15 RX ORDER — ONDANSETRON 4 MG/1
4 TABLET, FILM COATED ORAL 3 TIMES DAILY PRN
Qty: 15 TABLET | Refills: 0 | Status: SHIPPED | OUTPATIENT
Start: 2022-02-15

## 2022-02-15 RX ORDER — SUCRALFATE 1 G/1
1 TABLET ORAL 4 TIMES DAILY
Qty: 40 TABLET | Refills: 0 | Status: ON HOLD | OUTPATIENT
Start: 2022-02-15 | End: 2022-10-12 | Stop reason: HOSPADM

## 2022-02-15 RX ORDER — PHENOBARBITAL SODIUM 65 MG/ML
260 INJECTION INTRAMUSCULAR
Status: DISCONTINUED | OUTPATIENT
Start: 2022-02-15 | End: 2022-02-15 | Stop reason: HOSPADM

## 2022-02-15 RX ORDER — MORPHINE SULFATE 4 MG/ML
4 INJECTION, SOLUTION INTRAMUSCULAR; INTRAVENOUS ONCE
Status: COMPLETED | OUTPATIENT
Start: 2022-02-15 | End: 2022-02-15

## 2022-02-15 RX ORDER — ONDANSETRON 2 MG/ML
4 INJECTION INTRAMUSCULAR; INTRAVENOUS ONCE
Status: COMPLETED | OUTPATIENT
Start: 2022-02-15 | End: 2022-02-15

## 2022-02-15 RX ADMIN — ONDANSETRON 4 MG: 2 INJECTION INTRAMUSCULAR; INTRAVENOUS at 18:47

## 2022-02-15 RX ADMIN — LIDOCAINE HYDROCHLORIDE: 20 SOLUTION ORAL; TOPICAL at 18:51

## 2022-02-15 RX ADMIN — PHENOBARBITAL SODIUM 130 MG: 65 INJECTION INTRAMUSCULAR at 19:13

## 2022-02-15 RX ADMIN — SODIUM CHLORIDE 1000 ML: 9 INJECTION, SOLUTION INTRAVENOUS at 18:53

## 2022-02-15 RX ADMIN — IOPAMIDOL 80 ML: 755 INJECTION, SOLUTION INTRAVENOUS at 20:49

## 2022-02-15 RX ADMIN — MORPHINE SULFATE 4 MG: 4 INJECTION, SOLUTION INTRAMUSCULAR; INTRAVENOUS at 18:48

## 2022-02-15 ASSESSMENT — ENCOUNTER SYMPTOMS
DIARRHEA: 0
SINUS PAIN: 0
ABDOMINAL PAIN: 1
COUGH: 0
NAUSEA: 1
BACK PAIN: 0
SHORTNESS OF BREATH: 0
CONSTIPATION: 0
VOMITING: 1
EYE PAIN: 0

## 2022-02-15 ASSESSMENT — PAIN SCALES - GENERAL
PAINLEVEL_OUTOF10: 10
PAINLEVEL_OUTOF10: 7

## 2022-02-15 ASSESSMENT — PAIN DESCRIPTION - PAIN TYPE
TYPE: ACUTE PAIN
TYPE: ACUTE PAIN

## 2022-02-15 ASSESSMENT — PAIN DESCRIPTION - FREQUENCY
FREQUENCY: CONTINUOUS
FREQUENCY: CONTINUOUS

## 2022-02-15 ASSESSMENT — PAIN DESCRIPTION - LOCATION: LOCATION: CHEST

## 2022-02-15 NOTE — ED TRIAGE NOTES
Pt reports to the ED with complaints of chest pain. Pt states that he is an everyday drinker and hasn't had a drink in three days. He states that he has been having this pain for 4 days. Pt states that the pain is worse when he takes a deep breath. Pt states that the pain starts in his stomach and travels up to the chest and wraps around to his back. Pt states that he has a history of GERD, but this doesn't remind him of his usual symptoms of reflux. Pt alert and oriented. Respirations even and unlabored.

## 2022-02-15 NOTE — ED PROVIDER NOTES
[place] ENCOUNTER          Pt Name: Shamika Meneses MRN: [id number]  Birthdate 1970  Date of evaluation: 2/15/2022  Treating Resident Physician: Ana Ascencio MD  Supervising Physician: Dr. Luis Enrique Hines MD    [address] [place] [address]       Chief Complaint   Patient presents with    Chest Pain     History obtained from the patient. HISTORY OF PRESENT ILLNESS    HPI  Shamika Meneses is a 45 y.o. male who presents to the emergency department for evaluation of pain. Patient states he chronically has abdominal pain for the past 3 to 4 weeks but states over the past 4 days it has progressively become more severe. He states he usually drinks every day, says he drinks usually 3-24 ounces and states that he is trying to quit and has not drank in 2 to 3 days. Some associated chest pain. Mentions nausea and nonbilious nonbloody emesis. Denies any headache fever chills cough  shortness of breath diarrhea constipation leg swelling. He states that in the past he has gone through alcohol withdrawal and has had seizures in the past.  Denies any episodes of pancreatitis in the past.  He states he feels anxious. Denies any hallucinations. Denies any suicidal or homicidal behavior. Denies any other drugs besides smoking marijuana occasionally. Patient denies any new Headache, Fever, Chills, Cough, Shortness of breath, Diarrhea, Constipation and Leg swelling. The patient has no other acute complaints at this time. REVIEW OF SYSTEMS   Review of Systems   Constitutional: Negative for chills and fever. HENT: Negative for ear pain and sinus pain. Eyes: Negative for pain. Respiratory: Negative for cough and shortness of breath. Cardiovascular: Positive for chest pain. Negative for leg swelling. Gastrointestinal: Positive for abdominal pain, nausea and vomiting. Negative for constipation and diarrhea.    Genitourinary: Negative for dysuria and flank pain. Musculoskeletal: Negative for back pain and neck pain. Skin: Negative for wound. Neurological: Negative for seizures and headaches. Psychiatric/Behavioral: Positive for agitation. Negative for confusion, hallucinations and suicidal ideas. The patient is nervous/anxious. PAST MEDICAL AND SURGICAL HISTORY     Past Medical History:   Diagnosis Date    Arthritis     Asthma     COPD [facility]chronic obstructive pulmonary disease) [facility]     GERD (gastroesophageal reflux disease)     Hypertension     Psychiatric problem     Schizophrenia ([facility]     Severe malnutrition ([facility] 1/14/2021    Unspecified cerebral artery occlusion with cerebral infarction      No past surgical history on file.       MEDICATIONS     Current Facility-Administered Medications:     PHENobarbital (LUMINAL) injection 130 mg, 130 mg, IntraVENous, Q1H PRN, 130 mg at 02/15/22 1913 **OR** PHENobarbital (LUMINAL) injection 260 mg, 260 mg, IntraVENous, Q1H PRN **OR** PHENobarbital (LUMINAL) 657.8 mg in sodium chloride 0.9 % 100 mL IVPB, 10 mg/kg, IntraVENous, Daily PRN, Michelle Choi MD    Current Outpatient Medications:     sucralfate (CARAFATE) 1 GM tablet, Take 1 tablet by mouth 4 times daily for 10 days, Disp: 40 tablet, Rfl: 0    omeprazole (PRILOSEC) 40 MG delayed release capsule, Take 1 capsule by mouth every morning (before breakfast) for 10 days, Disp: 10 capsule, Rfl: 0    ondansetron (ZOFRAN) 4 MG tablet, Take 1 tablet by mouth 3 times daily as needed for Nausea or Vomiting, Disp: 15 tablet, Rfl: 0    naloxone (NARCAN) 4 MG/0.1ML LIQD nasal spray, 1 spray by Nasal route as needed for Opioid Reversal, Disp: 1 each, Rfl: 0    predniSONE (DELTASONE) 50 MG tablet, Take 1 tablet by mouth daily, Disp: 5 tablet, Rfl: 0    sertraline (ZOLOFT) 50 MG tablet, Take 1 tablet by mouth daily, Disp: 30 tablet, Rfl: 0    traZODone (DESYREL) 50 MG tablet, Take 1 tablet by mouth nightly as needed for Sleep, Disp: 30 tablet, Rfl: 0    cloNIDine (CATAPRES) 0.1 MG tablet, Take 1 tablet by mouth 2 times daily, Disp: 60 tablet, Rfl: 0    OLANZapine (ZYPREXA) 5 MG tablet, Take 1 tablet by mouth nightly, Disp: 30 tablet, Rfl: 0    pantoprazole (PROTONIX) 40 MG tablet, Take 1 tablet by mouth every morning (before breakfast), Disp: 30 tablet, Rfl: 0    sucralfate (CARAFATE) 1 GM tablet, Take 1 tablet by mouth 4 times daily (before meals and nightly), Disp: 120 tablet, Rfl: 0    thiamine 100 MG tablet, Take 1 tablet by mouth daily, Disp: 30 tablet, Rfl: 0    Multiple Vitamin (MULTIVITAMIN) TABS tablet, Take 1 tablet by mouth daily, Disp: 30 tablet, Rfl: 0      SOCIAL HISTORY     Social History     Social History Narrative    Not on file     Social History     Tobacco Use    Smoking status: Current Every Day Smoker     Packs/day: 1.00     Years: 30.00     Pack years: 30.00    Smokeless tobacco: Never Used   Vaping Use    Vaping Use: Never used   Substance Use Topics    Alcohol use: Not Currently     Comment: x3 24 oz beer    Drug use: Not Currently     Types: Marijuana Kang Munroe     Comment: occasional         ALLERGIES     Allergies   Allergen Reactions    Prochlorperazine Other (See Comments)    Toradol [Ketorolac Tromethamine] Itching and Rash     Able to take Aleve with no allergic reactions      Ketorolac     Compazine [Prochlorperazine Maleate] Anxiety         FAMILY HISTORY     Family History   Problem Relation Age of Onset    Cancer Mother         lung    Other Mother     Cancer Father         copd/lung    Other Sister         pancreatitis    Other Sister         pancreatitis         PREVIOUS RECORDS   Previous records reviewed: Seen last on 10/16/2021 for drug overdose. PHYSICAL EXAM     ED Triage Vitals   BP Temp Temp src Pulse Resp SpO2 Height Weight   -- -- -- -- -- -- -- --     Initial vital signs and nursing assessment reviewed and vitals are/show: abnormal from Tachycardic hypertensive. Pulsoximetry is normal per my interpretation. Additional Vital Signs:  Vitals:    02/15/22 2026   BP: (!) 136/97   Pulse: 99   Resp: 18   Temp:    SpO2: 99%       Physical Exam  Constitutional:       General: He is not in acute distress. Appearance: Normal appearance. He is not ill-appearing, toxic-appearing or diaphoretic. HENT:      Head: Normocephalic and atraumatic. Right Ear: External ear normal.      Left Ear: External ear normal.   Eyes:      General: No scleral icterus. Right eye: No discharge. Left eye: No discharge. Extraocular Movements: Extraocular movements intact. Pupils: Pupils are equal, round, and reactive to light. Cardiovascular:      Rate and Rhythm: Regular rhythm. Tachycardia present. Heart sounds: No murmur heard. No friction rub. No gallop. Comments: Anterior chest wall tenderness to palpation along the esophagus, no crepitus  Pulmonary:      Effort: Pulmonary effort is normal. No respiratory distress. Breath sounds: Normal breath sounds. No stridor. No wheezing, rhonchi or rales. Chest:      Chest wall: No tenderness. Abdominal:      General: Abdomen is flat. There is no distension. Palpations: Abdomen is soft. Tenderness: There is abdominal tenderness. There is no guarding or rebound. Comments: Epigastric and periumbilical tenderness to palpation, no rebound no rigidity no guarding   Musculoskeletal:      Cervical back: Neck supple. Right lower leg: No edema. Left lower leg: No edema. Skin:     General: Skin is warm and dry. Neurological:      Mental Status: He is alert and oriented to person, place, and time. Mental status is at baseline. Psychiatric:         Mood and Affect: Mood normal.         Behavior: Behavior normal.         Thought Content:  Thought content normal.         Judgment: Judgment normal.             MEDICAL DECISION MAKING   Initial Assessment:     67-year-old male presenting to the ED for abdominal pain    Differential diagnoses include but not limited to: Pancreatitis gastritis perforated ulcer GERD cholecystitis appendicitis alcohol intoxication alcohol withdrawal delirium tremens arrhythmia dehydration electrolyte abnormality ACS     Plan:       EKG  Labs  Imaging: CXR and CT Abd/Pel  IV fluids  Phenobarbital alcohol withdrawal order set  Morphine  GI cocktail  Zofran  Discharge        Patient is a 45 y.o. male who was seen and evaluated in the emergency department for gastritis. EKG showed no signs of acute ischemia. Vital signs showed some tachycardia. Imaging showed gastritis and duodenitis but no other acute pathology. Lab work was unremarkable other than a slightly elevated WBC. Lactic acid was within normal limits. I treated patient symptomatically with IV fluids and pain control and antiemetic. Given his time of last drink and his vital signs and agitation and anxiety there was concern for alcohol withdrawal so I gave him a dose of phenobarbital in the ED. Patient's lab work and imaging were suspicious for acute significant pathology in his symptoms likely related to gastritis likely secondary to alcohol use as well as NSAID use. Given his gastritis I did not want to replete patient's mildly symptomatic hypokalemia of 3.4 and patient is able to tolerate p.o. so I felt confident that patient will be able to replete on own with p.o. food. Patient declined wanting to seek alcohol detox inpatient. I discharged patient with GI follow-up as well as Carafate Prilosec and Zofran.                 ED RESULTS   Laboratory results:  Labs Reviewed   CBC WITH AUTO DIFFERENTIAL - Abnormal; Notable for the following components:       Result Value    WBC 12.1 (*)     MCV 95.2 (*)     Segs Absolute 8.0 (*)     All other components within normal limits   COMPREHENSIVE METABOLIC PANEL W/ REFLEX TO MG FOR LOW K - Abnormal; Notable for the following components:    BUN 5 (*)     Potassium reflex Magnesium 3.4 (*)     All other components within normal limits   OSMOLALITY - Abnormal; Notable for the following components:    Osmolality Calc 271.3 (*)     All other components within normal limits   URINE RT REFLEX TO CULTURE   LACTATE, SEPSIS   SPECIMEN REJECTION   BRAIN NATRIURETIC PEPTIDE   LIPASE   TROPONIN   ETHANOL   ANION GAP   GLOMERULAR FILTRATION RATE, ESTIMATED   MAGNESIUM       Radiologic studies results:  CT ABDOMEN PELVIS W IV CONTRAST Additional Contrast? None   Final Result   1. There is thickening of gastric and duodenal folds, compatible with    gastritis and duodenitis. 2. There is apparent thickening of the wall of the descending and sigmoid    colon which may be secondary to under distention or colitis. 3. Small umbilical hernia containing fat. This document has been electronically signed by: Lyly Henderson MD on    02/15/2022 09:07 PM      All CTs at this facility use dose modulation techniques and iterative    reconstructions, and/or weight-based dosing   when appropriate to reduce radiation to a low as reasonably achievable. XR CHEST PORTABLE   Final Result   Stable radiographic appearance of the chest. No interval change since previous study dated 16th of October 2021. Deangelo Case **This report has been created using voice recognition software. It may contain minor errors which are inherent in voice recognition technology. **      Final report electronically signed by DR Joi Hernandez on 2/15/2022 6:35 PM          ED Medications administered this visit:   Medications   PHENobarbital (LUMINAL) injection 130 mg (130 mg IntraVENous Given 2/15/22 1913)     Or   PHENobarbital (LUMINAL) injection 260 mg ( IntraVENous See Alternative 2/15/22 1913)     Or   PHENobarbital (LUMINAL) 657.8 mg in sodium chloride 0.9 % 100 mL IVPB ( IntraVENous See Alternative 2/15/22 1913)   0.9 % sodium chloride bolus (0 mLs IntraVENous Stopped 2/15/22 2030)   ondansetron (ZOFRAN) injection 4 mg (4 mg IntraVENous Given 2/15/22 1847)   aluminum & magnesium hydroxide-simethicone (MAALOX) 30 mL, lidocaine viscous hcl (XYLOCAINE) 5 mL (GI COCKTAIL) ( Oral Given 2/15/22 1851)   morphine injection 4 mg (4 mg IntraVENous Given 2/15/22 1848)   iopamidol (ISOVUE-370) 76 % injection 80 mL (80 mLs IntraVENous Given 2/15/22 2049)         ED COURSE     ED Course as of 02/15/22 2125   Tue Feb 15, 2022   1849 CXR:IMPRESSION:  Stable radiographic appearance of the chest. No interval change since previous study dated 16th of October 2021. . [CR]   2047 CBC shows a WBC of 12. 1CMP shows a potassium of 3.4Lactate lipase alcohol UA BNP troponin magnesium within normal limits [CR]   2111 CT: IMPRESSION:  1. There is thickening of gastric and duodenal folds, compatible with   gastritis and duodenitis. 2. There is apparent thickening of the wall of the descending and sigmoid   colon which may be secondary to under distention or colitis. 3. Small umbilical hernia containing fat. [CR]      ED Course User Index  [CR] Dario Kim MD        Strict return precautions and follow up instructions were discussed with the patient prior to discharge, with which the patient agrees. MEDICATION CHANGES     New Prescriptions    OMEPRAZOLE (PRILOSEC) 40 MG DELAYED RELEASE CAPSULE    Take 1 capsule by mouth every morning (before breakfast) for 10 days    ONDANSETRON (ZOFRAN) 4 MG TABLET    Take 1 tablet by mouth 3 times daily as needed for Nausea or Vomiting    SUCRALFATE (CARAFATE) 1 GM TABLET    Take 1 tablet by mouth 4 times daily for 10 days         FINAL DISPOSITION     Final diagnoses:   Gastritis and duodenitis     Condition: condition: stable  Dispo: Discharge to home       This transcription was electronically signed. Parts of this transcriptions may have been dictated by use of voice recognition software and electronically transcribed, and parts may have been transcribed with the assistance of an ED scribe.  The transcription may contain errors not detected in proofreading. Please refer to my supervising physician's documentation if my documentation differs. Electronically Signed: Dewey Manzo MD, 02/15/22, 9:25 Reynold Hughes MD  Resident  02/15/22 2121       Dewey Manzo MD  Resident  02/15/22 2123       Dewey Manzo MD  Resident  02/15/22 2125    Attending Supervising Physician's [address] Statement  I performed a history and physical examination on the patient and discussed the management with the resident physician. I reviewed and agree with the findings and plan as documented in his note unless described otherwise below. Pt presents the ER complaining primarily of epigastric abd pain, worse when eating, +h/o etoh abuse but has not drank in 3 days. Also c/o chest pain which is mild and he describes it as burning, radiating from epigastric area and worse when eating c/w refulx. CT scan findings noted, will initiate ppi and carafate, gi referral, patient counseled regarding importance of close outpatient f/u and ER return precautions, verbalizes understanding and agreement.     Electronically signed by Issac Sandoval MD on 2/16/22 at 3:12 PM EST         Ramos Marion MD  02/16/22 0278

## 2022-02-16 LAB
EKG ATRIAL RATE: 100 BPM
EKG P AXIS: 34 DEGREES
EKG P-R INTERVAL: 122 MS
EKG Q-T INTERVAL: 350 MS
EKG QRS DURATION: 90 MS
EKG QTC CALCULATION (BAZETT): 451 MS
EKG R AXIS: -57 DEGREES
EKG T AXIS: 63 DEGREES
EKG VENTRICULAR RATE: 100 BPM

## 2022-02-16 NOTE — ED NOTES
Utilize [facility] alcohol withdrawal scale (Based on [place] Modified Alcohol Withdrawal Scale). Tabulate score based on classifications including Tremor, Sweating, Hallucination, Orientation, and Agitation. Tremor: 0  Sweatin  Hallucinations: 0  Orientation: 0  Agitation: 0  Total Score: 0  Action perform as described below     Tremor:  No tremor is 0 points. Tremor on movement is 1 point. Tremor at rest is 2 points. Sweating: No Sweat 0 points. Moist is 1 point. Drenching sweats is 2 points. Hallucinations: No present 0 points. Dissuadable is 1 point. Not dissuadable is 2 points. Orientation: Oriented 0 points. Vague/detached 1 point. Disoriented/no contact 2 points. Agitation: Calm 0 points. Anxious 1 point. Panicky 2 points. Check scale every 2 hours. Discontinue scoring with 4 consecutive scorings of 0. Scale 0: No phenobarbital given. Re-assess every 60 minutes as needed. Scale 1-3: Phenobarbital 130 mg IV over 3 minutes. Re-assess every 60 minutes as needed. May administer every 60 minutes to a maximum dose of phenobarbital 1040 mg in 24 hours! Scale 4-8: Phenobarbital 260 mg IV over 5 minutes. Re-assess every 60 minutes as needed. May administer every 60 minutes to a maximum dose of phenobarbital 1040mg in 24 hours! Scale 9-10: Transfer to ICU (if not already in ICU). Administer 10mg/kg phenobarbital IV over 60 minutes. Maximum dose phenobarbital is 1040mg in 24 hours!          Josep Lovell RN  02/15/22 2026

## 2022-02-16 NOTE — ED NOTES
Pt states that his pain is now at a 7/10. Pt sitting comfortably on the cot. Respirations even and unlabored. Denies a need for anything at this time.      Donald Dodson  02/15/22 2029

## 2022-02-16 NOTE — ED NOTES
Utilize [facility] alcohol withdrawal scale (Based on [place] Modified Alcohol Withdrawal Scale). Tabulate score based on classifications including Tremor, Sweating, Hallucination, Orientation, and Agitation. Tremor: 1  Sweatin  Hallucinations: 0  Orientation: 0  Agitation: 0  Total Score: 1  Action perform as described below     Tremor:  No tremor is 0 points. Tremor on movement is 1 point. Tremor at rest is 2 points. Sweating: No Sweat 0 points. Moist is 1 point. Drenching sweats is 2 points. Hallucinations: No present 0 points. Dissuadable is 1 point. Not dissuadable is 2 points. Orientation: Oriented 0 points. Vague/detached 1 point. Disoriented/no contact 2 points. Agitation: Calm 0 points. Anxious 1 point. Panicky 2 points. Check scale every 2 hours. Discontinue scoring with 4 consecutive scorings of 0. Scale 0: No phenobarbital given. Re-assess every 60 minutes as needed. Scale 1-3: Phenobarbital 130 mg IV over 3 minutes. Re-assess every 60 minutes as needed. May administer every 60 minutes to a maximum dose of phenobarbital 1040 mg in 24 hours! Scale 4-8: Phenobarbital 260 mg IV over 5 minutes. Re-assess every 60 minutes as needed. May administer every 60 minutes to a maximum dose of phenobarbital 1040mg in 24 hours! Scale 9-10: Transfer to ICU (if not already in ICU). Administer 10mg/kg phenobarbital IV over 60 minutes. Maximum dose phenobarbital is 1040mg in 24 hours!          Sil Joshua RN  02/15/22 3588

## 2022-10-09 ENCOUNTER — APPOINTMENT (OUTPATIENT)
Dept: GENERAL RADIOLOGY | Age: 52
DRG: 383 | End: 2022-10-09
Payer: MEDICAID

## 2022-10-09 ENCOUNTER — HOSPITAL ENCOUNTER (INPATIENT)
Age: 52
LOS: 3 days | Discharge: HOME | DRG: 383 | End: 2022-10-12
Attending: EMERGENCY MEDICINE | Admitting: INTERNAL MEDICINE
Payer: MEDICAID

## 2022-10-09 DIAGNOSIS — S61.011A LACERATION OF RIGHT THUMB WITHOUT FOREIGN BODY WITHOUT DAMAGE TO NAIL, INITIAL ENCOUNTER: Primary | ICD-10-CM

## 2022-10-09 DIAGNOSIS — L03.113 CELLULITIS OF RIGHT UPPER EXTREMITY: ICD-10-CM

## 2022-10-09 LAB
ANION GAP SERPL CALCULATED.3IONS-SCNC: 11 MEQ/L (ref 8–16)
BASOPHILS # BLD: 0.5 %
BASOPHILS ABSOLUTE: 0.1 THOU/MM3 (ref 0–0.1)
BUN BLDV-MCNC: 10 MG/DL (ref 7–22)
CALCIUM SERPL-MCNC: 9.3 MG/DL (ref 8.5–10.5)
CHLORIDE BLD-SCNC: 103 MEQ/L (ref 98–111)
CO2: 24 MEQ/L (ref 23–33)
CREAT SERPL-MCNC: 0.6 MG/DL (ref 0.4–1.2)
EOSINOPHIL # BLD: 1.4 %
EOSINOPHILS ABSOLUTE: 0.2 THOU/MM3 (ref 0–0.4)
ERYTHROCYTE [DISTWIDTH] IN BLOOD BY AUTOMATED COUNT: 12.2 % (ref 11.5–14.5)
ERYTHROCYTE [DISTWIDTH] IN BLOOD BY AUTOMATED COUNT: 43.7 FL (ref 35–45)
GFR SERPL CREATININE-BSD FRML MDRD: > 90 ML/MIN/1.73M2
GLUCOSE BLD-MCNC: 79 MG/DL (ref 70–108)
HCT VFR BLD CALC: 44.2 % (ref 42–52)
HEMOGLOBIN: 14.6 GM/DL (ref 14–18)
IMMATURE GRANS (ABS): 0.05 THOU/MM3 (ref 0–0.07)
IMMATURE GRANULOCYTES: 0.3 %
LYMPHOCYTES # BLD: 19.9 %
LYMPHOCYTES ABSOLUTE: 3.2 THOU/MM3 (ref 1–4.8)
MAGNESIUM: 2.1 MG/DL (ref 1.6–2.4)
MCH RBC QN AUTO: 32.1 PG (ref 26–33)
MCHC RBC AUTO-ENTMCNC: 33 GM/DL (ref 32.2–35.5)
MCV RBC AUTO: 97.1 FL (ref 80–94)
MONOCYTES # BLD: 8.9 %
MONOCYTES ABSOLUTE: 1.5 THOU/MM3 (ref 0.4–1.3)
NUCLEATED RED BLOOD CELLS: 0 /100 WBC
OSMOLALITY CALCULATION: 273.6 MOSMOL/KG (ref 275–300)
PLATELET # BLD: 324 THOU/MM3 (ref 130–400)
PMV BLD AUTO: 10.1 FL (ref 9.4–12.4)
POTASSIUM REFLEX MAGNESIUM: 3.5 MEQ/L (ref 3.5–5.2)
RBC # BLD: 4.55 MILL/MM3 (ref 4.7–6.1)
SEG NEUTROPHILS: 69 %
SEGMENTED NEUTROPHILS ABSOLUTE COUNT: 11.2 THOU/MM3 (ref 1.8–7.7)
SODIUM BLD-SCNC: 138 MEQ/L (ref 135–145)
WBC # BLD: 16.3 THOU/MM3 (ref 4.8–10.8)

## 2022-10-09 PROCEDURE — 96376 TX/PRO/DX INJ SAME DRUG ADON: CPT

## 2022-10-09 PROCEDURE — 96365 THER/PROPH/DIAG IV INF INIT: CPT

## 2022-10-09 PROCEDURE — 36415 COLL VENOUS BLD VENIPUNCTURE: CPT

## 2022-10-09 PROCEDURE — G0378 HOSPITAL OBSERVATION PER HR: HCPCS

## 2022-10-09 PROCEDURE — 96372 THER/PROPH/DIAG INJ SC/IM: CPT

## 2022-10-09 PROCEDURE — 86140 C-REACTIVE PROTEIN: CPT

## 2022-10-09 PROCEDURE — 6370000000 HC RX 637 (ALT 250 FOR IP): Performed by: STUDENT IN AN ORGANIZED HEALTH CARE EDUCATION/TRAINING PROGRAM

## 2022-10-09 PROCEDURE — 96366 THER/PROPH/DIAG IV INF ADDON: CPT

## 2022-10-09 PROCEDURE — 73130 X-RAY EXAM OF HAND: CPT

## 2022-10-09 PROCEDURE — 83735 ASSAY OF MAGNESIUM: CPT

## 2022-10-09 PROCEDURE — 85025 COMPLETE CBC W/AUTO DIFF WBC: CPT

## 2022-10-09 PROCEDURE — 96367 TX/PROPH/DG ADDL SEQ IV INF: CPT

## 2022-10-09 PROCEDURE — 99222 1ST HOSP IP/OBS MODERATE 55: CPT | Performed by: INTERNAL MEDICINE

## 2022-10-09 PROCEDURE — 2580000003 HC RX 258: Performed by: STUDENT IN AN ORGANIZED HEALTH CARE EDUCATION/TRAINING PROGRAM

## 2022-10-09 PROCEDURE — 80048 BASIC METABOLIC PNL TOTAL CA: CPT

## 2022-10-09 PROCEDURE — 2500000003 HC RX 250 WO HCPCS: Performed by: STUDENT IN AN ORGANIZED HEALTH CARE EDUCATION/TRAINING PROGRAM

## 2022-10-09 PROCEDURE — 87040 BLOOD CULTURE FOR BACTERIA: CPT

## 2022-10-09 PROCEDURE — 1200000000 HC SEMI PRIVATE

## 2022-10-09 PROCEDURE — 6370000000 HC RX 637 (ALT 250 FOR IP): Performed by: PHYSICIAN ASSISTANT

## 2022-10-09 PROCEDURE — 99285 EMERGENCY DEPT VISIT HI MDM: CPT

## 2022-10-09 PROCEDURE — 6360000002 HC RX W HCPCS: Performed by: STUDENT IN AN ORGANIZED HEALTH CARE EDUCATION/TRAINING PROGRAM

## 2022-10-09 PROCEDURE — 96375 TX/PRO/DX INJ NEW DRUG ADDON: CPT

## 2022-10-09 RX ORDER — OLANZAPINE 5 MG/1
5 TABLET ORAL NIGHTLY
Status: DISCONTINUED | OUTPATIENT
Start: 2022-10-09 | End: 2022-10-12 | Stop reason: HOSPADM

## 2022-10-09 RX ORDER — POLYETHYLENE GLYCOL 3350 17 G/17G
17 POWDER, FOR SOLUTION ORAL DAILY PRN
Status: DISCONTINUED | OUTPATIENT
Start: 2022-10-09 | End: 2022-10-12 | Stop reason: HOSPADM

## 2022-10-09 RX ORDER — PANTOPRAZOLE SODIUM 40 MG/1
40 TABLET, DELAYED RELEASE ORAL
Status: DISCONTINUED | OUTPATIENT
Start: 2022-10-10 | End: 2022-10-12 | Stop reason: HOSPADM

## 2022-10-09 RX ORDER — DOXYCYCLINE HYCLATE 100 MG
100 TABLET ORAL 2 TIMES DAILY
Qty: 14 TABLET | Refills: 0 | Status: SHIPPED | OUTPATIENT
Start: 2022-10-09 | End: 2022-10-09 | Stop reason: CLARIF

## 2022-10-09 RX ORDER — TRAZODONE HYDROCHLORIDE 50 MG/1
50 TABLET ORAL NIGHTLY PRN
Status: DISCONTINUED | OUTPATIENT
Start: 2022-10-09 | End: 2022-10-12 | Stop reason: HOSPADM

## 2022-10-09 RX ORDER — ACETAMINOPHEN 325 MG/1
650 TABLET ORAL EVERY 6 HOURS PRN
Status: DISCONTINUED | OUTPATIENT
Start: 2022-10-09 | End: 2022-10-12 | Stop reason: HOSPADM

## 2022-10-09 RX ORDER — MORPHINE SULFATE 4 MG/ML
4 INJECTION, SOLUTION INTRAMUSCULAR; INTRAVENOUS ONCE
Status: COMPLETED | OUTPATIENT
Start: 2022-10-09 | End: 2022-10-09

## 2022-10-09 RX ORDER — DOXYCYCLINE HYCLATE 100 MG
100 TABLET ORAL ONCE
Status: COMPLETED | OUTPATIENT
Start: 2022-10-09 | End: 2022-10-09

## 2022-10-09 RX ORDER — LANOLIN ALCOHOL/MO/W.PET/CERES
100 CREAM (GRAM) TOPICAL DAILY
Status: DISCONTINUED | OUTPATIENT
Start: 2022-10-09 | End: 2022-10-12 | Stop reason: HOSPADM

## 2022-10-09 RX ORDER — TRAMADOL HYDROCHLORIDE 50 MG/1
100 TABLET ORAL EVERY 6 HOURS PRN
Status: DISCONTINUED | OUTPATIENT
Start: 2022-10-09 | End: 2022-10-09

## 2022-10-09 RX ORDER — SODIUM CHLORIDE 0.9 % (FLUSH) 0.9 %
5-40 SYRINGE (ML) INJECTION PRN
Status: DISCONTINUED | OUTPATIENT
Start: 2022-10-09 | End: 2022-10-12 | Stop reason: HOSPADM

## 2022-10-09 RX ORDER — SODIUM CHLORIDE 9 MG/ML
INJECTION, SOLUTION INTRAVENOUS PRN
Status: DISCONTINUED | OUTPATIENT
Start: 2022-10-09 | End: 2022-10-12 | Stop reason: HOSPADM

## 2022-10-09 RX ORDER — ONDANSETRON 4 MG/1
4 TABLET, ORALLY DISINTEGRATING ORAL EVERY 8 HOURS PRN
Status: DISCONTINUED | OUTPATIENT
Start: 2022-10-09 | End: 2022-10-12 | Stop reason: HOSPADM

## 2022-10-09 RX ORDER — SUCRALFATE 1 G/1
1 TABLET ORAL
Status: DISCONTINUED | OUTPATIENT
Start: 2022-10-09 | End: 2022-10-12 | Stop reason: HOSPADM

## 2022-10-09 RX ORDER — CLINDAMYCIN PHOSPHATE 600 MG/50ML
600 INJECTION INTRAVENOUS ONCE
Status: COMPLETED | OUTPATIENT
Start: 2022-10-09 | End: 2022-10-09

## 2022-10-09 RX ORDER — TRAMADOL HYDROCHLORIDE 50 MG/1
50 TABLET ORAL EVERY 6 HOURS PRN
Status: DISCONTINUED | OUTPATIENT
Start: 2022-10-09 | End: 2022-10-09

## 2022-10-09 RX ORDER — SODIUM CHLORIDE 0.9 % (FLUSH) 0.9 %
5-40 SYRINGE (ML) INJECTION EVERY 12 HOURS SCHEDULED
Status: DISCONTINUED | OUTPATIENT
Start: 2022-10-09 | End: 2022-10-12 | Stop reason: HOSPADM

## 2022-10-09 RX ORDER — CLONIDINE HYDROCHLORIDE 0.1 MG/1
0.1 TABLET ORAL 2 TIMES DAILY
Status: DISCONTINUED | OUTPATIENT
Start: 2022-10-09 | End: 2022-10-12 | Stop reason: HOSPADM

## 2022-10-09 RX ORDER — 0.9 % SODIUM CHLORIDE 0.9 %
1000 INTRAVENOUS SOLUTION INTRAVENOUS ONCE
Status: COMPLETED | OUTPATIENT
Start: 2022-10-09 | End: 2022-10-09

## 2022-10-09 RX ORDER — ONDANSETRON 2 MG/ML
4 INJECTION INTRAMUSCULAR; INTRAVENOUS EVERY 6 HOURS PRN
Status: DISCONTINUED | OUTPATIENT
Start: 2022-10-09 | End: 2022-10-12 | Stop reason: HOSPADM

## 2022-10-09 RX ORDER — ENOXAPARIN SODIUM 100 MG/ML
40 INJECTION SUBCUTANEOUS EVERY 24 HOURS
Status: DISCONTINUED | OUTPATIENT
Start: 2022-10-09 | End: 2022-10-12 | Stop reason: HOSPADM

## 2022-10-09 RX ORDER — HYDROCODONE BITARTRATE AND ACETAMINOPHEN 5; 325 MG/1; MG/1
1 TABLET ORAL EVERY 6 HOURS PRN
Status: DISCONTINUED | OUTPATIENT
Start: 2022-10-09 | End: 2022-10-10

## 2022-10-09 RX ADMIN — CLINDAMYCIN PHOSPHATE 600 MG: 600 INJECTION, SOLUTION INTRAVENOUS at 15:24

## 2022-10-09 RX ADMIN — OLANZAPINE 5 MG: 5 TABLET, FILM COATED ORAL at 20:39

## 2022-10-09 RX ADMIN — SODIUM CHLORIDE 1000 ML: 9 INJECTION, SOLUTION INTRAVENOUS at 15:23

## 2022-10-09 RX ADMIN — DOXYCYCLINE HYCLATE 100 MG: 100 TABLET, COATED ORAL at 14:45

## 2022-10-09 RX ADMIN — CLONIDINE HYDROCHLORIDE 0.1 MG: 0.1 TABLET ORAL at 20:39

## 2022-10-09 RX ADMIN — ENOXAPARIN SODIUM 40 MG: 100 INJECTION SUBCUTANEOUS at 18:31

## 2022-10-09 RX ADMIN — MORPHINE SULFATE 4 MG: 4 INJECTION, SOLUTION INTRAMUSCULAR; INTRAVENOUS at 17:11

## 2022-10-09 RX ADMIN — SUCRALFATE 1 G: 1 TABLET ORAL at 20:39

## 2022-10-09 RX ADMIN — Medication 1250 MG: at 20:33

## 2022-10-09 RX ADMIN — SERTRALINE 50 MG: 50 TABLET, FILM COATED ORAL at 18:32

## 2022-10-09 RX ADMIN — CEFEPIME 1000 MG: 1 INJECTION, POWDER, FOR SOLUTION INTRAMUSCULAR; INTRAVENOUS at 18:37

## 2022-10-09 RX ADMIN — TRAMADOL HYDROCHLORIDE 100 MG: 50 TABLET, COATED ORAL at 18:12

## 2022-10-09 RX ADMIN — HYDROCODONE BITARTRATE AND ACETAMINOPHEN 1 TABLET: 5; 325 TABLET ORAL at 22:23

## 2022-10-09 RX ADMIN — MORPHINE SULFATE 4 MG: 4 INJECTION, SOLUTION INTRAMUSCULAR; INTRAVENOUS at 15:37

## 2022-10-09 RX ADMIN — Medication 100 MG: at 18:32

## 2022-10-09 ASSESSMENT — ENCOUNTER SYMPTOMS
RECTAL PAIN: 0
FACIAL SWELLING: 0
BLOOD IN STOOL: 0
COLOR CHANGE: 1
SHORTNESS OF BREATH: 0
EYE DISCHARGE: 0
PHOTOPHOBIA: 0
APNEA: 0
EYE REDNESS: 0
CHEST TIGHTNESS: 0
ABDOMINAL PAIN: 0
CHOKING: 0
SINUS PRESSURE: 0
SINUS PAIN: 0
EYE PAIN: 0
COUGH: 0
ANAL BLEEDING: 0

## 2022-10-09 ASSESSMENT — PAIN DESCRIPTION - ORIENTATION
ORIENTATION: RIGHT

## 2022-10-09 ASSESSMENT — PAIN DESCRIPTION - DESCRIPTORS
DESCRIPTORS: ACHING
DESCRIPTORS: SHARP;BURNING
DESCRIPTORS: BURNING
DESCRIPTORS: ACHING
DESCRIPTORS: BURNING
DESCRIPTORS: ACHING

## 2022-10-09 ASSESSMENT — PAIN DESCRIPTION - LOCATION
LOCATION: HAND
LOCATION: ARM
LOCATION: HAND
LOCATION: HAND

## 2022-10-09 ASSESSMENT — PAIN SCALES - GENERAL
PAINLEVEL_OUTOF10: 10
PAINLEVEL_OUTOF10: 8
PAINLEVEL_OUTOF10: 8
PAINLEVEL_OUTOF10: 10
PAINLEVEL_OUTOF10: 8
PAINLEVEL_OUTOF10: 9
PAINLEVEL_OUTOF10: 6

## 2022-10-09 ASSESSMENT — PAIN - FUNCTIONAL ASSESSMENT
PAIN_FUNCTIONAL_ASSESSMENT: 0-10
PAIN_FUNCTIONAL_ASSESSMENT: ACTIVITIES ARE NOT PREVENTED

## 2022-10-09 NOTE — ED PROVIDER NOTES
[facility]  EMERGENCY MEDICINE ATTENDING ATTESTATION      Evaluation of Sonam Dougherty. .   Case discussed and care plan developed with resident physician. I agree with the resident physician documentation and plan as documented by her, except if my documentation differs. Patient seen, interviewed and examined by me. I reviewed the medical, surgical, family and social history, medications and allergies. I have reviewed the nursing documentation. I have reviewed the patient's vital signs and are abnormal from hypertension  per my interpretation. Body mass index is 22.81 kg/m². Pulsoxymetry is normal per my interpretation. Brief H&P   Patient c/o sustaining a laceration to his right hand while cooking 4 days ago. Did not seek care at that time and close it on his own. States that today when he was working with [employer] the wound came open and started hurting again. He was at that point that he noticed that his entire hand was swelling and red, and that he had redness extending to the forearm. Here currently complains of pain. Physical exam is notable for well appearing, infected appearing wound on the proximal dorsum of the right first finger, some purulent drainage present. There is erythema and induration of the dorsum of the hand with streaking going up to the proximal forearm. Medical Decision Making   MDM:   Infected laceration with significant cellulitis  Plan:   IV line, labs  Start antibiotics  Observation in the ED while awaiting results  After reevaluation careful consideration decided to admit this patient instead of allowing initial outpatient treatment due to the extent of the cellulitis. Please see the resident physician completed note for final disposition except as documented on this attestation. I have reviewed and interpreted all available lab, radiology and ekg results available at the moment.   Diagnosis, treatment and disposition plans were discussed and agreed upon by patient. This transcription was electronically signed. It was dictated by use of voice recognition software and electronically transcribed. The transcription may contain errors not detected in proofreading.      I performed direct supervision and was present for the critical portion following procedures: None  Critical care time on this case: None    Electronically signed by Colleen Lim MD on 10/9/22 at 3:12 PM EDT        Colleen Lim MD  10/09/22 3110

## 2022-10-09 NOTE — ED TRIAGE NOTES
Pt presents to the ED with c/o and old thumb laceration and right hand sweeling. Pt reports he cut his thumb open about 4 days ago and then yesterday and was hanging dry wall, swung his hammer and it all split open again. Pt rates pain 10/10. Pt right hand red and swollen at this time. VSS.

## 2022-10-09 NOTE — ED NOTES
Assumed care at this time. Pt to room 31 from intake in stable condition. Pt and vs assessed. Pt medicated per MAR and updated on POC. Pt requesting medication for pain. RN will notify provider.  Pt denies any other needs and is stable at this time     Willene Becerril, [place]  10/09/22 1526

## 2022-10-09 NOTE — PROGRESS NOTES
[facility] Pharmacokinetic Monitoring Service - Vancomycin     Tessy Kitchen is a 47 y.o. male starting on vancomycin therapy for SSTI. Pharmacy consulted by Dr. Nani Richardson for monitoring and adjustment. Target Concentration: Goal trough of 10-15 mg/L and AUC/LUDMILA <500 mg*hr/L    Additional Antimicrobials: Cefepime    Pertinent Laboratory Values: Wt Readings from Last 1 Encounters:   10/09/22 150 lb (68 kg)     Temp Readings from Last 1 Encounters:   10/09/22 97.7 °F (36.5 °C) (Oral)     Estimated Creatinine Clearance: 137 mL/min (based on SCr of 0.6 mg/dL). Recent Labs     10/09/22  1455 10/09/22  1500   CREATININE  --  0.6   WBC 16.3*  --      Pertinent Cultures:  Culture Date Source Results   10/9/22 Blood x 2 Sent   MRSA Nasal Swab: N/A. Non-respiratory infection.     Plan:  Dosing recommendations based on Bayesian software  Start vancomycin 1250 mg IV every 12 hours  Anticipated AUC of 497 and trough concentration of 14.0 at steady state  Renal labs as indicated   Vancomycin concentration not yet ordered   Pharmacy will continue to monitor patient and adjust therapy as indicated    Thank you for the consult,  Gabriella Osman [facility]  10/9/2022 5:54 PM

## 2022-10-09 NOTE — H&P
Hospitalist H&P Note       Patient:  Gordon Ritchie. YOB: 1970    MRN: [id number]     Acct: [de-identified]    PCP: No primary care provider on file. Date of Admission: 10/9/2022    Date of Service: Pt seen/examined on 10/09/22  and Admitted to Inpatient with expected LOS greater than two midnights due to medical therapy. Chief Complaint: Right thumb wound    Assessment and Plan:    1.)  Acute right arm cellulitis: Concern for Tenosynovitis. 3 cm laceration occurring just above the distal metacarpal joint. Was oozing pus in the ER, cleaned prior to examination. The right thumb is swollen and the swelling extends into the dorsum of the right hand. Erythema surrounds the right thumb, dorsum of the right hand, and extends and ribbons down to the right elbow. The patient does state numbness in his first 3 digits. Cut initially occurred 4 days ago, patient has not received antibiotics up until this ER visit. Patient has elevated white count at 16.3.  -Consult orthopedics due to potential need for I&D  -Treat with cefepime and vancomycin (rule out tenosynovitis prior to de-escalation)  -Continue with adequate pain control, tramadol ordered every 6 hours, may need breakthrough morphine  -Treat fevers with Tylenol  -Continue to trend CBC  -Blood cultures ordered  -Checking CRP  -X-ray of the hand does not show signs of osteomyelitis    2.)  Stable COPD, presumed moderate stage: No previous PFTs or inhaler use in chart. Patient does have expiratory wheezes at baseline. Patient does have a significant smoking history of about 30 pack years. Recently finished a prednisone burst.  -Start on Stiolto, discharged with an albuterol inhaler as needed as well  -Recommend follow-up with PFTs outpatient  -See below for smoking history    3.)  Tobacco abuse: Current everyday smoker with approximately 1 pack/day and 30 pack years.   Recommend low-dose CT scan outpatient for lung cancer screening.  -Noted below, patient does have a significant mass in the right upper lobe    4.)  Right upper lobe pulmonary nodule: Highly suspicious for malignancy, 3.4 cm in diameter located in the right upper lobe more laterally. No mediastinal lymphadenopathy. Seen on CT on 10/2021.  -As above, patient will need repeat CT scan outpatient  -Follow with PCP for any further work-up    5.)  Hx of HTN: Patient takes clonidine 0.1 mg twice daily. Continue inpatient. 6.)  Hx of GERD: Patient takes Protonix 40 mg daily and sulcal fate, continue inpatient. 7.)  Hx of schizophrenia: Patient takes Zoloft, trazodone, Zyprexa outpatient. Continuing medications inpatient. Recommend follow-up with PCP outpatient for further titration of medications. DVT Prophylaxis: Lovenox, currently on hold in case of orthopedic intervention. History Of Present Illness:    47 y.o. male who presented to [address] [place] [address] with a past medical history of COPD, GERD, HTN, schizophrenia, malnutrition, and previous CVA. He presented to the ER due to pain, swelling, and redness originating from his right thumb. He states he cut his thumb approximately 4 days ago when he was washing dishes and did not seek care at that time. He just closed the wound up with direct pressure and wrapped it with a Band-Aid. 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 He did not seek care at that time. He dressed the wound and went to sleep. In the morning, he woke up with increased pain and swelling to the area along with redness and numbness to the first 3 digits. He proceeded to go to the ER at this time due to increased pain and decreased ability to move his fingers. He was seeking antibiotics and pain medications so that he could return to work as soon as possible. In the ER, it is noted that there is a 3 cm linear laceration on his right thumb that has pus oozing from the wound.   The hand is swollen on the dorsal aspect and his thumb is swollen around the circumference. There is also noted red ribbons of erythema stretching down his forearm to his elbow. He has difficulty moving his fingers due to swelling. Patient does state associated fevers along with the numbness and tingling and a \"glasslike\" sensation of his first 3 digits and by his elbow. He denies any nausea, vomiting, chest pain, shortness of breath, diarrhea, abdominal pain, or swelling in his lower limbs. He denies any other lacerations or open wounds. Past Medical History:          Diagnosis Date    Arthritis     Asthma     COPD (chronic obstructive pulmonary disease) ([facility])     GERD (gastroesophageal reflux disease)     Hypertension     Psychiatric problem     Schizophrenia ([facility]     Severe malnutrition ([facility] 1/14/2021    Unspecified cerebral artery occlusion with cerebral infarction        Past Surgical History:      No past surgical history on file. Medications Prior to Admission:      Prior to Admission medications    Medication Sig Start Date End Date Taking?  Authorizing Provider   sucralfate (CARAFATE) 1 GM tablet Take 1 tablet by mouth 4 times daily for 10 days 2/15/22 2/25/22  Yandel Ornelas MD   omeprazole (PRILOSEC) 40 MG delayed release capsule Take 1 capsule by mouth every morning (before breakfast) for 10 days 2/15/22 2/25/22  Yandel Ornelas MD   ondansetron [facility]) 4 MG tablet Take 1 tablet by mouth 3 times daily as needed for Nausea or Vomiting 2/15/22   Yandel Ornelas MD   naloxone [facility]) 4 MG/0.1ML LIQD nasal spray 1 spray by Nasal route as needed for Opioid Reversal 10/16/21   Yandel Ornelas MD   predniSONE (DELTASONE) 50 MG tablet Take 1 tablet by mouth daily 4/27/21   RINA Orantes - CNP   sertraline (ZOLOFT) 50 MG tablet Take 1 tablet by mouth daily 1/26/21   Katelynn Luz MD   traZODone (DESYREL) 50 MG tablet Take 1 tablet by mouth nightly as needed for Sleep 1/25/21 Caty Guo MD   cloNIDine (CATAPRES) 0.1 MG tablet Take 1 tablet by mouth 2 times daily 1/25/21   Caty Guo MD   OLANZapine (ZYPREXA) 5 MG tablet Take 1 tablet by mouth nightly 1/25/21   Caty Guo MD   pantoprazole (PROTONIX) 40 MG tablet Take 1 tablet by mouth every morning (before breakfast) 1/26/21   Caty Guo MD   sucralfate (CARAFATE) 1 GM tablet Take 1 tablet by mouth 4 times daily (before meals and nightly) 1/25/21   Caty Guo MD   thiamine 100 MG tablet Take 1 tablet by mouth daily 1/25/21   Caty Guo MD   Multiple Vitamin (MULTIVITAMIN) TABS tablet Take 1 tablet by mouth daily 1/26/21   Caty Guo MD       Allergies:  Prochlorperazine, Toradol [ketorolac tromethamine], Ketorolac, and Compazine [prochlorperazine maleate]    Social History:      The patient currently lives at home. TOBACCO:   reports that he has been smoking. He has a 30.00 pack-year smoking history. He has never used smokeless tobacco.  ETOH:   reports that he does not currently use alcohol. Family History:       Reviewed in detail and negative for DM, CAD, Cancer, CVA. Positive as follows:        Problem Relation Age of Onset    Cancer Mother         lung    Other Mother     Cancer Father         copd/lung    Other Sister         pancreatitis    Other Sister         pancreatitis       Diet:  No diet orders on file    REVIEW OF SYSTEMS:   Pertinent positives as noted in the HPI. All other systems reviewed and negative. PHYSICAL EXAM:    BP (!) 141/108   Pulse 72   Temp 97.7 °F (36.5 °C) (Oral)   Resp 20   Ht 5' 8\" (1.727 m)   Wt 150 lb (68 kg)   SpO2 100%   BMI 22.81 kg/m²     General appearance:  No apparent distress, though appears mildly anxious, appears stated age and cooperative. HEENT:  Normal cephalic, atraumatic without obvious deformity. Pupils equal, round, and reactive to light. Extra ocular muscles intact. Conjunctivae/corneas clear. Neck: Supple, with full range of motion. No jugular venous distention. Trachea midline. Respiratory:  Normal respiratory effort. Diffuse expiratory wheezes heard throughout all lung fields (patient states that this is baseline)  Cardiovascular:  Regular rate and rhythm with normal S1/S2 without murmurs, rubs or gallops. Abdomen: Soft, non-tender, non-distended with normal bowel sounds. Musculoskeletal:  No clubbing, cyanosis or edema bilaterally. Full range of motion without deformity. Skin: Skin color, texture, turgor normal.  No rashes or lesions. Patient's right thumb has a 3 cm laceration with a now clean base seen (was cleaned by ED staff), the skin around the site is erythematous and extends circumferentially down the right thumb and onto the dorsum of the right hand. The erythema extends and ribbons down to the elbow. There is difficulty moving fingers due to the swelling. He also states numbness and tingling in the first 3 digits along with a \"glasslike\" sensation over the fourth and fifth digit of his hand and over the elbow. There is no active bleeding. An abscess was not appreciated on palpation. Neurologic:  Neurovascularly intact without any focal sensory/motor deficits. Cranial nerves: II-XII intact, grossly non-focal.  Psychiatric:  Alert and oriented, thought content appropriate, normal insight  Capillary Refill: Brisk,< 3 seconds   Peripheral Pulses: +2 palpable, equal bilaterally       Labs:     Recent Labs     10/09/22  1455   WBC 16.3*   HGB 14.6   HCT 44.2        Recent Labs     10/09/22  1500      K 3.5      CO2 24   BUN 10   CREATININE 0.6   CALCIUM 9.3     No results for input(s): AST, ALT, BILIDIR, BILITOT, ALKPHOS in the last 72 hours. No results for input(s): INR in the last 72 hours. No results for input(s): Isac Ebbs in the last 72 hours.     Urinalysis:      Lab Results   Component Value Date/Time    NITRU NEGATIVE 02/15/2022 07:30 PM    WBCUA 0-2 12/31/2015 02:40 PM    BACTERIA NONE 12/31/2015 02:40 PM    RBCUA 5-10 12/31/2015 02:40 PM    BLOODU NEGATIVE 02/15/2022 07:30 PM    SPECGRAV 1.011 07/16/2015 03:25 PM    GLUCOSEU NEGATIVE 02/15/2022 07:30 PM       Intake & Output:  No intake/output data recorded. No intake/output data recorded. Radiology:     Right hand x-ray shows no fracture, dislocation, or foreign body. Code Status: Prior    PT/OT Eval Status: Not consulted    [facility]    Diagnosis Date Noted    Right arm cellulitis [L03.113] 10/09/2022     Priority: Medium       Thank you No primary care provider on file. for the opportunity to be involved in this patient's care.     Electronically signed by Drew Bonds DO on 10/9/2022 at 4:50 PM

## 2022-10-09 NOTE — ED NOTES
ED to inpatient nurses report    Chief Complaint   Patient presents with    Laceration     old    Hand Injury     Right, swelling      Present to ED from home  LOC: alert and orientated to name, place, date  Vital signs   Vitals:    10/09/22 1326 10/09/22 1524 10/09/22 1556   BP: (!) 138/98 (!) 130/100 (!) 137/98   Pulse: 100 87 72   Resp: 17 18 18   Temp: 97.4 °F (36.3 °C)     TempSrc: Oral     SpO2: 100% 99% 97%   Weight: 150 lb (68 kg)     Height: 5' 8\" (1.727 m)        Oxygen Baseline 97%    Current needs required RA   LDAs:   Peripheral IV 10/09/22 Left Antecubital (Active)   Site Assessment Clean, dry & intact 10/09/22 1556   Line Status Infusing 10/09/22 1556   Phlebitis Assessment No symptoms 10/09/22 1556   Infiltration Assessment 0 10/09/22 1556   Dressing Status New dressing applied;Clean, dry & intact 10/09/22 1518   Dressing Type Transparent 10/09/22 1518   Dressing Intervention New 10/09/22 1518     Mobility: Independent  Pending ED orders: none  Present condition: stable    Swallow Screening Pass  Preferred Language: [place]     Electronically signed by Kaitlin Bernstein RN on 10/9/2022 at 4:07 PM       Kaitlin Bernstein [facility]  10/09/22 1606

## 2022-10-09 NOTE — ED NOTES
Pt transported to [address]. Floor called prior to transport, spoke with [facility].      Madalyn Asher  10/09/22 1255

## 2022-10-09 NOTE — ED PROVIDER NOTES
[facility] ENCOUNTER          Pt Name: Jenny Macedo. MRN: [id number]  Birthdate 1970  Date of evaluation: 10/9/2022  Treating Resident Physician: Nori Vnies MD  Supervising Physician: Tatyana Russell MD    CHIEF COMPLAINT       Chief Complaint   Patient presents with    Laceration     old    Hand Injury     Right, swelling     History obtained from the patient. HISTORY OF PRESENT ILLNESS    HPI  Jenny Macedo. is a 47 y.o. male with PMHx of paranoid schizophrenia, MDD, alcohol use, tobacco use, COPD who presents to the emergency department for evaluation of laceration, hand injury. Patient to ED due to chief complaint of L thumb laceration and right hand swelling. He reports that he cut his thumb open about 4 days ago when he was washing the dishes. Patient did not seek care at this time. States that he just closed the wound up himself with direct pressure and wrapped it up. 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 Patient still did not seek care at this time. Dressed the wound himself and went to sleep. Woke up this morning with increasing pain and swelling to the area. There is a 3 cm linear laceration on right thumb that has pus oozing from the wound. The hand is swollen on the dorsal aspect. The digit is swollen. There is no pain on passive range of motion. There is also extension of the cellulitis up into his forearm. Patient denies having any fever, chills, chest pain, shortness of breath. The patient has no other acute complaints at this time. REVIEW OF SYSTEMS   Review of Systems   Constitutional:  Negative for activity change, chills, diaphoresis and fatigue. HENT:  Negative for congestion, ear discharge, ear pain, facial swelling, hearing loss, sinus pressure and sinus pain. Eyes:  Negative for photophobia, pain, discharge and redness.    Respiratory: Negative for apnea, cough, choking, chest tightness and shortness of breath. Cardiovascular:  Negative for chest pain and leg swelling. Gastrointestinal:  Negative for abdominal pain, anal bleeding, blood in stool and rectal pain. Endocrine: Negative for polydipsia, polyphagia and polyuria. Genitourinary:  Negative for dysuria, flank pain, genital sores and hematuria. Musculoskeletal:  Negative for gait problem, joint swelling, myalgias, neck pain and neck stiffness. Skin:  Positive for color change and wound. Negative for pallor and rash. Neurological:  Negative for tremors, seizures, syncope, facial asymmetry and headaches. Hematological:  Negative for adenopathy. Psychiatric/Behavioral:  Negative for agitation, behavioral problems, hallucinations, self-injury and suicidal ideas. PAST MEDICAL AND SURGICAL HISTORY     Past Medical History:   Diagnosis Date    Arthritis     Asthma     COPD (chronic obstructive pulmonary disease) ([facility])     GERD (gastroesophageal reflux disease)     Hypertension     Psychiatric problem     Schizophrenia ([facility]     Severe malnutrition ([facility] 1/14/2021    Unspecified cerebral artery occlusion with cerebral infarction      No past surgical history on file.       MEDICATIONS     Current Facility-Administered Medications:     0.9 % sodium chloride bolus, 1,000 mL, IntraVENous, Once, Aarti Alvarez MD, Last Rate: 1,000 mL/hr at 10/09/22 1523, 1,000 mL at 10/09/22 1523    Current Outpatient Medications:     sucralfate (CARAFATE) 1 GM tablet, Take 1 tablet by mouth 4 times daily for 10 days, Disp: 40 tablet, Rfl: 0    omeprazole (PRILOSEC) 40 MG delayed release capsule, Take 1 capsule by mouth every morning (before breakfast) for 10 days, Disp: 10 capsule, Rfl: 0    ondansetron (ZOFRAN) 4 MG tablet, Take 1 tablet by mouth 3 times daily as needed for Nausea or Vomiting, Disp: 15 tablet, Rfl: 0    naloxone (NARCAN) 4 MG/0.1ML LIQD nasal spray, 1 spray by Nasal route as needed for Opioid Reversal, Disp: 1 each, Rfl: 0    predniSONE (DELTASONE) 50 MG tablet, Take 1 tablet by mouth daily, Disp: 5 tablet, Rfl: 0    sertraline (ZOLOFT) 50 MG tablet, Take 1 tablet by mouth daily, Disp: 30 tablet, Rfl: 0    traZODone (DESYREL) 50 MG tablet, Take 1 tablet by mouth nightly as needed for Sleep, Disp: 30 tablet, Rfl: 0    cloNIDine (CATAPRES) 0.1 MG tablet, Take 1 tablet by mouth 2 times daily, Disp: 60 tablet, Rfl: 0    OLANZapine (ZYPREXA) 5 MG tablet, Take 1 tablet by mouth nightly, Disp: 30 tablet, Rfl: 0    pantoprazole (PROTONIX) 40 MG tablet, Take 1 tablet by mouth every morning (before breakfast), Disp: 30 tablet, Rfl: 0    sucralfate (CARAFATE) 1 GM tablet, Take 1 tablet by mouth 4 times daily (before meals and nightly), Disp: 120 tablet, Rfl: 0    thiamine 100 MG tablet, Take 1 tablet by mouth daily, Disp: 30 tablet, Rfl: 0    Multiple Vitamin (MULTIVITAMIN) TABS tablet, Take 1 tablet by mouth daily, Disp: 30 tablet, Rfl: 0      SOCIAL HISTORY     Social History     Social History Narrative    Not on file     Social History     Tobacco Use    Smoking status: Every Day     Packs/day: 1.00     Years: 30.00     Pack years: 30.00     Types: Cigarettes    Smokeless tobacco: Never   Vaping Use    Vaping Use: Never used   Substance Use Topics    Alcohol use: Not Currently     Comment: x3 24 oz beer    Drug use: Not Currently     Types: Marijuana Debora Paulino     Comment: occasional     ALLERGIES     Allergies   Allergen Reactions    Prochlorperazine Other (See Comments)    Toradol [Ketorolac Tromethamine] Itching and Rash     Able to take Aleve with no allergic reactions      Ketorolac     Compazine [Prochlorperazine Maleate] Anxiety     FAMILY HISTORY     Family History   Problem Relation Age of Onset    Cancer Mother         lung    Other Mother     Cancer Father         copd/lung    Other Sister         pancreatitis    Other Sister         pancreatitis         PREVIOUS RECORDS   Previous records reviewed: I reviewed the patient's past medical records including relevant labs, imaging and procedures. Patient last seen in the emergency room on 2/15/2022 for gastritis and duodenitis    PHYSICAL EXAM     Initial vital signs and nursing assessment reviewed and abnormal from diastolic hypertension . Body mass index is 22.81 kg/m². Pulsoximetry is normal per my interpretation. Additional Vital Signs:  Vitals:    10/09/22 1556   BP: (!) 137/98   Pulse: 72   Resp: 18   Temp:    SpO2: 97%       Physical Exam  Constitutional:       Appearance: Normal appearance. He is not diaphoretic. HENT:      Head: Normocephalic and atraumatic. Right Ear: External ear normal.      Left Ear: External ear normal.      Nose: Nose normal. No congestion or rhinorrhea. Mouth/Throat:      Mouth: Mucous membranes are moist.      Pharynx: Oropharynx is clear. Eyes:      General: No scleral icterus. Right eye: No discharge. Left eye: No discharge. Extraocular Movements: Extraocular movements intact. Conjunctiva/sclera: Conjunctivae normal.      Pupils: Pupils are equal, round, and reactive to light. Pulmonary:      Effort: Pulmonary effort is normal.   Abdominal:      General: Abdomen is flat. Bowel sounds are normal. There is no distension. Palpations: Abdomen is soft. There is no mass. Tenderness: There is no abdominal tenderness. There is no guarding or rebound. Musculoskeletal:         General: No swelling, tenderness, deformity or signs of injury. Normal range of motion. Cervical back: Normal range of motion and neck supple. Skin:     General: Skin is warm. Capillary Refill: Capillary refill takes less than 2 seconds. Coloration: Skin is not jaundiced. Findings: Erythema, signs of injury and wound present. No bruising. Comments: 3 cm linear laceration noted over the dorsal aspect of right thumb with pus drainage.   Cellulitis and erythema to the area of the thumb as well as the right hand. Cellulitis radiating upward to the right forearm. Neurological:      General: No focal deficit present. Mental Status: He is alert and oriented to person, place, and time. Motor: No weakness. Psychiatric:         Mood and Affect: Mood normal.         Behavior: Behavior normal.         Thought Content: Thought content normal.                     ED RESULTS   Laboratory results:  Labs Reviewed   CBC WITH AUTO DIFFERENTIAL - Abnormal; Notable for the following components:       Result Value    WBC 16.3 (*)     RBC 4.55 (*)     MCV 97.1 (*)     Segs Absolute 11.2 (*)     Monocytes Absolute 1.5 (*)     All other components within normal limits   OSMOLALITY - Abnormal; Notable for the following components:    Osmolality Calc 273.6 (*)     All other components within normal limits   CULTURE, BLOOD 1   CULTURE, BLOOD 2   BASIC METABOLIC PANEL W/ REFLEX TO MG FOR LOW K   ANION GAP   GLOMERULAR FILTRATION RATE, ESTIMATED   MAGNESIUM       Radiologic studies results:  XR HAND RIGHT (MIN 3 VIEWS)   Final Result      No fracture, dislocation or radiopaque foreign body. Final report electronically signed by Dr. Rishi West on 10/9/2022 2:08 PM          ED Medications administered this visit:   Medications   0.9 % sodium chloride bolus (1,000 mLs IntraVENous New Bag 10/9/22 1523)   doxycycline hyclate (VIBRA-TABS) tablet 100 mg (100 mg Oral Given 10/9/22 1445)   clindamycin (CLEOCIN) 600 mg in dextrose 5 % 50 mL IVPB (0 mg IntraVENous Stopped 10/9/22 1556)   morphine injection 4 mg (4 mg IntraVENous Given 10/9/22 1537)         ED COURSE     ED Course as of 10/09/22 1601   Sun Oct 09, 2022   1417 XR HAND RIGHT (MIN 3 VIEWS)  No fracture, dislocation or radiopaque foreign body.  [EL]   1511 WBC(!): 16.3 [EL]   1541 BMP within normal limits [EL]      ED Course User Index  [EL] Juli Mcclendon MD       MEDICAL DECISION MAKING   Given the patient's above chief complaint and findings on history and physical examination, I thought it was appropriate to consider the following emergency medical conditions:  Laceration, infection, cellulitis, sepsis, compartment syndrome, retained foreign body    Although some of these diagnoses are unlikely they were considered in my medical decision making. 58-year-old male chief complaint laceration to right hand 4 days ago with no seeking of care. 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 On ED arrival, patient is afebrile. Physical exam shows an extremely swollen and erythematous right hand with new extension into right forearm. There is a laceration on the thumb that is oozing pus. Initially planned for p.o. doxycycline with discharge home on oral antibiotic therapy however with wbc 16.3 and wound presentation, decision made for IV clindamycin and blood cultures. We would like to admit this patient for IV antibiotics. MEDICATION CHANGES     Current Discharge Medication List          FINAL DISPOSITION     Final diagnoses:   Laceration of right thumb without foreign body without damage to nail, initial encounter   Cellulitis of right upper extremity     Condition: condition: stable  Dispo: admit to med/surg      This transcription was electronically signed. Parts of this transcriptions may have been dictated by use of voice recognition software and electronically transcribed, and parts may have been transcribed with the assistance of an ED scribe. The transcription may contain errors not detected in proofreading. Please refer to my supervising physician's documentation if my documentation differs.     Electronically Signed: Anisha Bowen MD, 10/09/22, 4:01 PM       Aleisha Horan MD  Resident  10/09/22 6721

## 2022-10-09 NOTE — ED NOTES
Pt and vs reassessed. RR easy and unlabored. Pt resting in bed alert and states pain is starting to decrease.  Pt denies any needs and is stable at this time     [facility]  10/09/22 1014

## 2022-10-10 ENCOUNTER — APPOINTMENT (OUTPATIENT)
Dept: GENERAL RADIOLOGY | Age: 52
DRG: 383 | End: 2022-10-10
Payer: MEDICAID

## 2022-10-10 LAB
ANION GAP SERPL CALCULATED.3IONS-SCNC: 11 MEQ/L (ref 8–16)
BASOPHILS # BLD: 0.6 %
BASOPHILS ABSOLUTE: 0.1 THOU/MM3 (ref 0–0.1)
BUN BLDV-MCNC: 8 MG/DL (ref 7–22)
C-REACTIVE PROTEIN: 0.6 MG/DL (ref 0–1)
CALCIUM SERPL-MCNC: 8.9 MG/DL (ref 8.5–10.5)
CHLORIDE BLD-SCNC: 99 MEQ/L (ref 98–111)
CO2: 24 MEQ/L (ref 23–33)
CREAT SERPL-MCNC: 0.6 MG/DL (ref 0.4–1.2)
EOSINOPHIL # BLD: 1.9 %
EOSINOPHILS ABSOLUTE: 0.2 THOU/MM3 (ref 0–0.4)
ERYTHROCYTE [DISTWIDTH] IN BLOOD BY AUTOMATED COUNT: 11.9 % (ref 11.5–14.5)
ERYTHROCYTE [DISTWIDTH] IN BLOOD BY AUTOMATED COUNT: 41.1 FL (ref 35–45)
GFR SERPL CREATININE-BSD FRML MDRD: > 90 ML/MIN/1.73M2
GLUCOSE BLD-MCNC: 121 MG/DL (ref 70–108)
HCT VFR BLD CALC: 38.5 % (ref 42–52)
HEMOGLOBIN: 12.7 GM/DL (ref 14–18)
IMMATURE GRANS (ABS): 0.03 THOU/MM3 (ref 0–0.07)
IMMATURE GRANULOCYTES: 0.3 %
LYMPHOCYTES # BLD: 25.2 %
LYMPHOCYTES ABSOLUTE: 2.8 THOU/MM3 (ref 1–4.8)
MCH RBC QN AUTO: 31.4 PG (ref 26–33)
MCHC RBC AUTO-ENTMCNC: 33 GM/DL (ref 32.2–35.5)
MCV RBC AUTO: 95.3 FL (ref 80–94)
MONOCYTES # BLD: 11 %
MONOCYTES ABSOLUTE: 1.2 THOU/MM3 (ref 0.4–1.3)
NUCLEATED RED BLOOD CELLS: 0 /100 WBC
PLATELET # BLD: 288 THOU/MM3 (ref 130–400)
PMV BLD AUTO: 10.1 FL (ref 9.4–12.4)
POTASSIUM REFLEX MAGNESIUM: 3.8 MEQ/L (ref 3.5–5.2)
RBC # BLD: 4.04 MILL/MM3 (ref 4.7–6.1)
SEG NEUTROPHILS: 61 %
SEGMENTED NEUTROPHILS ABSOLUTE COUNT: 6.7 THOU/MM3 (ref 1.8–7.7)
SODIUM BLD-SCNC: 134 MEQ/L (ref 135–145)
TROPONIN T: < 0.01 NG/ML
WBC # BLD: 11 THOU/MM3 (ref 4.8–10.8)

## 2022-10-10 PROCEDURE — 6370000000 HC RX 637 (ALT 250 FOR IP): Performed by: PHYSICIAN ASSISTANT

## 2022-10-10 PROCEDURE — G0378 HOSPITAL OBSERVATION PER HR: HCPCS

## 2022-10-10 PROCEDURE — 85025 COMPLETE CBC W/AUTO DIFF WBC: CPT

## 2022-10-10 PROCEDURE — 1200000000 HC SEMI PRIVATE

## 2022-10-10 PROCEDURE — 6370000000 HC RX 637 (ALT 250 FOR IP): Performed by: STUDENT IN AN ORGANIZED HEALTH CARE EDUCATION/TRAINING PROGRAM

## 2022-10-10 PROCEDURE — 96372 THER/PROPH/DIAG INJ SC/IM: CPT

## 2022-10-10 PROCEDURE — 99232 SBSQ HOSP IP/OBS MODERATE 35: CPT | Performed by: INTERNAL MEDICINE

## 2022-10-10 PROCEDURE — 87075 CULTR BACTERIA EXCEPT BLOOD: CPT

## 2022-10-10 PROCEDURE — 2580000003 HC RX 258: Performed by: STUDENT IN AN ORGANIZED HEALTH CARE EDUCATION/TRAINING PROGRAM

## 2022-10-10 PROCEDURE — 6360000002 HC RX W HCPCS: Performed by: INTERNAL MEDICINE

## 2022-10-10 PROCEDURE — 87070 CULTURE OTHR SPECIMN AEROBIC: CPT

## 2022-10-10 PROCEDURE — 96376 TX/PRO/DX INJ SAME DRUG ADON: CPT

## 2022-10-10 PROCEDURE — 93005 ELECTROCARDIOGRAM TRACING: CPT | Performed by: INTERNAL MEDICINE

## 2022-10-10 PROCEDURE — 6360000002 HC RX W HCPCS: Performed by: STUDENT IN AN ORGANIZED HEALTH CARE EDUCATION/TRAINING PROGRAM

## 2022-10-10 PROCEDURE — 96366 THER/PROPH/DIAG IV INF ADDON: CPT

## 2022-10-10 PROCEDURE — 87205 SMEAR GRAM STAIN: CPT

## 2022-10-10 PROCEDURE — 71045 X-RAY EXAM CHEST 1 VIEW: CPT

## 2022-10-10 PROCEDURE — 36415 COLL VENOUS BLD VENIPUNCTURE: CPT

## 2022-10-10 PROCEDURE — 84484 ASSAY OF TROPONIN QUANT: CPT

## 2022-10-10 PROCEDURE — 80048 BASIC METABOLIC PNL TOTAL CA: CPT

## 2022-10-10 PROCEDURE — 87077 CULTURE AEROBIC IDENTIFY: CPT

## 2022-10-10 PROCEDURE — 87147 CULTURE TYPE IMMUNOLOGIC: CPT

## 2022-10-10 PROCEDURE — 87186 SC STD MICRODIL/AGAR DIL: CPT

## 2022-10-10 RX ORDER — TAMSULOSIN HYDROCHLORIDE 0.4 MG/1
0.4 CAPSULE ORAL DAILY
Status: DISCONTINUED | OUTPATIENT
Start: 2022-10-10 | End: 2022-10-12 | Stop reason: HOSPADM

## 2022-10-10 RX ORDER — HYDROCODONE BITARTRATE AND ACETAMINOPHEN 5; 325 MG/1; MG/1
2 TABLET ORAL EVERY 4 HOURS PRN
Status: DISCONTINUED | OUTPATIENT
Start: 2022-10-10 | End: 2022-10-12 | Stop reason: HOSPADM

## 2022-10-10 RX ORDER — MORPHINE SULFATE 2 MG/ML
2 INJECTION, SOLUTION INTRAMUSCULAR; INTRAVENOUS ONCE
Status: COMPLETED | OUTPATIENT
Start: 2022-10-10 | End: 2022-10-10

## 2022-10-10 RX ORDER — HYDROCODONE BITARTRATE AND ACETAMINOPHEN 5; 325 MG/1; MG/1
1 TABLET ORAL EVERY 4 HOURS PRN
Status: DISCONTINUED | OUTPATIENT
Start: 2022-10-10 | End: 2022-10-12 | Stop reason: HOSPADM

## 2022-10-10 RX ADMIN — CEFEPIME 1000 MG: 1 INJECTION, POWDER, FOR SOLUTION INTRAMUSCULAR; INTRAVENOUS at 03:31

## 2022-10-10 RX ADMIN — CEFEPIME 1000 MG: 1 INJECTION, POWDER, FOR SOLUTION INTRAMUSCULAR; INTRAVENOUS at 20:38

## 2022-10-10 RX ADMIN — Medication 1250 MG: at 18:35

## 2022-10-10 RX ADMIN — SODIUM CHLORIDE, PRESERVATIVE FREE 10 ML: 5 INJECTION INTRAVENOUS at 08:29

## 2022-10-10 RX ADMIN — MORPHINE SULFATE 2 MG: 2 INJECTION, SOLUTION INTRAMUSCULAR; INTRAVENOUS at 13:29

## 2022-10-10 RX ADMIN — SUCRALFATE 1 G: 1 TABLET ORAL at 11:21

## 2022-10-10 RX ADMIN — CLONIDINE HYDROCHLORIDE 0.1 MG: 0.1 TABLET ORAL at 19:41

## 2022-10-10 RX ADMIN — CEFEPIME 1000 MG: 1 INJECTION, POWDER, FOR SOLUTION INTRAMUSCULAR; INTRAVENOUS at 11:23

## 2022-10-10 RX ADMIN — OLANZAPINE 5 MG: 5 TABLET, FILM COATED ORAL at 19:41

## 2022-10-10 RX ADMIN — CLONIDINE HYDROCHLORIDE 0.1 MG: 0.1 TABLET ORAL at 08:27

## 2022-10-10 RX ADMIN — Medication 1250 MG: at 06:35

## 2022-10-10 RX ADMIN — HYDROCODONE BITARTRATE AND ACETAMINOPHEN 2 TABLET: 5; 325 TABLET ORAL at 19:41

## 2022-10-10 RX ADMIN — ENOXAPARIN SODIUM 40 MG: 100 INJECTION SUBCUTANEOUS at 18:34

## 2022-10-10 RX ADMIN — SERTRALINE 50 MG: 50 TABLET, FILM COATED ORAL at 08:26

## 2022-10-10 RX ADMIN — HYDROCODONE BITARTRATE AND ACETAMINOPHEN 2 TABLET: 5; 325 TABLET ORAL at 09:50

## 2022-10-10 RX ADMIN — Medication 100 MG: at 08:27

## 2022-10-10 RX ADMIN — HYDROCODONE BITARTRATE AND ACETAMINOPHEN 2 TABLET: 5; 325 TABLET ORAL at 15:01

## 2022-10-10 RX ADMIN — SUCRALFATE 1 G: 1 TABLET ORAL at 16:24

## 2022-10-10 RX ADMIN — TAMSULOSIN HYDROCHLORIDE 0.4 MG: 0.4 CAPSULE ORAL at 16:24

## 2022-10-10 RX ADMIN — HYDROCODONE BITARTRATE AND ACETAMINOPHEN 1 TABLET: 5; 325 TABLET ORAL at 05:41

## 2022-10-10 ASSESSMENT — PAIN - FUNCTIONAL ASSESSMENT
PAIN_FUNCTIONAL_ASSESSMENT: ACTIVITIES ARE NOT PREVENTED

## 2022-10-10 ASSESSMENT — PAIN SCALES - GENERAL
PAINLEVEL_OUTOF10: 9
PAINLEVEL_OUTOF10: 6
PAINLEVEL_OUTOF10: 10
PAINLEVEL_OUTOF10: 9
PAINLEVEL_OUTOF10: 6
PAINLEVEL_OUTOF10: 6
PAINLEVEL_OUTOF10: 9
PAINLEVEL_OUTOF10: 9
PAINLEVEL_OUTOF10: 10

## 2022-10-10 ASSESSMENT — PAIN DESCRIPTION - ORIENTATION
ORIENTATION: RIGHT

## 2022-10-10 ASSESSMENT — PAIN DESCRIPTION - LOCATION
LOCATION: ARM;HAND
LOCATION: ARM
LOCATION: ARM
LOCATION: HAND
LOCATION: ARM
LOCATION: HAND

## 2022-10-10 ASSESSMENT — PAIN DESCRIPTION - DESCRIPTORS
DESCRIPTORS: BURNING
DESCRIPTORS: ACHING
DESCRIPTORS: BURNING
DESCRIPTORS: ACHING

## 2022-10-10 ASSESSMENT — PAIN DESCRIPTION - ONSET: ONSET: ON-GOING

## 2022-10-10 ASSESSMENT — PAIN DESCRIPTION - PAIN TYPE: TYPE: ACUTE PAIN

## 2022-10-10 ASSESSMENT — PAIN DESCRIPTION - FREQUENCY: FREQUENCY: CONTINUOUS

## 2022-10-10 NOTE — H&P
Department of Orthopedic Surgery  Physician Assistant Consult Note        Reason for Consult:  right arm cellulitis  Requesting Physician:  Maia Kc DO    CHIEF COMPLAINT:  right hand/arm pain    History Obtained From:  patient    HISTORY OF PRESENT ILLNESS:                The patient is a 47 y.o. male who presented with worsening pain, erythema, and swelling in right hand and forearm. History begins 4 days ago when cut his thumb doing some dishes. He treated the wound himself but the next day the wound reopened. He states that today he noticed worsening pain, swelling, as well as streaking up his forearm. He was admitted to the internal medicine service and we were consulted to assess for surgical pathology. 1 dose of Cleocin completed in ED. Patient has Cefepime and Vancomycin ordered. Past Medical History:        Diagnosis Date    Arthritis     Asthma     COPD (chronic obstructive pulmonary disease) ([facility])     GERD (gastroesophageal reflux disease)     Hypertension     Psychiatric problem     Schizophrenia ([facility] [address]     Severe malnutrition ([facility] [address] 1/14/2021    Unspecified cerebral artery occlusion with cerebral infarction      Past Surgical History:    No past surgical history on file.   Current Medications:   Current Facility-Administered Medications: cloNIDine (CATAPRES) tablet 0.1 mg, 0.1 mg, Oral, BID  OLANZapine (ZYPREXA) tablet 5 mg, 5 mg, Oral, Nightly  [START ON 10/10/2022] pantoprazole (PROTONIX) tablet 40 mg, 40 mg, Oral, QAM AC  sertraline (ZOLOFT) tablet 50 mg, 50 mg, Oral, Daily  sucralfate (CARAFATE) tablet 1 g, 1 g, Oral, 4x Daily AC & HS  thiamine tablet 100 mg, 100 mg, Oral, Daily  traZODone (DESYREL) tablet 50 mg, 50 mg, Oral, Nightly PRN  sodium chloride flush 0.9 % injection 5-40 mL, 5-40 mL, IntraVENous, 2 times per day  sodium chloride flush 0.9 % injection 5-40 mL, 5-40 mL, IntraVENous, PRN  0.9 % sodium chloride infusion, , IntraVENous, PRN  enoxaparin (LOVENOX) injection 40 mg, 40 mg, SubCUTAneous, Q24H  ondansetron (ZOFRAN-ODT) disintegrating tablet 4 mg, 4 mg, Oral, Q8H PRN **OR** ondansetron (ZOFRAN) injection 4 mg, 4 mg, IntraVENous, Q6H PRN  polyethylene glycol (GLYCOLAX) packet 17 g, 17 g, Oral, Daily PRN  acetaminophen (TYLENOL) tablet 650 mg, 650 mg, Oral, Q6H PRN **OR** acetaminophen (TYLENOL) suppository 650 mg, 650 mg, Rectal, Q6H PRN  traMADol (ULTRAM) tablet 50 mg, 50 mg, Oral, Q6H PRN **OR** traMADol (ULTRAM) tablet 100 mg, 100 mg, Oral, Q6H PRN  vancomycin (VANCOCIN) 1250 mg in dextrose 5 % 250 mL IVPB, 20 mg/kg, IntraVENous, Q12H  cefepime (MAXIPIME) 1000 mg IVPB minibag, 1,000 mg, IntraVENous, Q8H  vancomycin (VANCOCIN) intermittent dosing (placeholder), , Other, RX Placeholder  Allergies:  Prochlorperazine, Toradol [ketorolac tromethamine], Ketorolac, and Compazine [prochlorperazine maleate]    Social History:   Patient currently lives at home  Current Smoker    Family History:       Problem Relation Age of Onset    Cancer Mother         lung    Other Mother     Cancer Father         copd/lung    Other Sister         pancreatitis    Other Sister         pancreatitis     REVIEW OF SYSTEMS:    CONSTITUTIONAL:  negative  MUSCULOSKELETAL:  positive for  pain, joint swelling, and stiff joints    PHYSICAL EXAM:    VITALS:  BP (!) 119/98   Pulse 83   Temp 98.2 °F (36.8 °C) (Oral)   Resp 18   Ht 5' 8\" (1.727 m)   Wt 150 lb (68 kg)   SpO2 97%   BMI 22.81 kg/m²   CONSTITUTIONAL:  awake, alert, cooperative, no apparent distress, and appears stated age  MUSCULOSKELETAL:  On exam of right hand there is a 3cm oblique laceration on the dorsum of the thumb just distal to the MCP. There is mild gapping of the superficial skin edges. No active drainage. Mild to moderate erythema and edema on the dorsum of the hand and thumb extending proximally along the radial aspect of the wrist and volar forearm. Diffuse tenderness to palpation throughout hand and forearm.  Compartments are soft throughout hand and forearm. No fluctuance noted. No pain with full passive ROM of fingers 2-5. Some pain and tight sensation at terminal ranges of thumb flexion and extension. No small arc ROM pain in the right thumb. Active thumb flexion and extension intact at the MCP and IP. No significant pain with active ROM of fingers within tolerable ROM. No pain with AROM or PROM of right wrist. Sensation intact throughout hand. 2+ radial pulse. Brisk capillary refill. DATA:    CBC:   Lab Results   Component Value Date/Time    WBC 16.3 10/09/2022 02:55 PM    RBC 4.55 10/09/2022 02:55 PM    HGB 14.6 10/09/2022 02:55 PM    HCT 44.2 10/09/2022 02:55 PM    MCV 97.1 10/09/2022 02:55 PM    MCH 32.1 10/09/2022 02:55 PM    MCHC 33.0 10/09/2022 02:55 PM    RDW 12.4 03/30/2021 09:51 AM     10/09/2022 02:55 PM    MPV 10.1 10/09/2022 02:55 PM     CMP:    Lab Results   Component Value Date/Time     10/09/2022 03:00 PM    K 3.5 10/09/2022 03:00 PM     10/09/2022 03:00 PM    CO2 24 10/09/2022 03:00 PM    BUN 10 10/09/2022 03:00 PM    CREATININE 0.6 10/09/2022 03:00 PM    GFRAA >60 03/30/2021 09:51 AM    LABGLOM >90 10/09/2022 03:00 PM    GLUCOSE 79 10/09/2022 03:00 PM    PROT 6.9 02/15/2022 07:30 PM    LABALBU 4.3 02/15/2022 07:30 PM    CALCIUM 9.3 10/09/2022 03:00 PM    BILITOT 0.6 02/15/2022 07:30 PM    ALKPHOS 102 02/15/2022 07:30 PM    AST 18 02/15/2022 07:30 PM    ALT 11 02/15/2022 07:30 PM       IMPRESSION/RECOMMENDATIONS:    Right hand/forearm cellulitis    Discussed patient's findings with Dr. Antonina Garnica. No urgent surgical pathology identified. We advise monitoring the patient's symptoms while IV antibiotics administered. Pain management per admitting service's orders. No specific activity restrictions from orthopedic standpoint.  If surgical pathology develops, this will serve as patient's preoperative H&P.

## 2022-10-10 NOTE — DISCHARGE INSTR - COC
Continuity of Care Form    Patient Name: Nisha Ash :  1970  MRN:  [id number]    Admit date:  10/9/2022  Discharge date:  ***    Code Status Order: Full Code   Advance Directives:     Admitting Physician:  Silvio Chance MD  PCP: No primary care provider on file. Discharging Nurse: [facility] Unit/Room#: 6E-65/065-A  Discharging Unit Phone Number: ***    Emergency Contact:   Extended Emergency Contact Information  Primary Emergency Contact: Zeferino De La Cruz  Home Phone: [phone]  Relation: Brother/Sister    Past Surgical History:  No past surgical history on file. 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 [address] F10.939    Marijuana use F12.90    Chest pain R07.9    Depression F32. A    Right sided weakness R53.1    Acute diarrhea R19.7    Hypokalemia E87.6    Myalgia M79.10    Impaired functional mobility, balance, gait, and endurance Z74.09    Noncompliance Z91.199    Alcoholism ([address] F10.20    Syncope and collapse R55    Tobacco use disorder F17.200    COPD (chronic obstructive pulmonary disease) ([facility]) J44.9    Abdominal pain, epigastric R10.13    Chronic obstructive pulmonary disease with acute exacerbation ([facility]) J44.1    Gastritis K29.70    Duodenitis K29.80    Paranoid schizophrenia ([facility]) F20.0    Major depressive disorder, recurrent severe without psychotic features ([facility]) F33.2    Severe malnutrition ([facility]) E43    Severe episode of recurrent major depressive disorder, with psychotic features ([address] F33.3    Alcohol use disorder, severe, dependence ([address] F10.20    Right arm cellulitis L03.113    Laceration of right thumb without foreign body without damage to nail S61.011A Isolation/Infection:   Isolation            No Isolation          Patient Infection Status       Infection Onset Added Last Indicated Last Indicated By Review Planned Expiration Resolved Resolved By    None active    Resolved    COVID-19 (Rule Out) 04/27/21 04/27/21 04/27/21 COVID-19, Rapid (Ordered)   04/27/21 Rule-Out Test Resulted    COVID-19 (Rule Out) 01/14/21 01/14/21 01/14/21 COVID-19 (Ordered)   01/14/21 Rule-Out Test Resulted            Nurse Assessment:  Last Vital Signs: BP (!) 131/98   Pulse 84   Temp 97.4 °F (36.3 °C) (Oral)   Resp 18   Ht 5' 8\" (1.727 m)   Wt 150 lb (68 kg)   SpO2 97%   BMI 22.81 kg/m²     Last documented pain score (0-10 scale): Pain Level: 6  Last Weight:   Wt Readings from Last 1 Encounters:   10/09/22 150 lb (68 kg)     Mental Status:  {IP PT MENTAL STATUS:20030}    IV Access:  { SOLEDAD IV ACCESS:463761540}    Nursing Mobility/ADLs:  Walking   {P DME CJUU:691409497}  Transfer  {P DME FAHH:903170998}  Bathing  {P DME CWVK:864061811}  Dressing  {P DME WJZL:531560234}  Toileting  {P DME PBHQ:953860568}  Feeding  {P DME UWAK:260617428}  Med Admin  {[facility] DME QIZD:904835629}  Med Delivery   { SOLEDAD MED Delivery:003919231}    Wound Care Documentation and Therapy:        Elimination:  Continence: Bowel: {YES / VS:07002}  Bladder: {YES / IK:66619}  Urinary Catheter: {Urinary Catheter:950391492}   Colostomy/Ileostomy/Ileal Conduit: {YES / BM:63339}       Date of Last BM: ***    Intake/Output Summary (Last 24 hours) at 10/10/2022 1431  Last data filed at 10/10/2022 0622  Gross per 24 hour   Intake 693.32 ml   Output --   Net 693.32 ml     I/O last 3 completed shifts:   In: 693.32 [P.O.:300; IV Piggyback:393.32]  Out: -     Safety Concerns:     [address] [employer] Safety Concerns:955530805}    Impairments/Disabilities:      [employer] Impairments/Disabilities:933467361}    Nutrition Therapy:  Current Nutrition Therapy:   [employer] Diet List:646223419}    Routes of Feeding: {P DME Other Feedings:985919486}  Liquids: {Slp liquid thickness:34275}  Daily Fluid Restriction: {CHP DME Yes amt example:002822890}  Last Modified Barium Swallow with Video (Video Swallowing Test): {Done Not Done ZE:063717652}    Treatments at the Time of Hospital Discharge:   Respiratory Treatments: ***  Oxygen Therapy:  {Therapy; copd oxygen:63827}  Ventilator:    {[facility] Vent QWCA:387459313}    Rehab Therapies: {THERAPEUTIC INTERVENTION:4209096780}  Weight Bearing Status/Restrictions: { CC Weight Bearin}  Other Medical Equipment (for information only, NOT a DME order):  {EQUIPMENT:428668306}  Other Treatments: ***    Patient's personal belongings (please select all that are sent with patient):  {P DME Belongings:650701871}    RN SIGNATURE:  {Esignature:979597719}    CASE MANAGEMENT/SOCIAL WORK SECTION    Inpatient Status Date: ***    Readmission Risk Assessment Score:  Readmission Risk              Risk of Unplanned Readmission:  17           Discharging to Facility/ Agency   Name:   Address:  Phone:  Fax:    Dialysis Facility (if applicable)   Name:  Address:  Dialysis Schedule:  Phone:  Fax:    / signature: {Esignature:650022712}    PHYSICIAN SECTION    Prognosis: {Prognosis:8894299841}    Condition at Discharge: [facility] Patient Condition:964090596}    Rehab Potential (if transferring to Rehab): {Prognosis:6060704784}    Recommended Labs or Other Treatments After Discharge: ***    Physician Certification: I certify the above information and transfer of Kathy Rodriguez.  is necessary for the continuing treatment of the diagnosis listed and that he requires {Admit to Appropriate Level of Care:99029} for {GREATER/LESS:174427908} 30 days.      Update Admission H&P: {CHP DME Changes in AKSBZ:573258969}    PHYSICIAN SIGNATURE:  {Esignature:640472371}

## 2022-10-10 NOTE — FLOWSHEET NOTE
10/09/22 2226   Treatment Team Notification   Reason for Communication Evaluate   Team Member Name Carolee GARCIA   Treatment Team Role Physician Assistant   Method of Communication Secure Message   Response See orders   Notification Time [phone]   Patient having pain not relieved by tramadol. Patient rated pain 8/10. See new orders.

## 2022-10-10 NOTE — PROGRESS NOTES
Hospitalist Progress Note    Patient:  Nilesh Heart. Unit/Bed:6E-65/065-A    YOB: 1970    MRN: [id number]       Acct: [de-identified]     PCP: No primary care provider on file. Date of Admission: 10/9/2022    Assessment/Plan:    Acute right arm cellulitis: 3cm laceration right above the distal metacarpal joint which was oozing pus on arrival to the ED. No pus seen currently. Overall increase in erythema of right ventral forearm noted with decrease in streaking starting at thumb, and increasing edema noted extending into proximal forearm. Patient has numbness of his first three digits (which on prior chart review has been noted as residual symptoms from a stroke) and pain on light palpation of right hand and forearm. Cut initially occurred four days prior to ED visit. No improvement in condition since starting antibiotics. Most recent tetanus shot was roughly one year ago after patient stepped on a nail. Elevated WBC of 16.3. XR of hand not significant for signs of osteomyelitis. CRP normal. Patient has very little pus earlier that was swabbed for testing.    -Per Ortho, no urgent surgical pathology identified and no specific activity restrictions from their standpoint.    -Continue Cefepime and Vancomycin (Both started 10/9/22)   -Continue pain control regimen of Norco PRN   -Tylenol PRN if fevers develop   -Monitor CBC   -Blood cultures pending    -Wound drainage culture pending    COPD, presumed moderate stage, stable: No previous PFTs or inhaler use in chart. Significant smoking hx of 30 pack years. Recently finished a Prednisone burst.   -Started on Stiolto, discharge with albuterol inhaler as needed   -Follow up outpatient for PFTs   -Smoking hx discussed below    Tobacco abuse: Current daily smoker approximately 1 pack/day and 30 pack years.  Patient was found to have a right upper lobe pulmonary nodule on CT scan done on 10/21.  -Repeat CT scan outpatient to monitor pulmonary nodule/lung cancer screening      Right upper lobe pulmonary nodule: Highly suspicious for malignancy. Mass is 3.4 cm I diameter located in lateral right upper lobe. No mediastinal lymphadenopathy. Seen on CT done 10/21.   -Repeat CT outpatient for further monitoring   -Follow up outpatient for other further workup    Urinary Retention: Patient states he has had difficulty with urinating over the past several months. Says he will have a strong urge to urinate, but is not able to do so normally and only sees little output. He denies having a prostate exam in the past or any intervention or work up done for this matter.    -Start Flomax    Hx of HTN, controlled: Takes clonidine 0.1mg BID. -Continue Clonidine inpatient     Hx of GERD: Takes Protonix 40mg daily and sucralfate.    -Continue home medications    Hx of Paranoid Schizophrenia and MDD: Patient takes Zoloft, trazodone, Zyprexa outpatient.    -Continue home medications inpatient    Hx of stroke: Per chart review. From note in 2015, stated that patient reported hx of a stroke and residual effects included his right side feeling weaker. He had prior numbness to his right hand along ulnar distribution as well. Expected discharge date:  TBD    Disposition:    [] Home       [] TCU       [] Rehab       [] Psych       [] SNF       [] [place]       [] Other-    Chief Complaint: Right thumb laceration with erythema, pain, and edema to right proximal forearm    Hospital Course: Per HPI: 47year old male with past medical hx of COPD, GERD, HTN, tobacco abuse, schizophrenia, malnutrition, and previous CVA presented to the ED with pain, swelling, and redness originating from a laceration on his right thumb. She states he cut his thumb while washing dishes four days prior and did not seek care at the time. He closed the wound with direct pressure and wrapped it with a bandaid.  At work the day before ED admission, the wound split open as the patient swung Oral Daily    sucralfate  1 g Oral 4x Daily AC & HS    thiamine  100 mg Oral Daily    sodium chloride flush  5-40 mL IntraVENous 2 times per day    enoxaparin  40 mg SubCUTAneous Q24H    vancomycin  20 mg/kg IntraVENous Q12H    cefepime  1,000 mg IntraVENous Q8H    vancomycin (VANCOCIN) intermittent dosing (placeholder)   Other RX Placeholder     PRN Meds: HYDROcodone 5 mg - acetaminophen **OR** HYDROcodone 5 mg - acetaminophen, traZODone, sodium chloride flush, sodium chloride, ondansetron **OR** ondansetron, polyethylene glycol, acetaminophen **OR** acetaminophen      Intake/Output Summary (Last 24 hours) at 10/10/2022 1351  Last data filed at 10/10/2022 0622  Gross per 24 hour   Intake 693.32 ml   Output --   Net 693.32 ml       Diet:  ADULT DIET; Regular; 4 carb choices (60 gm/meal); Low Sodium (2 gm)    Exam:  BP (!) 130/90   Pulse 87   Temp 97.4 °F (36.3 °C) (Oral)   Resp 18   Ht 5' 8\" (1.727 m)   Wt 150 lb (68 kg)   SpO2 97%   BMI 22.81 kg/m²     General appearance: No apparent distress, appears stated age and cooperative. HEENT: Pupils equal, round, and reactive to light. Conjunctivae/corneas clear. Neck: Supple, with full range of motion. No jugular venous distention. Trachea midline. Respiratory:  Normal respiratory effort. Clear to auscultation, bilaterally without Rales/Wheezes/Rhonchi. Cardiovascular: Regular rate and rhythm with normal S1/S2 without murmurs, rubs or gallops. Abdomen: Soft, non-tender, non-distended with normal bowel sounds. Musculoskeletal: Passive and active ROM x 4 extremities. Skin: Skin color, texture, turgor normal. 3cm laceration seen right above the distal metacarpal joint that has scabbed over. No evidence of drainage at this time. Increase in erythema extending to right ventral distal forearm noted with decrease in streaking starting at thumb, and increasing edema noted extending into proximal forearm.  Patient has tenderness on light palpation of right hand and forearm. Neurologic:  Neurovascularly intact with numbness along ulnar distribution of right hand. Cranial nerves: II-XII intact, grossly non-focal.  Psychiatric: Alert and oriented, thought content appropriate, normal insight  Peripheral Pulses: +2 palpable, equal bilaterally       Labs:   Recent Labs     10/09/22  1455 10/10/22  1322   WBC 16.3* 11.0*   HGB 14.6 12.7*   HCT 44.2 38.5*    288     Recent Labs     10/09/22  1500      K 3.5      CO2 24   BUN 10   CREATININE 0.6   CALCIUM 9.3     No results for input(s): AST, ALT, BILIDIR, BILITOT, ALKPHOS in the last 72 hours. No results for input(s): INR in the last 72 hours. No results for input(s): Aleene Sauger in the last 72 hours. Microbiology:      Urinalysis:      Lab Results   Component Value Date/Time    NITRU NEGATIVE 02/15/2022 07:30 PM    [address] 0-2 12/31/2015 02:40 PM    BACTERIA NONE 12/31/2015 02:40 PM    RBCUA 5-10 12/31/2015 02:40 PM    BLOODU NEGATIVE 02/15/2022 07:30 PM    SPECGRAV 1.011 07/16/2015 03:25 PM    GLUCOSEU NEGATIVE 02/15/2022 07:30 PM       Radiology:  XR HAND RIGHT (MIN 3 VIEWS)   Final Result      No fracture, dislocation or radiopaque foreign body.       Final report electronically signed by Dr. Samm Salcido on 10/9/2022 2:08 PM      XR CHEST PORTABLE    (Results Pending)       DVT prophylaxis: [x] Lovenox                                 [] SCDs                                 [] SQ Heparin                                 [] Encourage ambulation           [] Already on Anticoagulation     Code Status: Full Code    PT/OT Eval Status:     Tele:   [x] yes             [] no    Electronically signed by Meredith Vidal DO on 10/10/2022 at 1:51 PM

## 2022-10-10 NOTE — PROGRESS NOTES
[facility] Pharmacokinetic Monitoring Service - Vancomycin    Consulting Provider: Dr. Nani Richardson   Indication: SSTI  Target Concentration: Goal trough of 10-15 mg/L and AUC/LUDMILA <500 mg*hr/L  Day of Therapy: 2  Additional Antimicrobials: Cefepime    Pertinent Laboratory Values: Wt Readings from Last 1 Encounters:   10/09/22 150 lb (68 kg)     Temp Readings from Last 1 Encounters:   10/10/22 97.4 °F (36.3 °C) (Oral)     Estimated Creatinine Clearance: 137 mL/min (based on SCr of 0.6 mg/dL). Recent Labs     10/09/22  1455 10/09/22  1500 10/10/22  1322   CREATININE  --  0.6  --    WBC 16.3*  --  11.0*       Pertinent Cultures:  Culture Date Source Results   10/9/22 Blood x2 Gram stain: few gram positive cocci singly and in pairs   MRSA Nasal Swab: N/A. Non-respiratory infection.     Recent vancomycin administrations                     vancomycin (VANCOCIN) 1250 mg in dextrose 5 % 250 mL IVPB (mg) 1,250 mg New Bag 10/10/22 0635     1,250 mg New Bag 10/09/22 2033                    Plan:  Continue current dose  Repeat vancomycin concentration ordered for 10/11/2022 @ 1000   Pharmacy will continue to monitor patient and adjust therapy as indicated    Thank you for the consult,  Armando Vital, [facility]  10/10/2022 1:52 PM

## 2022-10-10 NOTE — PROGRESS NOTES
Rapid response called for pt c/o right chest pain that radiates to back. States pain is 9/10. States pain is worse with inspiration. Describes pain as a constant pressure. Stat Ekg ordered.   1320 hr 84- 131/98, 98% on room air  1325 lab here

## 2022-10-10 NOTE — PLAN OF CARE
Problem: Chronic Conditions and Co-morbidities  Goal: Patient's chronic conditions and co-morbidity symptoms are monitored and maintained or improved  Outcome: Progressing  Flowsheets (Taken 10/9/2022 2348 by Seun Domingo RN)  Care Plan - Patient's Chronic Conditions and Co-Morbidity Symptoms are Monitored and Maintained or Improved: Monitor and assess patient's chronic conditions and comorbid symptoms for stability, deterioration, or improvement     Problem: Discharge Planning  Goal: Discharge to home or other facility with appropriate resources  Outcome: Progressing  Flowsheets (Taken 10/9/2022 2348 by Seun Domingo RN)  Discharge to home or other facility with appropriate resources:   Identify barriers to discharge with patient and caregiver   Arrange for needed discharge resources and transportation as appropriate   Identify discharge learning needs (meds, wound care, etc)     Problem: Pain  Goal: Verbalizes/displays adequate comfort level or baseline comfort level  Outcome: Progressing  Flowsheets (Taken 10/9/2022 2348 by Seun Domingo RN)  Verbalizes/displays adequate comfort level or baseline comfort level:   Encourage patient to monitor pain and request assistance   Assess pain using appropriate pain scale   Administer analgesics based on type and severity of pain and evaluate response   Implement non-pharmacological measures as appropriate and evaluate response     Problem: Skin/Tissue Integrity  Goal: Absence of new skin breakdown  Description: 1. Monitor for areas of redness and/or skin breakdown  2. Assess vascular access sites hourly  3. Every 4-6 hours minimum:  Change oxygen saturation probe site  4. Every 4-6 hours:  If on nasal continuous positive airway pressure, respiratory therapy assess nares and determine need for appliance change or resting period.   Outcome: Progressing     Problem: Skin/Tissue Integrity - Adult  Goal: Skin integrity remains intact  Flowsheets (Taken 10/10/2022 1009)  Skin Integrity Remains Intact:   Monitor for areas of redness and/or skin breakdown   Assess vascular access sites hourly     Problem: Skin/Tissue Integrity - Adult  Goal: Incisions, wounds, or drain sites healing without S/S of infection  Outcome: Progressing  Flowsheets  Taken 10/10/2022 1645  Incisions, Wounds, or Drain Sites Healing Without Sign and Symptoms of Infection: ADMISSION and DAILY: Assess and document risk factors for pressure ulcer development  Taken 10/10/2022 1009  Incisions, Wounds, or Drain Sites Healing Without Sign and Symptoms of Infection: ADMISSION and DAILY: Assess and document risk factors for pressure ulcer development     Problem: Infection - Adult  Goal: Absence of infection at discharge  Outcome: Progressing  Flowsheets (Taken 10/9/2022 234 by New Myers RN)  Absence of infection at discharge:   Assess and monitor for signs and symptoms of infection   Monitor lab/diagnostic results   Monitor all insertion sites i.e., indwelling lines, tubes and drains   Administer medications as ordered     Problem: Safety - Adult  Goal: Free from fall injury  Outcome: Progressing  Flowsheets (Taken 10/9/2022 2356 by New Myers RN)  Free From Fall Injury: Instruct family/caregiver on patient safety   Care plan reviewed with patient. Patient  verbalize understanding of the plan of care and contribute to goal setting.

## 2022-10-10 NOTE — CARE COORDINATION
Case Management Assessment  Initial Evaluation    Date/Time of Evaluation: 10/10/2022 9:36 AM  Assessment Completed by: Billie Faye RN    If patient is discharged prior to next notation, then this note serves as note for discharge by case management. Patient Name: Calli Mei. YOB: 1970  Diagnosis: Right arm cellulitis [L03.113]  Cellulitis of right upper extremity [L03.113]  Laceration of right thumb without foreign body without damage to nail, initial encounter Zuleyka Rein                   Date / Time: 10/9/2022  1:22 PM    Patient Admission Status: Inpatient     Current PCP: No primary care provider on file. PCP verified by CM? Yes (Follows at [facility] ([address]) unsure of providers name)    Chart Reviewed: Yes      Patient Orientation: Alert and Oriented    Patient Cognition: Alert  History Provided by: Patient    Hospitalization in the last 30 days (Readmission):  No    If yes, Readmission Assessment in CM Navigator will be completed. Advance Directives:     Code Status: Full Code       Discharge Planning  Patient lives with:   Type of Home:     Primary Caregiver: Self  Patient Support Systems include: Family Members   Current Financial resources:    Current community resources:    Current services prior to admission:     Type of Home Care services:       ADLS  Prior functional level: Independent in ADLs/IADLs  Current functional level: Independent in ADLs/IADLs      Family can provide assistance at DC: Other (comment) (no home needs)  Would you like Case Management to discuss the discharge plan with any other family members/significant others, and if so, who?  No  Plans to Return to Present Housing: Yes  Other Identified Issues/Barriers to RETURNING to current housing: n/a  Potential Assistance needed at discharge:    Patient expects to discharge to:    Plan for transportation at discharge:      Financial  Payor: Gabe Zavala / Plan: Sylvia Plasencia / Product Type: *No Product type* /     Does insurance require precert for SNF: Yes    Potential assistance Purchasing Medications:    Meds-to-Beds request: Yes      [address] [facility]  [address]  [address]  [address] [facility] [address]  Phone: [phone] Fax: [phone] [address] [facility], [address] [address]  Phone: [phone] Fax: [phone]      Factors facilitating achievement of predicted outcomes: Motivated, Cooperative, Pleasant, and Sense of humor    Barriers to discharge: n/a    Additional Case Management Notes: Hospitalist and ortho following. Right thumb wound with cellulitis. Wound nurse following. No surgical plan at this time. The Plan for Transition of Care is related to the following treatment goals of Right arm cellulitis [L03.113]  Cellulitis of right upper extremity [L03.113]  Laceration of right thumb without foreign body without damage to nail, initial encounter [S61.011A]      The Patient and/or Patient Representative Agree with the Discharge Plan? Spoke with Tanya Bunch, plans to return home independently. He does not anticipate discharge needs. No PCP listed, he follows at [facility], [address] [place] [address] location but is unsure of PCPs name.  Added to KAJAL Shaffer RN  Case Management Department

## 2022-10-10 NOTE — PLAN OF CARE
Problem: Chronic Conditions and Co-morbidities  Goal: Patient's chronic conditions and co-morbidity symptoms are monitored and maintained or improved  Outcome: Progressing  Flowsheets (Taken 10/9/2022 2348)  Care Plan - Patient's Chronic Conditions and Co-Morbidity Symptoms are Monitored and Maintained or Improved: Monitor and assess patient's chronic conditions and comorbid symptoms for stability, deterioration, or improvement     Problem: Discharge Planning  Goal: Discharge to home or other facility with appropriate resources  Outcome: Progressing  Flowsheets (Taken 10/9/2022 2348)  Discharge to home or other facility with appropriate resources:   Identify barriers to discharge with patient and caregiver   Arrange for needed discharge resources and transportation as appropriate   Identify discharge learning needs (meds, wound care, etc)     Problem: Pain  Goal: Verbalizes/displays adequate comfort level or baseline comfort level  Outcome: Progressing  Flowsheets (Taken 10/9/2022 2348)  Verbalizes/displays adequate comfort level or baseline comfort level:   Encourage patient to monitor pain and request assistance   Assess pain using appropriate pain scale   Administer analgesics based on type and severity of pain and evaluate response   Implement non-pharmacological measures as appropriate and evaluate response     Problem: Skin/Tissue Integrity  Goal: Absence of new skin breakdown  Description: 1. Monitor for areas of redness and/or skin breakdown  2. Assess vascular access sites hourly  3. Every 4-6 hours minimum:  Change oxygen saturation probe site  4. Every 4-6 hours:  If on nasal continuous positive airway pressure, respiratory therapy assess nares and determine need for appliance change or resting period.   Outcome: Progressing  Note: No new skin breakdown, will continue to monitor     Problem: Skin/Tissue Integrity - Adult  Goal: Skin integrity remains intact  Outcome: Progressing  Flowsheets (Taken 10/9/2022 2348)  Skin Integrity Remains Intact: Monitor for areas of redness and/or skin breakdown  Goal: Incisions, wounds, or drain sites healing without S/S of infection  Outcome: Progressing  Flowsheets (Taken 10/9/2022 2348)  Incisions, Wounds, or Drain Sites Healing Without Sign and Symptoms of Infection:   ADMISSION and DAILY: Assess and document risk factors for pressure ulcer development   TWICE DAILY: Assess and document skin integrity   TWICE DAILY: Assess and document dressing/incision, wound bed, drain sites and surrounding tissue   Implement wound care per orders     Problem: Infection - Adult  Goal: Absence of infection at discharge  Outcome: Progressing  Flowsheets (Taken 10/9/2022 2348)  Absence of infection at discharge:   Assess and monitor for signs and symptoms of infection   Monitor lab/diagnostic results   Monitor all insertion sites i.e., indwelling lines, tubes and drains   Administer medications as ordered     Problem: Safety - Adult  Goal: Free from fall injury  Outcome: Progressing  Flowsheets (Taken 10/9/2022 2348)  Free From Fall Injury: Instruct family/caregiver on patient safety   Care plan reviewed with patient. Patient verbalizes understanding of the plan of care and contribute to goal setting.

## 2022-10-10 NOTE — PROGRESS NOTES
Wound ostomy consulted for \"right thumb wound\". Photo reviewed. Pt has partial thickness skin loss to base or right thumb that is healing. Would recommend bacitracin (will need order per attending) and bandage to area or bordered foam dressing. Right thumb partial thickness healing wound.

## 2022-10-10 NOTE — PROGRESS NOTES
Orthopaedic Progress Note      SUBJECTIVE   Mr. Karlos Aponte is hospital day 2    Concern for progression of R hand/forearm cellulitic changes after sustaining a small laceration to thumb about five days ago  Seen at bedside this morning, pain well controlled  No significant change in hand pain or clinical eval  Tolerating iv abx well  no adverse events overnight      OBJECTIVE      Physical    VITALS:  /86   Pulse 88   Temp 97.9 °F (36.6 °C) (Oral)   Resp 18   Ht 5' 8\" (1.727 m)   Wt 150 lb (68 kg)   SpO2 100%   BMI 22.81 kg/m²   I/O last 3 completed shifts: In: 693.3 [P.O.:300; IV Piggyback:393.3]  Out: -     4/10 pain  Gen: alert and oriented  Head: normorcephalic, atraumatic  Resp: unlabored, room air  Pelvis: stable  MUSCULOSKELETAL:  On exam of right hand there is a 3cm oblique laceration on the dorsum of the thumb just distal to the MCP. There is mild gapping of the superficial skin edges. No active drainage. Mild to moderate erythema and edema on the dorsum of the hand and thumb extending proximally along the radial aspect of the wrist and volar forearm. Diffuse tenderness to palpation throughout hand and forearm, patient states this is to baseline upon arrival. Compartments are soft throughout hand and forearm. No fluctuance noted. No pain with full passive ROM of fingers 2-5. Some pain and tight sensation at terminal ranges of thumb flexion and extension however remain intact. No small arc ROM pain in the right thumb. Active thumb flexion and extension intact at the MCP and IP. No significant pain with active ROM of fingers within tolerable ROM. No pain with AROM or PROM of right wrist. Sensation intact throughout hand. 2+ radial pulse.  Brisk capillary refill    Data  CBC:   Lab Results   Component Value Date/Time    WBC 16.3 10/09/2022 02:55 PM    HGB 14.6 10/09/2022 02:55 PM     10/09/2022 02:55 PM     BMP:    Lab Results   Component Value Date/Time     10/09/2022 03:00 PM    K 3.5 10/09/2022 03:00 PM     10/09/2022 03:00 PM    CO2 24 10/09/2022 03:00 PM    BUN 10 10/09/2022 03:00 PM    CREATININE 0.6 10/09/2022 03:00 PM    CALCIUM 9.3 10/09/2022 03:00 PM    GLUCOSE 79 10/09/2022 03:00 PM     Uric Acid:  No components found for: URIC  PT/INR:    Lab Results   Component Value Date/Time    INR 0.83 03/09/2015 09:39 AM     Troponin:    Lab Results   Component Value Date/Time    TROPONINI <0.006 07/25/2013 02:53 PM     Urine Culture:  No components found for: CURINE      Current Inpatient Medications    Current Facility-Administered Medications: HYDROcodone-acetaminophen (NORCO) 5-325 MG per tablet 1 tablet, 1 tablet, Oral, Q4H PRN **OR** HYDROcodone-acetaminophen (NORCO) 5-325 MG per tablet 2 tablet, 2 tablet, Oral, Q4H PRN  cloNIDine (CATAPRES) tablet 0.1 mg, 0.1 mg, Oral, BID  OLANZapine (ZYPREXA) tablet 5 mg, 5 mg, Oral, Nightly  pantoprazole (PROTONIX) tablet 40 mg, 40 mg, Oral, QAM AC  sertraline (ZOLOFT) tablet 50 mg, 50 mg, Oral, Daily  sucralfate (CARAFATE) tablet 1 g, 1 g, Oral, 4x Daily AC & HS  thiamine tablet 100 mg, 100 mg, Oral, Daily  traZODone (DESYREL) tablet 50 mg, 50 mg, Oral, Nightly PRN  sodium chloride flush 0.9 % injection 5-40 mL, 5-40 mL, IntraVENous, 2 times per day  sodium chloride flush 0.9 % injection 5-40 mL, 5-40 mL, IntraVENous, PRN  0.9 % sodium chloride infusion, , IntraVENous, PRN  enoxaparin (LOVENOX) injection 40 mg, 40 mg, SubCUTAneous, Q24H  ondansetron (ZOFRAN-ODT) disintegrating tablet 4 mg, 4 mg, Oral, Q8H PRN **OR** ondansetron (ZOFRAN) injection 4 mg, 4 mg, IntraVENous, Q6H PRN  polyethylene glycol (GLYCOLAX) packet 17 g, 17 g, Oral, Daily PRN  acetaminophen (TYLENOL) tablet 650 mg, 650 mg, Oral, Q6H PRN **OR** acetaminophen (TYLENOL) suppository 650 mg, 650 mg, Rectal, Q6H PRN  vancomycin (VANCOCIN) 1250 mg in dextrose 5 % 250 mL IVPB, 20 mg/kg, IntraVENous, Q12H  cefepime (MAXIPIME) 1000 mg IVPB minibag, 1,000 mg, IntraVENous, Q8H  vancomycin (VANCOCIN) intermittent dosing (placeholder), , Other, Malaika Ohara    Mr. Briana Cormier is hospital day 2    RHD  Ortho consulted for evaluation or potential progression of R hand/forearm cellulitic changes after sustaining a small laceration to thumb about five days ago  Continue IV abx  Light ADLs only JEANA HILLS  no concern for urgent surgical intervention at this time, will monitor daily for response to iv abx  Ortho to continue to follow

## 2022-10-11 ENCOUNTER — APPOINTMENT (OUTPATIENT)
Dept: ULTRASOUND IMAGING | Age: 52
DRG: 383 | End: 2022-10-11
Payer: MEDICAID

## 2022-10-11 LAB
ALBUMIN SERPL-MCNC: 4 G/DL (ref 3.5–5.1)
ALP BLD-CCNC: 79 U/L (ref 38–126)
ALT SERPL-CCNC: 12 U/L (ref 11–66)
AST SERPL-CCNC: 16 U/L (ref 5–40)
BILIRUB SERPL-MCNC: 0.5 MG/DL (ref 0.3–1.2)
BILIRUBIN DIRECT: < 0.2 MG/DL (ref 0–0.3)
EKG ATRIAL RATE: 78 BPM
EKG P AXIS: 35 DEGREES
EKG P-R INTERVAL: 122 MS
EKG Q-T INTERVAL: 366 MS
EKG QRS DURATION: 84 MS
EKG QTC CALCULATION (BAZETT): 417 MS
EKG R AXIS: -10 DEGREES
EKG T AXIS: 54 DEGREES
EKG VENTRICULAR RATE: 78 BPM
TOTAL PROTEIN: 6.4 G/DL (ref 6.1–8)
VANCOMYCIN RANDOM: 22 UG/ML (ref 0.1–39.9)
VANCOMYCIN RANDOM: 35.3 UG/ML (ref 0.1–39.9)

## 2022-10-11 PROCEDURE — 96366 THER/PROPH/DIAG IV INF ADDON: CPT

## 2022-10-11 PROCEDURE — 2580000003 HC RX 258: Performed by: STUDENT IN AN ORGANIZED HEALTH CARE EDUCATION/TRAINING PROGRAM

## 2022-10-11 PROCEDURE — 80076 HEPATIC FUNCTION PANEL: CPT

## 2022-10-11 PROCEDURE — 6370000000 HC RX 637 (ALT 250 FOR IP): Performed by: STUDENT IN AN ORGANIZED HEALTH CARE EDUCATION/TRAINING PROGRAM

## 2022-10-11 PROCEDURE — 99232 SBSQ HOSP IP/OBS MODERATE 35: CPT | Performed by: INTERNAL MEDICINE

## 2022-10-11 PROCEDURE — 96372 THER/PROPH/DIAG INJ SC/IM: CPT

## 2022-10-11 PROCEDURE — G0378 HOSPITAL OBSERVATION PER HR: HCPCS

## 2022-10-11 PROCEDURE — 80202 ASSAY OF VANCOMYCIN: CPT

## 2022-10-11 PROCEDURE — 36415 COLL VENOUS BLD VENIPUNCTURE: CPT

## 2022-10-11 PROCEDURE — 6360000002 HC RX W HCPCS: Performed by: STUDENT IN AN ORGANIZED HEALTH CARE EDUCATION/TRAINING PROGRAM

## 2022-10-11 PROCEDURE — 76705 ECHO EXAM OF ABDOMEN: CPT

## 2022-10-11 PROCEDURE — 1200000000 HC SEMI PRIVATE

## 2022-10-11 PROCEDURE — 93010 ELECTROCARDIOGRAM REPORT: CPT | Performed by: INTERNAL MEDICINE

## 2022-10-11 RX ADMIN — CEFEPIME 1000 MG: 1 INJECTION, POWDER, FOR SOLUTION INTRAMUSCULAR; INTRAVENOUS at 12:06

## 2022-10-11 RX ADMIN — VANCOMYCIN HYDROCHLORIDE 1000 MG: 1 INJECTION, POWDER, LYOPHILIZED, FOR SOLUTION INTRAVENOUS at 19:36

## 2022-10-11 RX ADMIN — Medication 100 MG: at 08:01

## 2022-10-11 RX ADMIN — HYDROCODONE BITARTRATE AND ACETAMINOPHEN 2 TABLET: 5; 325 TABLET ORAL at 00:30

## 2022-10-11 RX ADMIN — HYDROCODONE BITARTRATE AND ACETAMINOPHEN 2 TABLET: 5; 325 TABLET ORAL at 21:13

## 2022-10-11 RX ADMIN — SUCRALFATE 1 G: 1 TABLET ORAL at 19:43

## 2022-10-11 RX ADMIN — ENOXAPARIN SODIUM 40 MG: 100 INJECTION SUBCUTANEOUS at 17:14

## 2022-10-11 RX ADMIN — OLANZAPINE 5 MG: 5 TABLET, FILM COATED ORAL at 19:43

## 2022-10-11 RX ADMIN — SUCRALFATE 1 G: 1 TABLET ORAL at 17:15

## 2022-10-11 RX ADMIN — HYDROCODONE BITARTRATE AND ACETAMINOPHEN 2 TABLET: 5; 325 TABLET ORAL at 13:21

## 2022-10-11 RX ADMIN — Medication 1250 MG: at 06:32

## 2022-10-11 RX ADMIN — HYDROCODONE BITARTRATE AND ACETAMINOPHEN 2 TABLET: 5; 325 TABLET ORAL at 17:15

## 2022-10-11 RX ADMIN — TAMSULOSIN HYDROCHLORIDE 0.4 MG: 0.4 CAPSULE ORAL at 08:01

## 2022-10-11 RX ADMIN — TRAZODONE HYDROCHLORIDE 50 MG: 50 TABLET ORAL at 19:43

## 2022-10-11 RX ADMIN — CEFEPIME 1000 MG: 1 INJECTION, POWDER, FOR SOLUTION INTRAMUSCULAR; INTRAVENOUS at 21:09

## 2022-10-11 RX ADMIN — SERTRALINE 50 MG: 50 TABLET, FILM COATED ORAL at 08:01

## 2022-10-11 RX ADMIN — SODIUM CHLORIDE 25 ML: 9 INJECTION, SOLUTION INTRAVENOUS at 19:33

## 2022-10-11 RX ADMIN — SUCRALFATE 1 G: 1 TABLET ORAL at 11:13

## 2022-10-11 RX ADMIN — HYDROCODONE BITARTRATE AND ACETAMINOPHEN 2 TABLET: 5; 325 TABLET ORAL at 09:10

## 2022-10-11 RX ADMIN — CLONIDINE HYDROCHLORIDE 0.1 MG: 0.1 TABLET ORAL at 19:43

## 2022-10-11 RX ADMIN — CEFEPIME 1000 MG: 1 INJECTION, POWDER, FOR SOLUTION INTRAMUSCULAR; INTRAVENOUS at 04:35

## 2022-10-11 RX ADMIN — HYDROCODONE BITARTRATE AND ACETAMINOPHEN 2 TABLET: 5; 325 TABLET ORAL at 04:36

## 2022-10-11 RX ADMIN — CLONIDINE HYDROCHLORIDE 0.1 MG: 0.1 TABLET ORAL at 08:01

## 2022-10-11 ASSESSMENT — PAIN DESCRIPTION - PAIN TYPE
TYPE: ACUTE PAIN
TYPE: ACUTE PAIN

## 2022-10-11 ASSESSMENT — PAIN SCALES - GENERAL
PAINLEVEL_OUTOF10: 8
PAINLEVEL_OUTOF10: 6
PAINLEVEL_OUTOF10: 8
PAINLEVEL_OUTOF10: 7
PAINLEVEL_OUTOF10: 8
PAINLEVEL_OUTOF10: 6
PAINLEVEL_OUTOF10: 5
PAINLEVEL_OUTOF10: 9
PAINLEVEL_OUTOF10: 8
PAINLEVEL_OUTOF10: 0
PAINLEVEL_OUTOF10: 9
PAINLEVEL_OUTOF10: 7
PAINLEVEL_OUTOF10: 8
PAINLEVEL_OUTOF10: 7

## 2022-10-11 ASSESSMENT — PAIN DESCRIPTION - LOCATION
LOCATION: ARM
LOCATION: HAND;ARM
LOCATION: HAND;ARM
LOCATION: ARM
LOCATION: HAND;ARM
LOCATION: ARM
LOCATION: ARM

## 2022-10-11 ASSESSMENT — PAIN - FUNCTIONAL ASSESSMENT
PAIN_FUNCTIONAL_ASSESSMENT: ACTIVITIES ARE NOT PREVENTED

## 2022-10-11 ASSESSMENT — PAIN DESCRIPTION - DESCRIPTORS
DESCRIPTORS: ACHING;BURNING
DESCRIPTORS: THROBBING
DESCRIPTORS: THROBBING
DESCRIPTORS: BURNING
DESCRIPTORS: BURNING
DESCRIPTORS: ACHING

## 2022-10-11 ASSESSMENT — PAIN DESCRIPTION - ORIENTATION
ORIENTATION: RIGHT
ORIENTATION: RIGHT
ORIENTATION: LEFT
ORIENTATION: RIGHT

## 2022-10-11 NOTE — CARE COORDINATION
10/11/22, 12:18 PM EDT    DISCHARGE ON GOING EVALUATION    Bob Vaughn. Hospital day: 2  Location: 6E-65/065-A Reason for admit: Right arm cellulitis [Y99.737]  Cellulitis of right upper extremity [A87.625]  Laceration of right thumb without foreign body without damage to nail, initial encounter [S61.011A]   Procedure:   10/9 Right hand XR: No fracture, dislocation or radiopaque foreign body. 10/10 CXR: 1. Chronic nodule right upper lobe. 2. No acute cardiopulmonary disease. Barriers to Discharge: Hospitalist following. Ortho signed off. Continue with IV atb. Culture sensitivities pending. New emesis and RUQ pain. Plan for Abdominal US. Flomax started. Wound monitoring. PCP: No primary care provider on file. Readmission Risk Score: 9.2%  Patient Goals/Plan/Treatment Preferences: Plan home alone. He is independent and does not anticipate needs. Follows at [facility], unsure of provider.

## 2022-10-11 NOTE — PROGRESS NOTES
[address] [facility] Pharmacokinetic Monitoring Service - Vancomycin    Consulting Provider: Dr. Vilma Cee  Indication: Cellulitis  Target Concentration: Goal trough of 10-15 mg/L and AUC/LUDMILA <500 mg*hr/L  Day of Therapy: 3  Additional Antimicrobials: Cefepime    Pertinent Laboratory Values: Wt Readings from Last 1 Encounters:   10/09/22 150 lb (68 kg)     Temp Readings from Last 1 Encounters:   10/11/22 97.8 °F (36.6 °C) (Oral)     Estimated Creatinine Clearance: 137 mL/min (based on SCr of 0.6 mg/dL). Recent Labs     10/09/22  1455 10/09/22  1500 10/10/22  1322   CREATININE  --  0.6 0.6   WBC 16.3*  --  11.0*     Pertinent Cultures:  Culture Date Source Results   10/9/22 Blood x 2 NGTD   10/10/22 Hand Coag + staph   MRSA Nasal Swab: N/A. Non-respiratory infection. Recent vancomycin administrations                     vancomycin (VANCOCIN) 1250 mg in dextrose 5 % 250 mL IVPB (mg) 1,250 mg New Bag 10/11/22 0632     1,250 mg New Bag 10/10/22 1835     1,250 mg New Bag  0635     1,250 mg New Bag 10/09/22 2033                  Assessment:  Date/Time Current Dose Concentration Timing of Concentration (h) AUC   10/11/22 1250 mg IVPB Q12H 22 mcg/mL ~6 hours when dose was started, ~2 hours after when it finished 603   Note: Serum concentrations collected for AUC dosing may appear elevated if collected in close proximity to the dose administered, this is not necessarily an indication of toxicity    Plan:  Current dosing regimen is supra-therapeutic and not at goal AUC of 400-500 and trough 10-15 for SSTI  RN notified pharmacy that level of 35.3 mcg/mL was drawn when vancomycin was infusing as dose was paused and was running from approximately 8565-2787.  Level is inaccurate   \"Decrease dose to Vancomycin 1000 mg IVPB Q12H  [employer] projects new regimen to provide an AUC of 484 and trough of 13.2 mcg/mL at steady state  Repeat vancomycin concentration to be ordered within 48 hours (not ordered yet)  Pharmacy will continue to monitor patient and adjust therapy as indicated    Thank you for the consult,    Taisha Shepard [facility]  10/11/2022 11:37 AM

## 2022-10-11 NOTE — PLAN OF CARE
Problem: Chronic Conditions and Co-morbidities  Goal: Patient's chronic conditions and co-morbidity symptoms are monitored and maintained or improved  10/11/2022 1736 by Bhupinder Otoole RN  Outcome: Progressing  Flowsheets (Taken 10/9/2022 2348 by Wilder Weston RN)  Care Plan - Patient's Chronic Conditions and Co-Morbidity Symptoms are Monitored and Maintained or Improved: Monitor and assess patient's chronic conditions and comorbid symptoms for stability, deterioration, or improvement  10/11/2022 0939 by Jae Mcmahon RN  Outcome: Alicia Nassar (Taken 10/9/2022 2348 by Wilder Weston, RN)  Care Plan - Patient's Chronic Conditions and Co-Morbidity Symptoms are Monitored and Maintained or Improved: Monitor and assess patient's chronic conditions and comorbid symptoms for stability, deterioration, or improvement     Problem: Discharge Planning  Goal: Discharge to home or other facility with appropriate resources  10/11/2022 1736 by Bhupinder Otoole RN  Outcome: Alicia Nassar (Taken 10/9/2022 2348 by Wilder Weston RN)  Discharge to home or other facility with appropriate resources:   Identify barriers to discharge with patient and caregiver   Arrange for needed discharge resources and transportation as appropriate   Identify discharge learning needs (meds, wound care, etc)  10/11/2022 0939 by Jae Mcmahon RN  Outcome: Alicia Nassar (Taken 10/9/2022 2348 by Wilder Weston RN)  Discharge to home or other facility with appropriate resources:   Identify barriers to discharge with patient and caregiver   Arrange for needed discharge resources and transportation as appropriate   Identify discharge learning needs (meds, wound care, etc)     Problem: Pain  Goal: Verbalizes/displays adequate comfort level or baseline comfort level  10/11/2022 1736 by Bhupinder Otoole RN  Outcome: Progressing  Flowsheets (Taken 10/9/2022 2348 by Wilder Weston RN)  Verbalizes/displays adequate comfort level or baseline comfort level:   Encourage patient to monitor pain and request assistance   Assess pain using appropriate pain scale   Administer analgesics based on type and severity of pain and evaluate response   Implement non-pharmacological measures as appropriate and evaluate response  10/11/2022 0939 by Marcelino Epley, RN  Outcome: Progressing  Flowsheets (Taken 10/9/2022 2348 by Yvette Farais RN)  Verbalizes/displays adequate comfort level or baseline comfort level:   Encourage patient to monitor pain and request assistance   Assess pain using appropriate pain scale   Administer analgesics based on type and severity of pain and evaluate response   Implement non-pharmacological measures as appropriate and evaluate response     Problem: Skin/Tissue Integrity  Goal: Absence of new skin breakdown  Description: 1. Monitor for areas of redness and/or skin breakdown  2. Assess vascular access sites hourly  3. Every 4-6 hours minimum:  Change oxygen saturation probe site  4. Every 4-6 hours:  If on nasal continuous positive airway pressure, respiratory therapy assess nares and determine need for appliance change or resting period.   10/11/2022 1736 by Nito Wallace RN  Outcome: Progressing  10/11/2022 0939 by Marcelino Epley, RN  Outcome: Progressing     Problem: Skin/Tissue Integrity - Adult  Goal: Skin integrity remains intact  10/11/2022 1736 by Nito Wallace RN  Outcome: Progressing  Flowsheets (Taken 10/10/2022 2033 by Yvette Farias RN)  Skin Integrity Remains Intact: Monitor for areas of redness and/or skin breakdown  10/11/2022 0939 by Marcelino Epley, RN  Outcome: Progressing  Flowsheets (Taken 10/10/2022 2033 by Yvette Farias RN)  Skin Integrity Remains Intact: Monitor for areas of redness and/or skin breakdown     Problem: Skin/Tissue Integrity - Adult  Goal: Incisions, wounds, or drain sites healing without S/S of infection  10/11/2022 1736 by Nito Wallace RN  Outcome: Progressing  Flowsheets (Taken 10/10/2022 2033 by Shubham Kay RN)  Incisions, Wounds, or Drain Sites Healing Without Sign and Symptoms of Infection:   ADMISSION and DAILY: Assess and document risk factors for pressure ulcer development   TWICE DAILY: Assess and document skin integrity   TWICE DAILY: Assess and document dressing/incision, wound bed, drain sites and surrounding tissue  10/11/2022 0939 by Naa Hernandez RN  Outcome: Progressing  Flowsheets (Taken 10/10/2022 2033 by Shubham Kay RN)  Incisions, Wounds, or Drain Sites Healing Without Sign and Symptoms of Infection:   ADMISSION and DAILY: Assess and document risk factors for pressure ulcer development   TWICE DAILY: Assess and document skin integrity   TWICE DAILY: Assess and document dressing/incision, wound bed, drain sites and surrounding tissue     Problem: Infection - Adult  Goal: Absence of infection at discharge  10/11/2022 1736 by Leia Martinez RN  Outcome: Progressing  Flowsheets (Taken 10/9/2022 2348 by Shuhbam Kay RN)  Absence of infection at discharge:   Assess and monitor for signs and symptoms of infection   Monitor lab/diagnostic results   Monitor all insertion sites i.e., indwelling lines, tubes and drains   Administer medications as ordered  10/11/2022 0939 by Naa Hernandez RN  Outcome: Progressing  Flowsheets (Taken 10/9/2022 2348 by Shubham Kay RN)  Absence of infection at discharge:   Assess and monitor for signs and symptoms of infection   Monitor lab/diagnostic results   Monitor all insertion sites i.e., indwelling lines, tubes and drains   Administer medications as ordered     Problem: Safety - Adult  Goal: Free from fall injury  10/11/2022 1736 by Leia Martinez RN  Outcome: Deepika Casillas (Taken 10/9/2022 2348 by Shubham Kay RN)  Free From Fall Injury: Instruct family/caregiver on patient safety  10/11/2022 0939 by Naa Hernandez RN  Outcome: Progressing     Problem: Chronic Conditions and Co-morbidities  Goal: Patient's chronic conditions and co-morbidity symptoms are monitored and maintained or improved  10/11/2022 1736 by Dorothy Mg RN  Outcome: Progressing  Flowsheets (Taken 10/9/2022 2348 by Bailey Guan RN)  Care Plan - Patient's Chronic Conditions and Co-Morbidity Symptoms are Monitored and Maintained or Improved: Monitor and assess patient's chronic conditions and comorbid symptoms for stability, deterioration, or improvement  10/11/2022 0939 by Braydon Herring RN  Outcome: Sol Bacca (Taken 10/9/2022 2348 by Bailey Guan RN)  Care Plan - Patient's Chronic Conditions and Co-Morbidity Symptoms are Monitored and Maintained or Improved: Monitor and assess patient's chronic conditions and comorbid symptoms for stability, deterioration, or improvement     Problem: Chronic Conditions and Co-morbidities  Goal: Patient's chronic conditions and co-morbidity symptoms are monitored and maintained or improved  10/11/2022 1736 by Dorothy Mg RN  Outcome: Sol Bacca (Taken 10/9/2022 2348 by Bailey Guan RN)  Care Plan - Patient's Chronic Conditions and Co-Morbidity Symptoms are Monitored and Maintained or Improved: Monitor and assess patient's chronic conditions and comorbid symptoms for stability, deterioration, or improvement  10/11/2022 0939 by Braydon Herring RN  Outcome: Sol Bacca (Taken 10/9/2022 2348 by Bailey Guan RN)  Care Plan - Patient's Chronic Conditions and Co-Morbidity Symptoms are Monitored and Maintained or Improved: Monitor and assess patient's chronic conditions and comorbid symptoms for stability, deterioration, or improvement   Care plan reviewed with patient. Patient verbalizes understanding of the plan of care and contribute to goal setting.

## 2022-10-11 NOTE — PROGRESS NOTES
mass noted on prostate exam.  -Continue Flomax. Recommend PCP follow-up. COPD, presumed moderate stage, stable: No previous PFTs or inhaler use in chart. Significant smoking hx of 30 pack years. Recently finished a Prednisone burst.   -Started on Stiolto, discharge with albuterol inhaler as needed   -Follow up outpatient for PFTs   -Smoking hx discussed below    Tobacco abuse: Current daily smoker approximately 1 pack/day and 30 pack years. Patient was found to have a right upper lobe pulmonary nodule on CT scan done on 10/21.  -Repeat CT scan outpatient to monitor pulmonary nodule/lung cancer screening      Right upper lobe pulmonary nodule: Highly suspicious for malignancy. Mass is 3.4 cm I diameter located in lateral right upper lobe. No mediastinal lymphadenopathy. Seen on CT done 10/21.   -Repeat CT outpatient for further monitoring   -Follow up outpatient for other further workup    Hx of HTN, controlled: Takes clonidine 0.1mg BID. -Continue Clonidine inpatient     Hx of GERD: Takes Protonix 40mg daily and sucralfate.    -Continue home medications    Hx of Paranoid Schizophrenia and MDD: Patient takes Zoloft, trazodone, Zyprexa outpatient.    -Continue home medications inpatient    Hx of stroke: Per chart review. From note in 2015, stated that patient reported hx of a stroke and residual effects included his right side feeling weaker. He had prior numbness to his right hand along ulnar distribution as well.     Expected discharge date:  TBD    Disposition:    [x] Home       [] TCU       [] Rehab       [] Psych       [] SNF       [] [place]       [] Other-    Chief Complaint: Right thumb laceration with erythema, pain, and edema to right proximal forearm    Hospital Course: Per HPI: 47year old male with past medical hx of COPD, GERD, HTN, tobacco abuse, schizophrenia, malnutrition, and previous CVA presented to the ED with pain, swelling, and redness originating from a laceration on his right thumb. She states he cut his thumb while washing dishes four days prior and did not seek care at the time. He closed the wound with direct pressure and wrapped it with a bandaid. 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 Dressed wound and went to sleep. The following morning he woke with pain and swelling to the area along with redness and numbness of the first three digits. He also had decreased ability to move his fingers and went to the ED. It was noted in the ED that he had a 3cm laceration on his right thumb with pus draining fro the would. His hand was swollen on the dorsal aspect of his thumb and and swollen around the circumference. There were also red erythematous ribbons stretching down his forearm to his elbow. Patient stated he had associated subjective fevers along with numbness and a \"glasslike\" sensation of his first 3 digits and by his elbow. He denied nausea, vomiting, chest pain, shortness of breath, diarrhea, abdominal pain, swelling in his lower limbs, or any other lacerations or open wounds    Subjective (past 24 hours): No significant events overnight. Patient states his redness and swelling is improving, but pain is still significant especially when touched. He still reports chills, but no fevers. He also has poor appetite and vomited twice yesterday with right upper quadrant pain. He is also reporting urinary urgency. He denies any burning with urination, but states whenever he feels he needs to urinate, he only has small output or dribbles. He requested a prostate exam.    ROS (12 point review of systems completed. Pertinent positives noted. Otherwise ROS is negative).     Medications:  Reviewed    Infusion Medications    sodium chloride       Scheduled Medications    tamsulosin  0.4 mg Oral Daily    cloNIDine  0.1 mg Oral BID    OLANZapine  5 mg Oral Nightly pantoprazole  40 mg Oral QAM AC    sertraline  50 mg Oral Daily    sucralfate  1 g Oral 4x Daily AC & HS    thiamine  100 mg Oral Daily    sodium chloride flush  5-40 mL IntraVENous 2 times per day    enoxaparin  40 mg SubCUTAneous Q24H    vancomycin  20 mg/kg IntraVENous Q12H    cefepime  1,000 mg IntraVENous Q8H    vancomycin (VANCOCIN) intermittent dosing (placeholder)   Other RX Placeholder     PRN Meds: HYDROcodone 5 mg - acetaminophen **OR** HYDROcodone 5 mg - acetaminophen, traZODone, sodium chloride flush, sodium chloride, ondansetron **OR** ondansetron, polyethylene glycol, acetaminophen **OR** acetaminophen      Intake/Output Summary (Last 24 hours) at 10/11/2022 1114  Last data filed at 10/11/2022 0910  Gross per 24 hour   Intake 2163.83 ml   Output --   Net 2163.83 ml       Diet:  ADULT DIET; Regular; 4 carb choices (60 gm/meal); Low Sodium (2 gm)    Exam:  /81   Pulse 84   Temp 97.8 °F (36.6 °C) (Oral)   Resp 18   Ht 5' 8\" (1.727 m)   Wt 150 lb (68 kg)   SpO2 96%   BMI 22.81 kg/m²     General appearance: No apparent distress, appears stated age and cooperative. HEENT: Pupils equal, round, and reactive to light. Conjunctivae/corneas clear. Neck: Supple, with full range of motion. No jugular venous distention. Trachea midline. Respiratory:  Normal respiratory effort. Clear to auscultation, bilaterally without Rales/Wheezes/Rhonchi. Cardiovascular: Regular rate and rhythm with normal S1/S2 without murmurs, rubs or gallops. Abdomen: Soft, non-tender, non-distended with normal bowel sounds. Musculoskeletal: Passive and active ROM x 4 extremities. Skin: Skin color, texture, turgor normal. 3 cm laceration seen right above the distal metacarpal joint that has scabbed over. No evidence of drainage at this time. Erythema improved and extending slightly past right wrist with resolution of streaking previously seen on forearm. Patient has tenderness on light palpation of right hand and forearm. : No nodules appreciated on prostate exam, area feels soft. Internal hemorrhoids noted. Neurologic:  Neurovascularly intact with numbness along ulnar distribution of right hand. Cranial nerves: II-XII intact, grossly non-focal.  Psychiatric: Alert and oriented, thought content appropriate, normal insight  Peripheral Pulses: +2 palpable, equal bilaterally     Labs:   Recent Labs     10/09/22  1455 10/10/22  1322   WBC 16.3* 11.0*   HGB 14.6 12.7*   HCT 44.2 38.5*    288     Recent Labs     10/09/22  1500 10/10/22  1322    134*   K 3.5 3.8    99   CO2 24 24   BUN 10 8   CREATININE 0.6 0.6   CALCIUM 9.3 8.9     Recent Labs     10/11/22  0953   AST 16   ALT 12   BILIDIR <0.2   BILITOT 0.5   ALKPHOS 79     No results for input(s): INR in the last 72 hours. No results for input(s): Normajean Fowler in the last 72 hours. Microbiology:      Urinalysis:      Lab Results   Component Value Date/Time    NITRU NEGATIVE 02/15/2022 07:30 PM    [address] 0-2 12/31/2015 02:40 PM    BACTERIA NONE 12/31/2015 02:40 PM    RBCUA 5-10 12/31/2015 02:40 PM    BLOODU NEGATIVE 02/15/2022 07:30 PM    SPECGRAV 1.011 07/16/2015 03:25 PM    GLUCOSEU NEGATIVE 02/15/2022 07:30 PM       Radiology:  XR CHEST PORTABLE   Final Result   1. Chronic nodule right upper lobe. 2. No acute cardiopulmonary disease. **This report has been created using voice recognition software. It may contain minor errors which are inherent in voice recognition technology. **      Final report electronically signed by Dr. Isidoro Glass on 10/10/2022 2:28 PM      XR HAND RIGHT (MIN 3 VIEWS)   Final Result      No fracture, dislocation or radiopaque foreign body. Final report electronically signed by Dr. Mari Molina on 10/9/2022 2:08 PM      [address] [place] organ?  LIVER, GALLBLADDER    (Results Pending)       DVT prophylaxis: [x] Lovenox                                 [] SCDs                                 [] SQ Heparin                                 [] Encourage ambulation           [] Already on Anticoagulation     Code Status: Full Code    PT/OT Eval Status:     Tele:   [x] yes             [] no    Electronically signed by Modesto Estrada DO on 10/11/2022 at 11:14 AM

## 2022-10-11 NOTE — PLAN OF CARE
Problem: Chronic Conditions and Co-morbidities  Goal: Patient's chronic conditions and co-morbidity symptoms are monitored and maintained or improved  10/11/2022 0939 by Noel Mane RN  Outcome: Sudheer Part (Taken 10/9/2022 2348 by Dilcia Mix RN)  Care Plan - Patient's Chronic Conditions and Co-Morbidity Symptoms are Monitored and Maintained or Improved: Monitor and assess patient's chronic conditions and comorbid symptoms for stability, deterioration, or improvement  10/10/2022 2039 by Dilcia Mix RN  Outcome: Progressing  Flowsheets (Taken 10/9/2022 2348)  Care Plan - Patient's Chronic Conditions and Co-Morbidity Symptoms are Monitored and Maintained or Improved: Monitor and assess patient's chronic conditions and comorbid symptoms for stability, deterioration, or improvement     Problem: Discharge Planning  Goal: Discharge to home or other facility with appropriate resources  10/11/2022 0939 by Noel Mane RN  Outcome: Sudheer Part (Taken 10/9/2022 2348 by Dilcia Mix RN)  Discharge to home or other facility with appropriate resources:   Identify barriers to discharge with patient and caregiver   Arrange for needed discharge resources and transportation as appropriate   Identify discharge learning needs (meds, wound care, etc)  10/10/2022 2039 by Dilcia Mix RN  Outcome: Progressing  [address] (Taken 10/9/2022 2348)  Discharge to home or other facility with appropriate resources:   Identify barriers to discharge with patient and caregiver   Arrange for needed discharge resources and transportation as appropriate   Identify discharge learning needs (meds, wound care, etc)     Problem: Pain  Goal: Verbalizes/displays adequate comfort level or baseline comfort level  10/11/2022 0939 by Noel Mane RN  Outcome: Progressing  Flowsheets (Taken 10/9/2022 2348 by Dlicia Mix RN)  Verbalizes/displays adequate comfort level or baseline comfort level:   Encourage patient to monitor pain and request assistance   Assess pain using appropriate pain scale   Administer analgesics based on type and severity of pain and evaluate response   Implement non-pharmacological measures as appropriate and evaluate response  10/10/2022 2039 by Norma Valencia RN  Outcome: Progressing  Flowsheets (Taken 10/9/2022 6153)  Verbalizes/displays adequate comfort level or baseline comfort level:   Encourage patient to monitor pain and request assistance   Assess pain using appropriate pain scale   Administer analgesics based on type and severity of pain and evaluate response   Implement non-pharmacological measures as appropriate and evaluate response     Problem: Skin/Tissue Integrity  Goal: Absence of new skin breakdown  Description: 1. Monitor for areas of redness and/or skin breakdown  2. Assess vascular access sites hourly  3. Every 4-6 hours minimum:  Change oxygen saturation probe site  4. Every 4-6 hours:  If on nasal continuous positive airway pressure, respiratory therapy assess nares and determine need for appliance change or resting period.   10/11/2022 0939 by Lazaro West RN  Outcome: Progressing  10/10/2022 2039 by Norma Valencia RN  Outcome: Progressing     Problem: Skin/Tissue Integrity - Adult  Goal: Skin integrity remains intact  10/11/2022 0939 by Lazaro West RN  Outcome: Progressing  Flowsheets (Taken 10/10/2022 2033 by Norma Valencia RN)  Skin Integrity Remains Intact: Monitor for areas of redness and/or skin breakdown  10/10/2022 2039 by Norma Valencia RN  Outcome: Progressing  Flowsheets (Taken 10/10/2022 2033)  Skin Integrity Remains Intact: Monitor for areas of redness and/or skin breakdown     Problem: Skin/Tissue Integrity - Adult  Goal: Incisions, wounds, or drain sites healing without S/S of infection  10/11/2022 0939 by Lazaro West RN  Outcome: Progressing  Flowsheets (Taken 10/10/2022 2033 by Norma Valencia RN)  Incisions, Wounds, or Drain Sites Healing Without Sign and Symptoms of Infection:   ADMISSION and DAILY: Assess and document risk factors for pressure ulcer development   TWICE DAILY: Assess and document skin integrity   TWICE DAILY: Assess and document dressing/incision, wound bed, drain sites and surrounding tissue  10/10/2022 2039 by Neris Styles RN  Outcome: Progressing  Flowsheets (Taken 10/10/2022 2033)  Incisions, Wounds, or Drain Sites Healing Without Sign and Symptoms of Infection:   ADMISSION and DAILY: Assess and document risk factors for pressure ulcer development   TWICE DAILY: Assess and document skin integrity   TWICE DAILY: Assess and document dressing/incision, wound bed, drain sites and surrounding tissue     Problem: Infection - Adult  Goal: Absence of infection at discharge  10/11/2022 0939 by Nelson Porter RN  Outcome: Progressing  Flowsheets (Taken 10/9/2022 2348 by Neris Styles RN)  Absence of infection at discharge:   Assess and monitor for signs and symptoms of infection   Monitor lab/diagnostic results   Monitor all insertion sites i.e., indwelling lines, tubes and drains   Administer medications as ordered  10/10/2022 2039 by Neris Styles RN  Outcome: Progressing  Flowsheets (Taken 10/9/2022 2348)  Absence of infection at discharge:   Assess and monitor for signs and symptoms of infection   Monitor lab/diagnostic results   Monitor all insertion sites i.e., indwelling lines, tubes and drains   Administer medications as ordered     Problem: Safety - Adult  Goal: Free from fall injury  10/11/2022 0939 by Nelson Porter RN  Outcome: Progressing  10/10/2022 2039 by Neris Styles RN  Outcome: Progressing  [address] [facility] (Taken 10/9/2022 2348)  Free From Fall Injury: Instruct family/caregiver on patient safety   Care plan reviewed with patient. Patient  verbalize understanding of the plan of care and contribute to goal setting.

## 2022-10-11 NOTE — PLAN OF CARE
Problem: Chronic Conditions and Co-morbidities  Goal: Patient's chronic conditions and co-morbidity symptoms are monitored and maintained or improved  10/10/2022 2039 by Carli Mejia RN  Outcome: Progressing  Flowsheets (Taken 10/9/2022 2348)  Care Plan - Patient's Chronic Conditions and Co-Morbidity Symptoms are Monitored and Maintained or Improved: Monitor and assess patient's chronic conditions and comorbid symptoms for stability, deterioration, or improvement  10/10/2022 1645 by Jeremy Vegas RN  Outcome: Mireya [place] (Taken 10/9/2022 2348 by Carli Mejia RN)  Care Plan - Patient's Chronic Conditions and Co-Morbidity Symptoms are Monitored and Maintained or Improved: Monitor and assess patient's chronic conditions and comorbid symptoms for stability, deterioration, or improvement     Problem: Discharge Planning  Goal: Discharge to home or other facility with appropriate resources  10/10/2022 2039 by Carli Mejia RN  Outcome: Progressing  [address] (Taken 10/9/2022 2348)  Discharge to home or other facility with appropriate resources:   Identify barriers to discharge with patient and caregiver   Arrange for needed discharge resources and transportation as appropriate   Identify discharge learning needs (meds, wound care, etc)  10/10/2022 1645 by Jeremy Vegas RN  Outcome: Mireya [place] (Taken 10/9/2022 2348 by Carli Mejia RN)  Discharge to home or other facility with appropriate resources:   Identify barriers to discharge with patient and caregiver   Arrange for needed discharge resources and transportation as appropriate   Identify discharge learning needs (meds, wound care, etc)     Problem: Pain  Goal: Verbalizes/displays adequate comfort level or baseline comfort level  10/10/2022 2039 by Carli Mejia RN  Outcome: Progressing  Flowsheets (Taken 10/9/2022 2348)  Verbalizes/displays adequate comfort level or baseline comfort level:   Encourage patient to monitor pain and request assistance   Assess pain using appropriate pain scale   Administer analgesics based on type and severity of pain and evaluate response   Implement non-pharmacological measures as appropriate and evaluate response  10/10/2022 1645 by Brett Bruno RN  Outcome: Chelly López (Taken 10/9/2022 2348 by Kylah Mcgarry RN)  Verbalizes/displays adequate comfort level or baseline comfort level:   Encourage patient to monitor pain and request assistance   Assess pain using appropriate pain scale   Administer analgesics based on type and severity of pain and evaluate response   Implement non-pharmacological measures as appropriate and evaluate response     Problem: Skin/Tissue Integrity  Goal: Absence of new skin breakdown  Description: 1. Monitor for areas of redness and/or skin breakdown  2. Assess vascular access sites hourly  3. Every 4-6 hours minimum:  Change oxygen saturation probe site  4. Every 4-6 hours:  If on nasal continuous positive airway pressure, respiratory therapy assess nares and determine need for appliance change or resting period.   10/10/2022 2039 by Kylah Mcgarry RN  Outcome: Progressing  10/10/2022 1645 by Brett Bruno RN  Outcome: Progressing     Problem: Skin/Tissue Integrity - Adult  Goal: Skin integrity remains intact  10/10/2022 2039 by Kylah Mcgarry RN  Outcome: Progressing  Flowsheets (Taken 10/10/2022 2033)  Skin Integrity Remains Intact: Monitor for areas of redness and/or skin breakdown  10/10/2022 1645 by Brett Bruno RN  Flowsheets (Taken 10/10/2022 1009)  Skin Integrity Remains Intact:   Monitor for areas of redness and/or skin breakdown   Assess vascular access sites hourly  Goal: Incisions, wounds, or drain sites healing without S/S of infection  10/10/2022 2039 by Kylah Mcgarry RN  Outcome: Progressing  Flowsheets (Taken 10/10/2022 2033)  Incisions, Wounds, or Drain Sites Healing Without Sign and Symptoms of Infection:   ADMISSION and DAILY: Assess and document risk factors for pressure ulcer development   TWICE DAILY: Assess and document skin integrity   TWICE DAILY: Assess and document dressing/incision, wound bed, drain sites and surrounding tissue  10/10/2022 1645 by Colleen Koehler RN  Outcome: Progressing  Flowsheets  Taken 10/10/2022 1645  Incisions, Wounds, or Drain Sites Healing Without Sign and Symptoms of Infection: ADMISSION and DAILY: Assess and document risk factors for pressure ulcer development  Taken 10/10/2022 1009  Incisions, Wounds, or Drain Sites Healing Without Sign and Symptoms of Infection: ADMISSION and DAILY: Assess and document risk factors for pressure ulcer development     Problem: Infection - Adult  Goal: Absence of infection at discharge  10/10/2022 2039 by Nicole Martinez RN  Outcome: Progressing  [address] (Taken 10/9/2022 2348)  Absence of infection at discharge:   Assess and monitor for signs and symptoms of infection   Monitor lab/diagnostic results   Monitor all insertion sites i.e., indwelling lines, tubes and drains   Administer medications as ordered  10/10/2022 1645 by Colleen Koehler RN  Outcome: Shellie Tay (Taken 10/9/2022 2348 by Nicole Martinez RN)  Absence of infection at discharge:   Assess and monitor for signs and symptoms of infection   Monitor lab/diagnostic results   Monitor all insertion sites i.e., indwelling lines, tubes and drains   Administer medications as ordered     Problem: Safety - Adult  Goal: Free from fall injury  10/10/2022 2039 by Nicole Martinez RN  Outcome: Progressing  [address] (Taken 10/9/2022 2348)  Free From Fall Injury: Instruct family/caregiver on patient safety  10/10/2022 1645 by Colleen Koehler RN  Outcome: Shellie Tay (Taken 10/9/2022 2348 by Nicole Martinez RN)  Free From Fall Injury: Instruct family/caregiver on patient safety   Care plan reviewed with patient. Patient verbalizes understanding of the plan of care and contribute to goal setting.

## 2022-10-11 NOTE — PROGRESS NOTES
Orthopaedic Progress Note      SUBJECTIVE   Mr. Hui Cedeño is hospital day 3    Concern for progression of R hand/forearm cellulitic changes after sustaining a small laceration to thumb about six  days ago  Seen at bedside this morning, pain well controlled  No significant change in hand pain or clinical eval  Tolerating iv abx well  no adverse events overnight  CRP WNL       OBJECTIVE      Physical    VITALS:  BP (!) 125/95   Pulse 82   Temp 98.2 °F (36.8 °C) (Oral)   Resp 18   Ht 5' 8\" (1.727 m)   Wt 150 lb (68 kg)   SpO2 94%   BMI 22.81 kg/m²   I/O last 3 completed shifts: In: 1173.3 [P.O.:780; IV Piggyback:393.3]  Out: -     4/10 pain  Gen: alert and oriented  Head: normorcephalic, atraumatic  Resp: unlabored, room air  Pelvis: stable  MUSCULOSKELETAL:  On exam of right hand there is a 3cm oblique laceration on the dorsum of the thumb just distal to the MCP. There is mild gapping of the superficial skin edges. No active drainage. Mild to moderate erythema and edema on the dorsum of the hand and thumb extending proximally along the radial aspect of the wrist and volar forearm. Diffuse tenderness to palpation throughout hand and forearm, patient states this is to baseline upon arrival. Compartments are soft throughout hand and forearm. No fluctuance noted. No pain with full passive ROM of fingers 2-5. Some pain and tight sensation at terminal ranges of thumb flexion and extension however remain intact. No small arc ROM pain in the right thumb. Active thumb flexion and extension intact at the MCP and IP. No significant pain with active ROM of fingers within tolerable ROM. No pain with AROM or PROM of right wrist. Sensation intact throughout hand. 2+ radial pulse.  Brisk capillary refill    Data  CBC:   Lab Results   Component Value Date/Time    WBC 11.0 10/10/2022 01:22 PM    HGB 12.7 10/10/2022 01:22 PM     10/10/2022 01:22 PM     BMP:    Lab Results   Component Value Date/Time     10/10/2022 01:22 (MAXIPIME) 1000 mg IVPB minibag, 1,000 mg, IntraVENous, Q8H  vancomycin (VANCOCIN) intermittent dosing (placeholder), , Other, Jackie Dys    Mr. Adrienne Renteria is hospital day 3    RHD  Ortho consulted for evaluation or potential progression of R hand/forearm cellulitic changes after sustaining a small laceration to thumb about six days ago  Significant improvement compared to yesterday eval  Light ADLs only [place]  [place]  Encourage twice daily warm soaks, dry dressing daily if opens  Follow up [facility] in one week for repeat evaluation  Ortho to sign off, available while in house

## 2022-10-12 VITALS
DIASTOLIC BLOOD PRESSURE: 88 MMHG | OXYGEN SATURATION: 96 % | HEIGHT: 68 IN | BODY MASS INDEX: 22.73 KG/M2 | TEMPERATURE: 97.4 F | HEART RATE: 74 BPM | SYSTOLIC BLOOD PRESSURE: 110 MMHG | RESPIRATION RATE: 18 BRPM | WEIGHT: 150 LBS

## 2022-10-12 LAB
ANION GAP SERPL CALCULATED.3IONS-SCNC: 14 MEQ/L (ref 8–16)
BUN BLDV-MCNC: 14 MG/DL (ref 7–22)
CALCIUM SERPL-MCNC: 10 MG/DL (ref 8.5–10.5)
CHLORIDE BLD-SCNC: 103 MEQ/L (ref 98–111)
CO2: 24 MEQ/L (ref 23–33)
CREAT SERPL-MCNC: 0.7 MG/DL (ref 0.4–1.2)
ERYTHROCYTE [DISTWIDTH] IN BLOOD BY AUTOMATED COUNT: 11.8 % (ref 11.5–14.5)
ERYTHROCYTE [DISTWIDTH] IN BLOOD BY AUTOMATED COUNT: 40.8 FL (ref 35–45)
GFR SERPL CREATININE-BSD FRML MDRD: > 90 ML/MIN/1.73M2
GLUCOSE BLD-MCNC: 81 MG/DL (ref 70–108)
HCT VFR BLD CALC: 46.3 % (ref 42–52)
HEMOGLOBIN: 15.3 GM/DL (ref 14–18)
MCH RBC QN AUTO: 31 PG (ref 26–33)
MCHC RBC AUTO-ENTMCNC: 33 GM/DL (ref 32.2–35.5)
MCV RBC AUTO: 93.9 FL (ref 80–94)
PLATELET # BLD: 347 THOU/MM3 (ref 130–400)
PMV BLD AUTO: 10 FL (ref 9.4–12.4)
POTASSIUM REFLEX MAGNESIUM: 5 MEQ/L (ref 3.5–5.2)
RBC # BLD: 4.93 MILL/MM3 (ref 4.7–6.1)
SODIUM BLD-SCNC: 141 MEQ/L (ref 135–145)
WBC # BLD: 10.6 THOU/MM3 (ref 4.8–10.8)

## 2022-10-12 PROCEDURE — 6370000000 HC RX 637 (ALT 250 FOR IP): Performed by: STUDENT IN AN ORGANIZED HEALTH CARE EDUCATION/TRAINING PROGRAM

## 2022-10-12 PROCEDURE — 36415 COLL VENOUS BLD VENIPUNCTURE: CPT

## 2022-10-12 PROCEDURE — 2580000003 HC RX 258: Performed by: STUDENT IN AN ORGANIZED HEALTH CARE EDUCATION/TRAINING PROGRAM

## 2022-10-12 PROCEDURE — 6360000002 HC RX W HCPCS: Performed by: STUDENT IN AN ORGANIZED HEALTH CARE EDUCATION/TRAINING PROGRAM

## 2022-10-12 PROCEDURE — 80048 BASIC METABOLIC PNL TOTAL CA: CPT

## 2022-10-12 PROCEDURE — G0378 HOSPITAL OBSERVATION PER HR: HCPCS

## 2022-10-12 PROCEDURE — 85027 COMPLETE CBC AUTOMATED: CPT

## 2022-10-12 PROCEDURE — 96366 THER/PROPH/DIAG IV INF ADDON: CPT

## 2022-10-12 PROCEDURE — 99239 HOSP IP/OBS DSCHRG MGMT >30: CPT | Performed by: INTERNAL MEDICINE

## 2022-10-12 RX ORDER — SULFAMETHOXAZOLE AND TRIMETHOPRIM 800; 160 MG/1; MG/1
1 TABLET ORAL EVERY 12 HOURS SCHEDULED
Status: DISCONTINUED | OUTPATIENT
Start: 2022-10-12 | End: 2022-10-12 | Stop reason: HOSPADM

## 2022-10-12 RX ORDER — SULFAMETHOXAZOLE AND TRIMETHOPRIM 800; 160 MG/1; MG/1
1 TABLET ORAL EVERY 12 HOURS SCHEDULED
Qty: 13 TABLET | Refills: 0 | Status: SHIPPED | OUTPATIENT
Start: 2022-10-12 | End: 2022-10-19

## 2022-10-12 RX ADMIN — SULFAMETHOXAZOLE AND TRIMETHOPRIM 1 TABLET: 800; 160 TABLET ORAL at 08:58

## 2022-10-12 RX ADMIN — Medication 100 MG: at 08:58

## 2022-10-12 RX ADMIN — TAMSULOSIN HYDROCHLORIDE 0.4 MG: 0.4 CAPSULE ORAL at 08:59

## 2022-10-12 RX ADMIN — SODIUM CHLORIDE 25 ML: 9 INJECTION, SOLUTION INTRAVENOUS at 04:12

## 2022-10-12 RX ADMIN — HYDROCODONE BITARTRATE AND ACETAMINOPHEN 2 TABLET: 5; 325 TABLET ORAL at 03:29

## 2022-10-12 RX ADMIN — CLONIDINE HYDROCHLORIDE 0.1 MG: 0.1 TABLET ORAL at 08:58

## 2022-10-12 RX ADMIN — CEFEPIME 1000 MG: 1 INJECTION, POWDER, FOR SOLUTION INTRAMUSCULAR; INTRAVENOUS at 04:13

## 2022-10-12 RX ADMIN — HYDROCODONE BITARTRATE AND ACETAMINOPHEN 2 TABLET: 5; 325 TABLET ORAL at 08:06

## 2022-10-12 RX ADMIN — SERTRALINE 50 MG: 50 TABLET, FILM COATED ORAL at 08:59

## 2022-10-12 ASSESSMENT — PAIN DESCRIPTION - DESCRIPTORS
DESCRIPTORS: SHARP;THROBBING
DESCRIPTORS: ACHING

## 2022-10-12 ASSESSMENT — PAIN - FUNCTIONAL ASSESSMENT: PAIN_FUNCTIONAL_ASSESSMENT: ACTIVITIES ARE NOT PREVENTED

## 2022-10-12 ASSESSMENT — PAIN SCALES - GENERAL
PAINLEVEL_OUTOF10: 8
PAINLEVEL_OUTOF10: 7
PAINLEVEL_OUTOF10: 8
PAINLEVEL_OUTOF10: 5

## 2022-10-12 ASSESSMENT — PAIN DESCRIPTION - LOCATION
LOCATION: HAND
LOCATION: HAND;ARM

## 2022-10-12 ASSESSMENT — PAIN DESCRIPTION - ORIENTATION: ORIENTATION: LEFT

## 2022-10-12 NOTE — DISCHARGE SUMMARY
Hospital Medicine Discharge Summary      Patient Identification:   Precious Geller. : 1970  MRN: [id number]   Account: [de-identified]      Patient's PCP: No primary care provider on file. Admit Date: 10/9/2022     Discharge Date: 10/12/22    Admitting Physician: Desmond Toure MD     Discharge Physician: Salome Polo DO     Discharge Diagnoses:    Acute right arm cellulitis: 3cm laceration right above the distal metacarpal joint which was oozing pus on arrival to the ED. No drainage seen currently. Overall  in erythema with it now primarily on right hand extending slightly past wrist and resolution of previously seen streaking. Patient has numbness of his first three digits (which on prior chart review has been noted as residual symptoms from a stroke) and pain on light palpation of right hand and forearm, however this pain has improved. Cut initially occurred four days prior to ED visit. Most recent tetanus shot was roughly one year ago after patient stepped on a nail. Improved WBC of 11. XR of hand not significant for signs of osteomyelitis. Blood cultures did not show an growth in 24 hours. Wound culture significant for coagulase positive staphylococcus. Sensitivity consistent with MSSA. -Per Ortho, no urgent surgical pathology identified and no specific activity restrictions from their standpoint. Follow up with Ortho in one week. -Discontinued Cefepime and Vancomycin IV (Both started 10/9/22)   -Begin Bactrim PO for 7 days      Abdominal Pain / Nausea/Vomiting: Patient has been having poor appetite with right upper quadrant pain that started yesterday after eating dinner. He has thrown up twice. LFT's are normal. RUQ US showed echogenic debris in the gallbladder without evidence of acute cholecystitis.     -Follow up outpatient with PCP to discuss need of referral to general surgery for intervention     Urinary Retention / Urgency: Patient states he has had difficulty with urinating over the past several months. Says he will have a strong urge to urinate, but is not able to do so normally and only sees little output. He denies having a prostate exam in the past or any intervention or work up done for this matter. Suspect BPH. No nodules or mass noted on prostate exam.  -Continue Flomax. Recommend PCP follow-up. COPD, presumed moderate stage, stable: No previous PFTs or inhaler use in chart. Significant smoking hx of 30 pack years. Recently finished a Prednisone burst.              -Started on Stiolto, discharge with albuterol inhaler as needed              -Follow up outpatient for PFTs              -Smoking hx discussed below     Tobacco abuse: Current daily smoker approximately 1 pack/day and 30 pack years. Patient was found to have a right upper lobe pulmonary nodule on CT scan done on 10/21.  -Repeat CT scan outpatient to monitor pulmonary nodule/lung cancer screening       Right upper lobe pulmonary nodule: Highly suspicious for malignancy. Mass is 3.4 cm I diameter located in lateral right upper lobe. No mediastinal lymphadenopathy. Seen on CT done 10/21.              -Repeat CT outpatient for further monitoring              -Follow up outpatient for other further workup     Hx of HTN, controlled: Takes clonidine 0.1mg BID. -Continue Clonidine inpatient      Hx of GERD: Takes Protonix 40mg daily and sucralfate.               -Continue home medications     Hx of Paranoid Schizophrenia and MDD: Patient takes Zoloft, trazodone, Zyprexa outpatient.               -Continue home medications inpatient     Hx of stroke: Per chart review. From note in 2015, stated that patient reported hx of a stroke and residual effects included his right side feeling weaker. He had prior numbness to his right hand along ulnar distribution as well.     The patient was seen and examined on day of discharge and this discharge summary is in conjunction with any daily progress note from day of discharge. Hospital Course:   Per HPI: 47year old male with past medical hx of COPD, GERD, HTN, tobacco abuse, schizophrenia, malnutrition, and previous CVA presented to the ED with pain, swelling, and redness originating from a laceration on his right thumb. She states he cut his thumb while washing dishes four days prior and did not seek care at the time. He closed the wound with direct pressure and wrapped it with a bandaid. 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 Dressed wound and went to sleep. The following morning he woke with pain and swelling to the area along with redness and numbness of the first three digits. He also had decreased ability to move his fingers and went to the ED. It was noted in the ED that he had a 3cm laceration on his right thumb with pus draining fro the would. His hand was swollen on the dorsal aspect of his thumb and and swollen around the circumference. There were also red erythematous ribbons stretching down his forearm to his elbow. Patient stated he had associated subjective fevers along with numbness and a \"glasslike\" sensation of his first 3 digits and by his elbow. He denied nausea, vomiting, chest pain, shortness of breath, diarrhea, abdominal pain, swelling in his lower limbs, or any other lacerations or open wounds    Overall significant  in erythema with it now primarily on right hand extending slightly past wrist and complete resolution of previously seen streaking. Patients edema has also resolved significantly. Patient has numbness of his first three digits (which on prior chart review has been noted as residual symptoms from a stroke) and pain on light palpation of right hand and forearm, however this pain has improved. ROM of right wrist and digits greatly improved, however still painful.  He denies fevers, chills, and body aches. Patient has no other questions or concerns at this time and is agreeable to discharge plan of beginning Bactrim PO today for a total of 7 days. He will follow up with Ortho in one week outpatient. Exam:     Vitals:  Vitals:    10/12/22 0325 10/12/22 0359 10/12/22 0745 10/12/22 0806   BP: 101/87  110/88    Pulse: 82  74    Resp: 18 16 18 18   Temp: 97.5 °F (36.4 °C)  97.4 °F (36.3 °C)    TempSrc: Oral  Oral    SpO2: 98%  96%    Weight:       Height:         Weight: Weight: 150 lb (68 kg)     24 hour intake/output:  Intake/Output Summary (Last 24 hours) at 10/12/2022 0949  Last data filed at 10/12/2022 6560  Gross per 24 hour   Intake 378.18 ml   Output --   Net 378.18 ml     General appearance: No apparent distress, appears stated age and cooperative. HEENT: Pupils equal, round, and reactive to light. Conjunctivae/corneas clear. Neck: Supple, with full range of motion. No jugular venous distention. Trachea midline. Respiratory:  Normal respiratory effort. Clear to auscultation, bilaterally without Rales/Wheezes/Rhonchi. Cardiovascular: Regular rate and rhythm with normal S1/S2 without murmurs, rubs or gallops. Abdomen: Soft, non-tender, non-distended with normal bowel sounds. Musculoskeletal: passive and active ROM x 4 extremities. Skin: Skin color, texture, turgor normal. 3 cm laceration seen right above the distal metacarpal joint that has scabbed over. No evidence of drainage at this time. Erythema improved and extending slightly past right wrist with resolution of streaking previously seen on forearm. Patient has improved but continued tenderness on light palpation of right hand and forearm. Neurologic:  Neurovascularly intact with numbness along ulnar distribution of right hand.  Cranial nerves: II-XII intact, grossly non-focal.  Psychiatric: Alert and oriented, thought content appropriate, normal insight  Capillary Refill: Brisk,< 3 seconds   Peripheral Pulses: +2 palpable, equal bilaterally Labs: For convenience and continuity at follow-up the following most recent labs are provided:      CBC:    Lab Results   Component Value Date/Time    WBC 10.6 10/12/2022 07:18 AM    HGB 15.3 10/12/2022 07:18 AM    HCT 46.3 10/12/2022 07:18 AM     10/12/2022 07:18 AM       Renal:    Lab Results   Component Value Date/Time     10/12/2022 07:18 AM    K 5.0 10/12/2022 07:18 AM     10/12/2022 07:18 AM    CO2 24 10/12/2022 07:18 AM    BUN 14 10/12/2022 07:18 AM    CREATININE 0.7 10/12/2022 07:18 AM    CALCIUM 10.0 10/12/2022 07:18 AM    PHOS 3.9 11/22/2016 05:13 PM         Significant Diagnostic Studies    Radiology:   US LIVER   Final Result   1. Echogenic debris in the gallbladder without evidence of acute cholecystitis. 2. Normal liver ultrasound. Final report electronically signed by Dr. Con Suh on 10/11/2022 4:23 PM      [address] [employer]   Final Result   1. Echogenic debris in the gallbladder without evidence of acute cholecystitis. 2. Normal liver ultrasound. Final report electronically signed by Dr. Con Suh on 10/11/2022 4:23 PM      XR CHEST PORTABLE   Final Result   1. Chronic nodule right upper lobe. 2. No acute cardiopulmonary disease. **This report has been created using voice recognition software. It may contain minor errors which are inherent in voice recognition technology. **      Final report electronically signed by Dr. Arnav Lerner on 10/10/2022 2:28 PM      XR HAND RIGHT (MIN 3 VIEWS)   Final Result      No fracture, dislocation or radiopaque foreign body. Final report electronically signed by Dr. Con Suh on 10/9/2022 2:08 PM             Consults:     IP CONSULT TO ORTHOPEDIC SURGERY    Disposition: Home  Condition at Discharge: Stable    Code Status:  Full Code     Patient Instructions:    Discharge lab work: None  Activity: activity as tolerated  Diet: ADULT DIET; Regular; 4 carb choices (60 gm/meal);  Low Sodium (2 gm) Follow-up visits:   [facility]  [address] [facility]/[address] [place] [address]  Unsure of which provider he follows with.   [phone] (ext [phone])  Follow up     Discharge Medications:        Medication List        START taking these medications      sulfamethoxazole-trimethoprim 800-160 MG per tablet  Commonly known as: BACTRIM DS;SEPTRA DS  Take 1 tablet by mouth every 12 hours for 14 doses            CHANGE how you take these medications      sucralfate 1 GM tablet  Commonly known as: CARAFATE  Take 1 tablet by mouth 4 times daily (before meals and nightly)  What changed: Another medication with the same name was removed. Continue taking this medication, and follow the directions you see here.             CONTINUE taking these medications      cloNIDine 0.1 MG tablet  Commonly known as: CATAPRES  Take 1 tablet by mouth 2 times daily     multivitamin Tabs tablet  Take 1 tablet by mouth daily     OLANZapine 5 MG tablet  Commonly known as: ZYPREXA  Take 1 tablet by mouth nightly     ondansetron 4 MG tablet  Commonly known as: ZOFRAN  Take 1 tablet by mouth 3 times daily as needed for Nausea or Vomiting     pantoprazole 40 MG tablet  Commonly known as: PROTONIX  Take 1 tablet by mouth every morning (before breakfast)     sertraline 50 MG tablet  Commonly known as: ZOLOFT  Take 1 tablet by mouth daily     thiamine 100 MG tablet  Take 1 tablet by mouth daily     traZODone 50 MG tablet  Commonly known as: DESYREL  Take 1 tablet by mouth nightly as needed for Sleep            STOP taking these medications      Narcan 4 MG/0.1ML Liqd nasal spray  Generic drug: naloxone     omeprazole 40 MG delayed release capsule  Commonly known as: PRILOSEC     predniSONE 50 MG tablet  Commonly known as: DELTASONE               Where to Get Your Medications        These medications were sent to [facility] [address], [address]  [address] [place], [place] [address] [facility] II[facility] [address] Phone: [phone]   sulfamethoxazole-trimethoprim 800-160 MG per tablet       Time Spent on discharge is more than  35 minutes  in the examination, evaluation, counseling and review of medications and discharge plan. Signed: Thank you No primary care provider on file. for the opportunity to be involved in this patient's care.     Electronically signed by Dalila Pedro DO on 10/12/2022 at 9:49 AM

## 2022-10-12 NOTE — CARE COORDINATION
10/12/22, 7:15 AM EDT    Patient goals/plan/ treatment preferences discussed by  and . Patient goals/plan/ treatment preferences reviewed with patient/ family. Patient/ family verbalize understanding of discharge plan and are in agreement with goal/plan/treatment preferences. Understanding was demonstrated using the teach back method. AVS provided by RN at time of discharge, which includes all necessary medical information pertaining to the patients current course of illness, treatment, post-discharge goals of care, and treatment preferences. Discharging home later today. Transitioning to oral bactrim. Prisca Samia is independent at home and follows at [facility]/[address] [place] [address].      Services At/After Discharge: None

## 2022-10-12 NOTE — PLAN OF CARE
Problem: Chronic Conditions and Co-morbidities  Goal: Patient's chronic conditions and co-morbidity symptoms are monitored and maintained or improved  10/12/2022 0406 by Camelia Daley RN  Outcome: [id number] Sara Liu (Taken 10/9/2022 2348 by Neris Styles RN)  Care Plan - Patient's Chronic Conditions and Co-Morbidity Symptoms are Monitored and Maintained or Improved: Monitor and assess patient's chronic conditions and comorbid symptoms for stability, deterioration, or improvement  10/11/2022 1736 by Colt Escoto RN  Outcome: Progressing  Flowsheets (Taken 10/9/2022 2348 by Neris Styles RN)  Care Plan - Patient's Chronic Conditions and Co-Morbidity Symptoms are Monitored and Maintained or Improved: Monitor and assess patient's chronic conditions and comorbid symptoms for stability, deterioration, or improvement     Problem: Discharge Planning  Goal: Discharge to home or other facility with appropriate resources  10/12/2022 0406 by Cameila Daley RN  Outcome: Progressing  Flowsheets (Taken 10/9/2022 2348 by Neris Styles RN)  Discharge to home or other facility with appropriate resources:   Identify barriers to discharge with patient and caregiver   Arrange for needed discharge resources and transportation as appropriate   Identify discharge learning needs (meds, wound care, etc)  10/11/2022 1736 by Colt Escoto RN  Outcome: Progressing  Flowsheets (Taken 10/9/2022 2348 by Neris Styles RN)  Discharge to home or other facility with appropriate resources:   Identify barriers to discharge with patient and caregiver   Arrange for needed discharge resources and transportation as appropriate   Identify discharge learning needs (meds, wound care, etc)     Problem: Pain  Goal: Verbalizes/displays adequate comfort level or baseline comfort level  10/12/2022 0406 by Camelia Daley RN  Outcome: Progressing  Flowsheets (Taken 10/9/2022 2348 by Neris Styles RN)  Verbalizes/displays adequate comfort level RN)  Incisions, Wounds, or Drain Sites Healing Without Sign and Symptoms of Infection:   ADMISSION and DAILY: Assess and document risk factors for pressure ulcer development   TWICE DAILY: Assess and document skin integrity   TWICE DAILY: Assess and document dressing/incision, wound bed, drain sites and surrounding tissue  10/11/2022 1736 by Shiela Conde RN  Outcome: Progressing  Flowsheets (Taken 10/10/2022 2033 by Christina White RN)  Incisions, Wounds, or Drain Sites Healing Without Sign and Symptoms of Infection:   ADMISSION and DAILY: Assess and document risk factors for pressure ulcer development   TWICE DAILY: Assess and document skin integrity   TWICE DAILY: Assess and document dressing/incision, wound bed, drain sites and surrounding tissue     Problem: Infection - Adult  Goal: Absence of infection at discharge  10/12/2022 0406 by Maris Graff RN  Outcome: Progressing  Flowsheets (Taken 10/9/2022 2348 by Christina White RN)  Absence of infection at discharge:   Assess and monitor for signs and symptoms of infection   Monitor lab/diagnostic results   Monitor all insertion sites i.e., indwelling lines, tubes and drains   Administer medications as ordered  10/11/2022 1736 by Shiela Conde RN  Outcome: Progressing  Flowsheets (Taken 10/9/2022 2348 by Christina White RN)  Absence of infection at discharge:   Assess and monitor for signs and symptoms of infection   Monitor lab/diagnostic results   Monitor all insertion sites i.e., indwelling lines, tubes and drains   Administer medications as ordered     Problem: Safety - Adult  Goal: Free from fall injury  10/12/2022 0406 by Maris Graff RN  Outcome: Progressing  Flowsheets (Taken 10/9/2022 2348 by Christina White RN)  Free From Fall Injury: Instruct family/caregiver on patient safety  10/11/2022 1736 by Shiela Conde RN  Outcome: Progressing  Flowsheets (Taken 10/9/2022 2348 by Christina White RN)  Free From Fall Injury: Instruct family/caregiver on patient safety   Care plan discussed with pt.

## 2022-10-12 NOTE — DISCHARGE INSTRUCTIONS
Take Bactrim for another 7 days. Please complete full 7 day course and do not miss any doses. Follow up with your PCP in 1-2 weeks to make sure infection fully resolves. Also discuss with your PCP regarding your gallbladder ultrasound findings to determine need of referral to general surgeon.     Follow up with [facility] in 1 week

## 2022-10-14 LAB
BLOOD CULTURE, ROUTINE: NORMAL
BLOOD CULTURE, ROUTINE: NORMAL

## 2022-10-15 LAB
AEROBIC CULTURE: ABNORMAL
ANAEROBIC CULTURE: ABNORMAL
GRAM STAIN RESULT: ABNORMAL
ORGANISM: ABNORMAL

## 2022-12-12 ENCOUNTER — HOSPITAL ENCOUNTER (OUTPATIENT)
Age: 52
Setting detail: SPECIMEN
Discharge: HOME | End: 2022-12-12

## 2022-12-12 LAB
HCT VFR BLD CALC: 47.5 % (ref 40.7–50.3)
HEMOGLOBIN: 15.2 G/DL (ref 13–17)
MCH RBC QN AUTO: 30.8 PG (ref 25.2–33.5)
MCHC RBC AUTO-ENTMCNC: 32 G/DL (ref 28.4–34.8)
MCV RBC AUTO: 96.3 FL (ref 82.6–102.9)
NRBC AUTOMATED: 0 PER 100 WBC
PDW BLD-RTO: 12.5 % (ref 11.8–14.4)
PLATELET # BLD: 395 K/UL (ref 138–453)
PMV BLD AUTO: 10.8 FL (ref 8.1–13.5)
RBC # BLD: 4.93 M/UL (ref 4.21–5.77)
SEDIMENTATION RATE, ERYTHROCYTE: 14 MM/HR (ref 0–20)
WBC # BLD: 10.1 K/UL (ref 3.5–11.3)

## 2022-12-13 LAB
ALBUMIN SERPL-MCNC: 4.3 G/DL (ref 3.5–5.2)
ALBUMIN/GLOBULIN RATIO: 1.3 (ref 1–2.5)
ALP BLD-CCNC: 97 U/L (ref 40–129)
ALT SERPL-CCNC: 12 U/L (ref 5–41)
ANION GAP SERPL CALCULATED.3IONS-SCNC: 12 MMOL/L (ref 9–17)
ANTI DNA DOUBLE STRANDED: <0.5 IU/ML
ANTI-NUCLEAR ANTIBODY (ANA): NEGATIVE
AST SERPL-CCNC: 22 U/L
BILIRUB SERPL-MCNC: 0.3 MG/DL (ref 0.3–1.2)
BUN BLDV-MCNC: 11 MG/DL (ref 6–20)
C-REACTIVE PROTEIN: 9.4 MG/L (ref 0–5)
CALCIUM SERPL-MCNC: 9.6 MG/DL (ref 8.6–10.4)
CCP IGG ANTIBODIES: 0.8 U/ML (ref 0–7)
CHLORIDE BLD-SCNC: 102 MMOL/L (ref 98–107)
CHOLESTEROL/HDL RATIO: 4.2
CHOLESTEROL: 183 MG/DL
CO2: 24 MMOL/L (ref 20–31)
CREAT SERPL-MCNC: 0.67 MG/DL (ref 0.7–1.2)
ENA ANTIBODIES SCREEN: 0.1 U/ML
GFR SERPL CREATININE-BSD FRML MDRD: >60 ML/MIN/1.73M2
GLUCOSE BLD-MCNC: 77 MG/DL (ref 70–99)
HDLC SERPL-MCNC: 44 MG/DL
LDL CHOLESTEROL: 117 MG/DL (ref 0–130)
POTASSIUM SERPL-SCNC: 4.5 MMOL/L (ref 3.7–5.3)
PROSTATE SPECIFIC ANTIGEN: 0.65 NG/ML
RHEUMATOID FACTOR: <10 IU/ML
SODIUM BLD-SCNC: 138 MMOL/L (ref 135–144)
TOTAL PROTEIN: 7.5 G/DL (ref 6.4–8.3)
TRIGL SERPL-MCNC: 110 MG/DL
TSH SERPL DL<=0.05 MIU/L-ACNC: 1.37 UIU/ML (ref 0.3–5)
URIC ACID: 2.9 MG/DL (ref 3.4–7)

## 2022-12-14 LAB
HEPATITIS C RNA PCR QUANT: NOT DETECTED
SOURCE: NORMAL

## 2023-03-26 ENCOUNTER — APPOINTMENT (OUTPATIENT)
Dept: MRI IMAGING | Age: 53
DRG: 812 | End: 2023-03-26
Payer: MEDICAID

## 2023-03-26 ENCOUNTER — HOSPITAL ENCOUNTER (INPATIENT)
Age: 53
LOS: 2 days | Discharge: HOME | DRG: 812 | End: 2023-03-28
Attending: EMERGENCY MEDICINE | Admitting: INTERNAL MEDICINE
Payer: MEDICAID

## 2023-03-26 ENCOUNTER — APPOINTMENT (OUTPATIENT)
Dept: GENERAL RADIOLOGY | Age: 53
DRG: 812 | End: 2023-03-26
Payer: MEDICAID

## 2023-03-26 ENCOUNTER — APPOINTMENT (OUTPATIENT)
Dept: CT IMAGING | Age: 53
DRG: 812 | End: 2023-03-26
Payer: MEDICAID

## 2023-03-26 DIAGNOSIS — F33.2 MAJOR DEPRESSIVE DISORDER, RECURRENT SEVERE WITHOUT PSYCHOTIC FEATURES (HCC): ICD-10-CM

## 2023-03-26 DIAGNOSIS — F20.0 PARANOID SCHIZOPHRENIA (HCC): Chronic | ICD-10-CM

## 2023-03-26 DIAGNOSIS — J96.01 ACUTE RESPIRATORY FAILURE WITH HYPOXIA (HCC): ICD-10-CM

## 2023-03-26 DIAGNOSIS — R41.82 ALTERED MENTAL STATUS, UNSPECIFIED ALTERED MENTAL STATUS TYPE: Primary | ICD-10-CM

## 2023-03-26 PROBLEM — R56.9 SEIZURE (HCC): Status: ACTIVE | Noted: 2023-03-26

## 2023-03-26 LAB
ALBUMIN SERPL BCG-MCNC: 4.2 G/DL (ref 3.5–5.1)
ALP SERPL-CCNC: 84 U/L (ref 38–126)
ALT SERPL W/O P-5'-P-CCNC: 11 U/L (ref 11–66)
AMPHETAMINES UR QL SCN: NEGATIVE
ANION GAP SERPL CALC-SCNC: 14 MEQ/L (ref 8–16)
APAP SERPL-MCNC: < 5 UG/ML (ref 0–20)
ARTERIAL PATENCY WRIST A: POSITIVE
AST SERPL-CCNC: 18 U/L (ref 5–40)
BACTERIA: ABNORMAL
BARBITURATES UR QL SCN: NEGATIVE
BASE EXCESS BLDA CALC-SCNC: 1.3 MMOL/L (ref -2.5–2.5)
BASOPHILS ABSOLUTE: 0 THOU/MM3 (ref 0–0.1)
BASOPHILS NFR BLD AUTO: 0.3 %
BDY SITE: ABNORMAL
BENZODIAZ UR QL SCN: NEGATIVE
BILIRUB CONJ SERPL-MCNC: < 0.2 MG/DL (ref 0–0.3)
BILIRUB SERPL-MCNC: 1 MG/DL (ref 0.3–1.2)
BILIRUB UR QL STRIP: NEGATIVE
BREATHS SETTING VENT: 14 BPM
BUN SERPL-MCNC: 26 MG/DL (ref 7–22)
BZE UR QL SCN: NEGATIVE
CA-I BLD ISE-SCNC: 1.13 MMOL/L (ref 1.12–1.32)
CALCIUM SERPL-MCNC: 8.6 MG/DL (ref 8.5–10.5)
CANNABINOIDS UR QL SCN: POSITIVE
CASTS #/AREA URNS LPF: ABNORMAL /LPF
CASTS #/AREA URNS LPF: ABNORMAL /LPF
CHARACTER UR: CLEAR
CHARCOAL URNS QL MICRO: ABNORMAL
CHLORIDE BLD-SCNC: 98 MEQ/L (ref 98–109)
CHLORIDE SERPL-SCNC: 93 MEQ/L (ref 98–111)
CK SERPL-CCNC: 242 U/L (ref 55–170)
CO2 SERPL-SCNC: 28 MEQ/L (ref 23–33)
COLLECTED BY:: ABNORMAL
COLOR UR: YELLOW
CREAT SERPL-MCNC: 0.8 MG/DL (ref 0.4–1.2)
CRYSTALS URNS QL MICRO: ABNORMAL
DEPRECATED RDW RBC AUTO: 44.6 FL (ref 35–45)
DEVICE: ABNORMAL
EOSINOPHIL NFR BLD AUTO: 0.2 %
EOSINOPHILS ABSOLUTE: 0 THOU/MM3 (ref 0–0.4)
EPITHELIAL CELLS, UA: ABNORMAL /HPF
ERYTHROCYTE [DISTWIDTH] IN BLOOD BY AUTOMATED COUNT: 13.2 % (ref 11.5–14.5)
ETHANOL SERPL-MCNC: < 0.01 %
FENTANYL: POSITIVE
FIO2 ON VENT O2 ANALYZER: 50 %
GFR SERPL CREATININE-BSD FRML MDRD: > 60 ML/MIN/1.73M2
GLUCOSE BLD-MCNC: 141 MG/DL (ref 70–108)
GLUCOSE SERPL-MCNC: 163 MG/DL (ref 70–108)
GLUCOSE UR QL STRIP.AUTO: NEGATIVE MG/DL
HCO3 BLDA-SCNC: 28 MMOL/L (ref 23–28)
HCT VFR BLD AUTO: 48.5 % (ref 42–52)
HGB BLD-MCNC: 15.9 GM/DL (ref 14–18)
HGB UR QL STRIP.AUTO: ABNORMAL
HGB UR QL STRIP.AUTO: POSITIVE
IMM GRANULOCYTES # BLD AUTO: 0.04 THOU/MM3 (ref 0–0.07)
IMM GRANULOCYTES NFR BLD AUTO: 0.3 %
INR PPP: 1.02 (ref 0.85–1.13)
KETONES UR QL STRIP.AUTO: ABNORMAL
LACTATE BLD-SCNC: 1.1 MMOL/L (ref 0.5–1.9)
LACTATE SERPL-SCNC: 2 MMOL/L (ref 0.5–2)
LEUKOCYTE ESTERASE UR QL STRIP.AUTO: ABNORMAL
LYMPHOCYTES ABSOLUTE: 3.9 THOU/MM3 (ref 1–4.8)
LYMPHOCYTES NFR BLD AUTO: 29 %
MAGNESIUM SERPL-MCNC: 1.9 MG/DL (ref 1.6–2.4)
MCH RBC QN AUTO: 30.2 PG (ref 26–33)
MCHC RBC AUTO-ENTMCNC: 32.8 GM/DL (ref 32.2–35.5)
MCV RBC AUTO: 92.2 FL (ref 80–94)
MONOCYTES ABSOLUTE: 1.3 THOU/MM3 (ref 0.4–1.3)
MONOCYTES NFR BLD AUTO: 9.5 %
MRSA DNA SPEC QL NAA+PROBE: NEGATIVE
NEUTROPHILS NFR BLD AUTO: 60.7 %
NITRITE UR QL STRIP.AUTO: NEGATIVE
NRBC BLD AUTO-RTO: 0 /100 WBC
OPIATES UR QL SCN: NEGATIVE
OSMOLALITY SERPL CALC.SUM OF ELEC: 278.4 MOSMOL/KG (ref 275–300)
OXYCODONE: NEGATIVE
PCO2 BLDA: 51 MMHG (ref 35–45)
PCP UR QL SCN: NEGATIVE
PEEP SETTING VENT: 6 MMHG
PH BLDA: 7.35 [PH] (ref 7.35–7.45)
PH UR STRIP.AUTO: 7 [PH] (ref 5–9)
PIP: 14 CMH2O
PLATELET # BLD AUTO: 370 THOU/MM3 (ref 130–400)
PMV BLD AUTO: 10.2 FL (ref 9.4–12.4)
PO2 BLDA: 250 MMHG (ref 71–104)
POC CREATININE WHOLE BLOOD: 0.8 MG/DL (ref 0.5–1.2)
POTASSIUM BLD-SCNC: 2.9 MEQ/L (ref 3.5–4.9)
POTASSIUM SERPL-SCNC: 3.2 MEQ/L (ref 3.5–5.2)
PROCALCITONIN SERPL IA-MCNC: 0.16 NG/ML (ref 0.01–0.09)
PROT SERPL-MCNC: 7 G/DL (ref 6.1–8)
PROT UR STRIP.AUTO-MCNC: ABNORMAL MG/DL
RBC # BLD AUTO: 5.26 MILL/MM3 (ref 4.7–6.1)
RBC #/AREA URNS HPF: ABNORMAL /HPF
REASON FOR REJECTION: NORMAL
REJECTED TEST: NORMAL
RENAL EPI CELLS #/AREA URNS HPF: ABNORMAL /[HPF]
SALICYLATES SERPL-MCNC: < 0.3 MG/DL (ref 2–10)
SAO2 % BLDA: 100 %
SEGMENTED NEUTROPHILS ABSOLUTE COUNT: 8.1 THOU/MM3 (ref 1.8–7.7)
SODIUM BLD-SCNC: 139 MEQ/L (ref 138–146)
SODIUM SERPL-SCNC: 135 MEQ/L (ref 135–145)
SP GR UR REFRACT.AUTO: > 1.03 (ref 1–1.03)
TROPONIN T: < 0.01 NG/ML
TSH SERPL DL<=0.005 MIU/L-ACNC: 2.83 UIU/ML (ref 0.4–4.2)
UROBILINOGEN UR QL STRIP.AUTO: 1 EU/DL (ref 0–1)
VANA ISLT/SPM QL: NEGATIVE
VENTILATION MODE VENT: ABNORMAL
WBC # BLD AUTO: 13.3 THOU/MM3 (ref 4.8–10.8)
WBC #/AREA URNS HPF: ABNORMAL /HPF
YEAST LIKE FUNGI URNS QL MICRO: ABNORMAL

## 2023-03-26 PROCEDURE — 82248 BILIRUBIN DIRECT: CPT

## 2023-03-26 PROCEDURE — 80179 DRUG ASSAY SALICYLATE: CPT

## 2023-03-26 PROCEDURE — 2000000000 HC ICU R&B

## 2023-03-26 PROCEDURE — 2500000003 HC RX 250 WO HCPCS: Performed by: EMERGENCY MEDICINE

## 2023-03-26 PROCEDURE — 83735 ASSAY OF MAGNESIUM: CPT

## 2023-03-26 PROCEDURE — 84145 PROCALCITONIN (PCT): CPT

## 2023-03-26 PROCEDURE — 36415 COLL VENOUS BLD VENIPUNCTURE: CPT

## 2023-03-26 PROCEDURE — 82947 ASSAY GLUCOSE BLOOD QUANT: CPT

## 2023-03-26 PROCEDURE — 83874 ASSAY OF MYOGLOBIN: CPT

## 2023-03-26 PROCEDURE — 70450 CT HEAD/BRAIN W/O DYE: CPT

## 2023-03-26 PROCEDURE — 36600 WITHDRAWAL OF ARTERIAL BLOOD: CPT

## 2023-03-26 PROCEDURE — 94640 AIRWAY INHALATION TREATMENT: CPT

## 2023-03-26 PROCEDURE — 70496 CT ANGIOGRAPHY HEAD: CPT

## 2023-03-26 PROCEDURE — 2500000003 HC RX 250 WO HCPCS

## 2023-03-26 PROCEDURE — 70551 MRI BRAIN STEM W/O DYE: CPT

## 2023-03-26 PROCEDURE — 82565 ASSAY OF CREATININE: CPT

## 2023-03-26 PROCEDURE — 85025 COMPLETE CBC W/AUTO DIFF WBC: CPT

## 2023-03-26 PROCEDURE — 87500 VANOMYCIN DNA AMP PROBE: CPT

## 2023-03-26 PROCEDURE — 87641 MR-STAPH DNA AMP PROBE: CPT

## 2023-03-26 PROCEDURE — 2580000003 HC RX 258

## 2023-03-26 PROCEDURE — 84132 ASSAY OF SERUM POTASSIUM: CPT

## 2023-03-26 PROCEDURE — 93005 ELECTROCARDIOGRAM TRACING: CPT

## 2023-03-26 PROCEDURE — 82550 ASSAY OF CK (CPK): CPT

## 2023-03-26 PROCEDURE — 6360000004 HC RX CONTRAST MEDICATION: Performed by: EMERGENCY MEDICINE

## 2023-03-26 PROCEDURE — A4216 STERILE WATER/SALINE, 10 ML: HCPCS

## 2023-03-26 PROCEDURE — 6360000002 HC RX W HCPCS

## 2023-03-26 PROCEDURE — 87070 CULTURE OTHR SPECIMN AEROBIC: CPT

## 2023-03-26 PROCEDURE — 80053 COMPREHEN METABOLIC PANEL: CPT

## 2023-03-26 PROCEDURE — 6360000002 HC RX W HCPCS: Performed by: EMERGENCY MEDICINE

## 2023-03-26 PROCEDURE — 80143 DRUG ASSAY ACETAMINOPHEN: CPT

## 2023-03-26 PROCEDURE — 93005 ELECTROCARDIOGRAM TRACING: CPT | Performed by: EMERGENCY MEDICINE

## 2023-03-26 PROCEDURE — 94002 VENT MGMT INPAT INIT DAY: CPT

## 2023-03-26 PROCEDURE — 82803 BLOOD GASES ANY COMBINATION: CPT

## 2023-03-26 PROCEDURE — 85610 PROTHROMBIN TIME: CPT

## 2023-03-26 PROCEDURE — 71045 X-RAY EXAM CHEST 1 VIEW: CPT

## 2023-03-26 PROCEDURE — 5A1945Z RESPIRATORY VENTILATION, 24-96 CONSECUTIVE HOURS: ICD-10-PCS | Performed by: EMERGENCY MEDICINE

## 2023-03-26 PROCEDURE — 99285 EMERGENCY DEPT VISIT HI MDM: CPT

## 2023-03-26 PROCEDURE — 83605 ASSAY OF LACTIC ACID: CPT

## 2023-03-26 PROCEDURE — 84443 ASSAY THYROID STIM HORMONE: CPT

## 2023-03-26 PROCEDURE — 84295 ASSAY OF SERUM SODIUM: CPT

## 2023-03-26 PROCEDURE — 80307 DRUG TEST PRSMV CHEM ANLYZR: CPT

## 2023-03-26 PROCEDURE — 81001 URINALYSIS AUTO W/SCOPE: CPT

## 2023-03-26 PROCEDURE — 6370000000 HC RX 637 (ALT 250 FOR IP)

## 2023-03-26 PROCEDURE — 99222 1ST HOSP IP/OBS MODERATE 55: CPT | Performed by: NURSE PRACTITIONER

## 2023-03-26 PROCEDURE — 87040 BLOOD CULTURE FOR BACTERIA: CPT

## 2023-03-26 PROCEDURE — 82077 ASSAY SPEC XCP UR&BREATH IA: CPT

## 2023-03-26 PROCEDURE — 82330 ASSAY OF CALCIUM: CPT

## 2023-03-26 PROCEDURE — 87086 URINE CULTURE/COLONY COUNT: CPT

## 2023-03-26 PROCEDURE — 70498 CT ANGIOGRAPHY NECK: CPT

## 2023-03-26 PROCEDURE — 82435 ASSAY OF BLOOD CHLORIDE: CPT

## 2023-03-26 PROCEDURE — 0BH17EZ INSERTION OF ENDOTRACHEAL AIRWAY INTO TRACHEA, VIA NATURAL OR ARTIFICIAL OPENING: ICD-10-PCS | Performed by: EMERGENCY MEDICINE

## 2023-03-26 PROCEDURE — 84484 ASSAY OF TROPONIN QUANT: CPT

## 2023-03-26 RX ORDER — PROPOFOL 10 MG/ML
10 INJECTION, EMULSION INTRAVENOUS ONCE
Status: COMPLETED | OUTPATIENT
Start: 2023-03-26 | End: 2023-03-26

## 2023-03-26 RX ORDER — SODIUM CHLORIDE 0.9 % (FLUSH) 0.9 %
5-40 SYRINGE (ML) INJECTION EVERY 12 HOURS SCHEDULED
Status: DISCONTINUED | OUTPATIENT
Start: 2023-03-26 | End: 2023-03-28 | Stop reason: HOSPADM

## 2023-03-26 RX ORDER — ACETAMINOPHEN 650 MG/1
650 SUPPOSITORY RECTAL EVERY 6 HOURS PRN
Status: DISCONTINUED | OUTPATIENT
Start: 2023-03-26 | End: 2023-03-28 | Stop reason: HOSPADM

## 2023-03-26 RX ORDER — ETOMIDATE 2 MG/ML
20 INJECTION INTRAVENOUS ONCE
Status: COMPLETED | OUTPATIENT
Start: 2023-03-26 | End: 2023-03-26

## 2023-03-26 RX ORDER — POTASSIUM CHLORIDE 20 MEQ/1
40 TABLET, EXTENDED RELEASE ORAL PRN
Status: DISCONTINUED | OUTPATIENT
Start: 2023-03-26 | End: 2023-03-28 | Stop reason: HOSPADM

## 2023-03-26 RX ORDER — 0.9 % SODIUM CHLORIDE 0.9 %
1000 INTRAVENOUS SOLUTION INTRAVENOUS ONCE
Status: COMPLETED | OUTPATIENT
Start: 2023-03-26 | End: 2023-03-26

## 2023-03-26 RX ORDER — POLYETHYLENE GLYCOL 3350 17 G/17G
17 POWDER, FOR SOLUTION ORAL DAILY PRN
Status: DISCONTINUED | OUTPATIENT
Start: 2023-03-26 | End: 2023-03-28 | Stop reason: HOSPADM

## 2023-03-26 RX ORDER — PROPOFOL 10 MG/ML
INJECTION, EMULSION INTRAVENOUS CONTINUOUS PRN
Status: DISCONTINUED | OUTPATIENT
Start: 2023-03-26 | End: 2023-03-26 | Stop reason: SDUPTHER

## 2023-03-26 RX ORDER — ROCURONIUM BROMIDE 10 MG/ML
100 INJECTION, SOLUTION INTRAVENOUS ONCE
Status: COMPLETED | OUTPATIENT
Start: 2023-03-26 | End: 2023-03-26

## 2023-03-26 RX ORDER — NOREPINEPHRINE BIT/0.9 % NACL 16MG/250ML
1-100 INFUSION BOTTLE (ML) INTRAVENOUS CONTINUOUS
Status: DISCONTINUED | OUTPATIENT
Start: 2023-03-26 | End: 2023-03-27

## 2023-03-26 RX ORDER — CYPROHEPTADINE HYDROCHLORIDE 4 MG/1
4 TABLET ORAL
Status: DISCONTINUED | OUTPATIENT
Start: 2023-03-26 | End: 2023-03-26

## 2023-03-26 RX ORDER — ONDANSETRON 2 MG/ML
4 INJECTION INTRAMUSCULAR; INTRAVENOUS EVERY 6 HOURS PRN
Status: DISCONTINUED | OUTPATIENT
Start: 2023-03-26 | End: 2023-03-28 | Stop reason: HOSPADM

## 2023-03-26 RX ORDER — ONDANSETRON 4 MG/1
4 TABLET, ORALLY DISINTEGRATING ORAL EVERY 8 HOURS PRN
Status: DISCONTINUED | OUTPATIENT
Start: 2023-03-26 | End: 2023-03-28 | Stop reason: HOSPADM

## 2023-03-26 RX ORDER — CYPROHEPTADINE HYDROCHLORIDE 4 MG/1
12 TABLET ORAL ONCE
Status: COMPLETED | OUTPATIENT
Start: 2023-03-26 | End: 2023-03-26

## 2023-03-26 RX ORDER — ENOXAPARIN SODIUM 100 MG/ML
40 INJECTION SUBCUTANEOUS DAILY
Status: DISCONTINUED | OUTPATIENT
Start: 2023-03-27 | End: 2023-03-28 | Stop reason: HOSPADM

## 2023-03-26 RX ORDER — POTASSIUM CHLORIDE 7.45 MG/ML
10 INJECTION INTRAVENOUS PRN
Status: DISCONTINUED | OUTPATIENT
Start: 2023-03-26 | End: 2023-03-28 | Stop reason: HOSPADM

## 2023-03-26 RX ORDER — SODIUM CHLORIDE 9 MG/ML
INJECTION, SOLUTION INTRAVENOUS CONTINUOUS
Status: DISCONTINUED | OUTPATIENT
Start: 2023-03-26 | End: 2023-03-28 | Stop reason: HOSPADM

## 2023-03-26 RX ORDER — SODIUM CHLORIDE 9 MG/ML
INJECTION, SOLUTION INTRAVENOUS PRN
Status: DISCONTINUED | OUTPATIENT
Start: 2023-03-26 | End: 2023-03-28 | Stop reason: HOSPADM

## 2023-03-26 RX ORDER — PROPOFOL 10 MG/ML
INJECTION, EMULSION INTRAVENOUS
Status: COMPLETED
Start: 2023-03-26 | End: 2023-03-26

## 2023-03-26 RX ORDER — CYPROHEPTADINE HYDROCHLORIDE 4 MG/1
4 TABLET ORAL EVERY 6 HOURS PRN
Status: DISCONTINUED | OUTPATIENT
Start: 2023-03-27 | End: 2023-03-27

## 2023-03-26 RX ORDER — NOREPINEPHRINE BIT/0.9 % NACL 16MG/250ML
INFUSION BOTTLE (ML) INTRAVENOUS
Status: COMPLETED
Start: 2023-03-26 | End: 2023-03-26

## 2023-03-26 RX ORDER — CYPROHEPTADINE HYDROCHLORIDE 4 MG/1
2 TABLET ORAL
Status: DISPENSED | OUTPATIENT
Start: 2023-03-26 | End: 2023-03-27

## 2023-03-26 RX ORDER — ALBUTEROL SULFATE 90 UG/1
6 AEROSOL, METERED RESPIRATORY (INHALATION) EVERY 6 HOURS PRN
Status: DISCONTINUED | OUTPATIENT
Start: 2023-03-26 | End: 2023-03-28 | Stop reason: HOSPADM

## 2023-03-26 RX ORDER — ACETAMINOPHEN 325 MG/1
650 TABLET ORAL EVERY 6 HOURS PRN
Status: DISCONTINUED | OUTPATIENT
Start: 2023-03-26 | End: 2023-03-28 | Stop reason: HOSPADM

## 2023-03-26 RX ORDER — PHENYLEPHRINE HCL IN 0.9% NACL 1 MG/10 ML
100 VIAL (ML) INTRAVENOUS ONCE
Status: COMPLETED | OUTPATIENT
Start: 2023-03-26 | End: 2023-03-26

## 2023-03-26 RX ORDER — PROPOFOL 10 MG/ML
5-50 INJECTION, EMULSION INTRAVENOUS CONTINUOUS
Status: DISCONTINUED | OUTPATIENT
Start: 2023-03-26 | End: 2023-03-27

## 2023-03-26 RX ORDER — SODIUM CHLORIDE 0.9 % (FLUSH) 0.9 %
10 SYRINGE (ML) INJECTION PRN
Status: DISCONTINUED | OUTPATIENT
Start: 2023-03-26 | End: 2023-03-28 | Stop reason: HOSPADM

## 2023-03-26 RX ORDER — CHLORHEXIDINE GLUCONATE 0.12 MG/ML
15 RINSE ORAL 2 TIMES DAILY
Status: DISCONTINUED | OUTPATIENT
Start: 2023-03-26 | End: 2023-03-27

## 2023-03-26 RX ADMIN — ROCURONIUM BROMIDE 100 MG: 10 INJECTION INTRAVENOUS at 16:43

## 2023-03-26 RX ADMIN — PROPOFOL 10 MG: 10 INJECTION, EMULSION INTRAVENOUS at 16:46

## 2023-03-26 RX ADMIN — CHLORHEXIDINE GLUCONATE 0.12% ORAL RINSE 15 ML: 1.2 LIQUID ORAL at 21:20

## 2023-03-26 RX ADMIN — PROPOFOL 8 MCG/KG/MIN: 10 INJECTION, EMULSION INTRAVENOUS at 16:46

## 2023-03-26 RX ADMIN — PROPOFOL 25 MCG/KG/MIN: 10 INJECTION, EMULSION INTRAVENOUS at 20:49

## 2023-03-26 RX ADMIN — CYPROHEPTADINE HYDROCHLORIDE 12 MG: 4 TABLET ORAL at 21:20

## 2023-03-26 RX ADMIN — IOPAMIDOL 80 ML: 755 INJECTION, SOLUTION INTRAVENOUS at 17:17

## 2023-03-26 RX ADMIN — ETOMIDATE 20 MG: 2 INJECTION INTRAVENOUS at 16:43

## 2023-03-26 RX ADMIN — Medication 5 MCG/MIN: at 22:52

## 2023-03-26 RX ADMIN — TIOTROPIUM BROMIDE AND OLODATEROL 2 PUFF: 3.124; 2.736 SPRAY, METERED RESPIRATORY (INHALATION) at 21:19

## 2023-03-26 RX ADMIN — FAMOTIDINE 20 MG: 10 INJECTION, SOLUTION INTRAVENOUS at 21:20

## 2023-03-26 RX ADMIN — SODIUM CHLORIDE 1000 ML: 9 INJECTION, SOLUTION INTRAVENOUS at 20:40

## 2023-03-26 RX ADMIN — PROPOFOL 10 MG: 10 INJECTION, EMULSION INTRAVENOUS at 16:45

## 2023-03-26 RX ADMIN — CYPROHEPTADINE HYDROCHLORIDE 2 MG: 4 TABLET ORAL at 23:21

## 2023-03-26 RX ADMIN — SODIUM CHLORIDE: 9 INJECTION, SOLUTION INTRAVENOUS at 22:22

## 2023-03-26 RX ADMIN — Medication 100 MCG: at 16:49

## 2023-03-26 RX ADMIN — Medication 5 MCG/MIN: at 16:56

## 2023-03-26 RX ADMIN — SODIUM CHLORIDE 3000 MG: 9 INJECTION, SOLUTION INTRAVENOUS at 20:47

## 2023-03-26 ASSESSMENT — PULMONARY FUNCTION TESTS
PIF_VALUE: 15
PIF_VALUE: 16

## 2023-03-26 NOTE — H&P
Levofloxacin, regardless of in vitro sensitivity, should not be used for staphylococcal infections other than uncomplicated lower UTIs. Moxifloxacin, regardless of in vitro sensitivity, should not be used for staphylococcal infections. Lab Results   Component Value Date/Time    LABANAE  10/10/2022 09:25 AM     No anaerobes isolated- preliminary No anaerobes isolated       Urinalysis:      Lab Results   Component Value Date/Time    NITRU NEGATIVE 02/15/2022 07:30 PM    WBCUA 0-2 12/31/2015 02:40 PM    BACTERIA NONE 12/31/2015 02:40 PM    RBCUA 5-10 12/31/2015 02:40 PM    BLOODU NEGATIVE 02/15/2022 07:30 PM    SPECGRAV 1.011 07/16/2015 03:25 PM    GLUCOSEU NEGATIVE 02/15/2022 07:30 PM       Radiology:(All radiographs, tracings, PFTs, and imaging are personally viewed and interpreted unless otherwise noted). MRI BRAIN WO CONTRAST         CTA NECK W WO CONTRAST (CODE STROKE)         CTA HEAD W WO CONTRAST (CODE STROKE)         CT Head W/O Contrast   Final Result   No acute intracranial hemorrhage, mass effect or midline shift. **This report has been created using voice recognition software. It may contain minor errors which are inherent in voice recognition technology. **      Final report electronically signed by Dr Litzy Abreu on 3/26/2023 5:13 PM      XR CHEST PORTABLE   Final Result      1. Endotracheal and nasogastric tubes appear appropriately positioned. 2. Pulmonary nodule in the peripheral right midlung. This area appears similar to the prior exam.         **This report has been created using voice recognition software. It may contain minor errors which are inherent in voice recognition technology. **      Final report electronically signed by Dr Litzy Abreu on 3/26/2023 5:29 PM        CTA HEAD W WO CONTRAST (CODE STROKE)    Result Date: 3/26/2023  WET READ: The internal carotid and vertebral arteries are patent through the neck. The arteries at the [place] are patent.

## 2023-03-26 NOTE — ED NOTES
Patient back to room from CT scan at this time.      Andrei Ramon RN  03/26/23 8095 [>50% of Time Spent on Counseling and Coordination of Care for  ___] : Greater than 50% of the encounter time was spent on counseling and coordination of care for [unfilled] [Time Spent: ___ minutes] : I have spent [unfilled] minutes of face to face time with the patient

## 2023-03-26 NOTE — ED TRIAGE NOTES
Patient to ED from home via [facility] c/o seizures. Patient has history of seizures. EMS reports upon arrival to scene, patient was contracture up. Family reports they were at work and came back to patient as witnessed on scene by EMS. EMS reports patient was alert and ambulatory on scene. EMS gave 2 mg Ativan en route due to contractures. Upon arrival to ED, patient has irregular breathing. EMS states it is not new. SPO2 66% on RA. Patient placed on 15L NRB. SPO2 98%. . IV access established by EMS.

## 2023-03-26 NOTE — CONSULTS
nodule in the peripheral right midlung. This area appears similar to the prior exam. **This report has been created using voice recognition software. It may contain minor errors which are inherent in voice recognition technology. ** Final report electronically signed by Dr Michelle Block on 3/26/2023 5:29 PM        Assessment and Plan:        Seizure  Apparently has a Hx of seziures but is not on any medication   No concern for status epilepticus at this time however if there becomes a concern for status epilepticus then the patient will need to be transferred to a hospital that has the capability to perform continuous EEG monitoring. Lactate normal   CT head negative   CTA head and neck negative- incidental finding of nodular density in the right upper lobe measuring 2.8 cm- recommend CTA chest to further evaluate per primary  MRI brain pending   Keppra load 50mg/kg  EEG in am   UDS ordered   Seizure precautions     This was discussed with Dr. Zeinab Zaragoza and he is in agreement with the assessment and plan. Electronically signed by RINA Britt CNP on 3/26/23 at 6:44 PM EDT     I agree with evaluation and plan.     Carla Yang MD

## 2023-03-26 NOTE — ED NOTES
ED to inpatient nurses report    Chief Complaint   Patient presents with    Seizures      Present to ED from home  LOC: intubated  Vital signs   Vitals:    03/26/23 1736 03/26/23 1853 03/26/23 1900 03/26/23 1915   BP: 84/74 (!) 157/69 125/60 135/71   Pulse:  57  62   Resp:  16     Temp:       TempSrc:       SpO2:  96%     Weight:       Height:          Oxygen Baseline RA    Current needs required vent Bipap/Cpap No  LDAs:   Peripheral IV 03/26/23 Right Arm (Active)       Peripheral IV 03/26/23 Left Antecubital (Active)     Mobility: Fully dependent  Pending ED orders:   Present condition: STABLE, intubated  Person of contract Alecia maxwell  Our promise was given to family    Electronically signed by Awilda Menjivar RN on 3/26/2023 at 7:20 PM       Awilda Menjivar [facility]  [facility]26/23 [facility]

## 2023-03-26 NOTE — ED PROVIDER NOTES
may used methadone. History provided at bedside by patient's significant other does not suggest initial seizure contradictory to EMS report. Patient's family reports she found him slumped over to 1 side on the chair with an odd breathing pattern. She states when she would arouse him he would bring both arms up and shake like he was scared/startled. She states she then called EMS arrived. The providers were able to arouse him to get up to the cot and he seemed shaky on his feet. Patient likely experienced a drug overdose and less likely seizure. Neurology and ICU updated. [DD]   9206 Patient intubated for airway protection. Postintubation hypotensive motion. Patient was given 2 L IV fluid. He required a small dose of peripheral Levophed. Max was 4 and to maintain adequate maps. Head CT and CTA head and neck were negative. ABG was grossly unremarkable. CK and lactic acid were normal.  Urine and creatinine were normal.  Ethanol was less than 1. Tylenol and aspirin levels were unmeasured. [DD]   1918 POC Lactic Acid: 1.1 [DD]   1918 PCO2(!): 51 [DD]   1918 pO2(!): 250 [DD]   Ev Locket: 1.13 [DD]   1919 Sodium: 139 [DD]   1919 Glucose, Random(!): 163 [DD]   1919 WBC(!): 13.3  Minimal elevation. No infectious source on ED evaluation. [DD]   1919 Hemoglobin Quant: 15.9 [DD]   1919 Acetaminophen Level: < 5.0  Discussed with critical care team, Jerome Granda. Graciously excepted the patient for admission for ICU. [DD]      ED Course User Index  [DD] Hannah Man DO     Available laboratory and imaging results were independently reviewed and clinically correlated. Decision Rules/Clinical Scores utilized:   none . Code Status:  Not addressed during this ED visit    [facility] Social determinants of health considered to potentially effect treatment and/or disposition plan:  Substance abuse  Healthy People 2030, [place]  Department of Health and [employer], Office of Disease Prevention and Health

## 2023-03-27 PROBLEM — R41.82 ALTERED MENTAL STATUS: Status: ACTIVE | Noted: 2023-03-27

## 2023-03-27 LAB
ALBUMIN SERPL BCG-MCNC: 3 G/DL (ref 3.5–5.1)
ALP SERPL-CCNC: 62 U/L (ref 38–126)
ALT SERPL W/O P-5'-P-CCNC: 7 U/L (ref 11–66)
ANION GAP SERPL CALC-SCNC: 7 MEQ/L (ref 8–16)
AST SERPL-CCNC: 11 U/L (ref 5–40)
B PERT DNA NPH QL NAA+PROBE: NOT DETECTED
BASOPHILS ABSOLUTE: 0 THOU/MM3 (ref 0–0.1)
BASOPHILS NFR BLD AUTO: 0.4 %
BILIRUB SERPL-MCNC: 0.4 MG/DL (ref 0.3–1.2)
BORDETELLA PARAPERTUSSIS BY PCR: NOT DETECTED
BUN SERPL-MCNC: 18 MG/DL (ref 7–22)
C PNEUM DNA SPEC QL NAA+PROBE: NOT DETECTED
CALCIUM SERPL-MCNC: 7.4 MG/DL (ref 8.5–10.5)
CHLORIDE SERPL-SCNC: 108 MEQ/L (ref 98–111)
CK SERPL-CCNC: 142 U/L (ref 55–170)
CO2 SERPL-SCNC: 24 MEQ/L (ref 23–33)
CREAT SERPL-MCNC: 0.6 MG/DL (ref 0.4–1.2)
DEPRECATED RDW RBC AUTO: 46.8 FL (ref 35–45)
EKG ATRIAL RATE: 118 BPM
EKG ATRIAL RATE: 77 BPM
EKG P AXIS: 80 DEGREES
EKG P AXIS: 80 DEGREES
EKG P-R INTERVAL: 134 MS
EKG P-R INTERVAL: 136 MS
EKG Q-T INTERVAL: 342 MS
EKG Q-T INTERVAL: 416 MS
EKG QRS DURATION: 80 MS
EKG QRS DURATION: 84 MS
EKG QTC CALCULATION (BAZETT): 470 MS
EKG QTC CALCULATION (BAZETT): 479 MS
EKG R AXIS: -61 DEGREES
EKG R AXIS: 6 DEGREES
EKG T AXIS: 71 DEGREES
EKG T AXIS: 74 DEGREES
EKG VENTRICULAR RATE: 118 BPM
EKG VENTRICULAR RATE: 77 BPM
EOSINOPHIL NFR BLD AUTO: 0.6 %
EOSINOPHILS ABSOLUTE: 0.1 THOU/MM3 (ref 0–0.4)
ERYTHROCYTE [DISTWIDTH] IN BLOOD BY AUTOMATED COUNT: 13.2 % (ref 11.5–14.5)
FLUAV RNA NPH QL NAA+PROBE: NOT DETECTED
FLUBV RNA NPH QL NAA+PROBE: NOT DETECTED
GFR SERPL CREATININE-BSD FRML MDRD: > 60 ML/MIN/1.73M2
GLUCOSE SERPL-MCNC: 77 MG/DL (ref 70–108)
HADV DNA NPH QL NAA+PROBE: NOT DETECTED
HCOV 229E RNA SPEC QL NAA+PROBE: NOT DETECTED
HCOV HKU1 RNA SPEC QL NAA+PROBE: NOT DETECTED
HCOV NL63 RNA SPEC QL NAA+PROBE: NOT DETECTED
HCOV OC43 RNA SPEC QL NAA+PROBE: NOT DETECTED
HCT VFR BLD AUTO: 35.5 % (ref 42–52)
HCT VFR BLD AUTO: 37.8 % (ref 42–52)
HGB BLD-MCNC: 11.2 GM/DL (ref 14–18)
HGB BLD-MCNC: 12 GM/DL (ref 14–18)
HMPV RNA NPH QL NAA+PROBE: NOT DETECTED
HPIV1 RNA NPH QL NAA+PROBE: NOT DETECTED
HPIV2 RNA NPH QL NAA+PROBE: NOT DETECTED
HPIV3 RNA NPH QL NAA+PROBE: NOT DETECTED
HPIV4 RNA NPH QL NAA+PROBE: NOT DETECTED
IMM GRANULOCYTES # BLD AUTO: 0.04 THOU/MM3 (ref 0–0.07)
IMM GRANULOCYTES NFR BLD AUTO: 0.3 %
LYMPHOCYTES ABSOLUTE: 1 THOU/MM3 (ref 1–4.8)
LYMPHOCYTES NFR BLD AUTO: 8.1 %
M PNEUMO DNA SPEC QL NAA+PROBE: NOT DETECTED
MAGNESIUM SERPL-MCNC: 1.8 MG/DL (ref 1.6–2.4)
MCH RBC QN AUTO: 30.9 PG (ref 26–33)
MCHC RBC AUTO-ENTMCNC: 31.5 GM/DL (ref 32.2–35.5)
MCV RBC AUTO: 97.8 FL (ref 80–94)
MONOCYTES ABSOLUTE: 0.8 THOU/MM3 (ref 0.4–1.3)
MONOCYTES NFR BLD AUTO: 7.2 %
NEUTROPHILS NFR BLD AUTO: 83.4 %
NRBC BLD AUTO-RTO: 0 /100 WBC
OSMOLALITY SERPL CALC.SUM OF ELEC: 278.2 MOSMOL/KG (ref 275–300)
PLATELET # BLD AUTO: 233 THOU/MM3 (ref 130–400)
PMV BLD AUTO: 10 FL (ref 9.4–12.4)
POTASSIUM SERPL-SCNC: 3.1 MEQ/L (ref 3.5–5.2)
PROT SERPL-MCNC: 4.9 G/DL (ref 6.1–8)
RBC # BLD AUTO: 3.63 MILL/MM3 (ref 4.7–6.1)
RSV RNA NPH QL NAA+PROBE: NOT DETECTED
RV+EV RNA SPEC QL NAA+PROBE: NOT DETECTED
SARS-COV-2 RNA NPH QL NAA+NON-PROBE: NOT DETECTED
SEGMENTED NEUTROPHILS ABSOLUTE COUNT: 9.8 THOU/MM3 (ref 1.8–7.7)
SODIUM SERPL-SCNC: 139 MEQ/L (ref 135–145)
TSH SERPL DL<=0.005 MIU/L-ACNC: 2.37 UIU/ML (ref 0.4–4.2)
WBC # BLD AUTO: 11.8 THOU/MM3 (ref 4.8–10.8)

## 2023-03-27 PROCEDURE — 95720 EEG PHY/QHP EA INCR W/VEEG: CPT | Performed by: PSYCHIATRY & NEUROLOGY

## 2023-03-27 PROCEDURE — 94003 VENT MGMT INPAT SUBQ DAY: CPT

## 2023-03-27 PROCEDURE — 95819 EEG AWAKE AND ASLEEP: CPT

## 2023-03-27 PROCEDURE — 6370000000 HC RX 637 (ALT 250 FOR IP)

## 2023-03-27 PROCEDURE — 6370000000 HC RX 637 (ALT 250 FOR IP): Performed by: NURSE PRACTITIONER

## 2023-03-27 PROCEDURE — 85018 HEMOGLOBIN: CPT

## 2023-03-27 PROCEDURE — 94761 N-INVAS EAR/PLS OXIMETRY MLT: CPT

## 2023-03-27 PROCEDURE — 1200000003 HC TELEMETRY R&B

## 2023-03-27 PROCEDURE — 99291 CRITICAL CARE FIRST HOUR: CPT | Performed by: INTERNAL MEDICINE

## 2023-03-27 PROCEDURE — 93010 ELECTROCARDIOGRAM REPORT: CPT | Performed by: INTERNAL MEDICINE

## 2023-03-27 PROCEDURE — 6360000002 HC RX W HCPCS

## 2023-03-27 PROCEDURE — 2580000003 HC RX 258

## 2023-03-27 PROCEDURE — 6360000002 HC RX W HCPCS: Performed by: EMERGENCY MEDICINE

## 2023-03-27 PROCEDURE — A4216 STERILE WATER/SALINE, 10 ML: HCPCS

## 2023-03-27 PROCEDURE — 6370000000 HC RX 637 (ALT 250 FOR IP): Performed by: EMERGENCY MEDICINE

## 2023-03-27 PROCEDURE — 2500000003 HC RX 250 WO HCPCS: Performed by: EMERGENCY MEDICINE

## 2023-03-27 PROCEDURE — 85025 COMPLETE CBC W/AUTO DIFF WBC: CPT

## 2023-03-27 PROCEDURE — 95705 EEG W/O VID 2-12 HR UNMNTR: CPT

## 2023-03-27 PROCEDURE — 2500000003 HC RX 250 WO HCPCS

## 2023-03-27 PROCEDURE — 83735 ASSAY OF MAGNESIUM: CPT

## 2023-03-27 PROCEDURE — 85014 HEMATOCRIT: CPT

## 2023-03-27 PROCEDURE — 82550 ASSAY OF CK (CPK): CPT

## 2023-03-27 PROCEDURE — 2700000000 HC OXYGEN THERAPY PER DAY

## 2023-03-27 PROCEDURE — 0202U NFCT DS 22 TRGT SARS-COV-2: CPT

## 2023-03-27 PROCEDURE — 36415 COLL VENOUS BLD VENIPUNCTURE: CPT

## 2023-03-27 PROCEDURE — 94640 AIRWAY INHALATION TREATMENT: CPT

## 2023-03-27 PROCEDURE — 80053 COMPREHEN METABOLIC PANEL: CPT

## 2023-03-27 PROCEDURE — 84443 ASSAY THYROID STIM HORMONE: CPT

## 2023-03-27 RX ORDER — PROPOFOL 10 MG/ML
5-50 INJECTION, EMULSION INTRAVENOUS CONTINUOUS
Status: DISCONTINUED | OUTPATIENT
Start: 2023-03-27 | End: 2023-03-27

## 2023-03-27 RX ORDER — LORAZEPAM 2 MG/ML
2 INJECTION INTRAMUSCULAR ONCE
Status: COMPLETED | OUTPATIENT
Start: 2023-03-27 | End: 2023-03-27

## 2023-03-27 RX ORDER — 0.9 % SODIUM CHLORIDE 0.9 %
1000 INTRAVENOUS SOLUTION INTRAVENOUS ONCE
Status: COMPLETED | OUTPATIENT
Start: 2023-03-27 | End: 2023-03-27

## 2023-03-27 RX ORDER — DEXAMETHASONE SODIUM PHOSPHATE 4 MG/ML
10 INJECTION, SOLUTION INTRA-ARTICULAR; INTRALESIONAL; INTRAMUSCULAR; INTRAVENOUS; SOFT TISSUE EVERY 6 HOURS
Status: DISCONTINUED | OUTPATIENT
Start: 2023-03-27 | End: 2023-03-27

## 2023-03-27 RX ORDER — LORAZEPAM 1 MG/1
1 TABLET ORAL EVERY 4 HOURS PRN
Status: DISCONTINUED | OUTPATIENT
Start: 2023-03-27 | End: 2023-03-28 | Stop reason: HOSPADM

## 2023-03-27 RX ADMIN — FAMOTIDINE 20 MG: 10 INJECTION, SOLUTION INTRAVENOUS at 09:51

## 2023-03-27 RX ADMIN — SODIUM CHLORIDE, PRESERVATIVE FREE 10 ML: 5 INJECTION INTRAVENOUS at 09:52

## 2023-03-27 RX ADMIN — CYPROHEPTADINE HYDROCHLORIDE 2 MG: 4 TABLET ORAL at 05:29

## 2023-03-27 RX ADMIN — POTASSIUM BICARBONATE 40 MEQ: 782 TABLET, EFFERVESCENT ORAL at 05:26

## 2023-03-27 RX ADMIN — CYPROHEPTADINE HYDROCHLORIDE 2 MG: 4 TABLET ORAL at 04:12

## 2023-03-27 RX ADMIN — CYPROHEPTADINE HYDROCHLORIDE 2 MG: 4 TABLET ORAL at 09:52

## 2023-03-27 RX ADMIN — DEXAMETHASONE SODIUM PHOSPHATE 10 MG: 4 INJECTION, SOLUTION INTRA-ARTICULAR; INTRALESIONAL; INTRAMUSCULAR; INTRAVENOUS; SOFT TISSUE at 06:19

## 2023-03-27 RX ADMIN — PROPOFOL 25 MCG/KG/MIN: 10 INJECTION, EMULSION INTRAVENOUS at 01:56

## 2023-03-27 RX ADMIN — VANCOMYCIN HYDROCHLORIDE 1500 MG: 5 INJECTION, POWDER, LYOPHILIZED, FOR SOLUTION INTRAVENOUS at 02:50

## 2023-03-27 RX ADMIN — POTASSIUM BICARBONATE 40 MEQ: 782 TABLET, EFFERVESCENT ORAL at 09:51

## 2023-03-27 RX ADMIN — CEFTRIAXONE SODIUM 1000 MG: 1 INJECTION, POWDER, FOR SOLUTION INTRAMUSCULAR; INTRAVENOUS at 02:14

## 2023-03-27 RX ADMIN — CHLORHEXIDINE GLUCONATE 0.12% ORAL RINSE 15 ML: 1.2 LIQUID ORAL at 09:52

## 2023-03-27 RX ADMIN — SODIUM CHLORIDE, PRESERVATIVE FREE 10 ML: 5 INJECTION INTRAVENOUS at 20:48

## 2023-03-27 RX ADMIN — TIOTROPIUM BROMIDE AND OLODATEROL 2 PUFF: 3.124; 2.736 SPRAY, METERED RESPIRATORY (INHALATION) at 09:17

## 2023-03-27 RX ADMIN — ACYCLOVIR SODIUM 550 MG: 50 INJECTION, SOLUTION INTRAVENOUS at 02:47

## 2023-03-27 RX ADMIN — LORAZEPAM 1 MG: 1 TABLET ORAL at 18:23

## 2023-03-27 RX ADMIN — LORAZEPAM 2 MG: 2 INJECTION INTRAMUSCULAR; INTRAVENOUS at 03:20

## 2023-03-27 RX ADMIN — CYPROHEPTADINE HYDROCHLORIDE 2 MG: 4 TABLET ORAL at 02:04

## 2023-03-27 RX ADMIN — DEXAMETHASONE SODIUM PHOSPHATE 10 MG: 4 INJECTION, SOLUTION INTRA-ARTICULAR; INTRALESIONAL; INTRAMUSCULAR; INTRAVENOUS; SOFT TISSUE at 02:01

## 2023-03-27 RX ADMIN — Medication 1 MCG/MIN: at 04:23

## 2023-03-27 RX ADMIN — SODIUM CHLORIDE 1000 ML: 9 INJECTION, SOLUTION INTRAVENOUS at 03:15

## 2023-03-27 RX ADMIN — CYPROHEPTADINE HYDROCHLORIDE 2 MG: 4 TABLET ORAL at 14:39

## 2023-03-27 RX ADMIN — SODIUM CHLORIDE: 9 INJECTION, SOLUTION INTRAVENOUS at 10:12

## 2023-03-27 RX ADMIN — ACYCLOVIR SODIUM 550 MG: 50 INJECTION, SOLUTION INTRAVENOUS at 10:14

## 2023-03-27 RX ADMIN — CYPROHEPTADINE HYDROCHLORIDE 2 MG: 4 TABLET ORAL at 12:44

## 2023-03-27 ASSESSMENT — PAIN DESCRIPTION - LOCATION: LOCATION: BACK;LEG

## 2023-03-27 ASSESSMENT — PULMONARY FUNCTION TESTS
PIF_VALUE: 19
PIF_VALUE: 16

## 2023-03-27 ASSESSMENT — ENCOUNTER SYMPTOMS
RESPIRATORY NEGATIVE: 1
EYES NEGATIVE: 1
GASTROINTESTINAL NEGATIVE: 1
ALLERGIC/IMMUNOLOGIC NEGATIVE: 1

## 2023-03-27 ASSESSMENT — PAIN DESCRIPTION - DESCRIPTORS: DESCRIPTORS: THROBBING

## 2023-03-27 ASSESSMENT — PAIN SCALES - GENERAL: PAINLEVEL_OUTOF10: 10

## 2023-03-27 NOTE — PLAN OF CARE
Problem: Discharge Planning  Goal: Discharge to home or other facility with appropriate resources  Outcome: Progressing  Flowsheets (Taken 3/26/2023 2000)  Discharge to home or other facility with appropriate resources:   Identify barriers to discharge with patient and caregiver   Arrange for needed discharge resources and transportation as appropriate   Identify discharge learning needs (meds, wound care, etc)     Problem: Safety - Medical Restraint  Goal: Remains free of injury from restraints (Restraint for Interference with Medical Device)  Description: INTERVENTIONS:  1. Determine that other, less restrictive measures have been tried or would not be effective before applying the restraint  2. Evaluate the patient's condition at the time of restraint application  3. Inform patient/family regarding the reason for restraint  4. Q2H: Monitor safety, psychosocial status, comfort, nutrition and hydration  Outcome: Progressing  Flowsheets (Taken 3/26/2023 2047)  Remains free of injury from restraints (restraint for interference with medical device):   Determine that other, less restrictive measures have been tried or would not be effective before applying the restraint   Evaluate the patient's condition at the time of restraint application   Inform patient/family regarding the reason for restraint   Every 2 hours: Monitor safety, psychosocial status, comfort, nutrition and hydration     Problem: Pain  Goal: Verbalizes/displays adequate comfort level or baseline comfort level  Outcome: Progressing     Problem: Safety - Adult  Goal: Free from fall injury  Outcome: Progressing     Problem: Skin/Tissue Integrity  Goal: Absence of new skin breakdown  Description: 1. Monitor for areas of redness and/or skin breakdown  2. Assess vascular access sites hourly  3. Every 4-6 hours minimum:  Change oxygen saturation probe site  4.   Every 4-6 hours:  If on nasal continuous positive airway pressure, respiratory therapy assess

## 2023-03-27 NOTE — PLAN OF CARE
Transition of Care    Per hand-off from ICU provider:  \"Mr. De La Cruz was admitted from the ED last night after being brought in by EMS after being found down. He has a known history of drug abuse. EMS had concerns for seizures due to described clonus by EMS. He was given Ativan by EMS and on arrival he was unresponsive and hypoxic having  small breathing. He was intubated in the ED and during intubation he became hypotensive so was placed on pressors. He was a code stroke and had CT, CTA, and MRI, none of which show acute pathology. He is now extubated, awake, and off of pressors. Still little bit confused but answers questions appropriately and obeys commands. Nothing crazy on work-up. He is cooperative right now. \". Chart reviewed    Patient extubated this morning. Neurology consulted but has signed off. EEG with diffuse polymorphic theta slowing suggestive of mild encephalopathy. No abnormal epileptiform activity is seen. BP on the low side, recommend IV fluid and/or initiation of midodrine if needed.     Patient stable to transfer out of ICU    Hospitalist will assume care    Electronically signed by RINA Bermeo CNP on 3/27/2023 at 2:43 PM

## 2023-03-27 NOTE — PLAN OF CARE
Problem: Respiratory - Adult  Goal: Achieves optimal ventilation and oxygenation  3/27/2023 1409 by Ana M Shanks RCP  Outcome: Progressing   Pt has been extubated, continue to wean cannula as tolerated. Pt agrees with plan of care.

## 2023-03-27 NOTE — PLAN OF CARE
Problem: Respiratory - Adult  Goal: Achieves optimal ventilation and oxygenation  Outcome: Progressing                                                Patient Weaning Progress    The patient's vent settings was able to be weaned this shift. Ventilator settings that were weaned              [] Mode   [x] Pressure support weaned   [x] Fio2 weaned   [] Peep weaned    SBT Readiness form filled out? [x]Yes        []No    Spontaneous weaning trial  was not attempted. due to defined parameters for SBT (spontaneous breathing trial) not being met. Unable to get agreement for goals because no family is present and patient cannot respond.

## 2023-03-27 NOTE — PROCEDURES
12 lead EKG completed.  Results handed to Dr. Samra Pereira MD. Lesly Martínez CET
Intubation    Date/Time: 3/26/2023 5:35 PM  Performed by: Cleo Encarnacion MD  Authorized by: Yasmine Llanos DO     Consent:     Consent obtained:  Emergent situation  Universal protocol:     Patient identity confirmed:  Arm band and hospital-assigned identification number  Pre-procedure details:     Indication: failure to protect airway and predicted clinical deterioration      Patient status:  Altered mental status    Look externally: no concerns      Mouth opening - incisor distance:  3 or more finger widths    Obstruction: none      Neck mobility: normal      Pharmacologic strategy: RSI      Induction agents:  Etomidate    Paralytics:  Rocuronium  Procedure details:     Preoxygenation:  Bag valve mask    CPR in progress: no      Intubation method:  Oral    Intubation technique: video assisted      Laryngoscope blade:  Hypercurved (4)    Bougie used: no      Grade view: IV      Tube size (mm):  7.5    Tube type:  Cuffed    Number of attempts:  1    Ventilation between attempts: no      Tube visualized through cords: yes    Placement assessment:     ETT at teeth/gumline (cm):  24    Tube secured with:  ETT coyne    Breath sounds:  Equal    Placement verification: chest rise, colorimetric ETCO2, CXR verification, direct visualization and equal breath sounds    Post-procedure details:     Procedure completion:  Tolerated    Complications: hypotension
video. Electrodes were placed in accordance with the 10-20 International System of Electrode Placement. Single lead EKG monitoring was included. Baseline EEG Recording:  A formal baseline EEG recording was not obtained. Day 1 - 3/27/23, starting at 838 am    Interictal EEG Samples: In the alert state, the posterior background rhythm was a symmetric, well-modulated, 7-8 Hz, 20-40 uV rhythm. During drowsiness, there were bursts of diffuse slowing and waxing and waning of the posterior dominant rhythm. During stage II sleep symmetric V waves, K complexes, and sleep spindles were seen. No abnormalities were activated by sleep. The EKG channel revealed no abnormalities. Ictal EEG Recording / Patient Events: During this period the patient had no events or seizures. Summary: During this day of recording no events were recorded. The interictal EEG was abnormal due to diffuse polymorphic theta slowing suggested mild encephalopathy. No abnormal epileptiform activity was seen. Monitoring was continued in order to record the patient's typical events. The EKG channel revealed no abnormalities. Misael Colbert MD  Diplomate, American Board of Psychiatry and Neurology  Diplomate, American Board of Clinical Neurophysiology  Diplomate, American Board of Epilepsy     Please note this is a preliminary report and updated daily. The final report will have a summary of behavior and electrographic findings with clinical correlation.

## 2023-03-27 NOTE — FLOWSHEET NOTE
03/27/23 1759   Safe Environment   Safety Measures Other (comment)  (VN completed admission required documents with pt , spoke with bedside nurse of pt missing cell phone upon transfer to room from the icu , she was addressing this issue)

## 2023-03-27 NOTE — CARE COORDINATION
Case Management Assessment  Initial Evaluation    Date/Time of Evaluation: 3/27/2023 3:32 PM  Assessment Completed by: Aurelia Lee RN    If patient is discharged prior to next notation, then this note serves as note for discharge by case management. Patient Name: Bailey Kay. YOB: 1970  Diagnosis: Seizure ([facility] [address] [R56.9]  Acute respiratory failure with hypoxia ([facility] [address] [J96.01]  Altered mental status, unspecified altered mental status type [R41.82]                   Date / Time: 3/26/2023  4:20 PM  Location: [address] [place] [address]     Patient Admission Status: Inpatient   Readmission Risk Low 0-14, Mod 15-19), High > 20: Readmission Risk Score: 15.1    Current PCP: RINA Rutherford CNP  PCP verified by CM? Yes    Chart Reviewed: Yes      History Provided by: Patient  Patient Orientation: Alert and Oriented    Patient Cognition: Alert    Hospitalization in the last 30 days (Readmission):  No    If yes, Readmission Assessment in CM Navigator will be completed. Advance Directives:      Code Status: Full Code   Patient's Primary Decision Maker is: Named in Scanned ACP Document      Discharge Planning:    Patient lives with: Alone Type of Home: House  Primary Care Giver: Self  Patient Support Systems include: Family Members   Current Financial resources: Medicaid  Current community resources: None  Current services prior to admission: None            Current DME:              Type of Home Care services:  None    ADLS  Prior functional level: Independent in ADLs/IADLs  Current functional level: Independent in ADLs/IADLs    Family can provide assistance at DC: Other (comment) (unsure)  Would you like Case Management to discuss the discharge plan with any other family members/significant others, and if so, who?  No  Plans to Return to Present Housing: Yes  Other Identified Issues/Barriers to RETURNING to current housing: none  Potential Assistance needed at discharge: N/A

## 2023-03-28 ENCOUNTER — HOSPITAL ENCOUNTER (EMERGENCY)
Age: 53
Discharge: HOME | End: 2023-03-28
Attending: EMERGENCY MEDICINE
Payer: MEDICAID

## 2023-03-28 VITALS
WEIGHT: 120 LBS | OXYGEN SATURATION: 97 % | TEMPERATURE: 97.6 F | RESPIRATION RATE: 16 BRPM | DIASTOLIC BLOOD PRESSURE: 86 MMHG | BODY MASS INDEX: 18.19 KG/M2 | HEART RATE: 66 BPM | HEIGHT: 68 IN | SYSTOLIC BLOOD PRESSURE: 138 MMHG

## 2023-03-28 VITALS
HEIGHT: 68 IN | TEMPERATURE: 98.6 F | HEART RATE: 73 BPM | OXYGEN SATURATION: 99 % | BODY MASS INDEX: 18.19 KG/M2 | DIASTOLIC BLOOD PRESSURE: 73 MMHG | SYSTOLIC BLOOD PRESSURE: 116 MMHG | WEIGHT: 120 LBS | RESPIRATION RATE: 24 BRPM

## 2023-03-28 DIAGNOSIS — T50.904A DRUG OVERDOSE OF UNDETERMINED INTENT, INITIAL ENCOUNTER: Primary | ICD-10-CM

## 2023-03-28 DIAGNOSIS — R41.82 ALTERED MENTAL STATUS, UNSPECIFIED ALTERED MENTAL STATUS TYPE: ICD-10-CM

## 2023-03-28 LAB
ALBUMIN SERPL BCG-MCNC: 3.6 G/DL (ref 3.5–5.1)
ALP SERPL-CCNC: 71 U/L (ref 38–126)
ALT SERPL W/O P-5'-P-CCNC: 15 U/L (ref 11–66)
AMPHETAMINES UR QL SCN: NEGATIVE
ANION GAP SERPL CALC-SCNC: 11 MEQ/L (ref 8–16)
ANION GAP SERPL CALC-SCNC: 7 MEQ/L (ref 8–16)
AST SERPL-CCNC: 22 U/L (ref 5–40)
BACTERIA SPEC AEROBE CULT: NORMAL
BACTERIA UR CULT: NORMAL
BARBITURATES UR QL SCN: NEGATIVE
BASOPHILS ABSOLUTE: 0 THOU/MM3 (ref 0–0.1)
BASOPHILS ABSOLUTE: 0 THOU/MM3 (ref 0–0.1)
BASOPHILS NFR BLD AUTO: 0.3 %
BASOPHILS NFR BLD AUTO: 0.4 %
BENZODIAZ UR QL SCN: NEGATIVE
BILIRUB SERPL-MCNC: 0.5 MG/DL (ref 0.3–1.2)
BUN SERPL-MCNC: 16 MG/DL (ref 7–22)
BUN SERPL-MCNC: 26 MG/DL (ref 7–22)
BZE UR QL SCN: NEGATIVE
CALCIUM SERPL-MCNC: 8 MG/DL (ref 8.5–10.5)
CALCIUM SERPL-MCNC: 8.3 MG/DL (ref 8.5–10.5)
CANNABINOIDS UR QL SCN: POSITIVE
CHLORIDE SERPL-SCNC: 105 MEQ/L (ref 98–111)
CHLORIDE SERPL-SCNC: 107 MEQ/L (ref 98–111)
CO2 SERPL-SCNC: 24 MEQ/L (ref 23–33)
CO2 SERPL-SCNC: 25 MEQ/L (ref 23–33)
CREAT SERPL-MCNC: 0.5 MG/DL (ref 0.4–1.2)
CREAT SERPL-MCNC: 0.5 MG/DL (ref 0.4–1.2)
DEPRECATED RDW RBC AUTO: 44.4 FL (ref 35–45)
DEPRECATED RDW RBC AUTO: 46.2 FL (ref 35–45)
EKG ATRIAL RATE: 66 BPM
EKG P AXIS: 48 DEGREES
EKG P-R INTERVAL: 100 MS
EKG Q-T INTERVAL: 422 MS
EKG QRS DURATION: 86 MS
EKG QTC CALCULATION (BAZETT): 442 MS
EKG R AXIS: 12 DEGREES
EKG T AXIS: 66 DEGREES
EKG VENTRICULAR RATE: 66 BPM
EOSINOPHIL NFR BLD AUTO: 0.2 %
EOSINOPHIL NFR BLD AUTO: 0.2 %
EOSINOPHILS ABSOLUTE: 0 THOU/MM3 (ref 0–0.4)
EOSINOPHILS ABSOLUTE: 0 THOU/MM3 (ref 0–0.4)
ERYTHROCYTE [DISTWIDTH] IN BLOOD BY AUTOMATED COUNT: 13.2 % (ref 11.5–14.5)
ERYTHROCYTE [DISTWIDTH] IN BLOOD BY AUTOMATED COUNT: 13.2 % (ref 11.5–14.5)
FENTANYL: POSITIVE
GFR SERPL CREATININE-BSD FRML MDRD: > 60 ML/MIN/1.73M2
GFR SERPL CREATININE-BSD FRML MDRD: > 60 ML/MIN/1.73M2
GLUCOSE SERPL-MCNC: 93 MG/DL (ref 70–108)
GLUCOSE SERPL-MCNC: 97 MG/DL (ref 70–108)
HCT VFR BLD AUTO: 33.5 % (ref 42–52)
HCT VFR BLD AUTO: 35.1 % (ref 42–52)
HGB BLD-MCNC: 11.1 GM/DL (ref 14–18)
HGB BLD-MCNC: 11.4 GM/DL (ref 14–18)
IMM GRANULOCYTES # BLD AUTO: 0.02 THOU/MM3 (ref 0–0.07)
IMM GRANULOCYTES # BLD AUTO: 0.04 THOU/MM3 (ref 0–0.07)
IMM GRANULOCYTES NFR BLD AUTO: 0.2 %
IMM GRANULOCYTES NFR BLD AUTO: 0.4 %
LYMPHOCYTES ABSOLUTE: 2 THOU/MM3 (ref 1–4.8)
LYMPHOCYTES ABSOLUTE: 3.4 THOU/MM3 (ref 1–4.8)
LYMPHOCYTES NFR BLD AUTO: 19.4 %
LYMPHOCYTES NFR BLD AUTO: 27 %
MCH RBC QN AUTO: 30.4 PG (ref 26–33)
MCH RBC QN AUTO: 30.6 PG (ref 26–33)
MCHC RBC AUTO-ENTMCNC: 32.5 GM/DL (ref 32.2–35.5)
MCHC RBC AUTO-ENTMCNC: 33.1 GM/DL (ref 32.2–35.5)
MCV RBC AUTO: 91.8 FL (ref 80–94)
MCV RBC AUTO: 94.1 FL (ref 80–94)
MONOCYTES ABSOLUTE: 0.9 THOU/MM3 (ref 0.4–1.3)
MONOCYTES ABSOLUTE: 1.2 THOU/MM3 (ref 0.4–1.3)
MONOCYTES NFR BLD AUTO: 8.5 %
MONOCYTES NFR BLD AUTO: 9.6 %
NEUTROPHILS NFR BLD AUTO: 62.7 %
NEUTROPHILS NFR BLD AUTO: 71.1 %
NRBC BLD AUTO-RTO: 0 /100 WBC
NRBC BLD AUTO-RTO: 0 /100 WBC
OPIATES UR QL SCN: NEGATIVE
OSMOLALITY SERPL CALC.SUM OF ELEC: 282.4 MOSMOL/KG (ref 275–300)
OXYCODONE: NEGATIVE
PCP UR QL SCN: NEGATIVE
PLATELET # BLD AUTO: 236 THOU/MM3 (ref 130–400)
PLATELET # BLD AUTO: 240 THOU/MM3 (ref 130–400)
PMV BLD AUTO: 10.3 FL (ref 9.4–12.4)
PMV BLD AUTO: 10.6 FL (ref 9.4–12.4)
POTASSIUM SERPL-SCNC: 3.4 MEQ/L (ref 3.5–5.2)
POTASSIUM SERPL-SCNC: 3.7 MEQ/L (ref 3.5–5.2)
PROT SERPL-MCNC: 5.9 G/DL (ref 6.1–8)
RBC # BLD AUTO: 3.65 MILL/MM3 (ref 4.7–6.1)
RBC # BLD AUTO: 3.73 MILL/MM3 (ref 4.7–6.1)
SEGMENTED NEUTROPHILS ABSOLUTE COUNT: 7.4 THOU/MM3 (ref 1.8–7.7)
SEGMENTED NEUTROPHILS ABSOLUTE COUNT: 7.8 THOU/MM3 (ref 1.8–7.7)
SODIUM SERPL-SCNC: 138 MEQ/L (ref 135–145)
SODIUM SERPL-SCNC: 141 MEQ/L (ref 135–145)
WBC # BLD AUTO: 10.4 THOU/MM3 (ref 4.8–10.8)
WBC # BLD AUTO: 12.5 THOU/MM3 (ref 4.8–10.8)

## 2023-03-28 PROCEDURE — 80053 COMPREHEN METABOLIC PANEL: CPT

## 2023-03-28 PROCEDURE — 85025 COMPLETE CBC W/AUTO DIFF WBC: CPT

## 2023-03-28 PROCEDURE — 93005 ELECTROCARDIOGRAM TRACING: CPT | Performed by: STUDENT IN AN ORGANIZED HEALTH CARE EDUCATION/TRAINING PROGRAM

## 2023-03-28 PROCEDURE — 94640 AIRWAY INHALATION TREATMENT: CPT

## 2023-03-28 PROCEDURE — 99284 EMERGENCY DEPT VISIT MOD MDM: CPT

## 2023-03-28 PROCEDURE — 2580000003 HC RX 258

## 2023-03-28 PROCEDURE — 99239 HOSP IP/OBS DSCHRG MGMT >30: CPT | Performed by: PHYSICIAN ASSISTANT

## 2023-03-28 PROCEDURE — 97116 GAIT TRAINING THERAPY: CPT

## 2023-03-28 PROCEDURE — 6370000000 HC RX 637 (ALT 250 FOR IP): Performed by: NURSE PRACTITIONER

## 2023-03-28 PROCEDURE — 93010 ELECTROCARDIOGRAM REPORT: CPT | Performed by: INTERNAL MEDICINE

## 2023-03-28 PROCEDURE — 6370000000 HC RX 637 (ALT 250 FOR IP): Performed by: PHYSICIAN ASSISTANT

## 2023-03-28 PROCEDURE — 80307 DRUG TEST PRSMV CHEM ANLYZR: CPT

## 2023-03-28 PROCEDURE — 6370000000 HC RX 637 (ALT 250 FOR IP): Performed by: EMERGENCY MEDICINE

## 2023-03-28 PROCEDURE — 97161 PT EVAL LOW COMPLEX 20 MIN: CPT

## 2023-03-28 PROCEDURE — 36415 COLL VENOUS BLD VENIPUNCTURE: CPT

## 2023-03-28 RX ORDER — HALOPERIDOL 5 MG/ML
5 INJECTION INTRAMUSCULAR
Status: DISCONTINUED | OUTPATIENT
Start: 2023-03-28 | End: 2023-03-28 | Stop reason: HOSPADM

## 2023-03-28 RX ORDER — LIDOCAINE 4 G/G
1 PATCH TOPICAL DAILY
Status: DISCONTINUED | OUTPATIENT
Start: 2023-03-28 | End: 2023-03-28 | Stop reason: HOSPADM

## 2023-03-28 RX ORDER — TRAMADOL HYDROCHLORIDE 50 MG/1
25 TABLET ORAL EVERY 6 HOURS PRN
Status: DISCONTINUED | OUTPATIENT
Start: 2023-03-28 | End: 2023-03-28 | Stop reason: HOSPADM

## 2023-03-28 RX ORDER — TRAMADOL HYDROCHLORIDE 50 MG/1
50 TABLET ORAL EVERY 6 HOURS PRN
Status: DISCONTINUED | OUTPATIENT
Start: 2023-03-28 | End: 2023-03-28 | Stop reason: HOSPADM

## 2023-03-28 RX ADMIN — TRAMADOL HYDROCHLORIDE 50 MG: 50 TABLET ORAL at 12:41

## 2023-03-28 RX ADMIN — LORAZEPAM 1 MG: 1 TABLET ORAL at 08:35

## 2023-03-28 RX ADMIN — TIOTROPIUM BROMIDE AND OLODATEROL 2 PUFF: 3.124; 2.736 SPRAY, METERED RESPIRATORY (INHALATION) at 08:57

## 2023-03-28 RX ADMIN — POTASSIUM BICARBONATE 40 MEQ: 782 TABLET, EFFERVESCENT ORAL at 08:35

## 2023-03-28 RX ADMIN — SODIUM CHLORIDE, PRESERVATIVE FREE 10 ML: 5 INJECTION INTRAVENOUS at 08:36

## 2023-03-28 RX ADMIN — LORAZEPAM 1 MG: 1 TABLET ORAL at 12:49

## 2023-03-28 ASSESSMENT — PAIN - FUNCTIONAL ASSESSMENT
PAIN_FUNCTIONAL_ASSESSMENT: 0-10
PAIN_FUNCTIONAL_ASSESSMENT: 0-10
PAIN_FUNCTIONAL_ASSESSMENT: ACTIVITIES ARE NOT PREVENTED

## 2023-03-28 ASSESSMENT — PAIN SCALES - GENERAL
PAINLEVEL_OUTOF10: 10
PAINLEVEL_OUTOF10: 8

## 2023-03-28 ASSESSMENT — PAIN DESCRIPTION - LOCATION
LOCATION: BACK

## 2023-03-28 ASSESSMENT — PAIN DESCRIPTION - DESCRIPTORS: DESCRIPTORS: BURNING

## 2023-03-28 NOTE — ED TRIAGE NOTES
Pt presents to the ER with c/o altered mental status. Pt was found by his girlfriend to have decreases responsiveness . Pt was discharged from the ICU today and states he went home and \"smoked some marijuana. \" Pt was apneic in route, pt received 1 of narcan which improved his consciousness. Pt is c/o back pain, denies CP and SOB. Upon arrival pt is alert and oriented, respirations even and unlabored.  VSS

## 2023-03-28 NOTE — ED NOTES
Pt resting in bed eating a boxed lunch. Urine specimen obtained. No needs expressed at this time.  [facility]  03/28/23 2058

## 2023-03-28 NOTE — PROGRESS NOTES
03/28/23 1043   Encounter Summary   Encounter Overview/Reason  Initial Encounter;Spiritual/Emotional Needs   Service Provided For: Patient   Referral/Consult From: Rounding   Last Encounter  03/28/23   Begin Time 1033   End Time  1043   Total Time Calculated 10 min   Encounter    Type Initial Screen/Assessment   Spiritual/Emotional needs   Type Spiritual Support   Assessment/Intervention/Outcome   Assessment Calm   Intervention Sustaining Presence/Ministry of presence;Prayer (assurance of)/[place];Nurtured Hope   Outcome Coping;Comfort   Assessment: In my encounter with the 47 yr old patient, while rounding  the unit 6K, I provided spiritual care to patient through conversation, I also came to assess the patient's spiritual needs present. The pt was admitted due to seizure. Interventions:  I provided prayer, emotional support and words of comfort.  provided a listening presence and encouraged pt to share their beliefs and how they support them during their hospitalization. Outcomes: The patient was encouraged and didn't share any further spiritual needs at this time. Plan:  Chaplains will follow-up at a later time for assessment of any spiritual care needs present.
[address] THERAPY MISSED TREATMENT NOTE  [facility] ICU 4D  4D-13/013-A      Date: 3/27/2023  Patient Name: Alycia Abrams        CSN: [id number]   : 1970  (47 y.o.)  Gender: male                REASON FOR MISSED TREATMENT: Hold Treatment per Nursing. Pt remains intubated, undergoing further testing for seizure activity.  Will hold and check back as able
[address] [facility] Pharmacokinetic Monitoring Service - Vancomycin     Seema Tellez. is a 47 y.o. male starting on vancomycin therapy for r/o meningitis. Pharmacy consulted by Dr Catarino Kaufman for monitoring and adjustment. Target Concentration: Goal AUC/LUDMILA 400-600 mg*hr/L    Additional Antimicrobials: Rocephin    Pertinent Laboratory Values: Wt Readings from Last 1 Encounters:   03/26/23 120 lb 9.5 oz (54.7 kg)     Temp Readings from Last 1 Encounters:   03/27/23 97.5 °F (36.4 °C) (Bladder)     Estimated Creatinine Clearance: 83 mL/min (based on SCr of 0.8 mg/dL). Recent Labs     03/26/23  1620 03/26/23  1640   CREATININE  --  0.8   WBC 13.3*  --      Procalcitonin: 0.16    Pertinent Cultures:  Culture Date Source Results   03/26/23 BCx2, UC, sputum, other    MRSA Nasal Swab: N/A. Non-respiratory infection. Plan:  Dosing recommendations based on Bayesian software  Start vancomycin 1500 mg iv load followed by 750 mg iv every 12 hours.   Anticipated AUC of 448 and trough concentration of 13.6 at steady state  Renal labs as indicated  Pharmacy will continue to monitor patient and adjust therapy as indicated    Thank you for the consult,  Leonor Elaine, [address] [facility]  3/27/2023 1:39 AM
[facility]     Neurodiagnostic Laboratory Technician Worksheet      EEG Date: 3/27/2023    Name: Bhupinder Velásquez. : 1970   Age: 47 y.o. SEX: male    ROOM: 13 MRN: [id number]           CSN: [id number]      Ordering Provider: Mario Menendez  EEG Number: [id number] Time of Test:  2517    Hand: Right   Sedation: pt was sedated during 20 min eeg then lowered sedation for continuouse and pt awoke,  sedtn removed when extubated    H.V. Done: No  on vent, sedated  Photic: Yes    Sleep: No  Drowsy: No   Sleep Deprived: No    Seizures observed: no    Mentality: sedated / to groggy after sedtn removed      Clinical History: found unresponsive, in ems bilateral clonus in lower extremities, hypoxia, polysubtance abuse, hx of alcoholism, mri  Impression       1. Negative MRI scan of the brain, no evidence for an acute infarct. 2. No definite structural abnormality noted in the temporal lobes. 3. Mild inflammatory changes in ethmoid air cells and maxillary sinuses bilaterally and in the right mastoid air cells.        Past Medical History:       Diagnosis Date    Arthritis     Asthma     COPD (chronic obstructive pulmonary disease) ([facility])     GERD (gastroesophageal reflux disease)     Hypertension     Psychiatric problem     Schizophrenia ([facility]     Severe malnutrition ([facility] 2021    Unspecified cerebral artery occlusion with cerebral infarction        Scheduled Meds:   cefTRIAXone (ROCEPHIN) IV  1,000 mg IntraVENous Q24H    acyclovir  10 mg/kg IntraVENous Q8H    dexamethasone  10 mg IntraVENous Q6H    vancomycin (VANCOCIN) intermittent dosing (placeholder)   Other RX Placeholder    vancomycin  750 mg IntraVENous Q12H    potassium bicarb-citric acid  40 mEq Oral Daily    sodium chloride flush  5-40 mL IntraVENous 2 times per day    [Held by provider] enoxaparin  40 mg SubCUTAneous Daily    chlorhexidine  15 mL Mouth/Throat BID    famotidine (PEPCID) injection  20 mg IntraVENous BID
Comprehensive Nutrition Assessment    Type and Reason for Visit:  Initial, Consult (TF ordering and management) extubated 3/27 am so no need for EN    Nutrition Recommendations/Plan:   Begin diet per Physician as passed bedside swallow evaluation per RN  Begin Compact ONS TID     Malnutrition Assessment:  Malnutrition Status:  Insufficient data (RD will assess on next FU) (03/27/23 2478)    Context:  Chronic Illness     Findings of the 6 clinical characteristics of malnutrition:  Energy Intake:  Unable to assess (pt. getting EEG on rounds and unable to see/talk with pt. - will evaluate on next RD FU)  Weight Loss:   (38# or 24% weight loss since 1/2020 however in 2021 was 119# - admit weight 120# - no significant loss in the past year but underweight)     Body Fat Loss:  Unable to assess     Muscle Mass Loss:  Unable to assess    Fluid Accumulation:  No significant fluid accumulation     Strength:  Not Performed    Nutrition Assessment:     Pt. nutritionally compromised AEB suspect severe malnutrition but RD unable to do nutrition focused physical exam this date d/t pt. In procedure on rounds; extubated 3/27. At risk for further nutrition compromise r/t admit with seizures after possible drug OD, fiance found pt. down at home, AMS, hypoxic, intubated 3/26 and extubated 3/27, weight loss 0142-1651 per EMR; EMR weight shows low BMI and underlying medical condition COPD, GERD, HTN, schizophrenia, substance abuse. Nutrition Related Findings:    Pt. Report/Treatments/Miscellaneous: extubated this am; spoke with RN; unable to see pt. As getting EEG; per RN pt.  Is \"bony\"; UO 900ml; passed bedside swallow evaluation so no need for TF now; will add ONS once diet cleared by MD; no family here at this time  GI Status: no BM since admit  Pertinent Labs: glucose 77  BUN 18  creatinine 0.6  K+ 3.1  Na 139  Mg 1.8  no phosphorus  Pertinent Meds: acyclovir, ATB, decadron,     Wound Type: None       Current Nutrition
[facility]  OCCUPATIONAL THERAPY MISSED TREATMENT NOTE  [facility] RENAL TELEMETRY 6K  6K-13/013-A      Date: 3/28/2023  Patient Name: Archibald Koyanagi        CSN: [id number]   : 1970  (47 y.o.)  Gender: male                REASON FOR MISSED TREATMENT: Patient Refused. RN approved session, patient side lying in bed upon OT arrival and refusing OT stating he is going to discharge himself stating he is in too much pain and the hospital is not helping him decrease his pain. RN states patient is allowed Tylenol, however has been refusing it. OT to check back another date.
[facility]  OCCUPATIONAL THERAPY MISSED TREATMENT NOTE  [facility] RENAL TELEMETRY 6K  6K-13/013-A      Date: 3/28/2023  Patient Name: Korina Esteves        CSN: [id number]   : 1970  (47 y.o.)  Gender: male                REASON FOR MISSED TREATMENT:  per discussion with PT who was able to evaluate patient; reports patient has no OT needs and okay to d/c orders. OT to discontinue orders this date.
[facility]  PHYSICAL THERAPY MISSED TREATMENT NOTE  [facility] ICU 4D    Date: 3/27/2023  Patient Name: Korina Denney MRN: [id number]   : 1970  (47 y.o.)  Gender: male                REASON FOR MISSED TREATMENT:  Missed Treat. Per OT, staff recommend hold in am due to further testing for seizure activity. Will check back as able.
Patient has been successfully weaned from Mechanical Ventilation. Patient extubated and placed on 4 liters/min via nasal cannula. Post extubation SpO2 is 98% with HR  79 bpm and RR 18 breaths/min. Patient had strong cough that was productive of clear  sputum. Extubation Well tolerated by patient. Veronica Robbins
Pt ventilated with ambu bag and 100% fio2 prior to intubation and after til placed on vent. Pt intubated by dr Mehul Vásquez on first attempt with 7.5 ett. Good bilat breath sounds and color change on co2 detector. Et secured at 24 cm. Pt placed on vent.
Recommendations:  Equipment Needed: No    Plan:  General Plan:  (NA)    Goals:  Patient Goals : go home, pain relief  Short Term Goals  Time Frame for Short Term Goals: NA  Long Term Goals  Time Frame for Long Term Goals : NA    Following session, patient left in safe position with all fall risk precautions in place.
dry  Psych: Intubated and sedated  Lymph:  No supraclavicular adenopathy. Neurologic: Intubated and sedated, obeys commands with movement in all 4 extremities. Data: (All radiographs, tracings, PFTs, and imaging are personally viewed and interpreted unless otherwise noted). , K 3.1, replacement ordered Cl 108, CO2 24, BUN 18, Cr 0.6, Ca 7.4  WBC 11.8, H/H 11.2/35.5, Platelets 171  Telemetry shows: Normal sinus rhythm  Micro: BCx, urine culture, respiratory culture ordered. Patient given empiric antibiotics in the ED of vancomycin, Rocephin, acyclovir. Seen with multidisciplinary ICU team.  Meets Continued ICU Level Care Criteria:    [x] Yes   [] No - Transfer Planned to listed location:  [] HOSPITALIST CONTACTED-      Case and plan discussed with Dr. Zonia Bernal. Electronically signed by Tobi Scott MD  [address]   Patient seen by me including key components of medical care. Case discussed with resident physician. Doing well with SBT. Extubate. Italicized font, if present,  represents changes to the note made by me. CC time 35 minutes. Time was discontiguous. Time does not include procedure. Time does include my direct assessment of the patient and coordination of care. Time represents more than 50% of the time involved with patient care by the [address] [place] [address] team.  Electronically signed by Kalli Aj.  Zonia Bernal MD.
changes in ethmoid air cells and maxillary sinuses bilaterally and in the right mastoid air cells. **This report has been created using voice recognition software. It may contain minor errors which are inherent in voice recognition technology. ** Final report electronically signed by DR Belinda Smith on 3/27/2023 8:02 AM        Assessment and Plan        Hospital Problems             Last Modified POA    * (Principal) Seizure ([address] 3/26/2023 Yes    Altered mental status 3/27/2023 Yes         Encephalopathy  - EEG with diffuse polymorphic theta slowing suggestive of mild encephalopathy. No abnormal epileptiform activity was seen. - Medical management per primary team  - Neurology will sign off at this time please call with questions or concerns. Patient discussed with Dr. Alyce Austin who is in agreement with the above treatment plan.     Electronically signed by Sarah Bartlett PA-C on 3/27/23 at 12:13 PM EDT

## 2023-03-28 NOTE — DISCHARGE INSTRUCTIONS
You were seen today in the ED for drug overdose     Please follow up with your family doctor regarding todays visit. I have also included the family medicine clinic in [address] [facility] if you need someone closer to you.      Please refrain from using drugs

## 2023-03-28 NOTE — ED PROVIDER NOTES
patient prior to discharge, with which the patient agrees. FINAL DIAGNOSES:  Final diagnoses: Altered mental status, unspecified altered mental status type   Drug overdose of undetermined intent, initial encounter       Condition: condition: stable  Dispo: Discharge to home  DISPOSITION Decision To Discharge 03/28/2023 07:48:57 PM      This transcription was electronically signed. It was dictated by use of voice recognition software and electronically transcribed. The transcription may contain errors not detected in proofreading.        Leroy Tejeda MD  Resident  03/28/23 4292

## 2023-03-28 NOTE — ED NOTES
ED nurse-to-nurse bedside report    Chief Complaint   Patient presents with    Altered Mental Status      LOC: alert and orientated to name, place, date  Vital signs   Vitals:    03/28/23 1658 03/28/23 1814   BP: (!) 158/98 138/86   Pulse: 58 66   Resp: 22 16   Temp: 97.6 °F (36.4 °C)    TempSrc: Oral    SpO2: 98% 97%   Weight: 120 lb (54.4 kg)    Height: 5' 8\" (1.727 m)       Pain:    Pain Interventions: none  Pain Goal: 0  Oxygen: No    Current needs required RA   Telemetry: Yes  LDAs:   Peripheral IV 03/28/23 Left Antecubital (Active)   Site Assessment Clean, dry & intact 03/28/23 1814   Line Status Normal saline locked 03/28/23 1814   Phlebitis Assessment No symptoms 03/28/23 1814   Infiltration Assessment 0 03/28/23 1814   Dressing Status Clean, dry & intact 03/28/23 1814   Dressing Type Transparent 03/28/23 1707   Dressing Intervention New 03/28/23 1707     Continuous Infusions:   Mobility: Requires assistance * 1  Abdalla Fall Risk Score: No flowsheet data found.   Fall Interventions: side rails, call light  Report given to: Yue Arita RN  03/28/23 5953

## 2023-03-28 NOTE — PLAN OF CARE
Problem: Discharge Planning  Goal: Discharge to home or other facility with appropriate resources  Outcome: Progressing  Flowsheets (Taken 3/28/2023 0700)  Discharge to home or other facility with appropriate resources:   Identify barriers to discharge with patient and caregiver   Arrange for needed discharge resources and transportation as appropriate   Identify discharge learning needs (meds, wound care, etc)     Problem: Safety - Medical Restraint  Goal: Remains free of injury from restraints (Restraint for Interference with Medical Device)  Description: INTERVENTIONS:  1. Determine that other, less restrictive measures have been tried or would not be effective before applying the restraint  2. Evaluate the patient's condition at the time of restraint application  3. Inform patient/family regarding the reason for restraint  4. Q2H: Monitor safety, psychosocial status, comfort, nutrition and hydration  Outcome: Progressing  Flowsheets (Taken 3/28/2023 0700)  Remains free of injury from restraints (restraint for interference with medical device): Determine that other, less restrictive measures have been tried or would not be effective before applying the restraint  Note: Patient was not on restraints anymore when 7P-7A shift started. Patient did not display any physically aggressive behavior.      Problem: Pain  Goal: Verbalizes/displays adequate comfort level or baseline comfort level  Outcome: Progressing  Flowsheets (Taken 3/28/2023 0700)  Verbalizes/displays adequate comfort level or baseline comfort level:   Encourage patient to monitor pain and request assistance   Assess pain using appropriate pain scale   Implement non-pharmacological measures as appropriate and evaluate response     Problem: Safety - Adult  Goal: Free from fall injury  Outcome: Progressing  Flowsheets (Taken 3/28/2023 0700)  Free From Fall Injury: Instruct family/caregiver on patient safety     Problem: Skin/Tissue Integrity  Goal:

## 2023-03-28 NOTE — DISCHARGE SUMMARY
Collection Time: 03/26/23  4:40 PM   Result Value Ref Range    Salicylate, Serum < 0.3 (L) 2.0 - 10.0 mg/dL   Hepatic Function Panel    Collection Time: 03/26/23  4:40 PM   Result Value Ref Range    Bilirubin, Direct <0.2 0.0 - 0.3 mg/dL   Troponin    Collection Time: 03/26/23  4:40 PM   Result Value Ref Range    Troponin T < 0.010 ng/ml   TSH    Collection Time: 03/26/23  4:40 PM   Result Value Ref Range    TSH 2.830 0.400 - 4.200 uIU/mL   Anion Gap    Collection Time: 03/26/23  4:40 PM   Result Value Ref Range    Anion Gap 14.0 8.0 - 16.0 meq/L   Glomerular Filtration Rate, Estimated    Collection Time: 03/26/23  4:40 PM   Result Value Ref Range    Est, Glom Filt Rate >60 >60 ml/min/1.73m2   Osmolality    Collection Time: 03/26/23  4:40 PM   Result Value Ref Range    Osmolality Calc 278.4 275.0 - 300.0 mOsmol/kg   CK    Collection Time: 03/26/23  4:40 PM   Result Value Ref Range    Total  (H) 55 - 170 U/L   Procalcitonin    Collection Time: 03/26/23  4:40 PM   Result Value Ref Range    Procalcitonin 0.16 (H) 0.01 - 0.09 ng/mL   Blood Gas, Arterial    Collection Time: 03/26/23  4:55 PM   Result Value Ref Range    pH, Blood Gas 7.35 7.35 - 7.45    PCO2 51 (H) 35 - 45 mmhg    PO2 250 (H) 71 - 104 mmhg    HCO3 28 23 - 28 mmol/l    Base Excess (Calculated) 1.3 -2.5 - 2.5 mmol/l    O2 Sat 100 %    IFIO2 50     DEVICE Adult Vent     Mode PC     SET RESPIRATORY RATE 14 bpm    SET PEEP 6.0 mmhg    PIP 14 cmH2O    Brayan Test Positive     Source: R Radial     COLLECTED BY: [id number]    Potassium, Whole Blood    Collection Time: 03/26/23  4:55 PM   Result Value Ref Range    Potassium, Whole Blood 2.9 (L) 3.5 - 4.9 meq/l   Chloride, Whole Blood    Collection Time: 03/26/23  4:55 PM   Result Value Ref Range    Chloride, Whole Blood 98 98 - 109 meq/l   Calcium Ionized Serum    Collection Time: 03/26/23  4:55 PM   Result Value Ref Range    Calcium,Ionized 1.13 1.12 - 1.32 mmol/L   POC Lactic Acid, Venous    Collection Time:

## 2023-03-28 NOTE — Clinical Note
Kecia Price was seen and treated in our emergency department on 3/28/2023. He may return to work on 03/29/2023. If you have any questions or concerns, please don't hesitate to call.       Justin Potter MD

## 2023-03-28 NOTE — FLOWSHEET NOTE
03/28/23 1325   Safe Environment   Safety Measures Bed in low position;Call light within reach; Fall prevention (comment); Side rails X 2;Standard Safety Measures  (Virtual nurse safety round completed.)   Patient is awake and  alert and laying in bed. Referred patient to page 13 of the handbook for fall prevention review. Call light in reach.

## 2023-03-29 LAB — MYOGLOBIN UR-MCNC: < 1 MG/L (ref 0–1)

## 2023-03-31 LAB
BACTERIA BLD AEROBE CULT: NORMAL
BACTERIA BLD AEROBE CULT: NORMAL

## 2023-10-25 ENCOUNTER — APPOINTMENT (OUTPATIENT)
Dept: CT IMAGING | Age: 53
End: 2023-10-25
Payer: MEDICAID

## 2023-10-25 ENCOUNTER — APPOINTMENT (OUTPATIENT)
Dept: GENERAL RADIOLOGY | Age: 53
End: 2023-10-25
Payer: MEDICAID

## 2023-10-25 ENCOUNTER — HOSPITAL ENCOUNTER (EMERGENCY)
Age: 53
Discharge: ANOTHER HOSPITAL | End: 2023-10-26
Attending: EMERGENCY MEDICINE
Payer: MEDICAID

## 2023-10-25 DIAGNOSIS — G93.89 BRAIN MASS: ICD-10-CM

## 2023-10-25 DIAGNOSIS — R91.8 LUNG MASS: ICD-10-CM

## 2023-10-25 DIAGNOSIS — G40.901 STATUS EPILEPTICUS (HCC): Primary | ICD-10-CM

## 2023-10-25 LAB
ALBUMIN SERPL BCG-MCNC: 4 G/DL (ref 3.5–5.1)
ALP SERPL-CCNC: 91 U/L (ref 38–126)
ALT SERPL W/O P-5'-P-CCNC: 17 U/L (ref 11–66)
AMMONIA PLAS-MCNC: 41 UMOL/L (ref 11–60)
AMPHETAMINES UR QL SCN: NEGATIVE
ANION GAP SERPL CALC-SCNC: 23 MEQ/L (ref 8–16)
AST SERPL-CCNC: 36 U/L (ref 5–40)
BACTERIA URNS QL MICRO: ABNORMAL /HPF
BARBITURATES UR QL SCN: NEGATIVE
BASOPHILS ABSOLUTE: 0.1 THOU/MM3 (ref 0–0.1)
BASOPHILS NFR BLD AUTO: 0.5 %
BENZODIAZ UR QL SCN: POSITIVE
BILIRUB SERPL-MCNC: 0.5 MG/DL (ref 0.3–1.2)
BILIRUB UR QL STRIP.AUTO: NEGATIVE
BUN SERPL-MCNC: 16 MG/DL (ref 7–22)
BZE UR QL SCN: NEGATIVE
CALCIUM SERPL-MCNC: 9 MG/DL (ref 8.5–10.5)
CANNABINOIDS UR QL SCN: POSITIVE
CASTS #/AREA URNS LPF: ABNORMAL /LPF
CASTS 2: ABNORMAL /LPF
CHARACTER UR: CLEAR
CHLORIDE SERPL-SCNC: 98 MEQ/L (ref 98–111)
CK SERPL-CCNC: 494 U/L (ref 55–170)
CO2 SERPL-SCNC: 18 MEQ/L (ref 23–33)
COLOR: YELLOW
CREAT SERPL-MCNC: 0.9 MG/DL (ref 0.4–1.2)
CRYSTALS URNS MICRO: ABNORMAL
DEPRECATED RDW RBC AUTO: 44.5 FL (ref 35–45)
EOSINOPHIL NFR BLD AUTO: 0.8 %
EOSINOPHILS ABSOLUTE: 0.1 THOU/MM3 (ref 0–0.4)
EPITHELIAL CELLS, UA: ABNORMAL /HPF
ERYTHROCYTE [DISTWIDTH] IN BLOOD BY AUTOMATED COUNT: 12.8 % (ref 11.5–14.5)
FENTANYL: POSITIVE
GFR SERPL CREATININE-BSD FRML MDRD: > 60 ML/MIN/1.73M2
GLUCOSE BLD STRIP.AUTO-MCNC: 125 MG/DL (ref 70–108)
GLUCOSE SERPL-MCNC: 138 MG/DL (ref 70–108)
GLUCOSE UR QL STRIP.AUTO: NEGATIVE MG/DL
HCT VFR BLD AUTO: 40.5 % (ref 42–52)
HGB BLD-MCNC: 12.9 GM/DL (ref 14–18)
HGB UR QL STRIP.AUTO: ABNORMAL
IMM GRANULOCYTES # BLD AUTO: 0.05 THOU/MM3 (ref 0–0.07)
IMM GRANULOCYTES NFR BLD AUTO: 0.4 %
KETONES UR QL STRIP.AUTO: NEGATIVE
LYMPHOCYTES ABSOLUTE: 4.3 THOU/MM3 (ref 1–4.8)
LYMPHOCYTES NFR BLD AUTO: 32.9 %
MAGNESIUM SERPL-MCNC: 1.8 MG/DL (ref 1.6–2.4)
MCH RBC QN AUTO: 30.1 PG (ref 26–33)
MCHC RBC AUTO-ENTMCNC: 31.9 GM/DL (ref 32.2–35.5)
MCV RBC AUTO: 94.6 FL (ref 80–94)
MISCELLANEOUS 2: ABNORMAL
MONOCYTES ABSOLUTE: 1.4 THOU/MM3 (ref 0.4–1.3)
MONOCYTES NFR BLD AUTO: 10.5 %
NEUTROPHILS NFR BLD AUTO: 54.9 %
NITRITE UR QL STRIP: NEGATIVE
NRBC BLD AUTO-RTO: 0 /100 WBC
OPIATES UR QL SCN: NEGATIVE
OSMOLALITY SERPL CALC.SUM OF ELEC: 280.9 MOSMOL/KG (ref 275–300)
OXYCODONE: NEGATIVE
PCP UR QL SCN: NEGATIVE
PH UR STRIP.AUTO: 5 [PH] (ref 5–9)
PHENYTOIN SERPL-MCNC: < 0.8 UG/ML (ref 6–18)
PHOSPHATE SERPL-MCNC: 3.4 MG/DL (ref 2.4–4.7)
PLATELET # BLD AUTO: 311 THOU/MM3 (ref 130–400)
PMV BLD AUTO: 10.2 FL (ref 9.4–12.4)
POTASSIUM SERPL-SCNC: 4 MEQ/L (ref 3.5–5.2)
PROT SERPL-MCNC: 7 G/DL (ref 6.1–8)
PROT UR STRIP.AUTO-MCNC: ABNORMAL MG/DL
RBC # BLD AUTO: 4.28 MILL/MM3 (ref 4.7–6.1)
RBC URINE: ABNORMAL /HPF
RENAL EPI CELLS #/AREA URNS HPF: ABNORMAL /[HPF]
SCAN OF BLOOD SMEAR: NORMAL
SEGMENTED NEUTROPHILS ABSOLUTE COUNT: 7.2 THOU/MM3 (ref 1.8–7.7)
SODIUM SERPL-SCNC: 139 MEQ/L (ref 135–145)
SP GR UR REFRACT.AUTO: > 1.03 (ref 1–1.03)
UROBILINOGEN, URINE: 0.2 EU/DL (ref 0–1)
WBC # BLD AUTO: 13.1 THOU/MM3 (ref 4.8–10.8)
WBC #/AREA URNS HPF: ABNORMAL /HPF
WBC #/AREA URNS HPF: NEGATIVE /[HPF]
YEAST LIKE FUNGI URNS QL MICRO: ABNORMAL

## 2023-10-25 PROCEDURE — 96365 THER/PROPH/DIAG IV INF INIT: CPT

## 2023-10-25 PROCEDURE — 96375 TX/PRO/DX INJ NEW DRUG ADDON: CPT

## 2023-10-25 PROCEDURE — 2500000003 HC RX 250 WO HCPCS

## 2023-10-25 PROCEDURE — 36415 COLL VENOUS BLD VENIPUNCTURE: CPT

## 2023-10-25 PROCEDURE — 70450 CT HEAD/BRAIN W/O DYE: CPT

## 2023-10-25 PROCEDURE — 96374 THER/PROPH/DIAG INJ IV PUSH: CPT

## 2023-10-25 PROCEDURE — 80307 DRUG TEST PRSMV CHEM ANLYZR: CPT

## 2023-10-25 PROCEDURE — 85025 COMPLETE CBC W/AUTO DIFF WBC: CPT

## 2023-10-25 PROCEDURE — 31500 INSERT EMERGENCY AIRWAY: CPT

## 2023-10-25 PROCEDURE — 71260 CT THORAX DX C+: CPT

## 2023-10-25 PROCEDURE — 83735 ASSAY OF MAGNESIUM: CPT

## 2023-10-25 PROCEDURE — 99285 EMERGENCY DEPT VISIT HI MDM: CPT

## 2023-10-25 PROCEDURE — 80185 ASSAY OF PHENYTOIN TOTAL: CPT

## 2023-10-25 PROCEDURE — 6360000002 HC RX W HCPCS: Performed by: STUDENT IN AN ORGANIZED HEALTH CARE EDUCATION/TRAINING PROGRAM

## 2023-10-25 PROCEDURE — 93005 ELECTROCARDIOGRAM TRACING: CPT | Performed by: EMERGENCY MEDICINE

## 2023-10-25 PROCEDURE — 82948 REAGENT STRIP/BLOOD GLUCOSE: CPT

## 2023-10-25 PROCEDURE — 80177 DRUG SCRN QUAN LEVETIRACETAM: CPT

## 2023-10-25 PROCEDURE — 2500000003 HC RX 250 WO HCPCS: Performed by: STUDENT IN AN ORGANIZED HEALTH CARE EDUCATION/TRAINING PROGRAM

## 2023-10-25 PROCEDURE — 81003 URINALYSIS AUTO W/O SCOPE: CPT

## 2023-10-25 PROCEDURE — 94761 N-INVAS EAR/PLS OXIMETRY MLT: CPT

## 2023-10-25 PROCEDURE — 6360000002 HC RX W HCPCS

## 2023-10-25 PROCEDURE — 71045 X-RAY EXAM CHEST 1 VIEW: CPT

## 2023-10-25 PROCEDURE — 72125 CT NECK SPINE W/O DYE: CPT

## 2023-10-25 PROCEDURE — 2580000003 HC RX 258: Performed by: STUDENT IN AN ORGANIZED HEALTH CARE EDUCATION/TRAINING PROGRAM

## 2023-10-25 PROCEDURE — 96376 TX/PRO/DX INJ SAME DRUG ADON: CPT

## 2023-10-25 PROCEDURE — 82550 ASSAY OF CK (CPK): CPT

## 2023-10-25 PROCEDURE — 96366 THER/PROPH/DIAG IV INF ADDON: CPT

## 2023-10-25 PROCEDURE — 2700000000 HC OXYGEN THERAPY PER DAY

## 2023-10-25 PROCEDURE — 82140 ASSAY OF AMMONIA: CPT

## 2023-10-25 PROCEDURE — 6360000004 HC RX CONTRAST MEDICATION: Performed by: STUDENT IN AN ORGANIZED HEALTH CARE EDUCATION/TRAINING PROGRAM

## 2023-10-25 PROCEDURE — 81001 URINALYSIS AUTO W/SCOPE: CPT

## 2023-10-25 PROCEDURE — 80053 COMPREHEN METABOLIC PANEL: CPT

## 2023-10-25 PROCEDURE — 51702 INSERT TEMP BLADDER CATH: CPT

## 2023-10-25 PROCEDURE — 84100 ASSAY OF PHOSPHORUS: CPT

## 2023-10-25 RX ORDER — KETAMINE HCL IN NACL, ISO-OSM 100MG/10ML
100 SYRINGE (ML) INJECTION ONCE
Status: COMPLETED | OUTPATIENT
Start: 2023-10-25 | End: 2023-10-25

## 2023-10-25 RX ORDER — LORAZEPAM 2 MG/ML
INJECTION INTRAMUSCULAR
Status: COMPLETED
Start: 2023-10-25 | End: 2023-10-25

## 2023-10-25 RX ORDER — KETAMINE HCL IN NACL, ISO-OSM 100MG/10ML
SYRINGE (ML) INJECTION
Status: COMPLETED
Start: 2023-10-25 | End: 2023-10-25

## 2023-10-25 RX ORDER — ROCURONIUM BROMIDE 10 MG/ML
100 INJECTION, SOLUTION INTRAVENOUS ONCE
Status: COMPLETED | OUTPATIENT
Start: 2023-10-25 | End: 2023-10-25

## 2023-10-25 RX ORDER — FENTANYL CITRATE 50 UG/ML
50 INJECTION, SOLUTION INTRAMUSCULAR; INTRAVENOUS ONCE
Status: COMPLETED | OUTPATIENT
Start: 2023-10-25 | End: 2023-10-25

## 2023-10-25 RX ORDER — LORAZEPAM 2 MG/ML
2 INJECTION INTRAMUSCULAR ONCE
Status: COMPLETED | OUTPATIENT
Start: 2023-10-25 | End: 2023-10-25

## 2023-10-25 RX ORDER — LORAZEPAM 2 MG/ML
INJECTION INTRAMUSCULAR
Status: DISCONTINUED
Start: 2023-10-25 | End: 2023-10-26 | Stop reason: HOSPADM

## 2023-10-25 RX ORDER — MIDAZOLAM HYDROCHLORIDE 1 MG/ML
1-10 INJECTION, SOLUTION INTRAVENOUS CONTINUOUS
Status: DISCONTINUED | OUTPATIENT
Start: 2023-10-25 | End: 2023-10-26 | Stop reason: HOSPADM

## 2023-10-25 RX ADMIN — Medication 2 MG/HR: at 20:35

## 2023-10-25 RX ADMIN — LORAZEPAM 2 MG: 2 INJECTION INTRAMUSCULAR at 20:10

## 2023-10-25 RX ADMIN — LORAZEPAM 2 MG: 2 INJECTION INTRAMUSCULAR; INTRAVENOUS at 20:19

## 2023-10-25 RX ADMIN — LORAZEPAM 2 MG: 2 INJECTION INTRAMUSCULAR; INTRAVENOUS at 20:10

## 2023-10-25 RX ADMIN — FENTANYL CITRATE 50 MCG: 50 INJECTION, SOLUTION INTRAMUSCULAR; INTRAVENOUS at 22:22

## 2023-10-25 RX ADMIN — ROCURONIUM BROMIDE 100 MG: 10 INJECTION INTRAVENOUS at 20:24

## 2023-10-25 RX ADMIN — LORAZEPAM 2 MG: 2 INJECTION INTRAMUSCULAR; INTRAVENOUS at 20:12

## 2023-10-25 RX ADMIN — LEVETIRACETAM 1500 MG: 100 INJECTION, SOLUTION INTRAVENOUS at 20:37

## 2023-10-25 RX ADMIN — IOPAMIDOL 80 ML: 755 INJECTION, SOLUTION INTRAVENOUS at 20:53

## 2023-10-25 RX ADMIN — Medication 100 MG: at 20:23

## 2023-10-25 RX ADMIN — LORAZEPAM 2 MG: 2 INJECTION INTRAMUSCULAR at 20:12

## 2023-10-25 ASSESSMENT — PAIN DESCRIPTION - DESCRIPTORS: DESCRIPTORS: ACHING

## 2023-10-25 ASSESSMENT — PAIN DESCRIPTION - PAIN TYPE: TYPE: ACUTE PAIN

## 2023-10-25 ASSESSMENT — PULMONARY FUNCTION TESTS: PIF_VALUE: 21

## 2023-10-25 ASSESSMENT — PAIN - FUNCTIONAL ASSESSMENT: PAIN_FUNCTIONAL_ASSESSMENT: 0-10

## 2023-10-25 ASSESSMENT — PAIN DESCRIPTION - LOCATION: LOCATION: CHEST

## 2023-10-25 ASSESSMENT — PAIN SCALES - GENERAL: PAINLEVEL_OUTOF10: 8

## 2023-10-25 NOTE — ED NOTES
Family to nurses station stating pt is \"getting brock horses in his legs really bad. \" Will find provider to speak with.      Nicholas Scott RN  10/25/23 1950

## 2023-10-25 NOTE — ED PROVIDER NOTES
[facility] EMERGENCY DEPT    Pt Name: Hayley Guaman MRN: [id number]  Birthdate 1970  Date of evaluation: 10/25/2023  Resident Physician: Zeek Trevino MD  Attending Physician: Steven Salvador, [address] [place] [address]       Chief Complaint   Patient presents with    Fall    Seizures     HISTORY OF PRESENT ILLNESS   Hayley Guaman is a 46 y.o. male with PMHx of polysubstance abuse, alcohol withdrawal, seizures and paranoid hernia  who presents to the emergency department for evaluation of fall and possible seizure. Patient reports that he was having some chronic pain today. Asked his friend for a pain pill. States he thinks it was an opiate. States that when he went from sitting to standing to walk down the steps he felt like he was going to pass out. States that his vision faded to black. When he returned to consciousness he was surrounded by EMS. He denies preceding chest pain, shortness of breath or palpitations. Patient accompanied by partner who lives with him. She states that she found him at the bottom of the steps soiled in urine and feces. States that he was apparently foaming at the mouth for approximately 3 minutes. She was the one who called EMS. Patient is now returned to baseline consciousness. Patient states that his only complaint now is that he has some tenderness to the right side of the chest.  Patient said that he had had a seizure before in the past several years ago but was never started on antiepileptic medications. The patient has no other acute complaints at this time.   PASTMEDICAL HISTORY     Past Medical History:   Diagnosis Date    Arthritis     Asthma     COPD (chronic obstructive pulmonary disease) ([facility])     GERD (gastroesophageal reflux disease)     Hypertension     Psychiatric problem     Schizophrenia ([facility])     Severe malnutrition ([facility]) 1/14/2021    Unspecified cerebral artery occlusion with cerebral infarction        Patient Active Problem List   Diagnosis

## 2023-10-25 NOTE — ED NOTES
Pt presents to ED via [address] [facility] for c/o fall down approx 15 steps after having seizure. Pt presents to ED in c-collar. Upon initial assessment, pt is A&Ox2 at this time, unsure of situation and year. Pt repeats \"I don't understand what happened. How did I get here? \" Pt resps easy and unlabored. Pt c/o pain in chest and then states generalized pain at this time. EKG Completed upon arrival. IV established with blood drawn and sent to lab. Pt presents soiled with urine and feces. Pt cleansed at this time and new brief placed on pt. Monitor applied, VS as noted. Protocol orders placed. Awaiting provider assessment and further orders. Will monitor.      Claudette Butt RN  10/25/23 4073

## 2023-10-26 ENCOUNTER — HOSPITAL ENCOUNTER (INPATIENT)
Age: 53
LOS: 7 days | Discharge: HOME | End: 2023-11-02
Attending: EMERGENCY MEDICINE | Admitting: INTERNAL MEDICINE
Payer: MEDICAID

## 2023-10-26 ENCOUNTER — APPOINTMENT (OUTPATIENT)
Dept: GENERAL RADIOLOGY | Age: 53
End: 2023-10-26
Payer: MEDICAID

## 2023-10-26 VITALS
BODY MASS INDEX: 20.53 KG/M2 | TEMPERATURE: 99 F | WEIGHT: 135 LBS | HEART RATE: 83 BPM | RESPIRATION RATE: 21 BRPM | SYSTOLIC BLOOD PRESSURE: 114 MMHG | OXYGEN SATURATION: 100 % | DIASTOLIC BLOOD PRESSURE: 91 MMHG

## 2023-10-26 DIAGNOSIS — G40.901 STATUS EPILEPTICUS (HCC): Primary | ICD-10-CM

## 2023-10-26 PROBLEM — R91.8 LUNG MASS: Status: ACTIVE | Noted: 2023-10-26

## 2023-10-26 PROBLEM — G93.6 CEREBRAL EDEMA (HCC): Status: ACTIVE | Noted: 2023-10-26

## 2023-10-26 PROBLEM — C80.1 METASTASIS TO BRAIN OF UNKNOWN ORIGIN (HCC): Status: ACTIVE | Noted: 2023-10-26

## 2023-10-26 PROBLEM — C79.31 METASTASIS TO BRAIN (HCC): Status: ACTIVE | Noted: 2023-10-26

## 2023-10-26 LAB
ANION GAP SERPL CALCULATED.3IONS-SCNC: 10 MMOL/L (ref 9–17)
BACTERIA URNS QL MICRO: NORMAL
BASOPHILS # BLD: 0.05 K/UL (ref 0–0.2)
BASOPHILS NFR BLD: 0 % (ref 0–2)
BILIRUB UR QL STRIP: NEGATIVE
BODY TEMPERATURE: 37
BUN BLD-MCNC: 16 MG/DL (ref 8–26)
BUN SERPL-MCNC: 16 MG/DL (ref 6–20)
CA-I BLD-SCNC: 1.14 MMOL/L (ref 1.15–1.33)
CALCIUM SERPL-MCNC: 8.7 MG/DL (ref 8.6–10.4)
CASTS #/AREA URNS LPF: NORMAL /LPF (ref 0–8)
CHLORIDE BLD-SCNC: 102 MMOL/L (ref 98–107)
CHLORIDE SERPL-SCNC: 100 MMOL/L (ref 98–107)
CK SERPL-CCNC: 939 U/L (ref 39–308)
CLARITY UR: ABNORMAL
CO2 BLD CALC-SCNC: 29 MMOL/L (ref 22–30)
CO2 SERPL-SCNC: 26 MMOL/L (ref 20–31)
COHGB MFR BLD: 2.3 % (ref 0–5)
COLOR UR: ABNORMAL
CREAT SERPL-MCNC: 0.4 MG/DL (ref 0.7–1.2)
EGFR, POC: >60 ML/MIN/1.73M2
EKG ATRIAL RATE: 96 BPM
EKG P AXIS: 48 DEGREES
EKG P-R INTERVAL: 130 MS
EKG Q-T INTERVAL: 370 MS
EKG QRS DURATION: 90 MS
EKG QTC CALCULATION (BAZETT): 467 MS
EKG R AXIS: -40 DEGREES
EKG T AXIS: 70 DEGREES
EKG VENTRICULAR RATE: 96 BPM
EOSINOPHIL # BLD: <0.03 K/UL (ref 0–0.44)
EOSINOPHILS RELATIVE PERCENT: 0 % (ref 1–4)
EPI CELLS #/AREA URNS HPF: NORMAL /HPF (ref 0–5)
ERYTHROCYTE [DISTWIDTH] IN BLOOD BY AUTOMATED COUNT: 12.9 % (ref 11.8–14.4)
FIO2 ON VENT: ABNORMAL %
FIO2: 40
GFR SERPL CREATININE-BSD FRML MDRD: >60 ML/MIN/1.73M2
GLUCOSE BLD-MCNC: 108 MG/DL (ref 74–100)
GLUCOSE BLD-MCNC: 110 MG/DL (ref 74–100)
GLUCOSE SERPL-MCNC: 102 MG/DL (ref 70–99)
GLUCOSE UR STRIP-MCNC: NEGATIVE MG/DL
HCO3 VENOUS: 28.8 MMOL/L (ref 22–29)
HCO3 VENOUS: 29.9 MMOL/L (ref 24–30)
HCT VFR BLD AUTO: 40.8 % (ref 40.7–50.3)
HCT VFR BLD AUTO: 41 % (ref 41–53)
HGB BLD-MCNC: 13.2 G/DL (ref 13–17)
HGB UR QL STRIP.AUTO: ABNORMAL
IMM GRANULOCYTES # BLD AUTO: 0.04 K/UL (ref 0–0.3)
IMM GRANULOCYTES NFR BLD: 0 %
KEPPRA: 41 UG/ML
KETONES UR STRIP-MCNC: ABNORMAL MG/DL
LEUKOCYTE ESTERASE UR QL STRIP: ABNORMAL
LYMPHOCYTES NFR BLD: 1.95 K/UL (ref 1.1–3.7)
LYMPHOCYTES RELATIVE PERCENT: 13 % (ref 24–43)
MCH RBC QN AUTO: 30.3 PG (ref 25.2–33.5)
MCHC RBC AUTO-ENTMCNC: 32.4 G/DL (ref 28.4–34.8)
MCV RBC AUTO: 93.6 FL (ref 82.6–102.9)
MONOCYTES NFR BLD: 1.39 K/UL (ref 0.1–1.2)
MONOCYTES NFR BLD: 9 % (ref 3–12)
NEUTROPHILS NFR BLD: 78 % (ref 36–65)
NEUTS SEG NFR BLD: 11.66 K/UL (ref 1.5–8.1)
NITRITE UR QL STRIP: NEGATIVE
NRBC BLD-RTO: 0 PER 100 WBC
O2 SAT, VEN: 86.5 % (ref 60–85)
O2 SAT, VEN: 92.1 % (ref 60–85)
PCO2, VEN: 37.8 MM HG (ref 41–51)
PCO2, VEN: 50.2 MM HG (ref 39–55)
PH UR STRIP: 7 [PH] (ref 5–8)
PH VENOUS: 7.39 (ref 7.32–7.42)
PH VENOUS: 7.49 (ref 7.32–7.43)
PLATELET # BLD AUTO: 267 K/UL (ref 138–453)
PMV BLD AUTO: 10 FL (ref 8.1–13.5)
PO2, VEN: 47.8 MM HG (ref 30–50)
PO2, VEN: 65 MM HG (ref 30–50)
POC ANION GAP: 12 MMOL/L (ref 7–16)
POC CREATININE: 0.4 MG/DL (ref 0.51–1.19)
POC HCO3: 28 MMOL/L (ref 21–28)
POC HEMOGLOBIN (CALC): 13.8 G/DL (ref 13.5–17.5)
POC LACTIC ACID: 1.4 MMOL/L (ref 0.56–1.39)
POC O2 SATURATION: 97.1 % (ref 94–98)
POC PCO2: 39.5 MM HG (ref 35–48)
POC PH: 7.46 (ref 7.35–7.45)
POC PO2: 86.7 MM HG (ref 83–108)
POSITIVE BASE EXCESS, ART: 3.8 MMOL/L (ref 0–3)
POSITIVE BASE EXCESS, VEN: 4.4 MMOL/L (ref 0–2)
POSITIVE BASE EXCESS, VEN: 5.2 MMOL/L (ref 0–3)
POTASSIUM BLD-SCNC: 3.4 MMOL/L (ref 3.5–4.5)
POTASSIUM SERPL-SCNC: 3.6 MMOL/L (ref 3.7–5.3)
PROT UR STRIP-MCNC: ABNORMAL MG/DL
RBC # BLD AUTO: 4.36 M/UL (ref 4.21–5.77)
RBC #/AREA URNS HPF: NORMAL /HPF (ref 0–4)
SAMPLE SITE: ABNORMAL
SODIUM BLD-SCNC: 141 MMOL/L (ref 138–146)
SODIUM SERPL-SCNC: 136 MMOL/L (ref 135–144)
SP GR UR STRIP: 1.03 (ref 1–1.03)
UROBILINOGEN UR STRIP-ACNC: NORMAL EU/DL (ref 0–1)
WBC #/AREA URNS HPF: NORMAL /HPF (ref 0–5)
WBC OTHER # BLD: 15.1 K/UL (ref 3.5–11.3)

## 2023-10-26 PROCEDURE — 6360000002 HC RX W HCPCS: Performed by: STUDENT IN AN ORGANIZED HEALTH CARE EDUCATION/TRAINING PROGRAM

## 2023-10-26 PROCEDURE — 82803 BLOOD GASES ANY COMBINATION: CPT

## 2023-10-26 PROCEDURE — 84520 ASSAY OF UREA NITROGEN: CPT

## 2023-10-26 PROCEDURE — 93005 ELECTROCARDIOGRAM TRACING: CPT | Performed by: EMERGENCY MEDICINE

## 2023-10-26 PROCEDURE — 71045 X-RAY EXAM CHEST 1 VIEW: CPT

## 2023-10-26 PROCEDURE — 95700 EEG CONT REC W/VID EEG TECH: CPT

## 2023-10-26 PROCEDURE — 74018 RADEX ABDOMEN 1 VIEW: CPT

## 2023-10-26 PROCEDURE — 2500000003 HC RX 250 WO HCPCS: Performed by: STUDENT IN AN ORGANIZED HEALTH CARE EDUCATION/TRAINING PROGRAM

## 2023-10-26 PROCEDURE — 2500000003 HC RX 250 WO HCPCS: Performed by: NURSE PRACTITIONER

## 2023-10-26 PROCEDURE — 80051 ELECTROLYTE PANEL: CPT

## 2023-10-26 PROCEDURE — 95720 EEG PHY/QHP EA INCR W/VEEG: CPT | Performed by: PSYCHIATRY & NEUROLOGY

## 2023-10-26 PROCEDURE — 51702 INSERT TEMP BLADDER CATH: CPT

## 2023-10-26 PROCEDURE — 82947 ASSAY GLUCOSE BLOOD QUANT: CPT

## 2023-10-26 PROCEDURE — 94761 N-INVAS EAR/PLS OXIMETRY MLT: CPT

## 2023-10-26 PROCEDURE — 85025 COMPLETE CBC W/AUTO DIFF WBC: CPT

## 2023-10-26 PROCEDURE — 96374 THER/PROPH/DIAG INJ IV PUSH: CPT

## 2023-10-26 PROCEDURE — 82565 ASSAY OF CREATININE: CPT

## 2023-10-26 PROCEDURE — 6360000002 HC RX W HCPCS

## 2023-10-26 PROCEDURE — 95714 VEEG EA 12-26 HR UNMNTR: CPT

## 2023-10-26 PROCEDURE — 93010 ELECTROCARDIOGRAM REPORT: CPT | Performed by: INTERNAL MEDICINE

## 2023-10-26 PROCEDURE — 5A1935Z RESPIRATORY VENTILATION, LESS THAN 24 CONSECUTIVE HOURS: ICD-10-PCS | Performed by: INTERNAL MEDICINE

## 2023-10-26 PROCEDURE — 2700000000 HC OXYGEN THERAPY PER DAY

## 2023-10-26 PROCEDURE — 2580000003 HC RX 258: Performed by: NURSE PRACTITIONER

## 2023-10-26 PROCEDURE — 82550 ASSAY OF CK (CPK): CPT

## 2023-10-26 PROCEDURE — 83605 ASSAY OF LACTIC ACID: CPT

## 2023-10-26 PROCEDURE — 99255 IP/OBS CONSLTJ NEW/EST HI 80: CPT | Performed by: INTERNAL MEDICINE

## 2023-10-26 PROCEDURE — 99291 CRITICAL CARE FIRST HOUR: CPT | Performed by: STUDENT IN AN ORGANIZED HEALTH CARE EDUCATION/TRAINING PROGRAM

## 2023-10-26 PROCEDURE — 85014 HEMATOCRIT: CPT

## 2023-10-26 PROCEDURE — 99285 EMERGENCY DEPT VISIT HI MDM: CPT

## 2023-10-26 PROCEDURE — 2000000000 HC ICU R&B

## 2023-10-26 PROCEDURE — 36415 COLL VENOUS BLD VENIPUNCTURE: CPT

## 2023-10-26 PROCEDURE — 96375 TX/PRO/DX INJ NEW DRUG ADDON: CPT

## 2023-10-26 PROCEDURE — 82330 ASSAY OF CALCIUM: CPT

## 2023-10-26 PROCEDURE — 82805 BLOOD GASES W/O2 SATURATION: CPT

## 2023-10-26 PROCEDURE — 81001 URINALYSIS AUTO W/SCOPE: CPT

## 2023-10-26 PROCEDURE — 6360000002 HC RX W HCPCS: Performed by: NURSE PRACTITIONER

## 2023-10-26 PROCEDURE — 2500000003 HC RX 250 WO HCPCS

## 2023-10-26 PROCEDURE — 94002 VENT MGMT INPAT INIT DAY: CPT

## 2023-10-26 PROCEDURE — 6370000000 HC RX 637 (ALT 250 FOR IP): Performed by: STUDENT IN AN ORGANIZED HEALTH CARE EDUCATION/TRAINING PROGRAM

## 2023-10-26 PROCEDURE — 80048 BASIC METABOLIC PNL TOTAL CA: CPT

## 2023-10-26 PROCEDURE — 36600 WITHDRAWAL OF ARTERIAL BLOOD: CPT

## 2023-10-26 PROCEDURE — 6370000000 HC RX 637 (ALT 250 FOR IP): Performed by: NURSE PRACTITIONER

## 2023-10-26 PROCEDURE — 2580000003 HC RX 258: Performed by: STUDENT IN AN ORGANIZED HEALTH CARE EDUCATION/TRAINING PROGRAM

## 2023-10-26 RX ORDER — MIDAZOLAM HYDROCHLORIDE 2 MG/2ML
4 INJECTION, SOLUTION INTRAMUSCULAR; INTRAVENOUS ONCE
Status: COMPLETED | OUTPATIENT
Start: 2023-10-26 | End: 2023-10-26

## 2023-10-26 RX ORDER — POTASSIUM CHLORIDE 20 MEQ/1
40 TABLET, EXTENDED RELEASE ORAL PRN
Status: DISCONTINUED | OUTPATIENT
Start: 2023-10-26 | End: 2023-11-02 | Stop reason: HOSPADM

## 2023-10-26 RX ORDER — FAMOTIDINE 10 MG/ML
20 INJECTION, SOLUTION INTRAVENOUS 2 TIMES DAILY
Status: DISCONTINUED | OUTPATIENT
Start: 2023-10-26 | End: 2023-10-27

## 2023-10-26 RX ORDER — FENTANYL CITRATE 50 UG/ML
100 INJECTION, SOLUTION INTRAMUSCULAR; INTRAVENOUS ONCE
Status: COMPLETED | OUTPATIENT
Start: 2023-10-26 | End: 2023-10-26

## 2023-10-26 RX ORDER — POTASSIUM CHLORIDE 7.45 MG/ML
10 INJECTION INTRAVENOUS PRN
Status: DISCONTINUED | OUTPATIENT
Start: 2023-10-26 | End: 2023-11-02 | Stop reason: HOSPADM

## 2023-10-26 RX ORDER — KETAMINE HYDROCHLORIDE 100 MG/ML
4 INJECTION INTRAMUSCULAR; INTRAVENOUS ONCE
Status: COMPLETED | OUTPATIENT
Start: 2023-10-26 | End: 2023-10-26

## 2023-10-26 RX ORDER — LORAZEPAM 2 MG/ML
4 INJECTION INTRAMUSCULAR EVERY 5 MIN PRN
Status: DISCONTINUED | OUTPATIENT
Start: 2023-10-26 | End: 2023-11-02 | Stop reason: HOSPADM

## 2023-10-26 RX ORDER — DIPHENHYDRAMINE HYDROCHLORIDE 50 MG/ML
25 INJECTION INTRAMUSCULAR; INTRAVENOUS ONCE
Status: COMPLETED | OUTPATIENT
Start: 2023-10-26 | End: 2023-10-26

## 2023-10-26 RX ORDER — FENTANYL CITRATE 50 UG/ML
INJECTION, SOLUTION INTRAMUSCULAR; INTRAVENOUS
Status: COMPLETED
Start: 2023-10-26 | End: 2023-10-26

## 2023-10-26 RX ORDER — FAMOTIDINE 10 MG/ML
20 INJECTION, SOLUTION INTRAVENOUS 2 TIMES DAILY
Status: DISCONTINUED | OUTPATIENT
Start: 2023-10-26 | End: 2023-10-26

## 2023-10-26 RX ORDER — SODIUM CHLORIDE 9 MG/ML
INJECTION, SOLUTION INTRAVENOUS PRN
Status: DISCONTINUED | OUTPATIENT
Start: 2023-10-26 | End: 2023-11-02 | Stop reason: HOSPADM

## 2023-10-26 RX ORDER — DEXMEDETOMIDINE HYDROCHLORIDE 4 UG/ML
INJECTION, SOLUTION INTRAVENOUS
Status: COMPLETED
Start: 2023-10-26 | End: 2023-10-26

## 2023-10-26 RX ORDER — PROPOFOL 10 MG/ML
5-50 INJECTION, EMULSION INTRAVENOUS CONTINUOUS
Status: DISCONTINUED | OUTPATIENT
Start: 2023-10-26 | End: 2023-10-26

## 2023-10-26 RX ORDER — SODIUM CHLORIDE 0.9 % (FLUSH) 0.9 %
5-40 SYRINGE (ML) INJECTION PRN
Status: DISCONTINUED | OUTPATIENT
Start: 2023-10-26 | End: 2023-11-02 | Stop reason: HOSPADM

## 2023-10-26 RX ORDER — MIDAZOLAM HYDROCHLORIDE 1 MG/ML
1-10 INJECTION, SOLUTION INTRAVENOUS CONTINUOUS
Status: DISCONTINUED | OUTPATIENT
Start: 2023-10-26 | End: 2023-10-26

## 2023-10-26 RX ORDER — ACETAMINOPHEN 650 MG/1
650 SUPPOSITORY RECTAL EVERY 6 HOURS PRN
Status: DISCONTINUED | OUTPATIENT
Start: 2023-10-26 | End: 2023-11-02 | Stop reason: HOSPADM

## 2023-10-26 RX ORDER — KETAMINE HYDROCHLORIDE 100 MG/ML
2 INJECTION INTRAMUSCULAR; INTRAVENOUS ONCE
Status: COMPLETED | OUTPATIENT
Start: 2023-10-26 | End: 2023-10-26

## 2023-10-26 RX ORDER — FENTANYL CITRATE 50 UG/ML
INJECTION, SOLUTION INTRAMUSCULAR; INTRAVENOUS
Status: DISCONTINUED
Start: 2023-10-26 | End: 2023-10-26

## 2023-10-26 RX ORDER — DEXTROSE MONOHYDRATE 100 MG/ML
INJECTION, SOLUTION INTRAVENOUS CONTINUOUS PRN
Status: DISCONTINUED | OUTPATIENT
Start: 2023-10-26 | End: 2023-11-02 | Stop reason: HOSPADM

## 2023-10-26 RX ORDER — QUETIAPINE FUMARATE 25 MG/1
25 TABLET, FILM COATED ORAL NIGHTLY
Status: DISCONTINUED | OUTPATIENT
Start: 2023-10-26 | End: 2023-10-26

## 2023-10-26 RX ORDER — QUETIAPINE FUMARATE 25 MG/1
50 TABLET, FILM COATED ORAL 2 TIMES DAILY
Status: DISCONTINUED | OUTPATIENT
Start: 2023-10-26 | End: 2023-10-27

## 2023-10-26 RX ORDER — LEVETIRACETAM 5 MG/ML
500 INJECTION INTRAVASCULAR EVERY 12 HOURS
Status: DISCONTINUED | OUTPATIENT
Start: 2023-10-26 | End: 2023-10-26

## 2023-10-26 RX ORDER — DEXMEDETOMIDINE HYDROCHLORIDE 4 UG/ML
.1-1.5 INJECTION, SOLUTION INTRAVENOUS CONTINUOUS
Status: DISCONTINUED | OUTPATIENT
Start: 2023-10-26 | End: 2023-10-27

## 2023-10-26 RX ORDER — HALOPERIDOL 5 MG/ML
5 INJECTION INTRAMUSCULAR ONCE
Status: COMPLETED | OUTPATIENT
Start: 2023-10-26 | End: 2023-10-26

## 2023-10-26 RX ORDER — LIDOCAINE 4 G/G
1 PATCH TOPICAL ONCE
Status: DISCONTINUED | OUTPATIENT
Start: 2023-10-27 | End: 2023-10-27

## 2023-10-26 RX ORDER — PROPOFOL 10 MG/ML
INJECTION, EMULSION INTRAVENOUS
Status: DISCONTINUED
Start: 2023-10-26 | End: 2023-10-26

## 2023-10-26 RX ORDER — POTASSIUM CHLORIDE 7.45 MG/ML
10 INJECTION INTRAVENOUS PRN
Status: DISCONTINUED | OUTPATIENT
Start: 2023-10-26 | End: 2023-11-01 | Stop reason: SDUPTHER

## 2023-10-26 RX ORDER — MIDAZOLAM HYDROCHLORIDE 2 MG/2ML
1 INJECTION, SOLUTION INTRAMUSCULAR; INTRAVENOUS EVERY 30 MIN PRN
Status: DISCONTINUED | OUTPATIENT
Start: 2023-10-26 | End: 2023-10-26

## 2023-10-26 RX ORDER — POTASSIUM CHLORIDE 7.45 MG/ML
10 INJECTION INTRAVENOUS ONCE
Status: DISCONTINUED | OUTPATIENT
Start: 2023-10-26 | End: 2023-10-26

## 2023-10-26 RX ORDER — FENTANYL CITRATE 50 UG/ML
25 INJECTION, SOLUTION INTRAMUSCULAR; INTRAVENOUS ONCE
Status: COMPLETED | OUTPATIENT
Start: 2023-10-26 | End: 2023-10-26

## 2023-10-26 RX ORDER — PHENYTOIN SODIUM 50 MG/ML
100 INJECTION, SOLUTION INTRAMUSCULAR; INTRAVENOUS EVERY 8 HOURS
Status: DISCONTINUED | OUTPATIENT
Start: 2023-10-26 | End: 2023-10-27

## 2023-10-26 RX ORDER — SODIUM CHLORIDE 9 MG/ML
INJECTION, SOLUTION INTRAVENOUS CONTINUOUS
Status: DISCONTINUED | OUTPATIENT
Start: 2023-10-26 | End: 2023-10-29

## 2023-10-26 RX ORDER — OLANZAPINE 5 MG/1
5 TABLET ORAL NIGHTLY
Status: DISCONTINUED | OUTPATIENT
Start: 2023-10-26 | End: 2023-10-26

## 2023-10-26 RX ORDER — MAGNESIUM SULFATE IN WATER 40 MG/ML
2000 INJECTION, SOLUTION INTRAVENOUS ONCE
Status: COMPLETED | OUTPATIENT
Start: 2023-10-26 | End: 2023-10-26

## 2023-10-26 RX ORDER — SODIUM CHLORIDE 0.9 % (FLUSH) 0.9 %
5-40 SYRINGE (ML) INJECTION EVERY 12 HOURS SCHEDULED
Status: DISCONTINUED | OUTPATIENT
Start: 2023-10-26 | End: 2023-11-02 | Stop reason: HOSPADM

## 2023-10-26 RX ORDER — POTASSIUM CHLORIDE 7.45 MG/ML
10 INJECTION INTRAVENOUS
Status: ACTIVE | OUTPATIENT
Start: 2023-10-26 | End: 2023-10-26

## 2023-10-26 RX ORDER — POLYETHYLENE GLYCOL 3350 17 G/17G
17 POWDER, FOR SOLUTION ORAL DAILY PRN
Status: DISCONTINUED | OUTPATIENT
Start: 2023-10-26 | End: 2023-11-02 | Stop reason: HOSPADM

## 2023-10-26 RX ORDER — DEXAMETHASONE SODIUM PHOSPHATE 4 MG/ML
4 INJECTION, SOLUTION INTRA-ARTICULAR; INTRALESIONAL; INTRAMUSCULAR; INTRAVENOUS; SOFT TISSUE EVERY 6 HOURS
Status: DISCONTINUED | OUTPATIENT
Start: 2023-10-26 | End: 2023-11-02

## 2023-10-26 RX ORDER — CHLORHEXIDINE GLUCONATE ORAL RINSE 1.2 MG/ML
15 SOLUTION DENTAL 2 TIMES DAILY
Status: DISCONTINUED | OUTPATIENT
Start: 2023-10-26 | End: 2023-10-26

## 2023-10-26 RX ORDER — ACETAMINOPHEN 325 MG/1
650 TABLET ORAL EVERY 6 HOURS PRN
Status: DISCONTINUED | OUTPATIENT
Start: 2023-10-26 | End: 2023-11-02 | Stop reason: HOSPADM

## 2023-10-26 RX ORDER — ONDANSETRON 4 MG/1
4 TABLET, ORALLY DISINTEGRATING ORAL EVERY 8 HOURS PRN
Status: DISCONTINUED | OUTPATIENT
Start: 2023-10-26 | End: 2023-11-02 | Stop reason: HOSPADM

## 2023-10-26 RX ORDER — ONDANSETRON 2 MG/ML
4 INJECTION INTRAMUSCULAR; INTRAVENOUS EVERY 6 HOURS PRN
Status: DISCONTINUED | OUTPATIENT
Start: 2023-10-26 | End: 2023-11-02 | Stop reason: HOSPADM

## 2023-10-26 RX ORDER — DEXAMETHASONE SODIUM PHOSPHATE 4 MG/ML
4 INJECTION, SOLUTION INTRA-ARTICULAR; INTRALESIONAL; INTRAMUSCULAR; INTRAVENOUS; SOFT TISSUE EVERY 6 HOURS
Status: DISCONTINUED | OUTPATIENT
Start: 2023-10-26 | End: 2023-10-26

## 2023-10-26 RX ORDER — ENOXAPARIN SODIUM 100 MG/ML
40 INJECTION SUBCUTANEOUS DAILY
Status: DISCONTINUED | OUTPATIENT
Start: 2023-10-26 | End: 2023-11-02 | Stop reason: HOSPADM

## 2023-10-26 RX ADMIN — ACETAMINOPHEN 650 MG: 325 TABLET ORAL at 21:05

## 2023-10-26 RX ADMIN — DIPHENHYDRAMINE HYDROCHLORIDE 25 MG: 50 INJECTION, SOLUTION INTRAMUSCULAR; INTRAVENOUS at 13:54

## 2023-10-26 RX ADMIN — Medication 3 MG/HR: at 02:18

## 2023-10-26 RX ADMIN — FENTANYL CITRATE 25 MCG: 50 INJECTION, SOLUTION INTRAMUSCULAR; INTRAVENOUS at 14:42

## 2023-10-26 RX ADMIN — DEXAMETHASONE SODIUM PHOSPHATE 4 MG: 4 INJECTION, SOLUTION INTRA-ARTICULAR; INTRALESIONAL; INTRAMUSCULAR; INTRAVENOUS; SOFT TISSUE at 21:49

## 2023-10-26 RX ADMIN — FAMOTIDINE 20 MG: 10 INJECTION, SOLUTION INTRAVENOUS at 09:47

## 2023-10-26 RX ADMIN — MIDAZOLAM HYDROCHLORIDE 4 MG: 1 INJECTION, SOLUTION INTRAMUSCULAR; INTRAVENOUS at 18:02

## 2023-10-26 RX ADMIN — DEXAMETHASONE SODIUM PHOSPHATE 4 MG: 4 INJECTION, SOLUTION INTRA-ARTICULAR; INTRALESIONAL; INTRAMUSCULAR; INTRAVENOUS; SOFT TISSUE at 18:03

## 2023-10-26 RX ADMIN — MAGNESIUM SULFATE HEPTAHYDRATE 2000 MG: 40 INJECTION, SOLUTION INTRAVENOUS at 05:59

## 2023-10-26 RX ADMIN — POTASSIUM CHLORIDE 10 MEQ: 7.46 INJECTION, SOLUTION INTRAVENOUS at 09:42

## 2023-10-26 RX ADMIN — PROPOFOL 10 MCG/KG/MIN: 10 INJECTION, EMULSION INTRAVENOUS at 02:33

## 2023-10-26 RX ADMIN — ENOXAPARIN SODIUM 40 MG: 100 INJECTION SUBCUTANEOUS at 09:30

## 2023-10-26 RX ADMIN — WATER 5 MG: 1 INJECTION INTRAMUSCULAR; INTRAVENOUS; SUBCUTANEOUS at 16:56

## 2023-10-26 RX ADMIN — PHENYTOIN SODIUM 100 MG: 50 INJECTION INTRAMUSCULAR; INTRAVENOUS at 18:03

## 2023-10-26 RX ADMIN — SODIUM CHLORIDE, PRESERVATIVE FREE 10 ML: 5 INJECTION INTRAVENOUS at 21:05

## 2023-10-26 RX ADMIN — QUETIAPINE FUMARATE 50 MG: 25 TABLET ORAL at 21:05

## 2023-10-26 RX ADMIN — PHENYTOIN SODIUM 100 MG: 50 INJECTION INTRAMUSCULAR; INTRAVENOUS at 11:46

## 2023-10-26 RX ADMIN — KETAMINE HYDROCHLORIDE 120 MG: 100 INJECTION, SOLUTION, CONCENTRATE INTRAMUSCULAR; INTRAVENOUS at 17:09

## 2023-10-26 RX ADMIN — DEXMEDETOMIDINE HYDROCHLORIDE 0.2 MCG/KG/HR: 400 INJECTION, SOLUTION INTRAVENOUS at 10:08

## 2023-10-26 RX ADMIN — HALOPERIDOL LACTATE 5 MG: 5 INJECTION, SOLUTION INTRAMUSCULAR at 10:28

## 2023-10-26 RX ADMIN — FENTANYL CITRATE 100 MCG: 50 INJECTION, SOLUTION INTRAMUSCULAR; INTRAVENOUS at 02:20

## 2023-10-26 RX ADMIN — THIAMINE HYDROCHLORIDE: 100 INJECTION, SOLUTION INTRAMUSCULAR; INTRAVENOUS at 15:54

## 2023-10-26 RX ADMIN — SODIUM CHLORIDE, PRESERVATIVE FREE 10 ML: 5 INJECTION INTRAVENOUS at 09:39

## 2023-10-26 RX ADMIN — ZIPRASIDONE MESYLATE 20 MG: 20 INJECTION, POWDER, LYOPHILIZED, FOR SOLUTION INTRAMUSCULAR at 12:10

## 2023-10-26 RX ADMIN — LEVETIRACETAM 500 MG: 5 INJECTION, SOLUTION INTRAVENOUS at 03:05

## 2023-10-26 RX ADMIN — SODIUM CHLORIDE: 9 INJECTION, SOLUTION INTRAVENOUS at 09:39

## 2023-10-26 RX ADMIN — Medication 50 MCG/HR: at 02:41

## 2023-10-26 RX ADMIN — DEXMEDETOMIDINE HYDROCHLORIDE 0.2 MCG/KG/HR: 4 INJECTION, SOLUTION INTRAVENOUS at 10:08

## 2023-10-26 RX ADMIN — KETAMINE HYDROCHLORIDE 240 MG: 100 INJECTION, SOLUTION, CONCENTRATE INTRAMUSCULAR; INTRAVENOUS at 15:11

## 2023-10-26 RX ADMIN — DEXAMETHASONE SODIUM PHOSPHATE 4 MG: 4 INJECTION, SOLUTION INTRA-ARTICULAR; INTRALESIONAL; INTRAMUSCULAR; INTRAVENOUS; SOFT TISSUE at 11:46

## 2023-10-26 RX ADMIN — FAMOTIDINE 20 MG: 10 INJECTION, SOLUTION INTRAVENOUS at 21:06

## 2023-10-26 ASSESSMENT — PULMONARY FUNCTION TESTS
PIF_VALUE: 22
PIF_VALUE: 22
PIF_VALUE: 17
PIF_VALUE: 22
PIF_VALUE: 24
PIF_VALUE: 25
PIF_VALUE: 24
PIF_VALUE: 22
PIF_VALUE: 7
PIF_VALUE: 21

## 2023-10-26 ASSESSMENT — PAIN SCALES - GENERAL: PAINLEVEL_OUTOF10: 2

## 2023-10-26 NOTE — PROGRESS NOTES
Occupational [address]  Occupational Therapy Not Seen Note    DATE: 10/26/2023    NAME: Kinjal Marquez. MRN: [id number]   : 1970      Patient not seen this date for Occupational Therapy due to:    Strict Bedrest: and vented per chart    Next Scheduled Treatment: Attempt on 10/27 as appropriate.     Electronically signed by Favio Musa OT on 10/26/2023 at 7:34 AM

## 2023-10-26 NOTE — PROGRESS NOTES
SLP ALL NOTES  [address]  Speech Language Pathology    Date: 10/26/2023  Patient Name: Buddy Gomez. YOB: 1970   AGE: 46 y.o. MRN: [id number]        Patient Not Available for Speech Therapy     Due to:  [] Testing  [] Hemodialysis  [] Cancelled by RN  [] Surgery   [x] Intubation/Sedation/Pain Medication  [] Medical instability  [] Other:    Next scheduled treatment: as medically appropriate  Completed by:  Luis Eduardo Rosario, SLP, M.SZi Deras

## 2023-10-26 NOTE — ED NOTES
Pt taken to CT scan at this time via this RN, Abel RN and RT.       Nicholas Scott, RN  10/25/23 4892

## 2023-10-26 NOTE — PROGRESS NOTES
Patient arrived as a transfer from [address] [place] [address] for seizure activity. Patient arrived intubated with a 7.5 ETT at 23 at the [facility]. Patient placed on Servo on charted settings. Patient attached to cardiac monitor and end tidal CO2 box. Patient is currently resting on cot, eyes closed with no distress noted. Will continue to monitor.

## 2023-10-26 NOTE — CONSULTS
_                         Today's Date: 10/26/2023  Patient Name: Johnny Brown. Date of admission: 10/26/2023  2:08 AM  Patient's age: 46 y.o., 1970  Admission Dx: Status epilepticus ([address]) [G40.901]  Seizure ([address]) [R56.9]      Requesting Physician: Srini Alvarado MD    CHIEF COMPLAINT: Status epilepticus. .  Consult for brain metastasis and lung mass. History Obtained From:  electronic medical record    HISTORY OF PRESENT ILLNESS:      The patient is a 46 y.o.  male who is admitted to the hospital for further movement of status epilepticus. Patient presented to outside facility with seizure activities. It was difficult to control. He was transferred for further care. Further management and work-up reviewed multiple small brain lesions consistent with metastasis. MRI was ordered and it is pending. CT scan of the chest showed suspicious spiculated right upper lobe mass measuring 4.3 cm suggestive of malignancy. Positive evaluating the right hilar and subcarinal stations. No further history is obtainable from the patient. Past Medical History:   has a past medical history of Arthritis, Asthma, COPD (chronic obstructive pulmonary disease) ([address]), GERD (gastroesophageal reflux disease), Hypertension, Psychiatric problem, Schizophrenia ([address]), Severe malnutrition ([address]), and Unspecified cerebral artery occlusion with cerebral infarction. Past Surgical History:   has no past surgical history on file. Family History: family history includes Cancer in his father and mother; Other in his mother, sister, and sister. Social History:   reports that he has been smoking cigarettes. He has a 30.00 pack-year smoking history. He has never used smokeless tobacco. He reports that he does not currently use alcohol. He reports that he does not currently use drugs after having used the following drugs: Marijuana (Anuradha Mews), Opiates , and Cocaine.

## 2023-10-26 NOTE — CONSULTS
([address] [facility]) 1/14/2021    Unspecified cerebral artery occlusion with cerebral infarction      Past Surgical History:    No past surgical history on file. Social History:   Social History     Socioeconomic History    Marital status: Single     Spouse name: Not on file    Number of children: 2    Years of education: 9    Highest education level: Not on file   Occupational History    Not on file   Tobacco Use    Smoking status: Every Day     Packs/day: 1.00     Years: 30.00     Additional pack years: 0.00     Total pack years: 30.00     Types: Cigarettes    Smokeless tobacco: Never   Vaping Use    Vaping Use: Never used   Substance and Sexual Activity    Alcohol use: Not Currently     Comment: x3 24 oz beer    Drug use: Not Currently     Types: Marijuana (Richardson Rowland), Opiates , Cocaine     Comment: + Fentanyl 3/2023, + Cocaine 2021    Sexual activity: Yes     Partners: Female   Other Topics Concern    Not on file   Social History Narrative    Not on file     Social Determinants of Health     Financial Resource Strain: Not on file   Food Insecurity: Not on file   Transportation Needs: Not on file   Physical Activity: Not on file   Stress: Not on file   Social Connections: Not on file   Intimate Partner Violence: Not on file   Housing Stability: Not on file       Family History:       Problem Relation Age of Onset    Cancer Mother         lung    Other Mother     Cancer Father         copd/lung    Other Sister         pancreatitis    Other Sister         pancreatitis       Allergies:  Prochlorperazine, Toradol [ketorolac tromethamine], Ketorolac, and Compazine [prochlorperazine maleate]    Home Medications:  Prior to Admission medications    Medication Sig Start Date End Date Taking?  Authorizing Provider   sertraline (ZOLOFT) 50 MG tablet Take 1 tablet by mouth daily  Patient not taking: Reported on 3/27/2023 1/26/21   Eileen Olvera MD   traZODone (DESYREL) 50 MG tablet Take 1 tablet by mouth nightly as needed for disease) ([address])    Abdominal pain, epigastric    Chronic obstructive pulmonary disease with acute exacerbation (HCC)    Gastritis    Duodenitis    Paranoid schizophrenia ([address])    Major depressive disorder, recurrent severe without psychotic features ([address])    Severe malnutrition ([address])    Severe episode of recurrent major depressive disorder, with psychotic features ([address])    Alcohol use disorder, severe, dependence ([address])    Right arm cellulitis    Laceration of right thumb without foreign body without damage to nail    Seizure ([address])    Altered mental status    Status epilepticus ([address])       A/P:  This is a 46 y.o. male with multiple brain mets, spiculated right upper pulmonary nodule consistent with malignant disease with bilateral pulmonary mets and lymph mets. Seizures and agitation may be secondary to brain mets vs ETOH withdrawal vs underlying paranoid schizophrenia. Patient care will be discussed with attending, will reevaluate patient along with attending     - No neurosurgical interventions planned for now   - Please obtain MRI brain with and without contrast   - Consider treatment for EOTH withdrawal   - Neuro checks per protocol    Additional recommendations may follow    Please contact neurosurgery with any changes in patients neurologic status. Thank you for your consult.        Vipul Recinos MD    10/26/2023  10:14 AM

## 2023-10-26 NOTE — ED NOTES
Verbal order received from Dr. Eric Thrasher for NS @ 100 ml/hr  NS started at 100 ml/hr     Noel Vidal, RN  10/25/23 [address] [facility], [address] [facility], RN  10/1970

## 2023-10-26 NOTE — ED NOTES
?SZ X2 TODAY, TUBED, KEPPRA LOADED, HASMUKH POS FOR FENTANYL, ABNL CT W/ POSS METS. MIDAZOLAM INFUSION.      Veronique Skelton, MYRA  10/26/23 8811

## 2023-10-26 NOTE — PLAN OF CARE
Problem: Respiratory - Adult  Goal: Achieves optimal ventilation and oxygenation  10/26/2023 0759 by Sary Nails RCP  Outcome: Progressing

## 2023-10-26 NOTE — CARE COORDINATION
Pt currently confused, Unable to complete initial case management assessment with pt. Attempted to call patients emergency contacts brother Luís Skaggs and sister Tenzin Heart, received no answer. Will follow and complete assessment when pt is extubated or able to speak with family.

## 2023-10-26 NOTE — ED NOTES
Pt family in [facility] stating \"I think he's having another seizure. \" This RN into room to find pt sitting up, straight on cot. Pt is not responding to verbal stimuli and appears to be staring off. After approx 10 seconds, pt arms relax and pt lays back, followed immediately by seizure like activity. Pt has full body shaking, unresponsive to any stimuli. Gave reassurance to pt and cradled head while Dr Hosea Lorenzo was called to bedside. Dr Hsoea Lorenzo at bedside and verbal orders received for Ativan at this time. Medication given and pt continues to seize. Monitor remains in place, VS as noted. Verbal order to move pt to room 3 for possible intubation.      Stormy Sharp RN  10/25/23 9804

## 2023-10-26 NOTE — ED NOTES
100mg ketamine given at this time per verbal from Dr Hosea Lorenzo.       Stormy Sharp, RN  10/25/23 2024

## 2023-10-26 NOTE — ED NOTES
Ativan 2mg given at this time per Dr Amos Primer verbal orders.      Carolann Bravo RN  10/25/23 2020

## 2023-10-26 NOTE — PROGRESS NOTES
A size 7.5 endotracheal tube was successfully placed using a size 3 blade. The Lot number for he endotracheal tube was [id number].

## 2023-10-26 NOTE — PLAN OF CARE
Problem: Safety - Medical Restraint  Goal: Remains free of injury from restraints (Restraint for Interference with Medical Device)  Description: INTERVENTIONS:  1. Determine that other, less restrictive measures have been tried or would not be effective before applying the restraint  2. Evaluate the patient's condition at the time of restraint application  3. Inform patient/family regarding the reason for restraint  4.  Q2H: Monitor safety, psychosocial status, comfort, nutrition and hydration  10/26/2023 0731 by Fabian Berumen RN  Outcome: Progressing  10/26/2023 0647 by Samuel Rg RN  Outcome: Progressing  Flowsheets  Taken 10/26/2023 0600 by Samuel Rg RN  Remains free of injury from restraints (restraint for interference with medical device):   Determine that other, less restrictive measures have been tried or would not be effective before applying the restraint   Evaluate the patient's condition at the time of restraint application   Inform patient/family regarding the reason for restraint   Every 2 hours: Monitor safety, psychosocial status, comfort, nutrition and hydration  Taken 10/26/2023 0400 by Samuel Rg RN  Remains free of injury from restraints (restraint for interference with medical device):   Determine that other, less restrictive measures have been tried or would not be effective before applying the restraint   Evaluate the patient's condition at the time of restraint application   Inform patient/family regarding the reason for restraint   Every 2 hours: Monitor safety, psychosocial status, comfort, nutrition and hydration  Taken 10/26/2023 0215 by Salina Sorensen RN  Remains free of injury from restraints (restraint for interference with medical device):   Determine that other, less restrictive measures have been tried or would not be effective before applying the restraint   Evaluate the patient's condition at the time of restraint application   Inform patient/family regarding the Karina Means  Outcome: Progressing  10/26/2023 0647 by Michelle Ramos RN  Outcome: Progressing  Goal: Urinary catheter remains patent  10/26/2023 0731 by Leonel Alarcon RN  Outcome: Progressing  10/26/2023 0647 by Michelle Ramos RN  Outcome: Progressing     Problem: Infection - Adult  Goal: Absence of infection at discharge  10/26/2023 0731 by Leonel Alarcon RN  Outcome: Progressing  10/26/2023 0647 by Michelle Ramos RN  Outcome: Progressing  Goal: Absence of infection during hospitalization  10/26/2023 0731 by Leonel Alarcon RN  Outcome: Progressing  10/26/2023 0647 by Michelle Ramos RN  Outcome: Progressing  Goal: Absence of fever/infection during anticipated neutropenic period  10/26/2023 0731 by Leonel Alarcon RN  Outcome: Progressing  10/26/2023 0647 by Michelle Ramos RN  Outcome: Progressing     Problem: Metabolic/Fluid and Electrolytes - Adult  Goal: Electrolytes maintained within normal limits  10/26/2023 0731 by Leonel Alarcon RN  Outcome: Progressing  10/26/2023 0647 by Michelle Ramos RN  Outcome: Progressing  Goal: Hemodynamic stability and optimal renal function maintained  10/26/2023 0731 by Leonel Alarcon RN  Outcome: Progressing  10/26/2023 0647 by Michelle Ramos RN  Outcome: Progressing     Problem: Hematologic - Adult  Goal: Maintains hematologic stability  10/26/2023 0731 by Leonel Alarcon RN  Outcome: Progressing  10/26/2023 0647 by Michelle Ramos RN  Outcome: Progressing

## 2023-10-26 NOTE — ED NOTES
100 mcg fentanyl IVP given by [facility] MYRA via verbal order by Dr. Shona Richardson.      Emilia Torrse [facility]  10/26/23 0222

## 2023-10-26 NOTE — ED NOTES
Report called to 1B, all questions answered      Salina Sorensen, [address]  10/26/23 5154

## 2023-10-26 NOTE — ED NOTES
Pt continues to be postictal at this time. Pt breathing more easily at this time, pt responsive to painful stimuli at this time. Resps easy and unlabored at this time. 2nd IV initiated, flushed and shows no s/s of infection or infiltration. Monitor in place, VS as noted. Monitor.      Adelina Pena RN  10/25/23 2022

## 2023-10-26 NOTE — ED PROVIDER NOTES
[address] ICU  Emergency Department Encounter  Emergency Medicine Resident     Pt Dorothy Lawrence. MRN: [id number]  Birthdate 1970  Date of evaluation: 10/26/23  PCP:  RINA Brown CNP  Note Started: 2:53 AM EDT      CHIEF COMPLAINT       Chief Complaint   Patient presents with    Seizures       HISTORY OF PRESENT ILLNESS  (Location/Symptom, Timing/Onset, Context/Setting, Quality, Duration, Modifying Factors, Severity.)      Lanre Myers is a 46 y.o. male who presents as a transfer from [facility] ED due to seizures. Per EMS report and report from physician at [facility] patient had a fall at home and after the fall had a seizure and was transported to the ED where he again seized which continued despite Ativan and patient did not return to baseline and required intubation. Patient received CT scan of the head and chest showing a lung mass and brain metastases likely. Patient has been sedated using Versed and fentanyl and has also received 1500 of Keppra. Upon transfer by EMS he has been having some episodes of agitation and sitting up requiring boluses of Versed. PAST MEDICAL / SURGICAL / SOCIAL / FAMILY HISTORY      has a past medical history of Arthritis, Asthma, COPD (chronic obstructive pulmonary disease) ([address]), GERD (gastroesophageal reflux disease), Hypertension, Psychiatric problem, Schizophrenia ([address]), Severe malnutrition ([address]), and Unspecified cerebral artery occlusion with cerebral infarction. has no past surgical history on file.     Social History     Socioeconomic History    Marital status: Single     Spouse name: Not on file    Number of children: 2    Years of education: 9    Highest education level: Not on file   Occupational History    Not on file   Tobacco Use    Smoking status: Every Day     Packs/day: 1.00     Years: 30.00     Additional pack years: 0.00     Total pack years: 30.00     Types: Cigarettes    Smokeless tobacco: Never   Vaping Use fentanyl drip. We will plan to admit to neuro critical care for continued LTM E and continued monitoring. Upon my examination vitals are within normal limits and he is saturating well on vent at 40%. Point-of-care VBG showing no significant derangements. Patient is making appropriate urine with indwelling Diop catheter placed at [facility]. Chest x-ray at bedside showing good placement of the endotracheal tube without any signs of displacement from previous image done at [facility]. Amount and/or Complexity of Data Reviewed  Labs: ordered. Radiology: ordered. Risk  Prescription drug management. Decision regarding hospitalization. EKG    All EKG's are interpreted by the Emergency Department Physician who either signs or Co-signs this chart in the absence of a cardiologist.    EMERGENCY DEPARTMENT COURSE:         PROCEDURES:  none    CONSULTS:  IP CONSULT TO NEUROCRITICAL CARE  IP CONSULT TO DIETITIAN    CRITICAL CARE:  There was significant risk of life threatening deterioration of patient's condition requiring my direct management. Critical care time 0 minutes, excluding any documented procedures. FINAL IMPRESSION      1. Status epilepticus ([address])          DISPOSITION / PLAN     DISPOSITION Admitted 10/26/2023 03:10:32 AM      PATIENT REFERRED TO:  No follow-up provider specified.     DISCHARGE MEDICATIONS:  Current Discharge Medication List          Romel Lockhart MD  Emergency Medicine Resident    (Please note that portions of thisnote were completed with a voice recognition program.  Efforts were made to edit the dictations but occasionally words are mis-transcribed.)       Lorena Thompson MD  Resident  10/26/23 4253

## 2023-10-26 NOTE — ED NOTES
ED nurse-to-nurse bedside report    Chief Complaint   Patient presents with    Fall    Seizures      LOC:  Intubated  Vital signs   Vitals:    10/25/23 2007 10/25/23 2028 10/25/23 2042 10/25/23 2109   BP:    (!) 147/103   Pulse: 61 (!) 131 (!) 105 85   Resp: 15 18 20 20   Temp:       TempSrc:       SpO2: (S) (!) 68% 94% 99% 100%   Weight:          Pain:    Pain Interventions: NA  Pain Goal: NA  Oxygen: Yes    Current needs required Ventilation   Telemetry: Yes  LDAs:   Peripheral IV 10/25/23 Posterior;Right Forearm (Active)   Site Assessment Clean, dry & intact 10/25/23 1933   Line Status Blood return noted;Normal saline locked;Specimen collected; Flushed 10/25/23 1933   Phlebitis Assessment No symptoms 10/25/23 1933   Infiltration Assessment 0 10/25/23 1933   Alcohol Cap Used No 10/25/23 1933   Dressing Status Clean, dry & intact 10/25/23 1933   Dressing Type Transparent 10/25/23 1933   Dressing Intervention New 10/25/23 1933       Peripheral IV 75/64/98 Left Cephalic (Active)   Site Assessment Clean, dry & intact 10/25/23 2018   Line Status Blood return noted;Normal saline locked; Flushed 10/25/23 2018   Phlebitis Assessment No symptoms 10/25/23 2018   Infiltration Assessment 0 10/25/23 2018   Dressing Status Clean, dry & intact 10/25/23 2018   Dressing Type Transparent 10/25/23 2018   Dressing Intervention New 10/25/23 2018     Continuous Infusions:    midazolam 2 mg/hr (10/25/23 2035)     Mobility: Fully dependent  Abdalla Fall Risk Score:        No data to display              Fall Interventions: Side rails x2, seizure pads  Report given to: Hubert Rey RN  10/25/23 2116

## 2023-10-26 NOTE — PROGRESS NOTES
Physical Therapy        Physical Therapy Cancel Note      DATE: 10/26/2023    NAME: Kim Whatley. MRN: [id number]   : 1970      Patient not seen this date for Physical Therapy due to:    Strict Bedrest:  Will pursue for mobility readiness.        Electronically signed by Kamini Borges PT on 10/26/2023 at 12:16 PM

## 2023-10-26 NOTE — ED NOTES
Pt transferred to ED from [address] [place] [address]. Pt was noted to have an unwitnessed fall with a seizure lasting 2-3 min. Pt was then taken to [facility] where he had a 1 minute witnessed seizure. Pt was given 6mg of ativan at that time and then intubated. Pt had CT at [address] [place] [address] showing mass head and lung. Pt UDS showed + fentanyl , benzos, and marijuana. Pt is known to have seizures with alcohol consumption, but pt was noted to be sober for 1-2 years. Upon arrival pt is in soft wrist restraints, has 2 20g Ivs, ETT tube at 23 at the gums. Pt OG 55 at lip. Pt has villaseñor placed. Versed infusing at 3mg/hr. Pt received 100mcg during transport given to 2 separate doses of 50mcg. Pt received  Pt moving legs and head during transfer from cots. Seizure precautions in place. Pt placed on continuous cardiac monitor, bp and pulse ox. EKG completed, IV established, blood work drawn. Pt resting on cot, eyes closed. No distress noted.  Will continue with plan of care     Rosalia Downing  10/26/23 4129

## 2023-10-26 NOTE — PROCEDURES
LONG-TERM EEG-VIDEO MONITORING   CLINICAL NEUROPHYSIOLOGY LABORATORY  DEPARTMENT OF NEUROLOGY  [facility]    Patient: Krystian French. Age: 46 y.o. MRN: [id number]    Referring Physician: No ref. provider found  History: The patient is a 46 y.o. male who presented breakthrough seizure/encephalopathy. This long-term video-EEG monitoring study was performed to determine the nature of the patient's clinical events. The patient is on neuroactive medications. Krystian French.    Current Facility-Administered Medications   Medication Dose Route Frequency Provider Last Rate Last Admin    fentaNYL 50 mcg/mL 50 mL infusion   mcg/hr IntraVENous Continuous Cedric Prabhakar MD 1 mL/hr at 10/26/23 0559 50 mcg/hr at 10/26/23 0559    midazolam (VERSED) 100mg/100mL in NS infusion  1-10 mg/hr IntraVENous Continuous Cedric Prabhakar MD 2 mL/hr at 10/26/23 0415 2 mg/hr at 10/26/23 0415    sodium chloride flush 0.9 % injection 5-40 mL  5-40 mL IntraVENous 2 times per day Mary Pennington DO   10 mL at 10/26/23 4943    sodium chloride flush 0.9 % injection 5-40 mL  5-40 mL IntraVENous PRN Josias Zarate DO        0.9 % sodium chloride infusion   IntraVENous PRN Vicki Zarate DO        LORazepam (ATIVAN) injection 4 mg  4 mg IntraVENous Q5 Min PRN Josias Zarate DO        enoxaparin (LOVENOX) injection 40 mg  40 mg SubCUTAneous Daily Josias Zarate DO   40 mg at 10/26/23 0930    ondansetron (ZOFRAN-ODT) disintegrating tablet 4 mg  4 mg Oral Q8H PRN Josias Zarate DO        Or    ondansetron (ZOFRAN) injection 4 mg  4 mg IntraVENous Q6H PRN Josias Zarate DO        polyethylene glycol (GLYCOLAX) packet 17 g  17 g Oral Daily PRN Josias Zarate DO        acetaminophen (TYLENOL) tablet 650 mg  650 mg Oral Q6H PRN Josias Zarate DO        Or    acetaminophen (TYLENOL) suppository 650 mg  650 mg Rectal Q6H PRN Josias Zarate DO        potassium chloride 10 mEq/100 mL IVPB (Peripheral Line)  10 mEq IntraVENous PRN Tessa, [place] Last, DO        propofol infusion  5-50 mcg/kg/min IntraVENous Continuous [place] Fought, DO 11 mL/hr at 10/26/23 0559 30 mcg/kg/min at 10/26/23 0559    chlorhexidine (PERIDEX) 0.12 % solution 15 mL  15 mL Mouth/Throat BID Tessa, Josias, DO        famotidine (PEPCID) injection 20 mg  20 mg IntraVENous BID Tessa, Josias, DO        folic acid 1 mg, thiamine (B-1) 100 mg in sodium chloride 0.9 % 50 mL IVPB   IntraVENous Daily Tessa, Josias, DO        glucose chewable tablet 16 g  4 tablet Oral PRN Tessa, Josias, DO        dextrose bolus 10% 125 mL  125 mL IntraVENous PRN Tessa, Josias, DO        Or    dextrose bolus 10% 250 mL  250 mL IntraVENous PRN Tessa, Josias, DO        glucagon (rDNA) injection 1 mg  1 mg SubCUTAneous PRN Tessa, Josias, DO        dextrose 10 % infusion   IntraVENous Continuous PRN Tessa, [place] Last, DO        potassium chloride (KLOR-CON M) extended release tablet 40 mEq  40 mEq Oral PRN Shayla Wiggins MD        Or    potassium bicarb-citric acid (EFFER-K) effervescent tablet 40 mEq  40 mEq Oral PRN Shayla Wiggins MD        Or    potassium chloride 10 mEq/100 mL IVPB (Peripheral Line)  10 mEq IntraVENous PRN Danita López MD        0.9 % sodium chloride infusion   IntraVENous Continuous RINA Contreras CNP 75 mL/hr at 10/26/23 0939 New Bag at 10/26/23 0939    potassium chloride 10 mEq/100 mL IVPB (Peripheral Line)  10 mEq IntraVENous Q1H RINA Contreras - CNP        phenytoin (DILANTIN) injection 100 mg  100 mg IntraVENous Q8H Jennifer VALDEZ APRN - BHAVANI        dexamethasone (DECADRON) injection 4 mg  4 mg IntraMUSCular Q6H Shayla Wiggins MD         Technical Description: This is a 21-channel digital EEG recording with time-locked video. Electrodes were placed in accordance with the 10-20 International System of Electrode Placement. Single lead EKG monitoring was included.     Baseline EEG Recording:  A formal baseline EEG

## 2023-10-26 NOTE — PLAN OF CARE
Problem: Respiratory - Adult  Goal: Achieves optimal ventilation and oxygenation  Outcome: Progressing     MECHANICAL VENTILATION     [x]  PROVIDE OPTIMAL VENTILATION  [x]   ASSESS FOR EXTUBATION READINESS  [x]   ASSESS FOR WEANING READINESS  [x]  EXTUBATE AS TOLERATED  [x]  IMPLEMENT ADULT MECHANICAL VENTILATION PROTOCOL  [x]  MAINTAIN ADEQUATE OXYGENATION  [x]  PERFORM SPONTANEOUS WEANING TRIAL AS TOLERATED     PROVIDE ADEQUATE OXYGENATION WITH ACCEPTABLE SP02/ABG'S    [x]  IDENTIFY APPROPRIATE OXYGEN THERAPY  [x]   MONITOR SP02/ABG'S AS NEEDED   [x]   PATIENT EDUCATION AS NEEDED

## 2023-10-26 NOTE — ED NOTES
Report provided to [facility] with transport team. Eric Cynthia states he has no further questions at this time. Patient departs in stable condition.      Eric Leon RN  10/26/23 3585

## 2023-10-26 NOTE — PROGRESS NOTES
Patient extubated per physician order. Patient placed on a 5lpm NC. Voice intact, no stridor noted. Strong non productive cough.    Will continue to monitor

## 2023-10-26 NOTE — CONSULTS
Comprehensive Nutrition Assessment    Type and Reason for Visit:  Initial, Consult    Nutrition Recommendations/Plan:   Start TF of Standard with Fiber (Jevity 1.5) at 20 ml/hr and advance to goal rate of 35 ml/hr after 12 hrs     Malnutrition Assessment:  Malnutrition Status:  Insufficient data (10/26/23 6317)    Context:  Acute Illness     Findings of the 6 clinical characteristics of malnutrition:  Energy Intake:  Unable to assess  Weight Loss:  Unable to assess     Body Fat Loss:  Unable to assess     Muscle Mass Loss:  Unable to assess    Fluid Accumulation:  No significant fluid accumulation     Strength:  Not Performed    Nutrition Assessment:    Consulted for TF order and management. Pt admitted to OSH for drug overdose and Hx COPD, GERD, HTN, admitted to this hospital for seizure and status epilepticus. Pt currently intubated and sedated with Propofol at 14.7 ml/hr, providing an estimated 388 kcal/day. Weight history is limited, but does seem to show some recent weight gain, going from 54.7 kg in March to CBW 61.2 kg. Pt has OGT in place. Recommend to start Standard with Fiber TF (Jevity 1.5) at goal rate of 35 ml/hr with Protein modular BID. Combined with current Propofol rate, this will slightly exceed estimated kcal and protein needs at goal volume. Nutrition Related Findings:    Meds/Labs reviewed.  Wound Type: None       Current Nutrition Intake & Therapies:    Average Meal Intake: NPO  Average Supplements Intake: NPO  Diet NPO  Current Tube Feeding (TF) Orders:  Feeding Route: Orogastric  Formula:    Schedule:    Feeding Regimen:    Additives/Modulars:    Water Flushes:    Current TF & Flush Orders Provides:    Goal TF & Flush Orders Provides: Jevity 1.5 @ 35 ml/hr + Protein modular BID = 1468 kcal, 106 g protein, 638 ml fluid daily  Additional Calorie Sources:  Propofol @ 14.7 ml/hr = 388 kcal/day    Anthropometric Measures:  Height: 165.1 cm (5' 5\")  Ideal Body Weight (IBW): 136 lbs (62

## 2023-10-26 NOTE — ED NOTES
Girlfriend at bedside updated on POC. Girlfriend denies further needs at this time. Respirations even and unlabored on ventilator.  VSS      Josefina Shi RN  10/25/23 8598       Josefina Shi, [address] [place] [address]  10/25/23 2569

## 2023-10-26 NOTE — PLAN OF CARE
Problem: Safety - Medical Restraint  Goal: Remains free of injury from restraints (Restraint for Interference with Medical Device)  Description: INTERVENTIONS:  1. Determine that other, less restrictive measures have been tried or would not be effective before applying the restraint  2. Evaluate the patient's condition at the time of restraint application  3. Inform patient/family regarding the reason for restraint  4.  Q2H: Monitor safety, psychosocial status, comfort, nutrition and hydration  Outcome: Progressing  Flowsheets  Taken 10/26/2023 0600 by Urszula Forte RN  Remains free of injury from restraints (restraint for interference with medical device):   Determine that other, less restrictive measures have been tried or would not be effective before applying the restraint   Evaluate the patient's condition at the time of restraint application   Inform patient/family regarding the reason for restraint   Every 2 hours: Monitor safety, psychosocial status, comfort, nutrition and hydration  Taken 10/26/2023 0400 by Urszula Forte RN  Remains free of injury from restraints (restraint for interference with medical device):   Determine that other, less restrictive measures have been tried or would not be effective before applying the restraint   Evaluate the patient's condition at the time of restraint application   Inform patient/family regarding the reason for restraint   Every 2 hours: Monitor safety, psychosocial status, comfort, nutrition and hydration  Taken 10/26/2023 0215 by Chung Jacobo RN  Remains free of injury from restraints (restraint for interference with medical device):   Determine that other, less restrictive measures have been tried or would not be effective before applying the restraint   Evaluate the patient's condition at the time of restraint application   Inform patient/family regarding the reason for restraint   Every 2 hours: Monitor safety, psychosocial status, comfort, nutrition limits  Outcome: Progressing  Goal: Hemodynamic stability and optimal renal function maintained  Outcome: Progressing     Problem: Hematologic - Adult  Goal: Maintains hematologic stability  Outcome: Progressing     Problem: Discharge Planning  Goal: Discharge to home or other facility with appropriate resources  Outcome: Progressing  Flowsheets (Taken 10/26/2023 0400)  Discharge to home or other facility with appropriate resources:   Identify barriers to discharge with patient and caregiver   Arrange for needed discharge resources and transportation as appropriate   Identify discharge learning needs (meds, wound care, etc)   Refer to discharge planning if patient needs post-hospital services based on physician order or complex needs related to functional status, cognitive ability or social support system     Problem: Pain  Goal: Verbalizes/displays adequate comfort level or baseline comfort level  Outcome: Progressing  Flowsheets (Taken 10/26/2023 0400)  Verbalizes/displays adequate comfort level or baseline comfort level:   Encourage patient to monitor pain and request assistance   Assess pain using appropriate pain scale   Administer analgesics based on type and severity of pain and evaluate response   Implement non-pharmacological measures as appropriate and evaluate response   Consider cultural and social influences on pain and pain management   Notify Licensed Independent Practitioner if interventions unsuccessful or patient reports new pain

## 2023-10-26 NOTE — H&P
Neuro ICU History & Physical    Patient Name: Anabel Eisenmenger. Patient : 1970  Room/Bed:   Code Status: full  Allergies: Allergies   Allergen Reactions    Prochlorperazine Other (See Comments)    Toradol [Ketorolac Tromethamine] Itching and Rash     Able to take Aleve with no allergic reactions      Ketorolac     Compazine [Prochlorperazine Maleate] Anxiety       CHIEF COMPLAINT     Chief Complaint   Patient presents with    Seizures       HPI    History Obtained From: EMR, ED physician    The patient is a 46 y.o. male with PMH including right upper lobe lung mass, multiple admissions for polysubstance abuse and encephalopathy, paranoid schizophrenia, alcohol abuse with history of alcohol withdrawal, COPD, and tobacco use who originally presented to [facility] ED on the afternoon of 10/25/2023 after a witnessed seizure with fall. Per report patient seized roughly 3 minutes at home, and at that time EMS was called. By the time patient arrived at [facility] ED, he was A&O x3 and able to provide some sort of history. Question if patient had the seizure and then fell, or patient fell and then seized. While at [facility] ED, patient went into status epilepticus and failed multiple doses of Ativan. He was subsequently intubated for airway protection and started on a Versed drip. Patient did not respond well to Versed drip and was agitated on the mechanical ventilator, at that time propofol was started. Per report, patient has had a history of seizures in the past however is not on AEDs. Question if the seizures are from alcohol withdrawal. UDS unremarkable. While at [facility], patient was loaded with Keppra. He underwent CT head, CT C-spine, CT chest with contrast.  CT head showed multiple subcentimeter brain metastasis, at least 7, suspected in both cerebral hemispheres with mild associated vasogenic edema.   CT chest with contrast showed a 4.3 cm spiculated right upper lobe mass

## 2023-10-26 NOTE — ED NOTES
100mg Roccuronium given at this time per verbal from Dr Bong Trujillo.       Candice Chopra, RN  10/25/23 2025

## 2023-10-27 ENCOUNTER — APPOINTMENT (OUTPATIENT)
Dept: MRI IMAGING | Age: 53
End: 2023-10-27
Payer: MEDICAID

## 2023-10-27 LAB
ANION GAP SERPL CALCULATED.3IONS-SCNC: 15 MMOL/L (ref 9–17)
BASOPHILS # BLD: 0.03 K/UL (ref 0–0.2)
BASOPHILS NFR BLD: 0 % (ref 0–2)
BUN SERPL-MCNC: 19 MG/DL (ref 6–20)
CALCIUM SERPL-MCNC: 8.4 MG/DL (ref 8.6–10.4)
CHLORIDE SERPL-SCNC: 104 MMOL/L (ref 98–107)
CK SERPL-CCNC: 3763 U/L (ref 39–308)
CK SERPL-CCNC: 3867 U/L (ref 39–308)
CK SERPL-CCNC: 3983 U/L (ref 39–308)
CK SERPL-CCNC: 4443 U/L (ref 39–308)
CK SERPL-CCNC: 5085 U/L (ref 39–308)
CO2 SERPL-SCNC: 16 MMOL/L (ref 20–31)
CREAT SERPL-MCNC: 0.5 MG/DL (ref 0.7–1.2)
EKG ATRIAL RATE: 71 BPM
EKG P AXIS: -77 DEGREES
EKG P-R INTERVAL: 128 MS
EKG Q-T INTERVAL: 418 MS
EKG QRS DURATION: 86 MS
EKG QTC CALCULATION (BAZETT): 454 MS
EKG R AXIS: -60 DEGREES
EKG T AXIS: 128 DEGREES
EKG VENTRICULAR RATE: 71 BPM
EOSINOPHIL # BLD: <0.03 K/UL (ref 0–0.44)
EOSINOPHILS RELATIVE PERCENT: 0 % (ref 1–4)
ERYTHROCYTE [DISTWIDTH] IN BLOOD BY AUTOMATED COUNT: 13 % (ref 11.8–14.4)
GFR SERPL CREATININE-BSD FRML MDRD: >60 ML/MIN/1.73M2
GLUCOSE SERPL-MCNC: 119 MG/DL (ref 70–99)
HCT VFR BLD AUTO: 41.4 % (ref 40.7–50.3)
HGB BLD-MCNC: 13.6 G/DL (ref 13–17)
IMM GRANULOCYTES # BLD AUTO: 0.09 K/UL (ref 0–0.3)
IMM GRANULOCYTES NFR BLD: 1 %
LYMPHOCYTES NFR BLD: 1.2 K/UL (ref 1.1–3.7)
LYMPHOCYTES RELATIVE PERCENT: 7 % (ref 24–43)
MAGNESIUM SERPL-MCNC: 2 MG/DL (ref 1.6–2.6)
MCH RBC QN AUTO: 30.7 PG (ref 25.2–33.5)
MCHC RBC AUTO-ENTMCNC: 32.9 G/DL (ref 28.4–34.8)
MCV RBC AUTO: 93.5 FL (ref 82.6–102.9)
MONOCYTES NFR BLD: 0.63 K/UL (ref 0.1–1.2)
MONOCYTES NFR BLD: 4 % (ref 3–12)
MYOGLOBIN SERPL-MCNC: 306 NG/ML (ref 28–72)
NEUTROPHILS NFR BLD: 88 % (ref 36–65)
NEUTS SEG NFR BLD: 14.65 K/UL (ref 1.5–8.1)
NRBC BLD-RTO: 0 PER 100 WBC
PLATELET # BLD AUTO: 331 K/UL (ref 138–453)
PMV BLD AUTO: 10.3 FL (ref 8.1–13.5)
POTASSIUM SERPL-SCNC: 3.6 MMOL/L (ref 3.7–5.3)
RBC # BLD AUTO: 4.43 M/UL (ref 4.21–5.77)
SODIUM SERPL-SCNC: 135 MMOL/L (ref 135–144)
WBC OTHER # BLD: 16.6 K/UL (ref 3.5–11.3)

## 2023-10-27 PROCEDURE — 82550 ASSAY OF CK (CPK): CPT

## 2023-10-27 PROCEDURE — 93010 ELECTROCARDIOGRAM REPORT: CPT | Performed by: INTERNAL MEDICINE

## 2023-10-27 PROCEDURE — 99232 SBSQ HOSP IP/OBS MODERATE 35: CPT | Performed by: INTERNAL MEDICINE

## 2023-10-27 PROCEDURE — 6360000002 HC RX W HCPCS: Performed by: NURSE PRACTITIONER

## 2023-10-27 PROCEDURE — 6360000002 HC RX W HCPCS: Performed by: STUDENT IN AN ORGANIZED HEALTH CARE EDUCATION/TRAINING PROGRAM

## 2023-10-27 PROCEDURE — 99291 CRITICAL CARE FIRST HOUR: CPT | Performed by: INTERNAL MEDICINE

## 2023-10-27 PROCEDURE — 2580000003 HC RX 258: Performed by: NURSE PRACTITIONER

## 2023-10-27 PROCEDURE — 97116 GAIT TRAINING THERAPY: CPT

## 2023-10-27 PROCEDURE — 85025 COMPLETE CBC W/AUTO DIFF WBC: CPT

## 2023-10-27 PROCEDURE — 2580000003 HC RX 258: Performed by: STUDENT IN AN ORGANIZED HEALTH CARE EDUCATION/TRAINING PROGRAM

## 2023-10-27 PROCEDURE — 70553 MRI BRAIN STEM W/O & W/DYE: CPT

## 2023-10-27 PROCEDURE — 99291 CRITICAL CARE FIRST HOUR: CPT | Performed by: STUDENT IN AN ORGANIZED HEALTH CARE EDUCATION/TRAINING PROGRAM

## 2023-10-27 PROCEDURE — 92523 SPEECH SOUND LANG COMPREHEN: CPT

## 2023-10-27 PROCEDURE — 97163 PT EVAL HIGH COMPLEX 45 MIN: CPT

## 2023-10-27 PROCEDURE — 6370000000 HC RX 637 (ALT 250 FOR IP): Performed by: STUDENT IN AN ORGANIZED HEALTH CARE EDUCATION/TRAINING PROGRAM

## 2023-10-27 PROCEDURE — 2500000003 HC RX 250 WO HCPCS: Performed by: STUDENT IN AN ORGANIZED HEALTH CARE EDUCATION/TRAINING PROGRAM

## 2023-10-27 PROCEDURE — 99222 1ST HOSP IP/OBS MODERATE 55: CPT | Performed by: NEUROLOGICAL SURGERY

## 2023-10-27 PROCEDURE — 6370000000 HC RX 637 (ALT 250 FOR IP): Performed by: NURSE PRACTITIONER

## 2023-10-27 PROCEDURE — 80048 BASIC METABOLIC PNL TOTAL CA: CPT

## 2023-10-27 PROCEDURE — 6370000000 HC RX 637 (ALT 250 FOR IP)

## 2023-10-27 PROCEDURE — 97530 THERAPEUTIC ACTIVITIES: CPT

## 2023-10-27 PROCEDURE — 83735 ASSAY OF MAGNESIUM: CPT

## 2023-10-27 PROCEDURE — 93005 ELECTROCARDIOGRAM TRACING: CPT

## 2023-10-27 PROCEDURE — 83874 ASSAY OF MYOGLOBIN: CPT

## 2023-10-27 PROCEDURE — 36415 COLL VENOUS BLD VENIPUNCTURE: CPT

## 2023-10-27 PROCEDURE — 6360000002 HC RX W HCPCS

## 2023-10-27 PROCEDURE — 95711 VEEG 2-12 HR UNMONITORED: CPT

## 2023-10-27 PROCEDURE — 6360000004 HC RX CONTRAST MEDICATION: Performed by: STUDENT IN AN ORGANIZED HEALTH CARE EDUCATION/TRAINING PROGRAM

## 2023-10-27 PROCEDURE — A9579 GAD-BASE MR CONTRAST NOS,1ML: HCPCS | Performed by: STUDENT IN AN ORGANIZED HEALTH CARE EDUCATION/TRAINING PROGRAM

## 2023-10-27 PROCEDURE — 2060000000 HC ICU INTERMEDIATE R&B

## 2023-10-27 RX ORDER — QUETIAPINE FUMARATE 25 MG/1
50 TABLET, FILM COATED ORAL 2 TIMES DAILY
Status: DISCONTINUED | OUTPATIENT
Start: 2023-10-27 | End: 2023-11-02 | Stop reason: HOSPADM

## 2023-10-27 RX ORDER — POTASSIUM CHLORIDE 7.45 MG/ML
10 INJECTION INTRAVENOUS
Status: DISPENSED | OUTPATIENT
Start: 2023-10-27 | End: 2023-10-27

## 2023-10-27 RX ORDER — PHENYTOIN SODIUM 100 MG/1
100 CAPSULE, EXTENDED RELEASE ORAL 3 TIMES DAILY
Status: DISCONTINUED | OUTPATIENT
Start: 2023-10-27 | End: 2023-11-02 | Stop reason: HOSPADM

## 2023-10-27 RX ORDER — LIDOCAINE 4 G/G
1 PATCH TOPICAL DAILY
Status: DISCONTINUED | OUTPATIENT
Start: 2023-10-27 | End: 2023-11-02 | Stop reason: HOSPADM

## 2023-10-27 RX ORDER — LANOLIN ALCOHOL/MO/W.PET/CERES
100 CREAM (GRAM) TOPICAL DAILY
Status: DISCONTINUED | OUTPATIENT
Start: 2023-10-28 | End: 2023-11-02 | Stop reason: HOSPADM

## 2023-10-27 RX ORDER — FOLIC ACID 1 MG/1
1 TABLET ORAL DAILY
Status: DISCONTINUED | OUTPATIENT
Start: 2023-10-28 | End: 2023-11-02 | Stop reason: HOSPADM

## 2023-10-27 RX ORDER — SODIUM CHLORIDE 0.9 % (FLUSH) 0.9 %
10 SYRINGE (ML) INJECTION PRN
Status: DISCONTINUED | OUTPATIENT
Start: 2023-10-27 | End: 2023-11-02 | Stop reason: HOSPADM

## 2023-10-27 RX ORDER — FAMOTIDINE 20 MG/1
20 TABLET, FILM COATED ORAL 2 TIMES DAILY
Status: DISCONTINUED | OUTPATIENT
Start: 2023-10-27 | End: 2023-11-02 | Stop reason: HOSPADM

## 2023-10-27 RX ADMIN — THIAMINE HYDROCHLORIDE: 100 INJECTION, SOLUTION INTRAMUSCULAR; INTRAVENOUS at 08:48

## 2023-10-27 RX ADMIN — FAMOTIDINE 20 MG: 10 INJECTION, SOLUTION INTRAVENOUS at 08:45

## 2023-10-27 RX ADMIN — PHENYTOIN SODIUM 100 MG: 50 INJECTION INTRAMUSCULAR; INTRAVENOUS at 10:30

## 2023-10-27 RX ADMIN — DEXAMETHASONE SODIUM PHOSPHATE 4 MG: 4 INJECTION, SOLUTION INTRA-ARTICULAR; INTRALESIONAL; INTRAMUSCULAR; INTRAVENOUS; SOFT TISSUE at 08:40

## 2023-10-27 RX ADMIN — POTASSIUM CHLORIDE 10 MEQ: 7.46 INJECTION, SOLUTION INTRAVENOUS at 07:41

## 2023-10-27 RX ADMIN — SODIUM CHLORIDE: 9 INJECTION, SOLUTION INTRAVENOUS at 03:19

## 2023-10-27 RX ADMIN — DEXAMETHASONE SODIUM PHOSPHATE 4 MG: 4 INJECTION, SOLUTION INTRA-ARTICULAR; INTRALESIONAL; INTRAMUSCULAR; INTRAVENOUS; SOFT TISSUE at 17:44

## 2023-10-27 RX ADMIN — SERTRALINE HYDROCHLORIDE 50 MG: 50 TABLET ORAL at 08:40

## 2023-10-27 RX ADMIN — ACETAMINOPHEN 650 MG: 325 TABLET ORAL at 20:23

## 2023-10-27 RX ADMIN — GADOTERIDOL 12 ML: 279.3 INJECTION, SOLUTION INTRAVENOUS at 15:54

## 2023-10-27 RX ADMIN — SODIUM CHLORIDE, PRESERVATIVE FREE 10 ML: 5 INJECTION INTRAVENOUS at 08:41

## 2023-10-27 RX ADMIN — EXTENDED PHENYTOIN SODIUM 100 MG: 100 CAPSULE ORAL at 20:21

## 2023-10-27 RX ADMIN — POTASSIUM CHLORIDE 40 MEQ: 1500 TABLET, EXTENDED RELEASE ORAL at 08:40

## 2023-10-27 RX ADMIN — SODIUM CHLORIDE, PRESERVATIVE FREE 10 ML: 5 INJECTION INTRAVENOUS at 20:21

## 2023-10-27 RX ADMIN — PHENYTOIN SODIUM 100 MG: 50 INJECTION INTRAMUSCULAR; INTRAVENOUS at 01:30

## 2023-10-27 RX ADMIN — DEXAMETHASONE SODIUM PHOSPHATE 4 MG: 4 INJECTION, SOLUTION INTRA-ARTICULAR; INTRALESIONAL; INTRAMUSCULAR; INTRAVENOUS; SOFT TISSUE at 22:20

## 2023-10-27 RX ADMIN — ENOXAPARIN SODIUM 40 MG: 100 INJECTION SUBCUTANEOUS at 08:42

## 2023-10-27 RX ADMIN — FAMOTIDINE 20 MG: 20 TABLET, FILM COATED ORAL at 20:21

## 2023-10-27 RX ADMIN — QUETIAPINE FUMARATE 50 MG: 25 TABLET ORAL at 20:21

## 2023-10-27 RX ADMIN — DEXAMETHASONE SODIUM PHOSPHATE 4 MG: 4 INJECTION, SOLUTION INTRA-ARTICULAR; INTRALESIONAL; INTRAMUSCULAR; INTRAVENOUS; SOFT TISSUE at 04:47

## 2023-10-27 RX ADMIN — SODIUM CHLORIDE, PRESERVATIVE FREE 10 ML: 5 INJECTION INTRAVENOUS at 15:54

## 2023-10-27 ASSESSMENT — PAIN SCALES - GENERAL
PAINLEVEL_OUTOF10: 9
PAINLEVEL_OUTOF10: 9
PAINLEVEL_OUTOF10: 0
PAINLEVEL_OUTOF10: 3
PAINLEVEL_OUTOF10: 9

## 2023-10-27 ASSESSMENT — PAIN DESCRIPTION - DESCRIPTORS: DESCRIPTORS: ACHING

## 2023-10-27 ASSESSMENT — PAIN DESCRIPTION - LOCATION: LOCATION: BACK

## 2023-10-27 NOTE — H&P
[address] [address] [facility]     Department of Internal Medicine - Staff Internal Medicine Teaching Service          ADMISSION NOTE/HISTORY AND PHYSICAL EXAMINATION   Date: 10/27/2023  Patient Name: Kristal Galdamez. Date of admission: 10/26/2023  2:08 AM  YOB: 1970  PCP: RINA Martin CNP  History Obtained From:  patient    CHIEF COMPLAINT     Chief complaint: Seizure    HISTORY OF PRESENTING ILLNESS     The patient is a pleasant 46 y.o. male  with polysubstance abuse, COPD, Alcohol abuse disorder ( sober since 2 years) with history of withdrawal seizures, schizophrenia presented to [facility] ER with seizure with fall. Per report patient seized roughly 3 minutes at home, and at that time EMS was called. By the time patient arrived at [facility] ED, he was A&O x3 and able to provide some sort of history. While at [address] [place] [address] ED, patient went into status epilepticus and failed multiple doses of Ativan. He was subsequently intubated for airway protection and started on a Versed drip. Patient did not respond well to Versed drip and was agitated on the mechanical ventilator, at that time propofol was started. Per report, patient has had a history of seizures in the past however is not on AEDs. He has had seizures in the past secondary to alcohol withdrawal. Patient has been sober for last 2 years. UDS was unremarkable. While at [facility], patient was loaded with Keppra. He underwent CT head, CT C-spine, CT chest with contrast.  CT head showed multiple subcentimeter brain metastasis, at least 7, suspected in both cerebral hemispheres with mild associated vasogenic edema. CT chest with contrast showed a 4.3 cm spiculated right upper lobe mass with malignant appearance with multiple bilateral pulmonary metastatic nodules and metastatic lymphadenopathy in the right hilar and subcarinal stations. CT cervical spine was unremarkable.     Patient was transferred to [facility]. Prasanth

## 2023-10-27 NOTE — H&P
[address] [facility]     Department of Internal Medicine - Staff Internal Medicine Teaching Service          ADMISSION NOTE/HISTORY AND PHYSICAL EXAMINATION   Date: 10/27/2023  Patient Name: Hayley Garcias. Date of admission: 10/26/2023  2:08 AM  YOB: 1970  PCP: RINA Huang CNP  History Obtained From:  patient    CHIEF COMPLAINT     Chief complaint: Seizure    HISTORY OF PRESENTING ILLNESS     The patient is a pleasant 46 y.o. male  with polysubstance abuse, COPD, Alcohol abuse disorder ( sober since 2 years) with history of withdrawal seizures, schizophrenia presented to presented to [facility] ER with seizure with fall. Per report patient seized roughly 3 minutes at home, and at that time EMS was called. By the time patient arrived at [facility] ED, he was A&O x3 and able to provide some sort of history. While at [address] [place] [address] ED, patient went into status epilepticus. and failed multiple doses of Ativan. He was subsequently intubated for airway protection and started on a Versed drip. Patient did not respond well to Versed drip and was agitated on the mechanical ventilator, at that time propofol was started. Per report, patient has had a history of seizures in the past however is not on AEDs. He has had seizures in the past secondary to alcohol withdrawal. Patient has been sober for last 2 years. UDS was unremarkable. While at [facility], patient was loaded with Keppra. He underwent CT head, CT C-spine, CT chest with contrast.  CT head showed multiple subcentimeter brain metastasis, at least 7, suspected in both cerebral hemispheres with mild associated vasogenic edema. CT chest with contrast showed a 4.3 cm spiculated right upper lobe mass with malignant appearance with multiple bilateral pulmonary metastatic nodules and metastatic lymphadenopathy in the right hilar and subcarinal stations. CT cervical spine unremarkable.     Patient was transferred to [facility]. present. There is vasogenic edema surrounding the lesions but no mass effect or midline shift. XR ABDOMEN FOR NG/OG/NE TUBE PLACEMENT    Result Date: 10/26/2023  Enteric tube in satisfactory position. XR ABDOMEN FOR NG/OG/NE TUBE PLACEMENT    Result Date: 10/26/2023  Enteric tube terminates at the level of the body of the stomach. XR ABDOMEN FOR NG/OG/NE TUBE PLACEMENT    Result Date: 10/26/2023  Nasogastric tube tip overlies the region of the fundus of the stomach and should be advanced. The side port is at the gastroesophageal junction     XR CHEST PORTABLE    Result Date: 10/26/2023  Question of a pulmonary nodule versus small infiltrate in the left mid to lower lung zone. CT chest is recommended for further evaluation. Right lung infiltrate. CT CHEST W CONTRAST    Result Date: 10/25/2023  1. Spiculated 4.3 cm right upper lobe mass, malignant in appearance, with multiple bilateral pulmonary metastatic nodules and metastatic lymphadenopathy in the right hilar and subcarinal stations. This document has been electronically signed by: Michael Dorado MD on 10/25/2023 09:46 PM All CTs at this facility use dose modulation techniques and iterative reconstructions, and/or weight-based dosing when appropriate to reduce radiation to a low as reasonably achievable. CT HEAD WO CONTRAST    Result Date: 10/25/2023  1. Multiple subcentimeter brain metastases, at least 7, suspected in both cerebral hemishperes. Mild associated vasogenic edema. MRI brain with IV contrast recommended when clinically appropriate. This document has been electronically signed by: Michael Dorado MD on 10/25/2023 09:35 PM All CTs at this facility use dose modulation techniques and iterative reconstructions, and/or weight-based dosing when appropriate to reduce radiation to a low as reasonably achievable. CT CERVICAL SPINE WO CONTRAST    Result Date: 10/25/2023  1. No CT evidence of acute traumatic cervical spine injury.  This

## 2023-10-27 NOTE — PLAN OF CARE
Internal medicine Senior note: This 59-year-old male with past history of alcohol abuse and polysubstance abuse disorder, paranoid schizophrenia with home medication of Seroquel and trazodone, history of COPD without any baseline oxygen requirement, tobacco use who initially had a seizure-like episode and presented herself to the emergency medicine department at [facility]. The first time they had it controlled with Ativan, ketamine however the second time patient went into status and they had to intubate in order to control the seizure. Was transferred to the [facility] because of LTM E being intubated. Was on Versed and propofol drip. CT chest which showed right upper lobe spiculated mass and subsequently CT head was done which showed multiple intracranial metastasis in bilateral hemisphere with surrounding vasogenic edema. Was started on Decadron. Neurology continued the LTM E and later the LTM he was discontinued. Did not have any seizure-like activity on LTM E.  Neurosurgery and oncology was consulted from neuro ICU. Oncology recommended CT-guided biopsy of the pulmonary mass. MRI ordered by neurosurgery. MRI was done today on 10/27 demonstrated intracranial metastatic disease with greater than 15 subcentimeter lesion with surrounding vasogenic edema.   Patient was extubated subsequently on 10/27    Lab work revealed elevated CK which is continuously uptrending, 3867> 5085> 4443, hypokalemia with potassium level 3.6, leukocytosis with WBC count 16.6    #Right upper lobe lung mass  #Intracranial metastasis evident on CT and MRI  #Status epilepticus was intubated in [facility] subsequent extubation  #LTM negative for seizure-like activity  #Elevated CK-concern for rhabdomyolysis or secondary CK elevation from seizure-like activity  #COPD  #History of polysubstance abuse alcohol abuse    Plan:  Neurosurgery to comment on MRI finding  Oncology is looking for tissue diagnosis from CT-guided

## 2023-10-27 NOTE — PROGRESS NOTES
was called. By the time patient arrived at [facility] ED, he was A&O x3 and able to provide some sort of history. Question if patient had the seizure and then fell, or patient fell and then seized. While at [facility] ED, patient went into status epilepticus and failed multiple doses of Ativan. He was subsequently intubated for airway protection and started on a Versed drip. Patient did not respond well to Versed drip and was agitated on the mechanical ventilator, at that time propofol was started. Per report, patient has had a history of seizures in the past however is not on AEDs. Question if the seizures are from alcohol withdrawal. UDS unremarkable. While at [facility], patient was loaded with Keppra. He underwent CT head, CT C-spine, CT chest with contrast.  CT head showed multiple subcentimeter brain metastasis, at least 7, suspected in both cerebral hemispheres with mild associated vasogenic edema. CT chest with contrast showed a 4.3 cm spiculated right upper lobe mass with malignant appearance with multiple bilateral pulmonary metastatic nodules and metastatic lymphadenopathy in the right hilar and subcarinal stations. CT cervical spine unremarkable. Patient transferred to [facility] for LTME in our neuro critical care department. Pain:  Pain Assessment  Pain Assessment: None - Denies Pain  Pain Level: 2    Vision/ Hearing  Vision  Vision: Impaired  Hearing  Hearing: Exceptions to [facility]    Assessment:  Pt presents with moderate cognitive deficits characterized by difficulties with short term and immediate recall, word associations, antonyms, and inductive reasoning. Pt. Presents with no dysarthria, no O/M deficits at this time. ST to follow up and provide treatment to address noted deficits. Education provided.         Recommendations:  Recommendations  Requires SLP Intervention: Yes  Patient Education Response: Verbalizes understanding  Frequency: 3-x week   D/C Recommendations: Ongoing speech therapy is recommended at next level of care; Ongoing speech therapy is recommended during this hospitalization       Plan:   Speech Therapy Prognosis  Prognosis: Fair  Individuals consulted  Consulted and agree with results and recommendations: Patient    Goals:  Short Term Goals  Goal 1: Pt will recall 3-5 units with and without distractions with 90% accuracy. Goal 2: Pt will utilize memory compensatory strategies to aid in recall. Goal 3: Pt will complete verbal reasoning tasks with 90% accuracy. Goal 4: Pt will complete the remainder of the cognitive evaluation when appropriate. Patient/family involved in developing goals and treatment plan: yes    Subjective:   Social/Functional History  Lives With: Spouse  Active : Yes  Vision  Vision: Impaired  Hearing  Hearing: Exceptions to [facility]           Objective:  Oral Motor   Lingual: No impairment    Motor Speech  Apraxic Characteristics: None  Dysarthric Characteristics: None  Intelligibility: No impairment  Overall Impairment Severity: None        Cognition:   Orientation  Overall Orientation Status: Within Functional Limits  Memory  Memory: Exceptions to [facility]  Short-term Memory: Moderate (1/3)  Immediate Memory: Moderate (3/3, 3/5, 1/3)  Problem Solving  Problem Solving: Exceptions to [facility]  Verbal Reasoning Skills:  Moderate (Word Associations: 1/3, Antonyms: 2/3, Inductive Reasoning: 3/4, Similarities/Differences: Unable to assess, Deductive Reasoning: Unable to assess)  Sequencing: [facility] (4/4)  Abstract Reasoning  Abstract Reasoning: Exceptions to [facility]  Convergent Thinking:  (Unable to assess)  Divergent Thinking:  (Unable to assess)  Safety/Judgment  Safety/Judgment: Exceptions to [facility]  Insight:  (Unable to assess)  Flexibility of Thought:  (Unable to assess)      Prognosis:  Speech Therapy Prognosis  Prognosis: Fair  Individuals consulted  Consulted and agree with results and recommendations: Patient    Education:  Patient Education Response: [place]

## 2023-10-27 NOTE — PROGRESS NOTES
Physical Therapy  Facility/Department: [address] NEURO ICU  Physical Therapy Initial Assessment    Name: Marcy James. : 1970  MRN: [id number]  Date of Service: 10/27/2023  Chief Complaint   Patient presents with    Seizures      Discharge Recommendations:  Patient would benefit from continued therapy after discharge   PT Equipment Recommendations  Equipment Needed: No (CTA, unsafe to ambulate without skilled assistance at this time.)      Patient Diagnosis(es): The encounter diagnosis was Status epilepticus ([address]). Past Medical History:  has a past medical history of Arthritis, Asthma, COPD (chronic obstructive pulmonary disease) ([address]), GERD (gastroesophageal reflux disease), Hypertension, Psychiatric problem, Schizophrenia ([address]), Severe malnutrition ([address]), and Unspecified cerebral artery occlusion with cerebral infarction. Past Surgical History:  has no past surgical history on file. Assessment   Body Structures, Functions, Activity Limitations Requiring Skilled Therapeutic Intervention: Decreased functional mobility ; Decreased endurance;Decreased cognition;Decreased coordination;Decreased balance;Decreased strength;Decreased safe awareness  Assessment: Pt with significant mobility deficits requiring mod-A to ambulate 15 feet with no AD. Pt is grossly confused throughout today's session, difficulty answering open ended questions, requires frequent redirection. Pt is very unsteady with ambulation this date and is currently a high fall risk secondary to poor coordination, poor standing balance, and impaired awareness of deficits. Pt would benefit from additional PT upon discharge to maximize functional independence. Pt would be unsafe to return to prior living arrangements upon discharge due to high fall risk.   Therapy Prognosis: Good  Decision Making: High Complexity  Requires PT Follow-Up: Yes  Activity Tolerance  Activity Tolerance: Treatment limited secondary to decreased cognition;Patient limited by fatigue     Plan   Physcial Therapy Plan  General Plan:  (5-6x/week)  Current Treatment Recommendations: Strengthening, Balance training, Functional mobility training, Transfer training, Gait training, Home exercise program, Safety education & training, Therapeutic activities, Patient/Caregiver education & training, Equipment evaluation, education, & procurement, Endurance training, Neuromuscular re-education, Stair training  Safety Devices  Type of Devices: Patient at risk for falls, Gait belt, Sitter present, Nurse notified, Call light within reach, Left in bed  Restraints  Restraints Initially in Place: No     Restrictions  Restrictions/Precautions  Restrictions/Precautions: Seizure  Required Braces or Orthoses?: No  Position Activity Restriction  Other position/activity restrictions: BP normotensive, extubated 10/26     Subjective   General  Patient assessed for rehabilitation services?: Yes  Response To Previous Treatment: Not applicable  Family / Caregiver Present: No  Follows Commands: Impaired (follows most simple commands)  Subjective  Subjective: Pt supine in bed and agreeable to therapy, RN agreeable to therapy. Pt is confused but attempts to cooperate throughout.   Pt denies pain at rest.         Social/Functional History  Social/Functional History  Lives With: Significant other  Type of Home: Apartment  Home Layout: One level  Home Access: Stairs to enter with rails  Entrance Stairs - Number of Steps: 8  Entrance Stairs - Rails: Right  Bathroom Shower/Tub: Tub/Shower unit  Bathroom Toilet: Standard  Home Equipment:  (no DME)  Has the patient had two or more falls in the past year or any fall with injury in the past year?: Yes (~10 secondary to dizziness)  ADL Assistance: [address] Independent  Homemaking Responsibilities: Yes (shares with significant other)  Ambulation Assistance: Independent  Transfer Assistance: Independent  Active : Yes  Mode of

## 2023-10-27 NOTE — CARE COORDINATION
Case Management Assessment  Initial Evaluation    Date/Time of Evaluation: 10/27/2023 11:35 AM  Assessment Completed by: Dorann Barthel, RN    If patient is discharged prior to next notation, then this note serves as note for discharge by case management. Patient Name: Jaqueline Johnson YOB: 1970  Diagnosis: Status epilepticus ([address]) [G40.901]  Seizure ([address]) [R56.9]                   Date / Time: 10/26/2023  2:08 AM    Patient Admission Status: Inpatient   Readmission Risk (Low < 19, Mod (19-27), High > 27): Readmission Risk Score: 14.5    Current PCP: RINA Peraza CNP  PCP verified by CM? (P) Yes (Corinne Jewel APRN)    Chart Reviewed: Yes      History Provided by: Spouse  Patient Orientation: Alert and Oriented, Person, Place, Situation    Patient Cognition: Alert    Hospitalization in the last 30 days (Readmission):  No    If yes, Readmission Assessment in CM Navigator will be completed.     Advance Directives:      Code Status: Full Code   Patient's Primary Decision Maker is:        Discharge Planning:    Patient lives with: (P) Spouse/Significant Other Type of Home: (P) House  Primary Care Giver: Self  Patient Support Systems include: Spouse/Significant Other   Current Financial resources: (P) Medicaid  Current community resources: (P) None  Current services prior to admission: (P) None            Current DME:              Type of Home Care services:  (P) None    ADLS  Prior functional level: (P) Independent in ADLs/IADLs  Current functional level: (P) Assistance with the following:, Mobility, Toileting    PT AM-PAC:   /24  OT AM-PAC:   /24    Family can provide assistance at DC: (P) Yes  Would you like Case Management to discuss the discharge plan with any other family members/significant others, and if so, who? (P) Yes  Plans to Return to Present Housing: (P) Yes  Other Identified Issues/Barriers to RETURNING to current housing: na  Potential Assistance needed

## 2023-10-27 NOTE — PLAN OF CARE
Problem: Safety - Medical Restraint  Goal: Remains free of injury from restraints (Restraint for Interference with Medical Device)  Description: INTERVENTIONS:  1. Determine that other, less restrictive measures have been tried or would not be effective before applying the restraint  2. Evaluate the patient's condition at the time of restraint application  3. Inform patient/family regarding the reason for restraint  4.  Q2H: Monitor safety, psychosocial status, comfort, nutrition and hydration  Outcome: Completed     Problem: Respiratory - Adult  Goal: Achieves optimal ventilation and oxygenation  Outcome: Progressing     Problem: Neurosensory - Adult  Goal: Absence of seizures  Outcome: Progressing  Goal: Remains free of injury related to seizures activity  Outcome: Progressing     Problem: Skin/Tissue Integrity - Adult  Goal: Skin integrity remains intact  Outcome: Progressing  Goal: Incisions, wounds, or drain sites healing without S/S of infection  Outcome: Progressing  Goal: Oral mucous membranes remain intact  Outcome: Progressing     Problem: Musculoskeletal - Adult  Goal: Return mobility to safest level of function  Outcome: Progressing  Goal: Maintain proper alignment of affected body part  Outcome: Progressing  Goal: Return ADL status to a safe level of function  Outcome: Progressing     Problem: Genitourinary - Adult  Goal: Absence of urinary retention  Outcome: Progressing  Goal: Urinary catheter remains patent  Outcome: Progressing     Problem: Infection - Adult  Goal: Absence of infection at discharge  Outcome: Progressing  Goal: Absence of infection during hospitalization  Outcome: Progressing  Goal: Absence of fever/infection during anticipated neutropenic period  Outcome: Progressing     Problem: Metabolic/Fluid and Electrolytes - Adult  Goal: Electrolytes maintained within normal limits  Outcome: Progressing  Goal: Hemodynamic stability and optimal renal function maintained  Outcome: Progressing Problem: Discharge Planning  Goal: Discharge to home or other facility with appropriate resources  Outcome: Progressing     Problem: Safety - Adult  Goal: Free from fall injury  Outcome: Progressing     Problem: Pain  Goal: Verbalizes/displays adequate comfort level or baseline comfort level  Outcome: Progressing     Problem: Confusion  Goal: Confusion, delirium, dementia, or psychosis is improved or at baseline  Description: INTERVENTIONS:  1. Assess for possible contributors to thought disturbance, including medications, impaired vision or hearing, underlying metabolic abnormalities, dehydration, psychiatric diagnoses, and notify attending LIP  2. [place] high risk fall precautions, as indicated  3. Provide frequent short contacts to provide reality reorientation, refocusing and direction  4. Decrease environmental stimuli, including noise as appropriate  5. Monitor and intervene to maintain adequate nutrition, hydration, elimination, sleep and activity  6. If unable to ensure safety without constant attention obtain sitter and review sitter guidelines with assigned personnel  7. Initiate Psychosocial CNS and Spiritual Care consult, as indicated  Outcome: Progressing     Problem: Skin/Tissue Integrity  Goal: Absence of new skin breakdown  Description: 1. Monitor for areas of redness and/or skin breakdown  2. Assess vascular access sites hourly  3. Every 4-6 hours minimum:  Change oxygen saturation probe site  4. Every 4-6 hours:  If on nasal continuous positive airway pressure, respiratory therapy assess nares and determine need for appliance change or resting period.   Outcome: Progressing

## 2023-10-27 NOTE — PLAN OF CARE
Problem: Respiratory - Adult  Goal: Achieves optimal ventilation and oxygenation  10/27/2023 1300 by Alek Bhatti RN  Outcome: Progressing  10/26/2023 2342 by Samy Huff RN  Outcome: Progressing     Problem: Discharge Planning  Goal: Discharge to home or other facility with appropriate resources  10/27/2023 1300 by Alek Bhatti RN  Outcome: Progressing  10/26/2023 2342 by Samy Huff RN  Outcome: Progressing     Problem: Pain  Goal: Verbalizes/displays adequate comfort level or baseline comfort level  10/27/2023 1300 by Alek Bhatti RN  Outcome: Progressing  10/26/2023 2342 by Samy Huff RN  Outcome: Progressing     Problem: Neurosensory - Adult  Goal: Absence of seizures  10/27/2023 1300 by Alek Bhatti RN  Outcome: Progressing  10/26/2023 2342 by Samy Huff RN  Outcome: Progressing  Goal: Remains free of injury related to seizures activity  10/27/2023 1300 by Alek Bhatti RN  Outcome: Progressing  10/26/2023 2342 by Samy Huff RN  Outcome: Progressing     Problem: Skin/Tissue Integrity - Adult  Goal: Skin integrity remains intact  10/27/2023 1300 by Alek Bhatti RN  Outcome: Progressing  10/26/2023 2342 by Samy Huff RN  Outcome: Progressing  Goal: Incisions, wounds, or drain sites healing without S/S of infection  10/27/2023 1300 by Alek Bhatti RN  Outcome: Progressing  10/26/2023 2342 by Samy Huff RN  Outcome: Progressing  Goal: Oral mucous membranes remain intact  10/27/2023 1300 by Aelk Bhatti RN  Outcome: Progressing  10/26/2023 2342 by Samy Huff RN  Outcome: Progressing     Problem: Musculoskeletal - Adult  Goal: Return mobility to safest level of function  10/27/2023 1300 by Alek Bhatti RN  Outcome: Progressing  10/26/2023 2342 by Samy Huff RN  Outcome: Progressing  Goal: Maintain proper alignment of affected body part  10/27/2023 1300 by Alek Bhatti RN  Outcome: Progressing  10/26/2023 2342 by Samy Huff RN  Outcome:

## 2023-10-28 LAB
ANION GAP SERPL CALCULATED.3IONS-SCNC: 14 MMOL/L (ref 9–17)
BASOPHILS # BLD: 0.03 K/UL (ref 0–0.2)
BASOPHILS NFR BLD: 0 % (ref 0–2)
BUN SERPL-MCNC: 18 MG/DL (ref 6–20)
CALCIUM SERPL-MCNC: 8.2 MG/DL (ref 8.6–10.4)
CHLORIDE SERPL-SCNC: 104 MMOL/L (ref 98–107)
CK SERPL-CCNC: 1565 U/L (ref 39–308)
CK SERPL-CCNC: 2399 U/L (ref 39–308)
CK SERPL-CCNC: 2470 U/L (ref 39–308)
CK SERPL-CCNC: 3184 U/L (ref 39–308)
CO2 SERPL-SCNC: 17 MMOL/L (ref 20–31)
CREAT SERPL-MCNC: 0.4 MG/DL (ref 0.7–1.2)
EKG ATRIAL RATE: 76 BPM
EKG P AXIS: 75 DEGREES
EKG P-R INTERVAL: 150 MS
EKG Q-T INTERVAL: 406 MS
EKG QRS DURATION: 88 MS
EKG QTC CALCULATION (BAZETT): 456 MS
EKG R AXIS: -58 DEGREES
EKG T AXIS: 87 DEGREES
EKG VENTRICULAR RATE: 76 BPM
EOSINOPHIL # BLD: <0.03 K/UL (ref 0–0.44)
EOSINOPHILS RELATIVE PERCENT: 0 % (ref 1–4)
ERYTHROCYTE [DISTWIDTH] IN BLOOD BY AUTOMATED COUNT: 12.8 % (ref 11.8–14.4)
GFR SERPL CREATININE-BSD FRML MDRD: >60 ML/MIN/1.73M2
GLUCOSE SERPL-MCNC: 111 MG/DL (ref 70–99)
HCT VFR BLD AUTO: 39.4 % (ref 40.7–50.3)
HGB BLD-MCNC: 13.4 G/DL (ref 13–17)
IMM GRANULOCYTES # BLD AUTO: 0.08 K/UL (ref 0–0.3)
IMM GRANULOCYTES NFR BLD: 0 %
LYMPHOCYTES NFR BLD: 1.26 K/UL (ref 1.1–3.7)
LYMPHOCYTES RELATIVE PERCENT: 7 % (ref 24–43)
MCH RBC QN AUTO: 30.5 PG (ref 25.2–33.5)
MCHC RBC AUTO-ENTMCNC: 34 G/DL (ref 28.4–34.8)
MCV RBC AUTO: 89.7 FL (ref 82.6–102.9)
MONOCYTES NFR BLD: 1.3 K/UL (ref 0.1–1.2)
MONOCYTES NFR BLD: 7 % (ref 3–12)
NEUTROPHILS NFR BLD: 86 % (ref 36–65)
NEUTS SEG NFR BLD: 15.73 K/UL (ref 1.5–8.1)
NRBC BLD-RTO: 0 PER 100 WBC
PLATELET # BLD AUTO: 255 K/UL (ref 138–453)
PMV BLD AUTO: 10.2 FL (ref 8.1–13.5)
POTASSIUM SERPL-SCNC: 3.6 MMOL/L (ref 3.7–5.3)
RBC # BLD AUTO: 4.39 M/UL (ref 4.21–5.77)
SODIUM SERPL-SCNC: 135 MMOL/L (ref 135–144)
WBC OTHER # BLD: 18.4 K/UL (ref 3.5–11.3)

## 2023-10-28 PROCEDURE — 6370000000 HC RX 637 (ALT 250 FOR IP): Performed by: NURSE PRACTITIONER

## 2023-10-28 PROCEDURE — 2580000003 HC RX 258: Performed by: STUDENT IN AN ORGANIZED HEALTH CARE EDUCATION/TRAINING PROGRAM

## 2023-10-28 PROCEDURE — 85025 COMPLETE CBC W/AUTO DIFF WBC: CPT

## 2023-10-28 PROCEDURE — 6370000000 HC RX 637 (ALT 250 FOR IP)

## 2023-10-28 PROCEDURE — 80048 BASIC METABOLIC PNL TOTAL CA: CPT

## 2023-10-28 PROCEDURE — 6360000002 HC RX W HCPCS

## 2023-10-28 PROCEDURE — 6370000000 HC RX 637 (ALT 250 FOR IP): Performed by: STUDENT IN AN ORGANIZED HEALTH CARE EDUCATION/TRAINING PROGRAM

## 2023-10-28 PROCEDURE — 82550 ASSAY OF CK (CPK): CPT

## 2023-10-28 PROCEDURE — 87040 BLOOD CULTURE FOR BACTERIA: CPT

## 2023-10-28 PROCEDURE — 36415 COLL VENOUS BLD VENIPUNCTURE: CPT

## 2023-10-28 PROCEDURE — 2060000000 HC ICU INTERMEDIATE R&B

## 2023-10-28 PROCEDURE — 99232 SBSQ HOSP IP/OBS MODERATE 35: CPT | Performed by: INTERNAL MEDICINE

## 2023-10-28 PROCEDURE — 6360000002 HC RX W HCPCS: Performed by: STUDENT IN AN ORGANIZED HEALTH CARE EDUCATION/TRAINING PROGRAM

## 2023-10-28 PROCEDURE — 93010 ELECTROCARDIOGRAM REPORT: CPT | Performed by: INTERNAL MEDICINE

## 2023-10-28 PROCEDURE — 99291 CRITICAL CARE FIRST HOUR: CPT | Performed by: INTERNAL MEDICINE

## 2023-10-28 PROCEDURE — 99232 SBSQ HOSP IP/OBS MODERATE 35: CPT | Performed by: NEUROLOGICAL SURGERY

## 2023-10-28 RX ORDER — HYDROXYZINE HYDROCHLORIDE 10 MG/1
10 TABLET, FILM COATED ORAL ONCE
Status: DISCONTINUED | OUTPATIENT
Start: 2023-10-28 | End: 2023-10-28

## 2023-10-28 RX ORDER — POTASSIUM CHLORIDE 20 MEQ/1
40 TABLET, EXTENDED RELEASE ORAL ONCE
Status: DISCONTINUED | OUTPATIENT
Start: 2023-10-28 | End: 2023-11-01

## 2023-10-28 RX ORDER — HYDROXYZINE 50 MG/1
50 TABLET, FILM COATED ORAL ONCE
Status: COMPLETED | OUTPATIENT
Start: 2023-10-28 | End: 2023-10-28

## 2023-10-28 RX ORDER — POTASSIUM CHLORIDE 20 MEQ/1
40 TABLET, EXTENDED RELEASE ORAL ONCE
Status: COMPLETED | OUTPATIENT
Start: 2023-10-28 | End: 2023-10-28

## 2023-10-28 RX ORDER — HYDROCODONE BITARTRATE AND ACETAMINOPHEN 5; 325 MG/1; MG/1
1 TABLET ORAL EVERY 6 HOURS PRN
Status: DISCONTINUED | OUTPATIENT
Start: 2023-10-28 | End: 2023-10-29

## 2023-10-28 RX ORDER — GUAIFENESIN 600 MG/1
600 TABLET, EXTENDED RELEASE ORAL 2 TIMES DAILY PRN
Status: DISCONTINUED | OUTPATIENT
Start: 2023-10-28 | End: 2023-11-02 | Stop reason: HOSPADM

## 2023-10-28 RX ADMIN — EXTENDED PHENYTOIN SODIUM 100 MG: 100 CAPSULE ORAL at 17:00

## 2023-10-28 RX ADMIN — SODIUM CHLORIDE, PRESERVATIVE FREE 10 ML: 5 INJECTION INTRAVENOUS at 20:50

## 2023-10-28 RX ADMIN — DEXAMETHASONE SODIUM PHOSPHATE 4 MG: 4 INJECTION, SOLUTION INTRA-ARTICULAR; INTRALESIONAL; INTRAMUSCULAR; INTRAVENOUS; SOFT TISSUE at 03:58

## 2023-10-28 RX ADMIN — ONDANSETRON 4 MG: 4 TABLET, ORALLY DISINTEGRATING ORAL at 20:50

## 2023-10-28 RX ADMIN — ACETAMINOPHEN 650 MG: 325 TABLET ORAL at 09:06

## 2023-10-28 RX ADMIN — HYDROXYZINE HYDROCHLORIDE 50 MG: 50 TABLET ORAL at 17:55

## 2023-10-28 RX ADMIN — HYDROCODONE BITARTRATE AND ACETAMINOPHEN 1 TABLET: 5; 325 TABLET ORAL at 12:14

## 2023-10-28 RX ADMIN — QUETIAPINE FUMARATE 50 MG: 25 TABLET ORAL at 20:50

## 2023-10-28 RX ADMIN — Medication 100 MG: at 09:05

## 2023-10-28 RX ADMIN — HYDROCODONE BITARTRATE AND ACETAMINOPHEN 1 TABLET: 5; 325 TABLET ORAL at 18:32

## 2023-10-28 RX ADMIN — EXTENDED PHENYTOIN SODIUM 100 MG: 100 CAPSULE ORAL at 11:28

## 2023-10-28 RX ADMIN — GUAIFENESIN 600 MG: 600 TABLET, EXTENDED RELEASE ORAL at 12:21

## 2023-10-28 RX ADMIN — FAMOTIDINE 20 MG: 20 TABLET, FILM COATED ORAL at 09:05

## 2023-10-28 RX ADMIN — SODIUM CHLORIDE, PRESERVATIVE FREE 10 ML: 5 INJECTION INTRAVENOUS at 09:06

## 2023-10-28 RX ADMIN — FOLIC ACID 1 MG: 1 TABLET ORAL at 09:05

## 2023-10-28 RX ADMIN — ONDANSETRON 4 MG: 2 INJECTION INTRAMUSCULAR; INTRAVENOUS at 11:27

## 2023-10-28 RX ADMIN — EXTENDED PHENYTOIN SODIUM 100 MG: 100 CAPSULE ORAL at 20:50

## 2023-10-28 RX ADMIN — SERTRALINE HYDROCHLORIDE 50 MG: 50 TABLET ORAL at 09:05

## 2023-10-28 RX ADMIN — DEXAMETHASONE SODIUM PHOSPHATE 4 MG: 4 INJECTION, SOLUTION INTRA-ARTICULAR; INTRALESIONAL; INTRAMUSCULAR; INTRAVENOUS; SOFT TISSUE at 21:40

## 2023-10-28 RX ADMIN — POTASSIUM CHLORIDE 40 MEQ: 1500 TABLET, EXTENDED RELEASE ORAL at 06:18

## 2023-10-28 RX ADMIN — QUETIAPINE FUMARATE 50 MG: 25 TABLET ORAL at 09:05

## 2023-10-28 RX ADMIN — DEXAMETHASONE SODIUM PHOSPHATE 4 MG: 4 INJECTION, SOLUTION INTRA-ARTICULAR; INTRALESIONAL; INTRAMUSCULAR; INTRAVENOUS; SOFT TISSUE at 11:27

## 2023-10-28 RX ADMIN — FAMOTIDINE 20 MG: 20 TABLET, FILM COATED ORAL at 20:50

## 2023-10-28 RX ADMIN — DEXAMETHASONE SODIUM PHOSPHATE 4 MG: 4 INJECTION, SOLUTION INTRA-ARTICULAR; INTRALESIONAL; INTRAMUSCULAR; INTRAVENOUS; SOFT TISSUE at 17:00

## 2023-10-28 RX ADMIN — ENOXAPARIN SODIUM 40 MG: 100 INJECTION SUBCUTANEOUS at 09:05

## 2023-10-28 ASSESSMENT — PAIN SCALES - GENERAL
PAINLEVEL_OUTOF10: 8
PAINLEVEL_OUTOF10: 9
PAINLEVEL_OUTOF10: 7
PAINLEVEL_OUTOF10: 0
PAINLEVEL_OUTOF10: 7
PAINLEVEL_OUTOF10: 0
PAINLEVEL_OUTOF10: 0

## 2023-10-28 ASSESSMENT — PAIN - FUNCTIONAL ASSESSMENT
PAIN_FUNCTIONAL_ASSESSMENT: PREVENTS OR INTERFERES SOME ACTIVE ACTIVITIES AND ADLS
PAIN_FUNCTIONAL_ASSESSMENT: PREVENTS OR INTERFERES SOME ACTIVE ACTIVITIES AND ADLS

## 2023-10-28 ASSESSMENT — PAIN DESCRIPTION - FREQUENCY
FREQUENCY: CONTINUOUS
FREQUENCY: CONTINUOUS

## 2023-10-28 ASSESSMENT — PAIN DESCRIPTION - ORIENTATION
ORIENTATION: RIGHT
ORIENTATION: RIGHT

## 2023-10-28 ASSESSMENT — PAIN DESCRIPTION - LOCATION
LOCATION: CHEST;SHOULDER
LOCATION: CHEST;SHOULDER

## 2023-10-28 ASSESSMENT — PAIN DESCRIPTION - PAIN TYPE
TYPE: ACUTE PAIN
TYPE: ACUTE PAIN

## 2023-10-28 ASSESSMENT — PAIN DESCRIPTION - ONSET
ONSET: PROGRESSIVE
ONSET: PROGRESSIVE

## 2023-10-28 ASSESSMENT — PAIN DESCRIPTION - DESCRIPTORS
DESCRIPTORS: ACHING;SHARP
DESCRIPTORS: ACHING;SHARP;TIGHTNESS

## 2023-10-28 NOTE — PROGRESS NOTES
(DILANTIN) ER capsule 100 mg  100 mg Oral TID Mick De Jesus, APRN - CNP   100 mg at 10/27/23 2021    sodium chloride flush 0.9 % injection 5-40 mL  5-40 mL IntraVENous 2 times per day Armando Rail, DO   10 mL at 10/27/23 2021    sodium chloride flush 0.9 % injection 5-40 mL  5-40 mL IntraVENous PRN Josias Zarate, DO        0.9 % sodium chloride infusion   IntraVENous PRN Geoffrey Zarate, DO        LORazepam (ATIVAN) injection 4 mg  4 mg IntraVENous Q5 Min PRN Josias Zarate, DO        enoxaparin (LOVENOX) injection 40 mg  40 mg SubCUTAneous Daily TessaJosias licona, DO   40 mg at 10/27/23 0842    ondansetron (ZOFRAN-ODT) disintegrating tablet 4 mg  4 mg Oral Q8H PRN Josias Zarate, DO        Or    ondansetron (ZOFRAN) injection 4 mg  4 mg IntraVENous Q6H PRN Josias Zarate, DO        polyethylene glycol (GLYCOLAX) packet 17 g  17 g Oral Daily PRN Josias Zarate, DO        acetaminophen (TYLENOL) tablet 650 mg  650 mg Oral Q6H PRN Geoffrey Zarate, DO   650 mg at 10/27/23 2023    Or    acetaminophen (TYLENOL) suppository 650 mg  650 mg Rectal Q6H PRN Josias Zarate, DO        potassium chloride 10 mEq/100 mL IVPB (Peripheral Line)  10 mEq IntraVENous PRN Josias Zarate, DO        glucose chewable tablet 16 g  4 tablet Oral PRN Josias Zarate, DO        dextrose bolus 10% 125 mL  125 mL IntraVENous PRN Josias Zarate, DO        Or    dextrose bolus 10% 250 mL  250 mL IntraVENous PRN Josias Zarate, DO        glucagon (rDNA) injection 1 mg  1 mg SubCUTAneous PRN Josias Zarate, DO        dextrose 10 % infusion   IntraVENous Continuous PRN Josias Zarate, DO        potassium chloride (KLOR-CON M) extended release tablet 40 mEq  40 mEq Oral PRN Wilver Contreras MD   40 mEq at 10/27/23 0840    Or    potassium bicarb-citric acid (EFFER-K) effervescent tablet 40 mEq  40 mEq Oral PRN Wilver Contreras MD        Or    potassium chloride 10 mEq/100 mL IVPB (Peripheral Line)  10 mEq IntraVENous PRN Wilver Contreras MD contextual diversion.

## 2023-10-28 NOTE — PROGRESS NOTES
[facility]  Internal Medicine Teaching Residency Program  Inpatient Daily Progress Note  ______________________________________________________________________________    Patient: Marcy James. YOB: 1970   RQR:[id number]    Acct: [de-identified]     Room: [address] [place] [address]  Admit date: 10/26/2023  Today's date: 10/28/23  Number of days in the hospital: 2    SUBJECTIVE   Admitting Diagnosis: Seizure ([address])  CC: Seizure  Pt examined at bedside. Chart & results reviewed. Patient's BP was in 140's/100's. Patient is saturating well on room air and is currently on 125 NS. Patient complained of mid epigastric pain and had some episodes of loose stools overnight. Patient's potassium was 3.6. It was replaced. CK is downtrending on . WBC is uptrending. ROS:  Constitutional:  negative for chills, fevers, sweats  Respiratory:  negative for cough, dyspnea on exertion, hemoptysis, shortness of breath, wheezing  Cardiovascular:  negative for chest pain, chest pressure/discomfort, lower extremity edema, palpitations  Gastrointestinal:  mid epigastric pain and diarrhea present  Neurological:  headache present  BRIEF HISTORY     The patient is a pleasant 46 y.o. male  with polysubstance abuse, COPD, Alcohol abuse disorder ( sober since 2 years) with history of withdrawal seizures, schizophrenia presented to presented to [facility] ER with seizure with fall. Per report patient seized roughly 3 minutes at home, and at that time EMS was called. By the time patient arrived at [facility] ED, he was A&O x3 and able to provide some sort of history. While at [address] [place] [address] ED, patient went into status epilepticus. and failed multiple doses of Ativan. He was subsequently intubated for airway protection and started on a Versed drip. Patient did not respond well to Versed drip and was agitated on the mechanical ventilator, at that time propofol was started.   Per report,

## 2023-10-28 NOTE — PLAN OF CARE
Problem: Respiratory - Adult  Goal: Achieves optimal ventilation and oxygenation  10/27/2023 2233 by Gage Moreira RN  Outcome: Progressing  10/27/2023 1300 by Radha Mejia RN  Outcome: Progressing     Problem: Discharge Planning  Goal: Discharge to home or other facility with appropriate resources  10/27/2023 2233 by Gage Moreira RN  Outcome: Progressing  10/27/2023 1300 by Radha Mejia RN  Outcome: Progressing     Problem: Pain  Goal: Verbalizes/displays adequate comfort level or baseline comfort level  10/27/2023 2233 by Gage Moreira RN  Outcome: Progressing  10/27/2023 1300 by Radha Mejia RN  Outcome: Progressing     Problem: Neurosensory - Adult  Goal: Absence of seizures  10/27/2023 2233 by Gage Moreira RN  Outcome: Progressing  10/27/2023 1300 by Radha Mejia RN  Outcome: Progressing  Goal: Remains free of injury related to seizures activity  10/27/2023 2233 by Gage Moreira RN  Outcome: Progressing  10/27/2023 1300 by Radha Mejia RN  Outcome: Progressing     Problem: Skin/Tissue Integrity - Adult  Goal: Skin integrity remains intact  10/27/2023 2233 by Gage Moreira RN  Outcome: Progressing  10/27/2023 1300 by Radha Mejia RN  Outcome: Progressing  Goal: Incisions, wounds, or drain sites healing without S/S of infection  10/27/2023 2233 by Gage Moreira RN  Outcome: Progressing  10/27/2023 1300 by Radha Mejia RN  Outcome: Progressing  Goal: Oral mucous membranes remain intact  10/27/2023 2233 by Gage Moreira RN  Outcome: Progressing  10/27/2023 1300 by Radha Mejia RN  Outcome: Progressing     Problem: Musculoskeletal - Adult  Goal: Return mobility to safest level of function  10/27/2023 2233 by Gage Moreira RN  Outcome: Progressing  10/27/2023 1300 by Radha Mejia RN  Outcome: Progressing  Goal: Maintain proper alignment of affected body part  10/27/2023 2233 by Gage Moreira RN  Outcome: Progressing  10/27/2023 1300 by Radha Mejia RN  Outcome: Progressing  Goal: Return ADL

## 2023-10-28 NOTE — PROGRESS NOTES
Today's Date: 10/28/2023  Patient Name: Negrito Adan. Date of admission: 10/26/2023  2:08 AM  Patient's age: 46 y.o., 1970  Admission Dx: Status epilepticus ([address]) [G40.901]  Seizure ([address]) [R56.9]      Requesting Physician: Kati Mix MD    CHIEF COMPLAINT: Status epilepticus. .  Consult for brain metastasis and lung mass. SUBJECTIVE:  Patient seen and examined  No new events  Family at bedside  NO chest pain   No NV    BRIEF CASE HISTORY:    The patient is a 46 y.o.  male who is admitted to the hospital for further movement of status epilepticus. Patient presented to outside facility with seizure activities. It was difficult to control. He was transferred for further care. Further management and work-up reviewed multiple small brain lesions consistent with metastasis. MRI was ordered and it is pending. CT scan of the chest showed suspicious spiculated right upper lobe mass measuring 4.3 cm suggestive of malignancy. Positive evaluating the right hilar and subcarinal stations. No further history is obtainable from the patient. Past Medical History:   has a past medical history of Arthritis, Asthma, COPD (chronic obstructive pulmonary disease) ([address]), GERD (gastroesophageal reflux disease), Hypertension, Psychiatric problem, Schizophrenia ([address]), Severe malnutrition ([address]), and Unspecified cerebral artery occlusion with cerebral infarction. Past Surgical History:   has no past surgical history on file. Family History: family history includes Cancer in his father and mother; Other in his mother, sister, and sister. Social History:   reports that he has been smoking cigarettes. He has a 30.00 pack-year smoking history. He has never used smokeless tobacco. He reports that he does not currently use alcohol.  He reports that he does not currently use drugs after having used the following drugs: Marijuana (Monika Risen), Opiates , and proximal portions of the [place] demonstrate normal  flow voids. ORBITS: Limited evaluation of the orbits is unremarkable. SINUSES: The paranasal sinuses are clear. Trace fluid is present within the  mastoid air cells. BONES/SOFT TISSUES: No bone marrow signal abnormality is identified. Impression: Limited exam due to patient motion demonstrating intracranial metastatic  disease with greater than 15 subcentimeter lesions present. There is  vasogenic edema surrounding the lesions but no mass effect or midline shift. IMPRESSION:    Primary Problem  Seizure [facility])    Active Hospital Problems    Diagnosis Date Noted    Status epilepticus ([address]) [facility] 10/26/2023    Lung mass [R91.8] 10/26/2023    Metastasis to brain ([address]) [C79.31] 10/26/2023    Vasogenic edema ([address]) [G93.6] 10/26/2023    Seizure ([address]) [R56.9] 03/26/2023   Status epilepticus  Brain metastasis  Right upper lobe lung mass suspicious for malignancy. RECOMMENDATIONS:  I reviewed the laboratory data, imaging studies, diagnosis and treatment recommendations   Overall picture consistent with possible lung primary with brain metastasis however will need tissue diagnosis   Continue Decadron   We will plan for IR guided lung biopsy on Monday   Further recommendations will based on the pathology   Continue other management as per primary team   We will follow. Tish Gonzalez MD, MD  [facility]                            This note is created with the assistance of a speech recognition program.  While intending to generate a document that actually reflects the content of the visit, the document can still have some errors including those of syntax and sound a like substitutions which may escape proof reading. It such instances, actual meaning can be extrapolated by contextual diversion.

## 2023-10-29 ENCOUNTER — APPOINTMENT (OUTPATIENT)
Dept: MRI IMAGING | Age: 53
End: 2023-10-29
Payer: MEDICAID

## 2023-10-29 PROBLEM — G93.89 BRAIN MASS: Status: ACTIVE | Noted: 2023-10-29

## 2023-10-29 LAB
ANION GAP SERPL CALCULATED.3IONS-SCNC: 11 MMOL/L (ref 9–17)
BASOPHILS # BLD: 0.03 K/UL (ref 0–0.2)
BASOPHILS NFR BLD: 0 % (ref 0–2)
BUN SERPL-MCNC: 11 MG/DL (ref 6–20)
CALCIUM SERPL-MCNC: 8.5 MG/DL (ref 8.6–10.4)
CHLORIDE SERPL-SCNC: 100 MMOL/L (ref 98–107)
CK SERPL-CCNC: 1118 U/L (ref 39–308)
CK SERPL-CCNC: 1310 U/L (ref 39–308)
CK SERPL-CCNC: 960 U/L (ref 39–308)
CO2 SERPL-SCNC: 17 MMOL/L (ref 20–31)
CREAT SERPL-MCNC: 0.4 MG/DL (ref 0.7–1.2)
EOSINOPHIL # BLD: <0.03 K/UL (ref 0–0.44)
EOSINOPHILS RELATIVE PERCENT: 0 % (ref 1–4)
ERYTHROCYTE [DISTWIDTH] IN BLOOD BY AUTOMATED COUNT: 12.8 % (ref 11.8–14.4)
GFR SERPL CREATININE-BSD FRML MDRD: >60 ML/MIN/1.73M2
GLUCOSE SERPL-MCNC: 100 MG/DL (ref 70–99)
HCT VFR BLD AUTO: 43 % (ref 40.7–50.3)
HGB BLD-MCNC: 14 G/DL (ref 13–17)
IMM GRANULOCYTES # BLD AUTO: 0.05 K/UL (ref 0–0.3)
IMM GRANULOCYTES NFR BLD: 0 %
LYMPHOCYTES NFR BLD: 2.44 K/UL (ref 1.1–3.7)
LYMPHOCYTES RELATIVE PERCENT: 15 % (ref 24–43)
MCH RBC QN AUTO: 30.8 PG (ref 25.2–33.5)
MCHC RBC AUTO-ENTMCNC: 32.6 G/DL (ref 28.4–34.8)
MCV RBC AUTO: 94.7 FL (ref 82.6–102.9)
MONOCYTES NFR BLD: 1.4 K/UL (ref 0.1–1.2)
MONOCYTES NFR BLD: 9 % (ref 3–12)
NEUTROPHILS NFR BLD: 76 % (ref 36–65)
NEUTS SEG NFR BLD: 12.4 K/UL (ref 1.5–8.1)
NRBC BLD-RTO: 0 PER 100 WBC
OSMOLALITY UR: 404 MOSM/KG (ref 80–1300)
PLATELET # BLD AUTO: 252 K/UL (ref 138–453)
PMV BLD AUTO: 10.2 FL (ref 8.1–13.5)
POTASSIUM SERPL-SCNC: 3.8 MMOL/L (ref 3.7–5.3)
RBC # BLD AUTO: 4.54 M/UL (ref 4.21–5.77)
SODIUM SERPL-SCNC: 128 MMOL/L (ref 135–144)
SODIUM UR-SCNC: 81 MMOL/L
WBC OTHER # BLD: 16.3 K/UL (ref 3.5–11.3)

## 2023-10-29 PROCEDURE — 6360000002 HC RX W HCPCS

## 2023-10-29 PROCEDURE — 2580000003 HC RX 258: Performed by: NURSE PRACTITIONER

## 2023-10-29 PROCEDURE — 82550 ASSAY OF CK (CPK): CPT

## 2023-10-29 PROCEDURE — 2060000000 HC ICU INTERMEDIATE R&B

## 2023-10-29 PROCEDURE — 6370000000 HC RX 637 (ALT 250 FOR IP): Performed by: STUDENT IN AN ORGANIZED HEALTH CARE EDUCATION/TRAINING PROGRAM

## 2023-10-29 PROCEDURE — 80048 BASIC METABOLIC PNL TOTAL CA: CPT

## 2023-10-29 PROCEDURE — 72146 MRI CHEST SPINE W/O DYE: CPT

## 2023-10-29 PROCEDURE — 97110 THERAPEUTIC EXERCISES: CPT

## 2023-10-29 PROCEDURE — 2580000003 HC RX 258

## 2023-10-29 PROCEDURE — 72141 MRI NECK SPINE W/O DYE: CPT

## 2023-10-29 PROCEDURE — 85025 COMPLETE CBC W/AUTO DIFF WBC: CPT

## 2023-10-29 PROCEDURE — 99232 SBSQ HOSP IP/OBS MODERATE 35: CPT | Performed by: INTERNAL MEDICINE

## 2023-10-29 PROCEDURE — 99291 CRITICAL CARE FIRST HOUR: CPT | Performed by: INTERNAL MEDICINE

## 2023-10-29 PROCEDURE — 36415 COLL VENOUS BLD VENIPUNCTURE: CPT

## 2023-10-29 PROCEDURE — 6370000000 HC RX 637 (ALT 250 FOR IP)

## 2023-10-29 PROCEDURE — 6360000002 HC RX W HCPCS: Performed by: STUDENT IN AN ORGANIZED HEALTH CARE EDUCATION/TRAINING PROGRAM

## 2023-10-29 PROCEDURE — 83935 ASSAY OF URINE OSMOLALITY: CPT

## 2023-10-29 PROCEDURE — 84295 ASSAY OF SERUM SODIUM: CPT

## 2023-10-29 PROCEDURE — 84300 ASSAY OF URINE SODIUM: CPT

## 2023-10-29 PROCEDURE — 97116 GAIT TRAINING THERAPY: CPT

## 2023-10-29 PROCEDURE — 2580000003 HC RX 258: Performed by: STUDENT IN AN ORGANIZED HEALTH CARE EDUCATION/TRAINING PROGRAM

## 2023-10-29 PROCEDURE — 6370000000 HC RX 637 (ALT 250 FOR IP): Performed by: NURSE PRACTITIONER

## 2023-10-29 PROCEDURE — 72148 MRI LUMBAR SPINE W/O DYE: CPT

## 2023-10-29 RX ORDER — FENTANYL CITRATE 50 UG/ML
25 INJECTION, SOLUTION INTRAMUSCULAR; INTRAVENOUS
Status: COMPLETED | OUTPATIENT
Start: 2023-10-29 | End: 2023-10-31

## 2023-10-29 RX ORDER — LORAZEPAM 2 MG/ML
0.5 INJECTION INTRAMUSCULAR ONCE
Status: COMPLETED | OUTPATIENT
Start: 2023-10-29 | End: 2023-10-29

## 2023-10-29 RX ORDER — HYDROCODONE BITARTRATE AND ACETAMINOPHEN 5; 325 MG/1; MG/1
2 TABLET ORAL EVERY 6 HOURS PRN
Status: DISCONTINUED | OUTPATIENT
Start: 2023-10-29 | End: 2023-11-02 | Stop reason: HOSPADM

## 2023-10-29 RX ORDER — SODIUM CHLORIDE, SODIUM LACTATE, POTASSIUM CHLORIDE, CALCIUM CHLORIDE 600; 310; 30; 20 MG/100ML; MG/100ML; MG/100ML; MG/100ML
INJECTION, SOLUTION INTRAVENOUS CONTINUOUS
Status: DISCONTINUED | OUTPATIENT
Start: 2023-10-29 | End: 2023-10-30

## 2023-10-29 RX ADMIN — SODIUM CHLORIDE, PRESERVATIVE FREE 10 ML: 5 INJECTION INTRAVENOUS at 08:51

## 2023-10-29 RX ADMIN — QUETIAPINE FUMARATE 50 MG: 25 TABLET ORAL at 08:51

## 2023-10-29 RX ADMIN — SODIUM CHLORIDE, POTASSIUM CHLORIDE, SODIUM LACTATE AND CALCIUM CHLORIDE: 600; 310; 30; 20 INJECTION, SOLUTION INTRAVENOUS at 20:02

## 2023-10-29 RX ADMIN — SODIUM CHLORIDE: 9 INJECTION, SOLUTION INTRAVENOUS at 02:35

## 2023-10-29 RX ADMIN — FENTANYL CITRATE 25 MCG: 50 INJECTION, SOLUTION INTRAMUSCULAR; INTRAVENOUS at 22:56

## 2023-10-29 RX ADMIN — DEXAMETHASONE SODIUM PHOSPHATE 4 MG: 4 INJECTION, SOLUTION INTRA-ARTICULAR; INTRALESIONAL; INTRAMUSCULAR; INTRAVENOUS; SOFT TISSUE at 03:59

## 2023-10-29 RX ADMIN — DEXAMETHASONE SODIUM PHOSPHATE 4 MG: 4 INJECTION, SOLUTION INTRA-ARTICULAR; INTRALESIONAL; INTRAMUSCULAR; INTRAVENOUS; SOFT TISSUE at 16:39

## 2023-10-29 RX ADMIN — LORAZEPAM 0.5 MG: 2 INJECTION INTRAMUSCULAR; INTRAVENOUS at 16:39

## 2023-10-29 RX ADMIN — ENOXAPARIN SODIUM 40 MG: 100 INJECTION SUBCUTANEOUS at 08:51

## 2023-10-29 RX ADMIN — FAMOTIDINE 20 MG: 20 TABLET, FILM COATED ORAL at 08:54

## 2023-10-29 RX ADMIN — EXTENDED PHENYTOIN SODIUM 100 MG: 100 CAPSULE ORAL at 09:06

## 2023-10-29 RX ADMIN — FOLIC ACID 1 MG: 1 TABLET ORAL at 08:51

## 2023-10-29 RX ADMIN — EXTENDED PHENYTOIN SODIUM 100 MG: 100 CAPSULE ORAL at 20:46

## 2023-10-29 RX ADMIN — LORAZEPAM 0.5 MG: 2 INJECTION INTRAMUSCULAR; INTRAVENOUS at 22:56

## 2023-10-29 RX ADMIN — QUETIAPINE FUMARATE 50 MG: 25 TABLET ORAL at 20:46

## 2023-10-29 RX ADMIN — SODIUM CHLORIDE, PRESERVATIVE FREE 10 ML: 5 INJECTION INTRAVENOUS at 20:46

## 2023-10-29 RX ADMIN — DEXAMETHASONE SODIUM PHOSPHATE 4 MG: 4 INJECTION, SOLUTION INTRA-ARTICULAR; INTRALESIONAL; INTRAMUSCULAR; INTRAVENOUS; SOFT TISSUE at 22:55

## 2023-10-29 RX ADMIN — FAMOTIDINE 20 MG: 20 TABLET, FILM COATED ORAL at 20:46

## 2023-10-29 RX ADMIN — Medication 100 MG: at 08:51

## 2023-10-29 RX ADMIN — EXTENDED PHENYTOIN SODIUM 100 MG: 100 CAPSULE ORAL at 13:31

## 2023-10-29 RX ADMIN — SERTRALINE HYDROCHLORIDE 50 MG: 50 TABLET ORAL at 08:51

## 2023-10-29 RX ADMIN — HYDROCODONE BITARTRATE AND ACETAMINOPHEN 1 TABLET: 5; 325 TABLET ORAL at 12:49

## 2023-10-29 RX ADMIN — HYDROCODONE BITARTRATE AND ACETAMINOPHEN 1 TABLET: 5; 325 TABLET ORAL at 03:59

## 2023-10-29 RX ADMIN — SODIUM CHLORIDE, PRESERVATIVE FREE 10 ML: 5 INJECTION INTRAVENOUS at 22:57

## 2023-10-29 RX ADMIN — DEXAMETHASONE SODIUM PHOSPHATE 4 MG: 4 INJECTION, SOLUTION INTRA-ARTICULAR; INTRALESIONAL; INTRAMUSCULAR; INTRAVENOUS; SOFT TISSUE at 08:51

## 2023-10-29 RX ADMIN — ONDANSETRON 4 MG: 2 INJECTION INTRAMUSCULAR; INTRAVENOUS at 09:05

## 2023-10-29 RX ADMIN — HYDROCODONE BITARTRATE AND ACETAMINOPHEN 2 TABLET: 5; 325 TABLET ORAL at 17:59

## 2023-10-29 ASSESSMENT — PAIN SCALES - GENERAL
PAINLEVEL_OUTOF10: 9
PAINLEVEL_OUTOF10: 8
PAINLEVEL_OUTOF10: 6
PAINLEVEL_OUTOF10: 9

## 2023-10-29 ASSESSMENT — PAIN DESCRIPTION - ORIENTATION
ORIENTATION: RIGHT
ORIENTATION: RIGHT;POSTERIOR

## 2023-10-29 ASSESSMENT — PAIN DESCRIPTION - DESCRIPTORS
DESCRIPTORS: ACHING
DESCRIPTORS: ACHING;DISCOMFORT

## 2023-10-29 ASSESSMENT — PAIN DESCRIPTION - LOCATION
LOCATION: BACK;GENERALIZED
LOCATION: BACK
LOCATION: CHEST

## 2023-10-29 NOTE — PROGRESS NOTES
[facility]  Internal Medicine Teaching Residency Program  Inpatient Daily Progress Note  ______________________________________________________________________________    Patient: Lesly Victoria. YOB: 1970   K:[id number]    Acct: [de-identified]     Room: [facility]011-01  Admit date: 10/26/2023  Today's date: 10/29/23  Number of days in the hospital: 3    SUBJECTIVE   Admitting Diagnosis: Seizure ([address])  CC: Seizure  Pt examined at bedside. Chart & results reviewed. Patient's BP was in 140's/100's. Patient is saturating well on room air and is currently on 125 LR. Patient complained of mid epigastric pain his diarrhea is improving. Patient had low sodium 128 and Bicarb 17. She was started on LR. It could be SIADH. Urine osm,plasma osm and Urine Na was ordered. CK is downtrending on fluids. WBC is still high. As per neurosurgery,due to multiple subcentimeter lesions throughout the subcortical regions of bilateral hemispheres,patient will likely require systemic treatment with radiation and chemotherapy. No surgical intervention. ROS:  Constitutional:  negative for chills, fevers, sweats  Respiratory:  negative for cough, dyspnea on exertion, hemoptysis, shortness of breath, wheezing  Cardiovascular:  negative for chest pain, chest pressure/discomfort, lower extremity edema, palpitations  Gastrointestinal:  mid epigastric pain and diarrhea present  Neurological:  headache and back present  BRIEF HISTORY     The patient is a pleasant 46 y.o. male  with polysubstance abuse, COPD, Alcohol abuse disorder ( sober since 2 years) with history of withdrawal seizures, schizophrenia presented to presented to [facility] ER with seizure with fall. Per report patient seized roughly 3 minutes at home, and at that time EMS was called. By the time patient arrived at [facility] ED, he was A&O x3 and able to provide some sort of history.  While at [address] [place] [address] ED, pulmonary nodules suspicious for malignancy and possible brain mets. Seizures with fall likely secondary to brain metastasis  - H/o alcohol withdrawal seizures  - Not on AED  - Status epilepticus unresponsive to ativan  - Intubated and later successfully extubated  - CT imaging - Right upper Lung mass with B/L pulmonary nodules and right hilar lymphadenopathy and brain mets and cerebral edema  - LTME - no more epileptiform activity  - On Dilantin and Decadron  - MRI Brain - multiple subcentimeter lesions throughout the subcortical regions of bilateral hemispheres  - As per Neurosurgery, will likely require systemic treatment with radiation and chemotherapy. No surgical intervention.  - MRI spine pending  - Oncology plans to do CT guided biopsy of the pulmonary mass on 10/30     Rhabdomyolysis likely secondary to seizure  -CK downtrending  -Creatinine wnl  -On 125 ml/hr LS     Leucocytosis likely secondary to seizures     Lung malignancy with brain metastasis  - Smoker 1 pack a day. 30 pack years  - CT Chest -  4.3 cm spiculated right upper lobe mass with malignant appearance with multiple bilateral pulmonary metastatic nodules and metastatic lymphadenopathy in the right hilar and subcarinal stations  - CT Head -  multiple subcentimeter brain metastasis, at least 7, suspected in both cerebral hemispheres with mild associated vasogenic edema  - MRI Brain - multiple subcentimeter lesions throughout the subcortical regions of bilateral hemispheres  - As per Neurosurgery, will likely require systemic treatment with radiation and chemotherapy. No surgical intervention.  - MRI spine pending  - Oncology plans to do CT guided biopsy of the pulmonary mass om 10/30     COPD  - smoker 1 pack a day.  30 pack years  - not on home oxygen  - As per the patient, uses inhalers     Hx of Alcohol abuse disorder  - been sober for 2 years  - h/o alcohol withdrawal seizures  - UDS negative for alcohol     Marijuana use  - advise to

## 2023-10-29 NOTE — PLAN OF CARE
Problem: Respiratory - Adult  Goal: Achieves optimal ventilation and oxygenation  10/29/2023 0754 by Christie Bucio RN  Outcome: Progressing  10/29/2023 0115 by Radha Richardson RN  Outcome: Progressing     Problem: Neurosensory - Adult  Goal: Absence of seizures  10/29/2023 0754 by Christie Bucio RN  Outcome: Progressing  10/29/2023 0115 by Radha Richardson RN  Outcome: Progressing  Goal: Remains free of injury related to seizures activity  10/29/2023 0754 by Christie Bucio RN  Outcome: Progressing  10/29/2023 0115 by Radha Richardson RN  Outcome: Progressing     Problem: Skin/Tissue Integrity - Adult  Goal: Skin integrity remains intact  10/29/2023 0754 by Christie Bucio RN  Outcome: Progressing  10/29/2023 0115 by Radha Richardson RN  Outcome: Progressing  Goal: Incisions, wounds, or drain sites healing without S/S of infection  10/29/2023 0754 by Christie Bucio RN  Outcome: Progressing  10/29/2023 0115 by Radha Richardson RN  Outcome: Progressing  Goal: Oral mucous membranes remain intact  10/29/2023 0754 by Christie Bucio RN  Outcome: Progressing  10/29/2023 0115 by Radha Richardson RN  Outcome: Progressing     Problem: Musculoskeletal - Adult  Goal: Return mobility to safest level of function  10/29/2023 0754 by Christie Bucio RN  Outcome: Progressing  10/29/2023 0115 by Radha Richardson RN  Outcome: Progressing  Goal: Maintain proper alignment of affected body part  10/29/2023 0754 by Christie Bucio RN  Outcome: Progressing  10/29/2023 0115 by Radha Richardson RN  Outcome: Progressing  Goal: Return ADL status to a safe level of function  10/29/2023 0754 by Christie Bucio RN  Outcome: Progressing  10/29/2023 0115 by Radha Richardson RN  Outcome: Progressing     Problem: Genitourinary - Adult  Goal: Absence of urinary retention  10/29/2023 0754 by Christie Bucio RN  Outcome: Progressing  10/29/2023 0115 by Radha Richardson RN  Outcome: Progressing  Goal: Urinary catheter remains patent  10/29/2023 0754 by Sravanthi Paige,

## 2023-10-29 NOTE — CARE COORDINATION
CM spoke with patient to discuss transitional planning. Patient states that he plans to return home independently at discharge. Patient states that he will have transportation and he verbalizes having no additional transitional needs at this time.

## 2023-10-29 NOTE — PROGRESS NOTES
Today's Date: 10/29/2023  Patient Name: Lesly Tomlinson Date of admission: 10/26/2023  2:08 AM  Patient's age: 46 y.o., 1970  Admission Dx: Status epilepticus ([address]) [G40.901]  Seizure ([address]) [R56.9]      Requesting Physician: Jayant Cohn MD    CHIEF COMPLAINT: Status epilepticus. .  Consult for brain metastasis and lung mass. SUBJECTIVE:  Patient seen and examined  No new events  NO chest pain   No NV    BRIEF CASE HISTORY:    The patient is a 46 y.o.  male who is admitted to the hospital for further movement of status epilepticus. Patient presented to outside facility with seizure activities. It was difficult to control. He was transferred for further care. Further management and work-up reviewed multiple small brain lesions consistent with metastasis. MRI was ordered and it is pending. CT scan of the chest showed suspicious spiculated right upper lobe mass measuring 4.3 cm suggestive of malignancy. Positive evaluating the right hilar and subcarinal stations. No further history is obtainable from the patient. Past Medical History:   has a past medical history of Arthritis, Asthma, COPD (chronic obstructive pulmonary disease) ([address]), GERD (gastroesophageal reflux disease), Hypertension, Psychiatric problem, Schizophrenia ([address]), Severe malnutrition ([address]), and Unspecified cerebral artery occlusion with cerebral infarction. Past Surgical History:   has no past surgical history on file. Family History: family history includes Cancer in his father and mother; Other in his mother, sister, and sister. Social History:   reports that he has been smoking cigarettes. He has a 30.00 pack-year smoking history. He has never used smokeless tobacco. He reports that he does not currently use alcohol. He reports that he does not currently use drugs after having used the following drugs: Marijuana (Ozell Wale), Opiates , and Cocaine.    Medications: narrowing. Foraminal narrowing, moderate to severe at right C6-7, mild-to-moderate at  left C6-7, mild at right C5-6. Heterogeneous T2 signal in the cervical spinal cord, likely related to  artifacts. MRI thoracic spine:    Mild chronic compression deformities of T6 and T8 with up to 15% height loss. No acute fracture or destructive osseous lesion. No significant disc herniation, spinal canal or foraminal stenosis in the  thoracic spine. Mildly prominent right hilar lymph nodes, likely related to  infection/inflammation. Lung nodules in the bilateral lungs measuring up to  1 cm in the right lower lobe. Further evaluation with CT chest is  recommended. MRI lumbar spine:    No fracture or destructive osseous lesion. Degenerative disc disease as described above. No spinal canal narrowing. Foraminal narrowing moderate at left L5-S1, mild at right L5-S1. Question of abnormal bone marrow signal in the right sacrum on sagittal STIR  images, likely related to artifacts. 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 RECOMMENDATIONS:  I reviewed the laboratory data, imaging studies, diagnosis and treatment recommendations   Overall picture consistent with possible lung primary with brain metastasis however will need tissue diagnosis   Continue Decadron   We will plan for IR guided lung biopsy  Further recommendations will based on the pathology   Continue other management as per primary team   We will follow.     Tish Gonzalez MD, MD Kath Siddiqui

## 2023-10-29 NOTE — FLOWSHEET NOTE
Pt refuses bath at this time. Pt states that he will get cleaned up later when his fiancee comes in.

## 2023-10-29 NOTE — PLAN OF CARE
Problem: Respiratory - Adult  Goal: Achieves optimal ventilation and oxygenation  Outcome: Progressing     Problem: Discharge Planning  Goal: Discharge to home or other facility with appropriate resources  Outcome: Progressing     Problem: Pain  Goal: Verbalizes/displays adequate comfort level or baseline comfort level  Outcome: Progressing     Problem: Neurosensory - Adult  Goal: Absence of seizures  Outcome: Progressing  Goal: Remains free of injury related to seizures activity  Outcome: Progressing     Problem: Skin/Tissue Integrity - Adult  Goal: Skin integrity remains intact  Outcome: Progressing  Goal: Incisions, wounds, or drain sites healing without S/S of infection  Outcome: Progressing  Goal: Oral mucous membranes remain intact  Outcome: Progressing     Problem: Musculoskeletal - Adult  Goal: Return mobility to safest level of function  Outcome: Progressing  Goal: Maintain proper alignment of affected body part  Outcome: Progressing  Goal: Return ADL status to a safe level of function  Outcome: Progressing     Problem: Genitourinary - Adult  Goal: Absence of urinary retention  Outcome: Progressing  Goal: Urinary catheter remains patent  Outcome: Progressing     Problem: Infection - Adult  Goal: Absence of infection at discharge  Outcome: Progressing  Goal: Absence of infection during hospitalization  Outcome: Progressing  Goal: Absence of fever/infection during anticipated neutropenic period  Outcome: Progressing     Problem: Metabolic/Fluid and Electrolytes - Adult  Goal: Electrolytes maintained within normal limits  Outcome: Progressing  Goal: Hemodynamic stability and optimal renal function maintained  Outcome: Progressing     Problem: Hematologic - Adult  Goal: Maintains hematologic stability  Outcome: Progressing     Problem: Safety - Adult  Goal: Free from fall injury  Outcome: Progressing     Problem: Nutrition Deficit:  Goal: Optimize nutritional status  Outcome: Progressing     Problem: Confusion  Goal: Confusion, delirium, dementia, or psychosis is improved or at baseline  Description: INTERVENTIONS:  1. Assess for possible contributors to thought disturbance, including medications, impaired vision or hearing, underlying metabolic abnormalities, dehydration, psychiatric diagnoses, and notify attending LIP  2. [place] high risk fall precautions, as indicated  3. Provide frequent short contacts to provide reality reorientation, refocusing and direction  4. Decrease environmental stimuli, including noise as appropriate  5. Monitor and intervene to maintain adequate nutrition, hydration, elimination, sleep and activity  6. If unable to ensure safety without constant attention obtain sitter and review sitter guidelines with assigned personnel  7. Initiate Psychosocial CNS and Spiritual Care consult, as indicated  Outcome: Progressing     Problem: Skin/Tissue Integrity  Goal: Absence of new skin breakdown  Description: 1. Monitor for areas of redness and/or skin breakdown  2. Assess vascular access sites hourly  3. Every 4-6 hours minimum:  Change oxygen saturation probe site  4. Every 4-6 hours:  If on nasal continuous positive airway pressure, respiratory therapy assess nares and determine need for appliance change or resting period.   Outcome: Progressing     Problem: Chronic Conditions and Co-morbidities  Goal: Patient's chronic conditions and co-morbidity symptoms are monitored and maintained or improved  Outcome: Progressing

## 2023-10-30 ENCOUNTER — HOSPITAL ENCOUNTER (OUTPATIENT)
Dept: RADIATION ONCOLOGY | Age: 53
Discharge: HOME | End: 2023-10-30

## 2023-10-30 LAB
ANION GAP SERPL CALCULATED.3IONS-SCNC: 6 MMOL/L (ref 9–17)
BASOPHILS # BLD: <0.03 K/UL (ref 0–0.2)
BASOPHILS NFR BLD: 0 % (ref 0–2)
BUN SERPL-MCNC: 14 MG/DL (ref 6–20)
CALCIUM SERPL-MCNC: 8.7 MG/DL (ref 8.6–10.4)
CHLORIDE SERPL-SCNC: 96 MMOL/L (ref 98–107)
CO2 SERPL-SCNC: 24 MMOL/L (ref 20–31)
CORTIS SERPL-MCNC: 2.4 UG/DL (ref 2.7–18.4)
CREAT SERPL-MCNC: 0.5 MG/DL (ref 0.7–1.2)
EOSINOPHIL # BLD: <0.03 K/UL (ref 0–0.44)
EOSINOPHILS RELATIVE PERCENT: 0 % (ref 1–4)
ERYTHROCYTE [DISTWIDTH] IN BLOOD BY AUTOMATED COUNT: 12.7 % (ref 11.8–14.4)
GFR SERPL CREATININE-BSD FRML MDRD: >60 ML/MIN/1.73M2
GLUCOSE SERPL-MCNC: 113 MG/DL (ref 70–99)
HCT VFR BLD AUTO: 42.5 % (ref 40.7–50.3)
HGB BLD-MCNC: 14.7 G/DL (ref 13–17)
IMM GRANULOCYTES # BLD AUTO: 0.04 K/UL (ref 0–0.3)
IMM GRANULOCYTES NFR BLD: 0 %
INR PPP: 1
LYMPHOCYTES NFR BLD: 2.16 K/UL (ref 1.1–3.7)
LYMPHOCYTES RELATIVE PERCENT: 20 % (ref 24–43)
MCH RBC QN AUTO: 30.8 PG (ref 25.2–33.5)
MCHC RBC AUTO-ENTMCNC: 34.6 G/DL (ref 28.4–34.8)
MCV RBC AUTO: 88.9 FL (ref 82.6–102.9)
MONOCYTES NFR BLD: 0.97 K/UL (ref 0.1–1.2)
MONOCYTES NFR BLD: 9 % (ref 3–12)
NEUTROPHILS NFR BLD: 71 % (ref 36–65)
NEUTS SEG NFR BLD: 7.77 K/UL (ref 1.5–8.1)
NRBC BLD-RTO: 0 PER 100 WBC
OSMOLALITY SERPL: 283 MOSM/KG (ref 275–295)
PLATELET # BLD AUTO: 281 K/UL (ref 138–453)
PMV BLD AUTO: 10.6 FL (ref 8.1–13.5)
POTASSIUM SERPL-SCNC: 4.1 MMOL/L (ref 3.7–5.3)
PROTHROMBIN TIME: 12.7 SEC (ref 11.7–14.9)
RBC # BLD AUTO: 4.78 M/UL (ref 4.21–5.77)
REASON FOR REJECTION: NORMAL
REASON FOR REJECTION: NORMAL
SODIUM SERPL-SCNC: 131 MMOL/L (ref 135–144)
SODIUM SERPL-SCNC: 132 MMOL/L (ref 135–144)
SPECIMEN SOURCE: NORMAL
SPECIMEN SOURCE: NORMAL
TSH SERPL DL<=0.05 MIU/L-ACNC: 2.34 UIU/ML (ref 0.3–5)
WBC OTHER # BLD: 11 K/UL (ref 3.5–11.3)
ZZ NTE CLEAN UP: ORDERED TEST: NORMAL
ZZ NTE CLEAN UP: ORDERED TEST: NORMAL

## 2023-10-30 PROCEDURE — 36415 COLL VENOUS BLD VENIPUNCTURE: CPT

## 2023-10-30 PROCEDURE — 6370000000 HC RX 637 (ALT 250 FOR IP)

## 2023-10-30 PROCEDURE — 85025 COMPLETE CBC W/AUTO DIFF WBC: CPT

## 2023-10-30 PROCEDURE — 6360000002 HC RX W HCPCS

## 2023-10-30 PROCEDURE — 6370000000 HC RX 637 (ALT 250 FOR IP): Performed by: STUDENT IN AN ORGANIZED HEALTH CARE EDUCATION/TRAINING PROGRAM

## 2023-10-30 PROCEDURE — 82533 TOTAL CORTISOL: CPT

## 2023-10-30 PROCEDURE — 6370000000 HC RX 637 (ALT 250 FOR IP): Performed by: NURSE PRACTITIONER

## 2023-10-30 PROCEDURE — 85610 PROTHROMBIN TIME: CPT

## 2023-10-30 PROCEDURE — 2580000003 HC RX 258: Performed by: STUDENT IN AN ORGANIZED HEALTH CARE EDUCATION/TRAINING PROGRAM

## 2023-10-30 PROCEDURE — 83930 ASSAY OF BLOOD OSMOLALITY: CPT

## 2023-10-30 PROCEDURE — 99232 SBSQ HOSP IP/OBS MODERATE 35: CPT | Performed by: INTERNAL MEDICINE

## 2023-10-30 PROCEDURE — 97116 GAIT TRAINING THERAPY: CPT

## 2023-10-30 PROCEDURE — 99222 1ST HOSP IP/OBS MODERATE 55: CPT | Performed by: INTERNAL MEDICINE

## 2023-10-30 PROCEDURE — 99232 SBSQ HOSP IP/OBS MODERATE 35: CPT | Performed by: NEUROLOGICAL SURGERY

## 2023-10-30 PROCEDURE — 6370000000 HC RX 637 (ALT 250 FOR IP): Performed by: INTERNAL MEDICINE

## 2023-10-30 PROCEDURE — 80048 BASIC METABOLIC PNL TOTAL CA: CPT

## 2023-10-30 PROCEDURE — 2580000003 HC RX 258

## 2023-10-30 PROCEDURE — 2060000000 HC ICU INTERMEDIATE R&B

## 2023-10-30 PROCEDURE — 94761 N-INVAS EAR/PLS OXIMETRY MLT: CPT

## 2023-10-30 PROCEDURE — 84443 ASSAY THYROID STIM HORMONE: CPT

## 2023-10-30 RX ADMIN — QUETIAPINE FUMARATE 50 MG: 25 TABLET ORAL at 09:05

## 2023-10-30 RX ADMIN — HYDROCODONE BITARTRATE AND ACETAMINOPHEN 2 TABLET: 5; 325 TABLET ORAL at 09:04

## 2023-10-30 RX ADMIN — FAMOTIDINE 20 MG: 20 TABLET, FILM COATED ORAL at 09:06

## 2023-10-30 RX ADMIN — SODIUM CHLORIDE, PRESERVATIVE FREE 10 ML: 5 INJECTION INTRAVENOUS at 10:21

## 2023-10-30 RX ADMIN — SODIUM CHLORIDE, PRESERVATIVE FREE 10 ML: 5 INJECTION INTRAVENOUS at 09:07

## 2023-10-30 RX ADMIN — Medication 7.5 MG: at 10:55

## 2023-10-30 RX ADMIN — FOLIC ACID 1 MG: 1 TABLET ORAL at 09:06

## 2023-10-30 RX ADMIN — HYDROCODONE BITARTRATE AND ACETAMINOPHEN 2 TABLET: 5; 325 TABLET ORAL at 22:09

## 2023-10-30 RX ADMIN — DEXAMETHASONE SODIUM PHOSPHATE 4 MG: 4 INJECTION, SOLUTION INTRA-ARTICULAR; INTRALESIONAL; INTRAMUSCULAR; INTRAVENOUS; SOFT TISSUE at 10:21

## 2023-10-30 RX ADMIN — SODIUM CHLORIDE, POTASSIUM CHLORIDE, SODIUM LACTATE AND CALCIUM CHLORIDE: 600; 310; 30; 20 INJECTION, SOLUTION INTRAVENOUS at 04:36

## 2023-10-30 RX ADMIN — EXTENDED PHENYTOIN SODIUM 100 MG: 100 CAPSULE ORAL at 21:05

## 2023-10-30 RX ADMIN — Medication 100 MG: at 09:06

## 2023-10-30 RX ADMIN — DEXAMETHASONE SODIUM PHOSPHATE 4 MG: 4 INJECTION, SOLUTION INTRA-ARTICULAR; INTRALESIONAL; INTRAMUSCULAR; INTRAVENOUS; SOFT TISSUE at 04:22

## 2023-10-30 RX ADMIN — FENTANYL CITRATE 25 MCG: 50 INJECTION, SOLUTION INTRAMUSCULAR; INTRAVENOUS at 04:22

## 2023-10-30 RX ADMIN — SERTRALINE HYDROCHLORIDE 50 MG: 50 TABLET ORAL at 09:06

## 2023-10-30 RX ADMIN — SODIUM CHLORIDE, PRESERVATIVE FREE 10 ML: 5 INJECTION INTRAVENOUS at 16:11

## 2023-10-30 RX ADMIN — EXTENDED PHENYTOIN SODIUM 100 MG: 100 CAPSULE ORAL at 09:08

## 2023-10-30 RX ADMIN — DEXAMETHASONE SODIUM PHOSPHATE 4 MG: 4 INJECTION, SOLUTION INTRA-ARTICULAR; INTRALESIONAL; INTRAMUSCULAR; INTRAVENOUS; SOFT TISSUE at 22:08

## 2023-10-30 RX ADMIN — SODIUM CHLORIDE, PRESERVATIVE FREE 10 ML: 5 INJECTION INTRAVENOUS at 21:04

## 2023-10-30 RX ADMIN — FAMOTIDINE 20 MG: 20 TABLET, FILM COATED ORAL at 21:05

## 2023-10-30 RX ADMIN — EXTENDED PHENYTOIN SODIUM 100 MG: 100 CAPSULE ORAL at 14:01

## 2023-10-30 RX ADMIN — QUETIAPINE FUMARATE 50 MG: 25 TABLET ORAL at 21:05

## 2023-10-30 RX ADMIN — HYDROCODONE BITARTRATE AND ACETAMINOPHEN 2 TABLET: 5; 325 TABLET ORAL at 16:11

## 2023-10-30 RX ADMIN — HYDROCODONE BITARTRATE AND ACETAMINOPHEN 2 TABLET: 5; 325 TABLET ORAL at 02:05

## 2023-10-30 RX ADMIN — DEXAMETHASONE SODIUM PHOSPHATE 4 MG: 4 INJECTION, SOLUTION INTRA-ARTICULAR; INTRALESIONAL; INTRAMUSCULAR; INTRAVENOUS; SOFT TISSUE at 16:10

## 2023-10-30 ASSESSMENT — PAIN SCALES - GENERAL
PAINLEVEL_OUTOF10: 8
PAINLEVEL_OUTOF10: 10
PAINLEVEL_OUTOF10: 6
PAINLEVEL_OUTOF10: 9
PAINLEVEL_OUTOF10: 5

## 2023-10-30 ASSESSMENT — PAIN DESCRIPTION - DESCRIPTORS
DESCRIPTORS: THROBBING
DESCRIPTORS: ACHING

## 2023-10-30 ASSESSMENT — PAIN DESCRIPTION - LOCATION
LOCATION: BACK
LOCATION: HEAD
LOCATION: HEAD
LOCATION: GENERALIZED

## 2023-10-30 ASSESSMENT — PAIN DESCRIPTION - ONSET: ONSET: PROGRESSIVE

## 2023-10-30 ASSESSMENT — PAIN DESCRIPTION - ORIENTATION: ORIENTATION: MID

## 2023-10-30 ASSESSMENT — PAIN DESCRIPTION - FREQUENCY: FREQUENCY: CONTINUOUS

## 2023-10-30 NOTE — FLOWSHEET NOTE
No response from first notification to medicine resident. Writer notified medicine resident again. Who then respond by stating they will order something.

## 2023-10-30 NOTE — PROGRESS NOTES
[facility]  Internal Medicine Teaching Residency Program  Inpatient Daily Progress Note  ______________________________________________________________________________    Patient: Luna Valladares. YOB: 1970   CGJ:[id number]    Acct: [de-identified]     Room: [facility]011-  Admit date: 10/26/2023  Today's date: 10/30/23  Number of days in the hospital: 4    SUBJECTIVE   Admitting Diagnosis: Seizure ([address])  CC: Seizure  Pt examined at bedside. Chart & results reviewed. -Patient's BP was hemodynamically stable and saturating well on room air.  -Patient states diarrhea improved. -Patient is complaining of back pain.  -Patient's sodium is low. Fluid was discontinued. Nephrology was consulted. - Lung mass biopsy today    ROS:  Constitutional:  negative for chills, fevers, sweats  Respiratory:  negative for cough, dyspnea on exertion, hemoptysis, shortness of breath, wheezing  Cardiovascular:  negative for chest pain, chest pressure/discomfort, lower extremity edema, palpitations  Gastrointestinal:  diarrhea improving  Neurological:  headache and back pain present  BRIEF HISTORY     The patient is a pleasant 46 y.o. male  with polysubstance abuse, COPD, Alcohol abuse disorder ( sober since 2 years) with history of withdrawal seizures, schizophrenia presented to presented to [facility] ER with seizure with fall. Per report patient seized roughly 3 minutes at home, and at that time EMS was called. By the time patient arrived at [facility] ED, he was A&O x3 and able to provide some sort of history. While at [address] [place] [address] ED, patient went into status epilepticus. and failed multiple doses of Ativan. He was subsequently intubated for airway protection and started on a Versed drip. Patient did not respond well to Versed drip and was agitated on the mechanical ventilator, at that time propofol was started.   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 30 pack years  - CT Chest -  4.3 cm spiculated right upper lobe mass with malignant appearance with multiple bilateral pulmonary metastatic nodules and metastatic lymphadenopathy in the right hilar and subcarinal stations  - CT Head -  multiple subcentimeter brain metastasis, at least 7, suspected in both cerebral hemispheres with mild associated vasogenic edema  - Neurosurgery ordered MRI Brain and spine  - Oncology plans to do CT guided biopsy of the pulmonary mass     COPD  - smoker 1 pack a day. 30 pack years  - not on home oxygen  - As per the patient, uses inhalers     Hx of Alcohol abuse disorder  - been sober for 2 years  - h/o alcohol withdrawal seizures  - UDS negative for alcohol     Marijuana use  - advise to cease use  - UDS positive for cannabis        DVT ppx:Enoxaparin [address], MD  Internal Medicine Resident, PGY-1  [id number] [address];  [place], [place]  10/30/2023, 9:33 AM

## 2023-10-30 NOTE — CONSULTS
Renal Consult Note    Patient :  Luciano Still.; 46 y.o. MRN# [id number]  Location:  7287/2206-59  Attending:  Linda Muir MD  Admit Date:  10/26/2023   Hospital Day: 4    Reason for Consult:     Asked by Dr Linda Muir MD to see for WONG/Elevated Creatinine. History Obtained From:     Patient/chart/staff    History of Present Illness:     Luciano Still.; 46 y.o. male with past medical history as mentioned below. Known history of hypertension, COPD, alcohol dependence and abuse, 30-40-pack-year history of smoking, schizoaffective disorder on antipsychotic medications. He has been on Zoloft, Desyrel and Zyprexa. Patient presented to the hospital as a transfer from [facility] where he initially presented after he had an episode of tonic-clonic seizure in the mental status. Continued to seize despite being on Ativan had to be based on a Versed drip. Was then intubated and transferred to [facility]. He had a similar episode 2 weeks ago according to him. He was loaded with Keppra and then underwent CT scan of the head and chest with contrast.  CT head showed multiple subcentimeter brain metastasis and CT chest showed 4.3 cm spiculated right upper lobe mass with malignant appearance and multiple bilateral pulmonary metastatic nodules and metastatic lymphadenopathy in the right hilar and subcarinal area. He was placed on IV fluids and continued to this morning. His sodium on presentation was 139 and was fluctuating in the 135-139 range, was 128 yesterday and this morning dropped down to 126. Nephrology was consulted for hyponatremia. Urine osmolality more than 400 urine sodium was 80. Symptom wise he denies any complaints today except for tingling in his hands. No fever chills nausea vomiting or diarrhea. No shortness of breath orthopnea. Urine output continues to be good, overall fluid balance positive by about 5.6 L.   Labs today showed a sodium of 126 potassium 4.1 chloride 96

## 2023-10-30 NOTE — PROGRESS NOTES
Spoke with Daryn Mayorga in IR, ?'d if lung bx will be done today. States not today, but will try to schedule for 1st thing tomorrow am. Will order diet and place NPO for midnight tonight.

## 2023-10-30 NOTE — PLAN OF CARE
Problem: Respiratory - Adult  Goal: Achieves optimal ventilation and oxygenation  Outcome: Progressing     Problem: Neurosensory - Adult  Goal: Absence of seizures  Outcome: Progressing  Goal: Remains free of injury related to seizures activity  Outcome: Progressing  Goal: Achieves stable or improved neurological status  Outcome: Progressing  Goal: Achieves maximal functionality and self care  Outcome: Progressing     Problem: Skin/Tissue Integrity - Adult  Goal: Skin integrity remains intact  Outcome: Progressing  Flowsheets (Taken 10/29/2023 2000)  Skin Integrity Remains Intact: Monitor for areas of redness and/or skin breakdown  Goal: Incisions, wounds, or drain sites healing without S/S of infection  Outcome: Progressing  Flowsheets (Taken 10/29/2023 2000)  Incisions, Wounds, or Drain Sites Healing Without Sign and Symptoms of Infection:   TWICE DAILY: Assess and document skin integrity   TWICE DAILY: Assess and document dressing/incision, wound bed, drain sites and surrounding tissue  Goal: Oral mucous membranes remain intact  Outcome: Progressing  Flowsheets (Taken 10/29/2023 2000)  Oral Mucous Membranes Remain Intact: Assess oral mucosa and hygiene practices     Problem: Musculoskeletal - Adult  Goal: Return mobility to safest level of function  Outcome: Progressing  Goal: Maintain proper alignment of affected body part  Outcome: Progressing  Goal: Return ADL status to a safe level of function  Outcome: Progressing     Problem: Genitourinary - Adult  Goal: Absence of urinary retention  Outcome: Progressing  Goal: Urinary catheter remains patent  Outcome: Progressing     Problem: Infection - Adult  Goal: Absence of infection at discharge  Outcome: Progressing  Goal: Absence of infection during hospitalization  Outcome: Progressing  Goal: Absence of fever/infection during anticipated neutropenic period  Outcome: Progressing     Problem: Metabolic/Fluid and Electrolytes - Adult  Goal: Electrolytes maintained

## 2023-10-30 NOTE — PLAN OF CARE
wounds, or drain sites healing without S/S of infection  10/30/2023 1842 by Jeffery Aguilera RN  Outcome: Progressing  10/30/2023 0547 by yLudmila Lopes RN  Outcome: Progressing  Flowsheets (Taken 10/29/2023 2000)  Incisions, Wounds, or Drain Sites Healing Without Sign and Symptoms of Infection:   TWICE DAILY: Assess and document skin integrity   TWICE DAILY: Assess and document dressing/incision, wound bed, drain sites and surrounding tissue  Goal: Oral mucous membranes remain intact  10/30/2023 1842 by Jeffery Aguilera RN  Outcome: Progressing  10/30/2023 0547 by Lyudmila Lopes RN  Outcome: Progressing  Flowsheets (Taken 10/29/2023 2000)  Oral Mucous Membranes Remain Intact: Assess oral mucosa and hygiene practices     Problem: Musculoskeletal - Adult  Goal: Return mobility to safest level of function  10/30/2023 1842 by Jeffery Aguilera RN  Outcome: Progressing  10/30/2023 0547 by Lyudmila Lopes RN  Outcome: Progressing  Goal: Maintain proper alignment of affected body part  10/30/2023 0547 by Lyudmila Lopes RN  Outcome: Progressing  Goal: Return ADL status to a safe level of function  10/30/2023 1842 by Jeffery Aguilera RN  Outcome: Progressing  10/30/2023 0547 by Lyudmila Lopes RN  Outcome: Progressing     Problem: Genitourinary - Adult  Goal: Absence of urinary retention  10/30/2023 1842 by Jeffery Aguilera RN  Outcome: Progressing  10/30/2023 0547 by Lyudmila Lopes RN  Outcome: Progressing  Goal: Urinary catheter remains patent  10/30/2023 1842 by Jeffery Aguilera RN  Outcome: Completed  10/30/2023 0547 by Lyudmila Lopes RN  Outcome: Progressing     Problem: Infection - Adult  Goal: Absence of infection at discharge  10/30/2023 1842 by Jeffery Aguilera RN  Outcome: Progressing  10/30/2023 0547 by Lyudmila Lopes RN  Outcome: Progressing  Goal: Absence of infection during hospitalization  10/30/2023 1842 by Jeffery Aguilera RN  Outcome: Progressing  10/30/2023 9231 by Anisha Nicholson RN  Outcome: Progressing  Goal: Absence of fever/infection during anticipated neutropenic period  10/30/2023 1842 by Dee Michel RN  Outcome: Progressing  10/30/2023 0547 by Anisha Nicholson RN  Outcome: Progressing     Problem: Metabolic/Fluid and Electrolytes - Adult  Goal: Electrolytes maintained within normal limits  10/30/2023 1842 by Dee Michel RN  Outcome: Progressing  10/30/2023 0547 by Anisha Nicholson RN  Outcome: Progressing  Goal: Hemodynamic stability and optimal renal function maintained  10/30/2023 1842 by Dee Michel RN  Outcome: Progressing  10/30/2023 0547 by Anisha Nicholson RN  Outcome: Progressing  Goal: Glucose maintained within prescribed range  10/30/2023 0547 by Anisha Nicholson RN  Outcome: Progressing     Problem: Gastrointestinal - Adult  Goal: Minimal or absence of nausea and vomiting  10/30/2023 1842 by Dee Michel RN  Outcome: Progressing  10/30/2023 0547 by Anisha Nicholson RN  Outcome: Progressing  Goal: Maintains or returns to baseline bowel function  10/30/2023 0547 by Anisha Nicholson RN  Outcome: Progressing  Goal: Maintains adequate nutritional intake  10/30/2023 0547 by Anisha Nicholson RN  Outcome: Progressing     Problem: Discharge Planning  Goal: Discharge to home or other facility with appropriate resources  10/30/2023 1842 by Dee Michel RN  Outcome: Progressing  10/30/2023 0547 by Anisha Nicholson RN  Outcome: Progressing

## 2023-10-30 NOTE — PROGRESS NOTES
spine:    No fracture or destructive osseous lesion. Degenerative disc disease as described above. No spinal canal narrowing. Foraminal narrowing, moderate to severe at right C6-7, mild-to-moderate at  left C6-7, mild at right C5-6. Heterogeneous T2 signal in the cervical spinal cord, likely related to  artifacts. MRI thoracic spine:    Mild chronic compression deformities of T6 and T8 with up to 15% height loss. No acute fracture or destructive osseous lesion. No significant disc herniation, spinal canal or foraminal stenosis in the  thoracic spine. Mildly prominent right hilar lymph nodes, likely related to  infection/inflammation. Lung nodules in the bilateral lungs measuring up to  1 cm in the right lower lobe. Further evaluation with CT chest is  recommended. MRI lumbar spine:    No fracture or destructive osseous lesion. Degenerative disc disease as described above. No spinal canal narrowing. Foraminal narrowing moderate at left L5-S1, mild at right L5-S1. Question of abnormal bone marrow signal in the right sacrum on sagittal STIR  images, likely related to artifacts. IMPRESSION:    Primary Problem  Seizure 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) [C79.31, C80.1] 10/26/2023    Vasogenic edema ([address]) [G93.6] 10/26/2023    Seizure ([address]) [R56.9] 03/26/2023   Status epilepticus  Brain metastasis  Right upper lobe lung mass suspicious for malignancy.     RECOMMENDATIONS:  I reviewed the laboratory data, imaging studies, diagnosis and treatment recommendations   Overall picture consistent with possible lung primary with brain metastasis however will need tissue diagnosis   Continue Decadron   Awaiting IR guided lung biopsy  Further recommendations will based on the pathology   Continue other management as per primary team   We will follow. Iker Alarcon MD, MD  [facility]                            This note is created with the assistance of a speech recognition program.  While intending to generate a document that actually reflects the content of the visit, the document can still have some errors including those of syntax and sound a like substitutions which may escape proof reading. It such instances, actual meaning can be extrapolated by contextual diversion.

## 2023-10-30 NOTE — FLOWSHEET NOTE
Pt complains of pain and anxiety. Norcos not available to give yet. Writer notified medicine resident on call.

## 2023-10-30 NOTE — PROGRESS NOTES
[id number]  Speech Language Pathology    Date: 10/30/2023  Patient Name: Mily Marroquin. YOB: 1970   AGE: 46 y.o. MRN: [id number]        Patient Not Available for Speech Therapy     Due to:  [] Testing  [] Hemodialysis  [] Cancelled by RN  [] Surgery   [] Intubation/Sedation/Pain Medication  [] Medical instability  [x] Other: Pt was unable to remain awake for treatment @ 0926.      Next scheduled treatment: 10/31    Completed by Kathy Allred  Clinician    Co-signed by Edna Burgos M.S., CCC/SLP

## 2023-10-30 NOTE — PROGRESS NOTES
Occupational Therapy  Facility/Department: [address] NEURO ICU  Occupational Therapy Initial Assessment    Name: Krystian French. : 1970  MRN: [id number]  Date of Service: 10/30/2023    Discharge Recommendations: No therapy recommended at discharge. OT Equipment Recommendations  Equipment Needed: Yes  Mobility Devices: ADL Assistive Devices  ADL Assistive Devices: Shower Chair with back       Patient Diagnosis(es): The encounter diagnosis was Status epilepticus ([address]). Past Medical History:  has a past medical history of Arthritis, Asthma, COPD (chronic obstructive pulmonary disease) ([address]), GERD (gastroesophageal reflux disease), Hypertension, Psychiatric problem, Schizophrenia ([address]), Severe malnutrition ([address]), and Unspecified cerebral artery occlusion with cerebral infarction. Past Surgical History:  has no past surgical history on file. Chief Complaint   Patient presents with    Seizures         Assessment   Performance deficits / Impairments: Decreased functional mobility ; Decreased ADL status; Decreased endurance;Decreased high-level IADLs;Decreased balance  Assessment: Pt completed functional sit<>stand transfers and functional mobility with SBA and no AD. Engaged in dynamic standing balance and UE coordination activity at whiteboard in room with SBA. Completed toileting with SBA. Pt tolerated session well. Limited by nausea and overall decreased endurance as compared to pt's baseline level of function.   Prognosis: Good  Decision Making: Medium Complexity  REQUIRES OT FOLLOW-UP: Yes  Activity Tolerance  Activity Tolerance: Patient Tolerated treatment well  Activity Tolerance Comments: Limited by nausea        Plan   Occupational Therapy Plan  Times Per Week: 1-2x/wk  Current Treatment Recommendations: Balance training, Functional mobility training, Endurance training, Safety education & training, Patient/Caregiver education & training, Equipment evaluation, education, & procurement, Self-Care Bed mobility  Supine to Sit: Supervision  Sit to Supine: Supervision  Scooting: Supervision  Bed Mobility Comments: HOB ~25 degrees    Transfers  Sit to stand: Stand by assistance  Stand to sit: Stand by assistance  Transfer Comments: No AD    Vision  Vision: Impaired (Acute R eye bluriness, reports getting worse)  Vision Exceptions: Wears glasses at all times  Hearing  Hearing: Exceptions to [facility]  Hearing Exceptions: Hard of hearing/hearing concerns; No hearing aid    Cognition  Overall Cognitive Status: WFL  Orientation  Overall Orientation Status: Within Normal Limits  Orientation Level: Oriented X4           Education Given To: Patient  Education Provided: Transfer Training;Role of Therapy;Plan of Care  Education Provided Comments: activity promotion, visual strategies to compensate for R eye bluriness to increase safety with activity, use of rest breaks  Education Method: Verbal;Demonstration  Barriers to Learning: None  Education Outcome: Verbalized understanding;Continued education needed;Demonstrated understanding              AM-PAC Score        AM-[facility] Inpatient Daily Activity Raw Score: 21 (10/30/23 1522)  AM-PAC Inpatient ADL T-Scale Score : 44.27 (10/30/23 1522)  ADL Inpatient CMS 0-100% Score: 32.79 (10/30/23 1522)  ADL Inpatient CMS G-Code Modifier : CJ (10/30/23 1522)      Goals  Short Term Goals  Time Frame for Short Term Goals: By discharge, pt will:  Short Term Goal 1: Demo functional sit<>stand transfers and functional mobility independently to promote increased independence throughout ADLs/IADLs  Short Term Goal 2: Demo 15 minutes of dynamic standing balance with SUP to promote increased independence throughout ADLs/IADLs  Short Term Goal 3: Demo ADLs with Mod IND and use of DME PRN  Short Term Goal 4: Demo 30 minutes of functional activity tolerance with 1 restbreak PRN to promote increased functional endurance required for ADL/IADL independence  Short Term Goal 5: Demo

## 2023-10-30 NOTE — PROGRESS NOTES
Nutrition Note    Pt no longer on TF. Advance to PO diet on 10/27. Appetite/intakes had been good 10/27-10/29. Ordered NPO this morning for possible biopsy today. Will continue to monitor and follow.     Electronically signed by Acacia Sexton RD on 10/30/23 at 9:36 AM EDT    Contact: [phone]

## 2023-10-30 NOTE — FLOWSHEET NOTE
Pt returns without difficulties or complications. Writer gave Fentanyl and Ativan as requested by pt for anxiety and pain.

## 2023-10-31 ENCOUNTER — APPOINTMENT (OUTPATIENT)
Dept: CT IMAGING | Age: 53
End: 2023-10-31
Payer: MEDICAID

## 2023-10-31 ENCOUNTER — APPOINTMENT (OUTPATIENT)
Dept: GENERAL RADIOLOGY | Age: 53
End: 2023-10-31
Payer: MEDICAID

## 2023-10-31 DIAGNOSIS — C34.90 LUNG CANCER METASTATIC TO BRAIN (HCC): Primary | ICD-10-CM

## 2023-10-31 DIAGNOSIS — C79.31 LUNG CANCER METASTATIC TO BRAIN (HCC): Primary | ICD-10-CM

## 2023-10-31 LAB
ANION GAP SERPL CALCULATED.3IONS-SCNC: 11 MMOL/L (ref 9–17)
ANION GAP SERPL CALCULATED.3IONS-SCNC: 13 MMOL/L (ref 9–17)
BASOPHILS # BLD: <0.03 K/UL (ref 0–0.2)
BASOPHILS NFR BLD: 0 % (ref 0–2)
BUN SERPL-MCNC: 16 MG/DL (ref 6–20)
CALCIUM SERPL-MCNC: 8.5 MG/DL (ref 8.6–10.4)
CHLORIDE SERPL-SCNC: 100 MMOL/L (ref 98–107)
CHLORIDE SERPL-SCNC: 99 MMOL/L (ref 98–107)
CK SERPL-CCNC: 317 U/L (ref 39–308)
CO2 SERPL-SCNC: 19 MMOL/L (ref 20–31)
CO2 SERPL-SCNC: 23 MMOL/L (ref 20–31)
CREAT SERPL-MCNC: 0.5 MG/DL (ref 0.7–1.2)
EOSINOPHIL # BLD: <0.03 K/UL (ref 0–0.44)
EOSINOPHILS RELATIVE PERCENT: 0 % (ref 1–4)
ERYTHROCYTE [DISTWIDTH] IN BLOOD BY AUTOMATED COUNT: 12.7 % (ref 11.8–14.4)
GFR SERPL CREATININE-BSD FRML MDRD: >60 ML/MIN/1.73M2
GLUCOSE SERPL-MCNC: 126 MG/DL (ref 70–99)
HCT VFR BLD AUTO: 39.4 % (ref 40.7–50.3)
HGB BLD-MCNC: 13.6 G/DL (ref 13–17)
IMM GRANULOCYTES # BLD AUTO: 0.03 K/UL (ref 0–0.3)
IMM GRANULOCYTES NFR BLD: 0 %
LYMPHOCYTES NFR BLD: 1.24 K/UL (ref 1.1–3.7)
LYMPHOCYTES RELATIVE PERCENT: 13 % (ref 24–43)
MCH RBC QN AUTO: 30.2 PG (ref 25.2–33.5)
MCHC RBC AUTO-ENTMCNC: 34.5 G/DL (ref 28.4–34.8)
MCV RBC AUTO: 87.6 FL (ref 82.6–102.9)
MONOCYTES NFR BLD: 0.77 K/UL (ref 0.1–1.2)
MONOCYTES NFR BLD: 8 % (ref 3–12)
NEUTROPHILS NFR BLD: 79 % (ref 36–65)
NEUTS SEG NFR BLD: 7.37 K/UL (ref 1.5–8.1)
NRBC BLD-RTO: 0 PER 100 WBC
PLATELET # BLD AUTO: 210 K/UL (ref 138–453)
PMV BLD AUTO: 10.6 FL (ref 8.1–13.5)
POTASSIUM SERPL-SCNC: 3.1 MMOL/L (ref 3.7–5.3)
POTASSIUM SERPL-SCNC: 3.7 MMOL/L (ref 3.7–5.3)
RBC # BLD AUTO: 4.5 M/UL (ref 4.21–5.77)
SODIUM SERPL-SCNC: 131 MMOL/L (ref 135–144)
SODIUM SERPL-SCNC: 134 MMOL/L (ref 135–144)
WBC OTHER # BLD: 9.4 K/UL (ref 3.5–11.3)

## 2023-10-31 PROCEDURE — 6370000000 HC RX 637 (ALT 250 FOR IP)

## 2023-10-31 PROCEDURE — 88333 PATH CONSLTJ SURG CYTO XM 1: CPT

## 2023-10-31 PROCEDURE — 36415 COLL VENOUS BLD VENIPUNCTURE: CPT

## 2023-10-31 PROCEDURE — 6360000002 HC RX W HCPCS

## 2023-10-31 PROCEDURE — 88334 PATH CONSLTJ SURG CYTO XM EA: CPT

## 2023-10-31 PROCEDURE — 88342 IMHCHEM/IMCYTCHM 1ST ANTB: CPT

## 2023-10-31 PROCEDURE — 80051 ELECTROLYTE PANEL: CPT

## 2023-10-31 PROCEDURE — 71045 X-RAY EXAM CHEST 1 VIEW: CPT

## 2023-10-31 PROCEDURE — 88341 IMHCHEM/IMCYTCHM EA ADD ANTB: CPT

## 2023-10-31 PROCEDURE — 2060000000 HC ICU INTERMEDIATE R&B

## 2023-10-31 PROCEDURE — 2709999900 CT NEEDLE BIOPSY LUNG PERCUTANEOUS W IMAGING GUIDANCE

## 2023-10-31 PROCEDURE — 6370000000 HC RX 637 (ALT 250 FOR IP): Performed by: NURSE PRACTITIONER

## 2023-10-31 PROCEDURE — 6360000002 HC RX W HCPCS: Performed by: RADIOLOGY

## 2023-10-31 PROCEDURE — 80048 BASIC METABOLIC PNL TOTAL CA: CPT

## 2023-10-31 PROCEDURE — 0BBC3ZX EXCISION OF RIGHT UPPER LUNG LOBE, PERCUTANEOUS APPROACH, DIAGNOSTIC: ICD-10-PCS | Performed by: RADIOLOGY

## 2023-10-31 PROCEDURE — 6370000000 HC RX 637 (ALT 250 FOR IP): Performed by: STUDENT IN AN ORGANIZED HEALTH CARE EDUCATION/TRAINING PROGRAM

## 2023-10-31 PROCEDURE — 99232 SBSQ HOSP IP/OBS MODERATE 35: CPT | Performed by: INTERNAL MEDICINE

## 2023-10-31 PROCEDURE — 99233 SBSQ HOSP IP/OBS HIGH 50: CPT | Performed by: INTERNAL MEDICINE

## 2023-10-31 PROCEDURE — 85025 COMPLETE CBC W/AUTO DIFF WBC: CPT

## 2023-10-31 PROCEDURE — 88305 TISSUE EXAM BY PATHOLOGIST: CPT

## 2023-10-31 PROCEDURE — 2580000003 HC RX 258: Performed by: STUDENT IN AN ORGANIZED HEALTH CARE EDUCATION/TRAINING PROGRAM

## 2023-10-31 PROCEDURE — 82550 ASSAY OF CK (CPK): CPT

## 2023-10-31 RX ORDER — FENTANYL CITRATE 50 UG/ML
INJECTION, SOLUTION INTRAMUSCULAR; INTRAVENOUS PRN
Status: COMPLETED | OUTPATIENT
Start: 2023-10-31 | End: 2023-10-31

## 2023-10-31 RX ORDER — MIDAZOLAM HYDROCHLORIDE 2 MG/2ML
INJECTION, SOLUTION INTRAMUSCULAR; INTRAVENOUS PRN
Status: COMPLETED | OUTPATIENT
Start: 2023-10-31 | End: 2023-10-31

## 2023-10-31 RX ORDER — LANOLIN ALCOHOL/MO/W.PET/CERES
3 CREAM (GRAM) TOPICAL NIGHTLY PRN
Status: DISCONTINUED | OUTPATIENT
Start: 2023-10-31 | End: 2023-11-02 | Stop reason: HOSPADM

## 2023-10-31 RX ADMIN — FAMOTIDINE 20 MG: 20 TABLET, FILM COATED ORAL at 08:58

## 2023-10-31 RX ADMIN — FAMOTIDINE 20 MG: 20 TABLET, FILM COATED ORAL at 21:15

## 2023-10-31 RX ADMIN — FOLIC ACID 1 MG: 1 TABLET ORAL at 08:58

## 2023-10-31 RX ADMIN — FENTANYL CITRATE 25 MCG: 50 INJECTION, SOLUTION INTRAMUSCULAR; INTRAVENOUS at 17:45

## 2023-10-31 RX ADMIN — Medication 100 MG: at 08:58

## 2023-10-31 RX ADMIN — HYDROCODONE BITARTRATE AND ACETAMINOPHEN 2 TABLET: 5; 325 TABLET ORAL at 14:05

## 2023-10-31 RX ADMIN — SODIUM CHLORIDE, PRESERVATIVE FREE 10 ML: 5 INJECTION INTRAVENOUS at 08:59

## 2023-10-31 RX ADMIN — SODIUM CHLORIDE, PRESERVATIVE FREE 10 ML: 5 INJECTION INTRAVENOUS at 16:30

## 2023-10-31 RX ADMIN — HYDROCODONE BITARTRATE AND ACETAMINOPHEN 2 TABLET: 5; 325 TABLET ORAL at 21:15

## 2023-10-31 RX ADMIN — QUETIAPINE FUMARATE 50 MG: 25 TABLET ORAL at 21:15

## 2023-10-31 RX ADMIN — EXTENDED PHENYTOIN SODIUM 100 MG: 100 CAPSULE ORAL at 21:15

## 2023-10-31 RX ADMIN — Medication 3 MG: at 21:16

## 2023-10-31 RX ADMIN — SERTRALINE HYDROCHLORIDE 50 MG: 50 TABLET ORAL at 08:58

## 2023-10-31 RX ADMIN — DEXAMETHASONE SODIUM PHOSPHATE 4 MG: 4 INJECTION, SOLUTION INTRA-ARTICULAR; INTRALESIONAL; INTRAMUSCULAR; INTRAVENOUS; SOFT TISSUE at 16:30

## 2023-10-31 RX ADMIN — QUETIAPINE FUMARATE 50 MG: 25 TABLET ORAL at 08:58

## 2023-10-31 RX ADMIN — SODIUM CHLORIDE, PRESERVATIVE FREE 10 ML: 5 INJECTION INTRAVENOUS at 21:16

## 2023-10-31 RX ADMIN — SODIUM CHLORIDE, PRESERVATIVE FREE 10 ML: 5 INJECTION INTRAVENOUS at 17:47

## 2023-10-31 RX ADMIN — POTASSIUM CHLORIDE 40 MEQ: 1500 TABLET, EXTENDED RELEASE ORAL at 17:47

## 2023-10-31 RX ADMIN — MIDAZOLAM HYDROCHLORIDE 1 MG: 1 INJECTION, SOLUTION INTRAMUSCULAR; INTRAVENOUS at 12:54

## 2023-10-31 RX ADMIN — HYDROCODONE BITARTRATE AND ACETAMINOPHEN 2 TABLET: 5; 325 TABLET ORAL at 05:00

## 2023-10-31 RX ADMIN — EXTENDED PHENYTOIN SODIUM 100 MG: 100 CAPSULE ORAL at 14:48

## 2023-10-31 RX ADMIN — DEXAMETHASONE SODIUM PHOSPHATE 4 MG: 4 INJECTION, SOLUTION INTRA-ARTICULAR; INTRALESIONAL; INTRAMUSCULAR; INTRAVENOUS; SOFT TISSUE at 05:00

## 2023-10-31 RX ADMIN — FENTANYL CITRATE 50 MCG: 50 INJECTION, SOLUTION INTRAMUSCULAR; INTRAVENOUS at 12:53

## 2023-10-31 ASSESSMENT — PAIN SCALES - GENERAL
PAINLEVEL_OUTOF10: 9
PAINLEVEL_OUTOF10: 8
PAINLEVEL_OUTOF10: 7
PAINLEVEL_OUTOF10: 5
PAINLEVEL_OUTOF10: 7
PAINLEVEL_OUTOF10: 9

## 2023-10-31 ASSESSMENT — PAIN DESCRIPTION - ORIENTATION
ORIENTATION: RIGHT
ORIENTATION: MID;UPPER
ORIENTATION: RIGHT

## 2023-10-31 ASSESSMENT — PAIN DESCRIPTION - ONSET
ONSET: PROGRESSIVE
ONSET: PROGRESSIVE

## 2023-10-31 ASSESSMENT — PAIN DESCRIPTION - FREQUENCY
FREQUENCY: CONTINUOUS
FREQUENCY: CONTINUOUS

## 2023-10-31 ASSESSMENT — PAIN DESCRIPTION - LOCATION
LOCATION: RIB CAGE
LOCATION: BACK
LOCATION: BACK
LOCATION: RIB CAGE

## 2023-10-31 ASSESSMENT — PAIN DESCRIPTION - DESCRIPTORS
DESCRIPTORS: ACHING
DESCRIPTORS: THROBBING
DESCRIPTORS: THROBBING
DESCRIPTORS: ACHING

## 2023-10-31 ASSESSMENT — PAIN DESCRIPTION - PAIN TYPE: TYPE: ACUTE PAIN

## 2023-10-31 NOTE — PLAN OF CARE
Problem: Respiratory - Adult  Goal: Achieves optimal ventilation and oxygenation  Outcome: Progressing     Problem: Neurosensory - Adult  Goal: Absence of seizures  Outcome: Progressing  Goal: Achieves stable or improved neurological status  Outcome: Progressing  Goal: Achieves maximal functionality and self care  Outcome: Progressing     Problem: Skin/Tissue Integrity - Adult  Goal: Skin integrity remains intact  Outcome: Progressing  Goal: Incisions, wounds, or drain sites healing without S/S of infection  Outcome: Progressing  Goal: Oral mucous membranes remain intact  Outcome: Progressing     Problem: Musculoskeletal - Adult  Goal: Return mobility to safest level of function  Outcome: Progressing  Goal: Return ADL status to a safe level of function  Outcome: Progressing     Problem: Genitourinary - Adult  Goal: Absence of urinary retention  Outcome: Progressing     Problem: Infection - Adult  Goal: Absence of infection at discharge  Outcome: Progressing  Goal: Absence of infection during hospitalization  Outcome: Progressing  Goal: Absence of fever/infection during anticipated neutropenic period  Outcome: Progressing     Problem: Metabolic/Fluid and Electrolytes - Adult  Goal: Electrolytes maintained within normal limits  Outcome: Progressing     Problem: Gastrointestinal - Adult  Goal: Minimal or absence of nausea and vomiting  Outcome: Progressing  Goal: Maintains adequate nutritional intake  Outcome: Progressing

## 2023-10-31 NOTE — PROGRESS NOTES
visited at nurse's request for assistance with an HCPOA document. Pt said he wants to add his girlfriend to his document; he already has a document naming his sister as primary.  offered to return with paperwork to complete but patient was out of the room at that time. Chaplains will attempt visit at later time.      Electronically signed by Shira Trejo on 10/31/2023 at 10:57 AM.  [address] [place] [address]  [phone]

## 2023-10-31 NOTE — CONSULTS
that of a cachectic male, well-developed in no apparent distress. HEENT: Normocephalic, atraumatic, EOMI, moist mucosa, no erythema. NECK:  No adenopathy or a palpable thyroid mass, trachea is midline. LYMPHATICS: No cervical, supraclavicular adenopathy. HEART:  Normal rate and regular rhythm, normal heart sounds. LUNGS:  Pulmonary effort normal. Normal breath sounds. ABDOMEN:  Soft, nontender, non distended  EXTREMITIES:  No edema. No calf tenderness. MSK:  No spinal tenderness. Normal ROM. NEUROLOGICAL: Alert and oriented. Strength and sensation intact bilaterally. No focal deficits. PSYCH: Mood normal, behavior normal.  SKIN: No erythema, no desquamation. LABS:  WBC   Date Value Ref Range Status   10/31/2023 9.4 3.5 - 11.3 k/uL Final     Segs Absolute   Date Value Ref Range Status   03/30/2021 6.84 1.50 - 8.10 k/uL Final     Neutrophils Absolute   Date Value Ref Range Status   10/31/2023 7.37 1.50 - 8.10 k/uL Final     Hemoglobin   Date Value Ref Range Status   10/31/2023 13.6 13.0 - 17.0 g/dL Final     Platelets   Date Value Ref Range Status   10/31/2023 210 138 - 453 k/uL Final     No results found for: \"\", \"CEA\"  No results found for: \"\"  PSA   Date Value Ref Range Status   12/12/2022 0.65 <4.1 ng/mL Final     Comment:     The Roche \"ECLIA\" assay is used. Results obtained with different assay methods cannot be   used interchangeably. IMAGING:   10/25/23 CT Head  IMPRESSION:  1. Multiple subcentimeter brain metastases, at least 7, suspected in both   cerebral hemishperes. Mild associated vasogenic edema. MRI brain with IV contrast recommended when clinically appropriate. 10/25/23 CT Chest  IMPRESSION:  1. Spiculated 4.3 cm right upper lobe mass, malignant in appearance, with   multiple bilateral pulmonary metastatic nodules and metastatic   lymphadenopathy in the right hilar and subcarinal stations.     10/27/23 MRI Brain  IMPRESSION:  Limited exam due to patient motion the content of the visit, the document can still have some errors including those of syntax and sound a like substitutions which may escape proof reading. It such instances, actual meaning can be extrapolated by contextual diversion.

## 2023-10-31 NOTE — PROGRESS NOTES
Today's Date: 10/31/2023  Patient Name: Minisetrio Belle. Date of admission: 10/26/2023  2:08 AM  Patient's age: 46 y.o., 1970  Admission Dx: Status epilepticus ([address]) [G40.901]  Seizure ([address]) [R56.9]      Requesting Physician: Jah Miller MD    CHIEF COMPLAINT: Status epilepticus. .  Consult for brain metastasis and lung mass. SUBJECTIVE:  Patient seen and examined  Had biopsy of lung toddayy  Mild Pneumo  NO NV    BRIEF CASE HISTORY:    The patient is a 46 y.o.  male who is admitted to the hospital for further movement of status epilepticus. Patient presented to outside facility with seizure activities. It was difficult to control. He was transferred for further care. Further management and work-up reviewed multiple small brain lesions consistent with metastasis. MRI was ordered and it is pending. CT scan of the chest showed suspicious spiculated right upper lobe mass measuring 4.3 cm suggestive of malignancy. Positive evaluating the right hilar and subcarinal stations. No further history is obtainable from the patient. Past Medical History:   has a past medical history of Arthritis, Asthma, COPD (chronic obstructive pulmonary disease) ([address]), GERD (gastroesophageal reflux disease), Hypertension, Psychiatric problem, Schizophrenia ([address]), Severe malnutrition ([address]), and Unspecified cerebral artery occlusion with cerebral infarction. Past Surgical History:   has no past surgical history on file. Family History: family history includes Cancer in his father and mother; Other in his mother, sister, and sister. Social History:   reports that he has been smoking cigarettes. He has a 30.00 pack-year smoking history. He has never used smokeless tobacco. He reports that he does not currently use alcohol. He reports that he does not currently use drugs after having used the following drugs: Marijuana (Giovani Sida), Opiates , and Cocaine.

## 2023-10-31 NOTE — PROGRESS NOTES
At pt bedside, see MAR for pain meds given per pt request and K given for recent labs with K=3.1. Dr John Dale came to bedside, pt informed of sm pneumo and need for O2. O2 started via simple mask at 6L/min. Pt alert and talkative. Enc pt to not go out to smoke, stay in bed with O2 on as ordered. Pt verbalizes understanding. Enc to call nurse if any c/o or ?'s.

## 2023-10-31 NOTE — CARE COORDINATION
Attempted to see patient to discuss transitional plan, not in room. Will attempt again as time allows. 455 pm spoke with patient, plan to return home with girlfriend, has transportation, denies further needs.

## 2023-10-31 NOTE — PROGRESS NOTES
Renal Progress Note    Patient :  Joshua Devries; 46 y.o. MRN# [id number]  Location:  9950/1616-79  Attending:  Trinity Sun MD  Admit Date:  10/26/2023   Hospital Day: 5      Subjective:     Plan for biopsy of lung lesions today. Denies any specific complaints. Possible discharge after biopsy. Urine output good. Did receive a dose of tolvaptan yesterday. Sodium up to 134. In hindsight sodium initially reported was a lab error, the actual sodium 132. Denies any complaints today. No shortness of breath orthopnea. No further seizure episodes. No nausea vomiting. No abdominal pain  Labs today showed a sodium 134 potassium 3.7 chloride 100 bicarb 23 BUN 18 creatinine 0.5 glucose 126 calcium 8.5    History reviewed, known history of hypertension COPD alcohol dependence and abuse, 30-40-pack-year history of smoking. Presented to the hospital as a transfer from [facility] after he had a tonic-clonic seizure and went into status epilepticus. Imaging studies showed multiple subcentimeter brain metastatic lesions and CT chest showed a spiculated right upper lobe mass with multiple bilateral pulmonary metastatic nodules. He was placed on IV fluids. Sodium level dropped from  135-128-126 yesterday. Nephrology was consulted for hyponatremia  Lab later called and clarified that the initial report of low sodium was an error in the actual sodium 132. He did receive a dose of tolvaptan. Sodium 134 now. Lung biopsy planned.       Outpatient Medications:     Medications Prior to Admission: sertraline (ZOLOFT) 50 MG tablet, Take 1 tablet by mouth daily (Patient not taking: Reported on 3/27/2023)  traZODone (DESYREL) 50 MG tablet, Take 1 tablet by mouth nightly as needed for Sleep (Patient not taking: Reported on 3/27/2023)  cloNIDine (CATAPRES) 0.1 MG tablet, Take 1 tablet by mouth 2 times daily  OLANZapine (ZYPREXA) 5 MG tablet, Take 1 tablet by mouth nightly  pantoprazole (PROTONIX) 40 MG tablet, Take 1

## 2023-10-31 NOTE — ACP (ADVANCE CARE PLANNING)
Advance Care Planning     Advance Care Planning Inpatient Note  [facility] Department    Today's Date: 10/31/2023  Unit: [facility] NEURO ICU    Received request from HealthCare Provider. Upon review of chart and communication with care team, patient's decision making abilities are not in question. . Patient and Healthcare Decision Maker was/were present in the room during visit. Goals of ACP Conversation:  Complete documents    Health Care Decision Makers:       Primary Decision Maker: True Scott - Girlfriend - [phone]    Secondary Decision Maker: Pari Jimenes - Brother/Sister - [phone]  Summary:  Completed New Documents    Advance Care Planning Documents (Patient Wishes):  Healthcare Power of /Advance Directive Appointment of Health Care Agent     Assessment:   explained living will to pt and significant other. Pt declined to complete at this time saying he wishes to talk it over with HCPOA's. SO became tearful at one point. Interventions:  Provided education on documents for clarity and greater understanding  Assisted in the completion of documents according to patient's wishes at this time  Encouraged ongoing ACP conversation with future decision makers and loved ones    Care Preferences Communicated:   No    Outcomes/Plan:  New advance directive completed. Returned original document(s) to patient, as well as copies for distribution to appointed agents  Copy of advance directive given to staff to scan into medical record.     Electronically signed by Raisa Lazar [facility] on 10/31/2023 at 3:19 PM

## 2023-10-31 NOTE — BRIEF OP NOTE
Brief Postoperative Note    Kinjal Marquez. YOB: 1970  [id number]    Pre-operative Diagnosis: Lung nodule      Post-operative Diagnosis: Same    Procedure: Lung bx    Medication Given: fentanyl and versed    Anesthesia: 1%Lidocaine     Surgeons/Assistants:  Kami Adams MD and Carla Christopher PA-C    Estimated Blood Loss: Minimal    Complications: none    Technically successful bx of right lung nodule. 4 core samples were obtained and given to the onsite pathologist that confirmed tissue sampling.      Electronically signed by JOSE Onofre on 10/31/2023 at 1:22 PM

## 2023-10-31 NOTE — PROGRESS NOTES
Pt has returned from lung bx, sig other at bedside.  called to bedside per pt request to fill out paperwork for sig other to be POA. Paperwork completed with . Pt then c/o feeling sore near puncture site for lung bx. VSS as noted, on room air, no dyspnea c/o noted. Pain pills were given as noted per [facility]. Pt requests another lidocaine patch, msg sent to  to request, awaiting response. Request pt try to lay in bed, rest and relax for a bit, as pt has been up walking in hallway and out to smoke with sig other. Enc pt to rest and will notify if further orders received.

## 2023-10-31 NOTE — PROGRESS NOTES
Spoke to [facility] about the order for Oxygen therapy with Simple Mask at 6L. Chest xray ordered for 0600 tomorrow am. To call if Pt gets short of breath or desaturates. Also asked RN to discourage Pt from smoking today.

## 2023-11-01 ENCOUNTER — APPOINTMENT (OUTPATIENT)
Dept: GENERAL RADIOLOGY | Age: 53
End: 2023-11-01
Payer: MEDICAID

## 2023-11-01 LAB
ANION GAP SERPL CALCULATED.3IONS-SCNC: 10 MMOL/L (ref 9–17)
ANION GAP SERPL CALCULATED.3IONS-SCNC: 14 MMOL/L (ref 9–17)
ANION GAP SERPL CALCULATED.3IONS-SCNC: 15 MMOL/L (ref 9–17)
ANION GAP SERPL CALCULATED.3IONS-SCNC: 8 MMOL/L (ref 9–17)
ANION GAP SERPL CALCULATED.3IONS-SCNC: NORMAL MMOL/L (ref 9–17)
BASOPHILS # BLD: <0.03 K/UL (ref 0–0.2)
BASOPHILS NFR BLD: 0 % (ref 0–2)
BUN SERPL-MCNC: 19 MG/DL (ref 6–20)
BUN SERPL-MCNC: NORMAL MG/DL (ref 6–20)
CALCIUM SERPL-MCNC: 9.1 MG/DL (ref 8.6–10.4)
CALCIUM SERPL-MCNC: NORMAL MG/DL (ref 8.6–10.4)
CHLORIDE SERPL-SCNC: 100 MMOL/L (ref 98–107)
CHLORIDE SERPL-SCNC: 101 MMOL/L (ref 98–107)
CHLORIDE SERPL-SCNC: 97 MMOL/L (ref 98–107)
CHLORIDE SERPL-SCNC: 98 MMOL/L (ref 98–107)
CHLORIDE SERPL-SCNC: NORMAL MMOL/L (ref 98–107)
CO2 SERPL-SCNC: 21 MMOL/L (ref 20–31)
CO2 SERPL-SCNC: 22 MMOL/L (ref 20–31)
CO2 SERPL-SCNC: NORMAL MMOL/L (ref 20–31)
CREAT SERPL-MCNC: 0.4 MG/DL (ref 0.7–1.2)
CREAT SERPL-MCNC: NORMAL MG/DL (ref 0.7–1.2)
EOSINOPHIL # BLD: 0.04 K/UL (ref 0–0.44)
EOSINOPHILS RELATIVE PERCENT: 0 % (ref 1–4)
ERYTHROCYTE [DISTWIDTH] IN BLOOD BY AUTOMATED COUNT: 12.8 % (ref 11.8–14.4)
GFR SERPL CREATININE-BSD FRML MDRD: >60 ML/MIN/1.73M2
GFR SERPL CREATININE-BSD FRML MDRD: NORMAL ML/MIN/1.73M2
GLUCOSE SERPL-MCNC: 108 MG/DL (ref 70–99)
GLUCOSE SERPL-MCNC: NORMAL MG/DL (ref 70–99)
HCT VFR BLD AUTO: 42.2 % (ref 40.7–50.3)
HGB BLD-MCNC: 14.5 G/DL (ref 13–17)
IMM GRANULOCYTES # BLD AUTO: 0.07 K/UL (ref 0–0.3)
IMM GRANULOCYTES NFR BLD: 1 %
LYMPHOCYTES NFR BLD: 1.66 K/UL (ref 1.1–3.7)
LYMPHOCYTES RELATIVE PERCENT: 11 % (ref 24–43)
MCH RBC QN AUTO: 30.2 PG (ref 25.2–33.5)
MCHC RBC AUTO-ENTMCNC: 34.4 G/DL (ref 28.4–34.8)
MCV RBC AUTO: 87.9 FL (ref 82.6–102.9)
MONOCYTES NFR BLD: 0.88 K/UL (ref 0.1–1.2)
MONOCYTES NFR BLD: 6 % (ref 3–12)
NEUTROPHILS NFR BLD: 82 % (ref 36–65)
NEUTS SEG NFR BLD: 12.23 K/UL (ref 1.5–8.1)
NRBC BLD-RTO: 0 PER 100 WBC
PLATELET # BLD AUTO: 249 K/UL (ref 138–453)
PMV BLD AUTO: 10.7 FL (ref 8.1–13.5)
POTASSIUM SERPL-SCNC: 4 MMOL/L (ref 3.7–5.3)
POTASSIUM SERPL-SCNC: 4.2 MMOL/L (ref 3.7–5.3)
POTASSIUM SERPL-SCNC: 4.2 MMOL/L (ref 3.7–5.3)
POTASSIUM SERPL-SCNC: 4.8 MMOL/L (ref 3.7–5.3)
POTASSIUM SERPL-SCNC: NORMAL MMOL/L (ref 3.7–5.3)
RBC # BLD AUTO: 4.8 M/UL (ref 4.21–5.77)
REASON FOR REJECTION: NORMAL
REASON FOR REJECTION: NORMAL
SODIUM SERPL-SCNC: 131 MMOL/L (ref 135–144)
SODIUM SERPL-SCNC: 132 MMOL/L (ref 135–144)
SODIUM SERPL-SCNC: 133 MMOL/L (ref 135–144)
SODIUM SERPL-SCNC: 134 MMOL/L (ref 135–144)
SODIUM SERPL-SCNC: NORMAL MMOL/L (ref 135–144)
SPECIMEN SOURCE: NORMAL
SPECIMEN SOURCE: NORMAL
WBC OTHER # BLD: 14.9 K/UL (ref 3.5–11.3)
ZZ NTE CLEAN UP: ORDERED TEST: NORMAL
ZZ NTE CLEAN UP: ORDERED TEST: NORMAL

## 2023-11-01 PROCEDURE — 85025 COMPLETE CBC W/AUTO DIFF WBC: CPT

## 2023-11-01 PROCEDURE — 80051 ELECTROLYTE PANEL: CPT

## 2023-11-01 PROCEDURE — 99232 SBSQ HOSP IP/OBS MODERATE 35: CPT | Performed by: INTERNAL MEDICINE

## 2023-11-01 PROCEDURE — 6370000000 HC RX 637 (ALT 250 FOR IP): Performed by: STUDENT IN AN ORGANIZED HEALTH CARE EDUCATION/TRAINING PROGRAM

## 2023-11-01 PROCEDURE — 2580000003 HC RX 258: Performed by: STUDENT IN AN ORGANIZED HEALTH CARE EDUCATION/TRAINING PROGRAM

## 2023-11-01 PROCEDURE — 36415 COLL VENOUS BLD VENIPUNCTURE: CPT

## 2023-11-01 PROCEDURE — 6360000002 HC RX W HCPCS: Performed by: STUDENT IN AN ORGANIZED HEALTH CARE EDUCATION/TRAINING PROGRAM

## 2023-11-01 PROCEDURE — 6370000000 HC RX 637 (ALT 250 FOR IP)

## 2023-11-01 PROCEDURE — 71045 X-RAY EXAM CHEST 1 VIEW: CPT

## 2023-11-01 PROCEDURE — 2060000000 HC ICU INTERMEDIATE R&B

## 2023-11-01 PROCEDURE — 6360000002 HC RX W HCPCS

## 2023-11-01 PROCEDURE — 6370000000 HC RX 637 (ALT 250 FOR IP): Performed by: NURSE PRACTITIONER

## 2023-11-01 PROCEDURE — 80048 BASIC METABOLIC PNL TOTAL CA: CPT

## 2023-11-01 RX ORDER — OXYCODONE HYDROCHLORIDE 5 MG/1
5 TABLET ORAL ONCE
Status: COMPLETED | OUTPATIENT
Start: 2023-11-01 | End: 2023-11-01

## 2023-11-01 RX ADMIN — FAMOTIDINE 20 MG: 20 TABLET, FILM COATED ORAL at 08:55

## 2023-11-01 RX ADMIN — EXTENDED PHENYTOIN SODIUM 100 MG: 100 CAPSULE ORAL at 20:34

## 2023-11-01 RX ADMIN — SODIUM CHLORIDE, PRESERVATIVE FREE 10 ML: 5 INJECTION INTRAVENOUS at 10:38

## 2023-11-01 RX ADMIN — EXTENDED PHENYTOIN SODIUM 100 MG: 100 CAPSULE ORAL at 08:51

## 2023-11-01 RX ADMIN — EXTENDED PHENYTOIN SODIUM 100 MG: 100 CAPSULE ORAL at 13:50

## 2023-11-01 RX ADMIN — SODIUM CHLORIDE, PRESERVATIVE FREE 10 ML: 5 INJECTION INTRAVENOUS at 20:30

## 2023-11-01 RX ADMIN — Medication 100 MG: at 08:55

## 2023-11-01 RX ADMIN — OXYCODONE HYDROCHLORIDE 5 MG: 5 TABLET ORAL at 08:51

## 2023-11-01 RX ADMIN — QUETIAPINE FUMARATE 50 MG: 25 TABLET ORAL at 20:35

## 2023-11-01 RX ADMIN — SERTRALINE HYDROCHLORIDE 50 MG: 50 TABLET ORAL at 08:54

## 2023-11-01 RX ADMIN — HYDROCODONE BITARTRATE AND ACETAMINOPHEN 2 TABLET: 5; 325 TABLET ORAL at 07:40

## 2023-11-01 RX ADMIN — DEXAMETHASONE SODIUM PHOSPHATE 4 MG: 4 INJECTION, SOLUTION INTRA-ARTICULAR; INTRALESIONAL; INTRAMUSCULAR; INTRAVENOUS; SOFT TISSUE at 20:36

## 2023-11-01 RX ADMIN — DEXAMETHASONE SODIUM PHOSPHATE 4 MG: 4 INJECTION, SOLUTION INTRA-ARTICULAR; INTRALESIONAL; INTRAMUSCULAR; INTRAVENOUS; SOFT TISSUE at 10:36

## 2023-11-01 RX ADMIN — QUETIAPINE FUMARATE 50 MG: 25 TABLET ORAL at 08:54

## 2023-11-01 RX ADMIN — ACETAMINOPHEN 650 MG: 325 TABLET ORAL at 18:43

## 2023-11-01 RX ADMIN — HYDROCODONE BITARTRATE AND ACETAMINOPHEN 2 TABLET: 5; 325 TABLET ORAL at 20:35

## 2023-11-01 RX ADMIN — DEXAMETHASONE SODIUM PHOSPHATE 4 MG: 4 INJECTION, SOLUTION INTRA-ARTICULAR; INTRALESIONAL; INTRAMUSCULAR; INTRAVENOUS; SOFT TISSUE at 17:10

## 2023-11-01 RX ADMIN — ACETAMINOPHEN 650 MG: 325 TABLET ORAL at 12:08

## 2023-11-01 RX ADMIN — DEXAMETHASONE SODIUM PHOSPHATE 4 MG: 4 INJECTION, SOLUTION INTRA-ARTICULAR; INTRALESIONAL; INTRAMUSCULAR; INTRAVENOUS; SOFT TISSUE at 06:08

## 2023-11-01 RX ADMIN — DEXAMETHASONE SODIUM PHOSPHATE 4 MG: 4 INJECTION, SOLUTION INTRA-ARTICULAR; INTRALESIONAL; INTRAMUSCULAR; INTRAVENOUS; SOFT TISSUE at 00:45

## 2023-11-01 RX ADMIN — FAMOTIDINE 20 MG: 20 TABLET, FILM COATED ORAL at 20:36

## 2023-11-01 RX ADMIN — HYDROCODONE BITARTRATE AND ACETAMINOPHEN 2 TABLET: 5; 325 TABLET ORAL at 13:50

## 2023-11-01 RX ADMIN — FOLIC ACID 1 MG: 1 TABLET ORAL at 08:54

## 2023-11-01 RX ADMIN — Medication 3 MG: at 20:35

## 2023-11-01 ASSESSMENT — PAIN SCALES - GENERAL
PAINLEVEL_OUTOF10: 6
PAINLEVEL_OUTOF10: 9

## 2023-11-01 ASSESSMENT — PAIN DESCRIPTION - LOCATION: LOCATION: GENERALIZED

## 2023-11-01 NOTE — PLAN OF CARE
Problem: Pain  Goal: Verbalizes/displays adequate comfort level or baseline comfort level  11/1/2023 1806 by Osiris Blake RN  Outcome: Progressing     Problem: Safety - Adult  Goal: Free from fall injury  11/1/2023 1806 by Osiris Blake RN  Outcome: Progressing     Problem: Nutrition Deficit:  Goal: Optimize nutritional status  11/1/2023 1806 by Osiris Blake RN  Outcome: Progressing     Problem: Skin/Tissue Integrity  Goal: Absence of new skin breakdown  Description: 1. Monitor for areas of redness and/or skin breakdown  2. Assess vascular access sites hourly  3. Every 4-6 hours minimum:  Change oxygen saturation probe site  4. Every 4-6 hours:  If on nasal continuous positive airway pressure, respiratory therapy assess nares and determine need for appliance change or resting period.   11/1/2023 1806 by Osiris Blake RN  Outcome: Progressing     Problem: Chronic Conditions and Co-morbidities  Goal: Patient's chronic conditions and co-morbidity symptoms are monitored and maintained or improved  11/1/2023 1806 by Osiris Blake RN  Outcome: Progressing     Problem: ABCDS Injury Assessment  Goal: Absence of physical injury  11/1/2023 1806 by Osiris Blake RN  Outcome: Progressing

## 2023-11-01 NOTE — PROGRESS NOTES
[facility]  Internal Medicine Teaching Residency Program  Inpatient Daily Progress Note  ______________________________________________________________________________    Patient: Lanre Ding. YOB: 1970   SOY:[id number]    Acct: [de-identified]     Room: [facility]01  Admit date: 10/26/2023  Today's date: 11/01/23  Number of days in the hospital: 6    SUBJECTIVE   Admitting Diagnosis: Seizure ([address])  CC: Seizure  Pt examined at bedside. Chart & results reviewed. - Patient's BP was hemodynamically stable and saturating well on 6 L oxygen(on it d/t pneumothorax)  - Patient still reports back pain  -Na 132. On Fluid Restriction 1.2 L  - Lung biopsy yesterday. Developed small pneumothorax. Put on oxygen. F/U on repeat CXR. ROS:  Constitutional:  negative for chills, fevers, sweats  Respiratory:  negative for cough, dyspnea on exertion, hemoptysis, shortness of breath, wheezing  Cardiovascular:  negative for chest pain, chest pressure/discomfort, lower extremity edema, palpitations  Gastrointestinal:  no N/V,diarrhea,constipation  Neurological:  headache and back pain present  BRIEF HISTORY     The patient is a pleasant 46 y.o. male  with polysubstance abuse, COPD, Alcohol abuse disorder ( sober since 2 years) with history of withdrawal seizures, schizophrenia presented to presented to [facility] ER with seizure with fall. Per report patient seized roughly 3 minutes at home, and at that time EMS was called. By the time patient arrived at [facility] ED, he was A&O x3 and able to provide some sort of history. While at [address] [place] [address] ED, patient went into status epilepticus. and failed multiple doses of Ativan. He was subsequently intubated for airway protection and started on a Versed drip. Patient did not respond well to Versed drip and was agitated on the mechanical ventilator, at that time propofol was started.   Per report, patient has had a history of Abdomen is soft. Musculoskeletal:         General: Normal range of motion. Cervical back: Normal range of motion. Neurological:      Mental Status: He is alert. Medications:  Scheduled Medications:    potassium chloride  40 mEq Oral Once    famotidine  20 mg Oral BID    QUEtiapine  50 mg Oral BID    sertraline  50 mg Oral Daily    lidocaine  1 patch TransDERmal Daily    folic acid  1 mg Oral Daily    thiamine  100 mg Oral Daily    phenytoin  100 mg Oral TID    sodium chloride flush  5-40 mL IntraVENous 2 times per day    enoxaparin  40 mg SubCUTAneous Daily    dexamethasone  4 mg IntraVENous Q6H     Continuous Infusions:    sodium chloride      dextrose       PRN Medicationsmelatonin, 3 mg, Nightly PRN  HYDROcodone 5 mg - acetaminophen, 2 tablet, Q6H PRN  guaiFENesin, 600 mg, BID PRN  sodium chloride flush, 10 mL, PRN  sodium chloride flush, 5-40 mL, PRN  sodium chloride, , PRN  LORazepam, 4 mg, Q5 Min PRN  ondansetron, 4 mg, Q8H PRN   Or  ondansetron, 4 mg, Q6H PRN  polyethylene glycol, 17 g, Daily PRN  acetaminophen, 650 mg, Q6H PRN   Or  acetaminophen, 650 mg, Q6H PRN  potassium chloride, 10 mEq, PRN  glucose, 4 tablet, PRN  dextrose bolus, 125 mL, PRN   Or  dextrose bolus, 250 mL, PRN  glucagon (rDNA), 1 mg, PRN  dextrose, , Continuous PRN  potassium chloride, 40 mEq, PRN   Or  potassium alternative oral replacement, 40 mEq, PRN   Or  potassium chloride, 10 mEq, PRN        Diagnostic Labs:  CBC:   Recent Labs     10/30/23  0513 10/31/23  0114 11/01/23  0321   WBC 11.0 9.4 14.9*   RBC 4.78 4.50 4.80   HGB 14.7 13.6 14.5   HCT 42.5 39.4* 42.2   MCV 88.9 87.6 87.9   RDW 12.7 12.7 12.8    210 249     BMP:   Recent Labs     10/31/23  0114 10/31/23  1605 11/01/23  0111 11/01/23  0321 11/01/23  0434   *   < > 131* DISREGARD RESULTS. SPECIMEN WILL BE REDRAWN. 132*   K 3.7   < > 4.0 DISREGARD RESULTS. SPECIMEN WILL BE REDRAWN. 4.2      < > 101 DISREGARD RESULTS. SPECIMEN WILL BE REDRAWN.

## 2023-11-01 NOTE — PROGRESS NOTES
[facility]  Speech Language Pathology    Date: 11/1/2023  Patient Name: Kyle De Leon. YOB: 1970   AGE: 46 y.o.   MRN: [id number]        Patient Not Available for Speech Therapy     Due to:  [] Testing  [] Hemodialysis  [] Cancelled by RN  [] Surgery   [] Intubation/Sedation/Pain Medication  [] Medical instability  [x] Other: Pt reports he is too fatigued this date to participate in therapy    Next scheduled treatment: 11/2/23  Completed by: Lenore Gould, SLP

## 2023-11-01 NOTE — PROGRESS NOTES
Comprehensive Nutrition Assessment    Type and Reason for Visit:  Reassess    Nutrition Recommendations/Plan:   Continue diet as ordered and fluids per physician order. Add Std High Kcal/ High Protein ONS BID, prefers chocolate. Monitor and follow. Malnutrition Assessment:  Malnutrition Status:  Mild malnutrition (11/01/23 1004)    Context:  Acute Illness     Findings of the 6 clinical characteristics of malnutrition:  Energy Intake:  Mild decrease in energy intake (Comment)  Weight Loss:  Unable to assess     Body Fat Loss:  Mild body fat loss Orbital   Muscle Mass Loss:  Mild muscle mass loss Thigh (quadraceps), Clavicles (pectoralis & deltoids), Temples (temporalis)  Fluid Accumulation:  No significant fluid accumulation     Strength:  Not Performed    Nutrition Assessment:    Reassessed for PO intakes. TF d/c'd 10/27. Pt has been NPO for ~4-5 meals since that time. Current diet order is 5 carb choice, providing 8666-5007 kcal/day. 1200 ml fluid restriction in place r/t hyponatremia. Pt currently reports good appetite and intakes, stating that he ate all of his breakfast this morning and is waiting for his pork chops for lunch. Pt is visually very thin, reporting that his UBW is 145#. Pt stated his curent weight at 116#, though record shows 135#. Writer asked why he thought he had lost the weight to which he said sometimes he just \"stopped eating because he had no appetite. \" Pt has h/o ETOH abuse and nicotine use, likely contributng to poor nutrition and food intakes. Pt desires to have ONS, chocolate preferred. Pt currently being followed by oncology r/t right lung mass. LBM 10/31. No edema or skin issues noted. Nutrition Related Findings:    Labs/ meds reviewed. Na 132. Wound Type: None       Current Nutrition Intake & Therapies:    Average Meal Intake: %  Average Supplements Intake: None Ordered  ADULT DIET;  Regular; 5 carb choices (75 gm/meal); 1200 ml    Anthropometric Measures:  Height:

## 2023-11-01 NOTE — PLAN OF CARE
Problem: Respiratory - Adult  Goal: Achieves optimal ventilation and oxygenation  11/1/2023 0604 by Juan Duke RN  Outcome: Progressing  10/31/2023 1652 by Joon Crowley RN  Outcome: Progressing     Problem: Discharge Planning  Goal: Discharge to home or other facility with appropriate resources  11/1/2023 0604 by Juan Duke RN  Outcome: Progressing  10/31/2023 1652 by Joon Crowley RN  Outcome: Progressing     Problem: Pain  Goal: Verbalizes/displays adequate comfort level or baseline comfort level  Outcome: Progressing     Problem: Neurosensory - Adult  Goal: Absence of seizures  11/1/2023 0604 by Juan Duke RN  Outcome: Progressing  10/31/2023 1652 by Joon Crowley RN  Outcome: Progressing  Goal: Remains free of injury related to seizures activity  Outcome: Progressing  Goal: Achieves stable or improved neurological status  11/1/2023 0604 by Juan Duke RN  Outcome: Progressing  10/31/2023 1652 by Joon Crowley RN  Outcome: Progressing  Goal: Achieves maximal functionality and self care  11/1/2023 0604 by Juan Duke RN  Outcome: Progressing  10/31/2023 1652 by Joon Crowley RN  Outcome: Progressing     Problem: Skin/Tissue Integrity - Adult  Goal: Skin integrity remains intact  11/1/2023 0604 by Juan Duke RN  Outcome: Progressing  10/31/2023 1652 by Joon Crowley RN  Outcome: Progressing  Goal: Incisions, wounds, or drain sites healing without S/S of infection  11/1/2023 0604 by Juan Duke RN  Outcome: Progressing  10/31/2023 1652 by Joon Crowley RN  Outcome: Progressing  Goal: Oral mucous membranes remain intact  11/1/2023 0604 by Juan Duke RN  Outcome: Progressing  10/31/2023 1652 by Joon Crowley RN  Outcome: Progressing     Problem: Musculoskeletal - Adult  Goal: Return mobility to safest level of function  11/1/2023 0604 by Juan Duke RN  Outcome: Progressing  10/31/2023 1652 by Joon Crowley RN  Outcome: injury  Outcome: Progressing

## 2023-11-02 ENCOUNTER — APPOINTMENT (OUTPATIENT)
Dept: GENERAL RADIOLOGY | Age: 53
End: 2023-11-02
Payer: MEDICAID

## 2023-11-02 VITALS
HEART RATE: 84 BPM | BODY MASS INDEX: 22.49 KG/M2 | DIASTOLIC BLOOD PRESSURE: 98 MMHG | HEIGHT: 65 IN | WEIGHT: 135 LBS | SYSTOLIC BLOOD PRESSURE: 127 MMHG | RESPIRATION RATE: 16 BRPM | TEMPERATURE: 98.5 F | OXYGEN SATURATION: 98 %

## 2023-11-02 LAB
ANION GAP SERPL CALCULATED.3IONS-SCNC: 11 MMOL/L (ref 9–17)
ANION GAP SERPL CALCULATED.3IONS-SCNC: 13 MMOL/L (ref 9–17)
BASOPHILS # BLD: <0.03 K/UL (ref 0–0.2)
BASOPHILS NFR BLD: 0 % (ref 0–2)
BUN SERPL-MCNC: 23 MG/DL (ref 6–20)
CALCIUM SERPL-MCNC: 8.8 MG/DL (ref 8.6–10.4)
CHLORIDE SERPL-SCNC: 100 MMOL/L (ref 98–107)
CHLORIDE SERPL-SCNC: 99 MMOL/L (ref 98–107)
CO2 SERPL-SCNC: 18 MMOL/L (ref 20–31)
CO2 SERPL-SCNC: 19 MMOL/L (ref 20–31)
CREAT SERPL-MCNC: 0.4 MG/DL (ref 0.7–1.2)
EOSINOPHIL # BLD: <0.03 K/UL (ref 0–0.44)
EOSINOPHILS RELATIVE PERCENT: 0 % (ref 1–4)
ERYTHROCYTE [DISTWIDTH] IN BLOOD BY AUTOMATED COUNT: 13 % (ref 11.8–14.4)
GFR SERPL CREATININE-BSD FRML MDRD: >60 ML/MIN/1.73M2
GLUCOSE SERPL-MCNC: 99 MG/DL (ref 70–99)
HCT VFR BLD AUTO: 45.3 % (ref 40.7–50.3)
HGB BLD-MCNC: 13.9 G/DL (ref 13–17)
IMM GRANULOCYTES # BLD AUTO: 0.05 K/UL (ref 0–0.3)
IMM GRANULOCYTES NFR BLD: 0 %
LYMPHOCYTES NFR BLD: 2.01 K/UL (ref 1.1–3.7)
LYMPHOCYTES RELATIVE PERCENT: 15 % (ref 24–43)
MCH RBC QN AUTO: 30.5 PG (ref 25.2–33.5)
MCHC RBC AUTO-ENTMCNC: 30.7 G/DL (ref 28.4–34.8)
MCV RBC AUTO: 99.3 FL (ref 82.6–102.9)
MICROORGANISM SPEC CULT: NORMAL
MICROORGANISM SPEC CULT: NORMAL
MONOCYTES NFR BLD: 0.68 K/UL (ref 0.1–1.2)
MONOCYTES NFR BLD: 5 % (ref 3–12)
NEUTROPHILS NFR BLD: 79 % (ref 36–65)
NEUTS SEG NFR BLD: 10.5 K/UL (ref 1.5–8.1)
NRBC BLD-RTO: 0 PER 100 WBC
PLATELET # BLD AUTO: 321 K/UL (ref 138–453)
PMV BLD AUTO: 9.9 FL (ref 8.1–13.5)
POTASSIUM SERPL-SCNC: 4.1 MMOL/L (ref 3.7–5.3)
POTASSIUM SERPL-SCNC: 4.2 MMOL/L (ref 3.7–5.3)
RBC # BLD AUTO: 4.56 M/UL (ref 4.21–5.77)
REASON FOR REJECTION: NORMAL
SERVICE CMNT-IMP: NORMAL
SERVICE CMNT-IMP: NORMAL
SODIUM SERPL-SCNC: 129 MMOL/L (ref 135–144)
SODIUM SERPL-SCNC: 131 MMOL/L (ref 135–144)
SPECIMEN DESCRIPTION: NORMAL
SPECIMEN DESCRIPTION: NORMAL
SPECIMEN SOURCE: NORMAL
SURGICAL PATHOLOGY REPORT: NORMAL
WBC OTHER # BLD: 13.3 K/UL (ref 3.5–11.3)
ZZ NTE CLEAN UP: ORDERED TEST: NORMAL

## 2023-11-02 PROCEDURE — 6360000002 HC RX W HCPCS

## 2023-11-02 PROCEDURE — 6370000000 HC RX 637 (ALT 250 FOR IP): Performed by: NURSE PRACTITIONER

## 2023-11-02 PROCEDURE — 99232 SBSQ HOSP IP/OBS MODERATE 35: CPT | Performed by: INTERNAL MEDICINE

## 2023-11-02 PROCEDURE — 6370000000 HC RX 637 (ALT 250 FOR IP): Performed by: STUDENT IN AN ORGANIZED HEALTH CARE EDUCATION/TRAINING PROGRAM

## 2023-11-02 PROCEDURE — 80048 BASIC METABOLIC PNL TOTAL CA: CPT

## 2023-11-02 PROCEDURE — 71045 X-RAY EXAM CHEST 1 VIEW: CPT

## 2023-11-02 PROCEDURE — 6360000002 HC RX W HCPCS: Performed by: STUDENT IN AN ORGANIZED HEALTH CARE EDUCATION/TRAINING PROGRAM

## 2023-11-02 PROCEDURE — 80051 ELECTROLYTE PANEL: CPT

## 2023-11-02 PROCEDURE — 36415 COLL VENOUS BLD VENIPUNCTURE: CPT

## 2023-11-02 PROCEDURE — 85025 COMPLETE CBC W/AUTO DIFF WBC: CPT

## 2023-11-02 PROCEDURE — 2580000003 HC RX 258: Performed by: STUDENT IN AN ORGANIZED HEALTH CARE EDUCATION/TRAINING PROGRAM

## 2023-11-02 PROCEDURE — 6370000000 HC RX 637 (ALT 250 FOR IP)

## 2023-11-02 RX ORDER — PHENYTOIN SODIUM 100 MG/1
100 CAPSULE, EXTENDED RELEASE ORAL 3 TIMES DAILY
Qty: 60 CAPSULE | Refills: 3 | Status: SHIPPED | OUTPATIENT
Start: 2023-11-02

## 2023-11-02 RX ORDER — SOD.CHLORID/POTASSIUM CHLORIDE 287-180-15
1 TABLET ORAL DAILY
Qty: 30 TABLET | Status: SHIPPED | OUTPATIENT
Start: 2023-11-02

## 2023-11-02 RX ORDER — DEXAMETHASONE 4 MG/1
4 TABLET ORAL 2 TIMES DAILY WITH MEALS
Qty: 14 TABLET | Refills: 0 | Status: SHIPPED | OUTPATIENT
Start: 2023-11-02 | End: 2023-11-09

## 2023-11-02 RX ORDER — FOLIC ACID 1 MG/1
1 TABLET ORAL DAILY
Qty: 30 TABLET | Refills: 3 | Status: SHIPPED | OUTPATIENT
Start: 2023-11-03

## 2023-11-02 RX ORDER — DEXAMETHASONE 4 MG/1
4 TABLET ORAL EVERY 6 HOURS SCHEDULED
Status: DISCONTINUED | OUTPATIENT
Start: 2023-11-02 | End: 2023-11-02 | Stop reason: HOSPADM

## 2023-11-02 RX ADMIN — DEXAMETHASONE SODIUM PHOSPHATE 4 MG: 4 INJECTION, SOLUTION INTRA-ARTICULAR; INTRALESIONAL; INTRAMUSCULAR; INTRAVENOUS; SOFT TISSUE at 04:25

## 2023-11-02 RX ADMIN — DEXAMETHASONE SODIUM PHOSPHATE 4 MG: 4 INJECTION, SOLUTION INTRA-ARTICULAR; INTRALESIONAL; INTRAMUSCULAR; INTRAVENOUS; SOFT TISSUE at 09:39

## 2023-11-02 RX ADMIN — QUETIAPINE FUMARATE 50 MG: 25 TABLET ORAL at 07:57

## 2023-11-02 RX ADMIN — EXTENDED PHENYTOIN SODIUM 100 MG: 100 CAPSULE ORAL at 13:36

## 2023-11-02 RX ADMIN — ENOXAPARIN SODIUM 40 MG: 100 INJECTION SUBCUTANEOUS at 07:57

## 2023-11-02 RX ADMIN — FOLIC ACID 1 MG: 1 TABLET ORAL at 07:57

## 2023-11-02 RX ADMIN — HYDROCODONE BITARTRATE AND ACETAMINOPHEN 2 TABLET: 5; 325 TABLET ORAL at 04:30

## 2023-11-02 RX ADMIN — SERTRALINE HYDROCHLORIDE 50 MG: 50 TABLET ORAL at 07:57

## 2023-11-02 RX ADMIN — Medication 100 MG: at 07:57

## 2023-11-02 RX ADMIN — EXTENDED PHENYTOIN SODIUM 100 MG: 100 CAPSULE ORAL at 09:39

## 2023-11-02 RX ADMIN — HYDROCODONE BITARTRATE AND ACETAMINOPHEN 2 TABLET: 5; 325 TABLET ORAL at 10:30

## 2023-11-02 RX ADMIN — FAMOTIDINE 20 MG: 20 TABLET, FILM COATED ORAL at 07:57

## 2023-11-02 RX ADMIN — SODIUM CHLORIDE, PRESERVATIVE FREE 10 ML: 5 INJECTION INTRAVENOUS at 07:56

## 2023-11-02 ASSESSMENT — PAIN SCALES - GENERAL
PAINLEVEL_OUTOF10: 5
PAINLEVEL_OUTOF10: 6
PAINLEVEL_OUTOF10: 7
PAINLEVEL_OUTOF10: 8

## 2023-11-02 NOTE — CARE COORDINATION
Discharge [facility] Case Management Department  Written by: Juliette Packer RN    Patient Name: Joshua Brumfield.   Attending Provider: Trinity Sun MD  Admit Date: 10/26/2023  2:08 AM  MRN: [id number]  Account: [de-identified]                     : 1970  Discharge Date:       Disposition: home    Juliette Packer RN

## 2023-11-02 NOTE — DISCHARGE SUMMARY
[address]     Department of Internal Medicine - Staff Internal Medicine Teaching Service    INPATIENT DISCHARGE SUMMARY      Patient Identification:  Rene Mackey is a 46 y.o. male. :  1970  MRN: [id number]     Acct: [de-identified]   PCP: RINA Ospina CNP  Admit Date:  10/26/2023  Discharge date and time: 2023  2:30 PM   Attending Provider: No att. 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  *      HOSPITAL STAY     Brief Inpatient course:   Rene Mackey is a 46 y.o. male who was admitted for the management of Seizure [facility]),   Presented as a transfer from [facility], he was managed for status epilepticus while in that ER, where he failed multiple doses of Ativan and was intubated for airway protection, imaging done while at outside hospital showed multiple subcentimeter brain mets, CT chest showed 4.3 cm spiculated right upper lobe mass with malignant appearance with multiple bilateral pulmonary metastatic nodules and metastatic lymphadenopathy  - He was transferred to [facility] for LTM E, he was extubated on 10/27  - His seizures were likely secondary to brain metastasis, neurology followed him, LTM he did not show any epileptiform activity, he was on Dilantin and Decadron  -He was also found to have hyponatremia, with urine osmole 404, likely secondary to SIADH, was managed with fluid restriction  -Further lung malignancy with brain metastasis, oncology was consulted, he underwent CT guided biopsy which showed out to be an adenocarcinoma  - He did not have any further seizures , oncology was okay for discharge with

## 2023-11-02 NOTE — DISCHARGE INSTRUCTIONS
You were admitted for seizures and found to have metastasis to the brain from the lungs  Lung biopsy is positive for adenocarcinoma (Cancer)  Please take Decadron 4 mg twice daily until you see oncology physician   Please take your medications as prescribed  Please make an appointment with oncology physician at [facility]  Please make an appointment with your PCP  In case of seizures or any kind of emergency, please call 911 or go to the nearest ER

## 2023-11-02 NOTE — PROGRESS NOTES
Normal breath sounds. Abdominal:      Palpations: Abdomen is soft. Musculoskeletal:         General: Normal range of motion. Cervical back: Normal range of motion. Neurological:      Mental Status: He is alert. Medications:  Scheduled Medications:    famotidine  20 mg Oral BID    QUEtiapine  50 mg Oral BID    sertraline  50 mg Oral Daily    lidocaine  1 patch TransDERmal Daily    folic acid  1 mg Oral Daily    thiamine  100 mg Oral Daily    phenytoin  100 mg Oral TID    sodium chloride flush  5-40 mL IntraVENous 2 times per day    enoxaparin  40 mg SubCUTAneous Daily    dexamethasone  4 mg IntraVENous Q6H     Continuous Infusions:    sodium chloride      dextrose       PRN Medicationsmelatonin, 3 mg, Nightly PRN  HYDROcodone 5 mg - acetaminophen, 2 tablet, Q6H PRN  guaiFENesin, 600 mg, BID PRN  sodium chloride flush, 10 mL, PRN  sodium chloride flush, 5-40 mL, PRN  sodium chloride, , PRN  LORazepam, 4 mg, Q5 Min PRN  ondansetron, 4 mg, Q8H PRN   Or  ondansetron, 4 mg, Q6H PRN  polyethylene glycol, 17 g, Daily PRN  acetaminophen, 650 mg, Q6H PRN   Or  acetaminophen, 650 mg, Q6H PRN  glucose, 4 tablet, PRN  dextrose bolus, 125 mL, PRN   Or  dextrose bolus, 250 mL, PRN  glucagon (rDNA), 1 mg, PRN  dextrose, , Continuous PRN  potassium chloride, 40 mEq, PRN   Or  potassium alternative oral replacement, 40 mEq, PRN   Or  potassium chloride, 10 mEq, PRN        Diagnostic Labs:  CBC:   Recent Labs     10/31/23  0114 11/01/23  0321 11/02/23  0345   WBC 9.4 14.9* 13.3*   RBC 4.50 4.80 4.56   HGB 13.6 14.5 13.9   HCT 39.4* 42.2 45.3   MCV 87.6 87.9 99.3   RDW 12.7 12.8 13.0    249 321     BMP:   Recent Labs     11/01/23  0321 11/01/23  0434 11/01/23  0953 11/01/23  2034 11/02/23  0345   NA DISREGARD RESULTS. SPECIMEN WILL BE REDRAWN. 132* 134* 133* 129*   K DISREGARD RESULTS. SPECIMEN WILL BE REDRAWN. 4.2 4.2 4.8 4.1   CL DISREGARD RESULTS.   SPECIMEN WILL BE REDRAWN. 100 97* 98 100   CO2 DISREGARD 10/25/2023  1. Spiculated 4.3 cm right upper lobe mass, malignant in appearance, with multiple bilateral pulmonary metastatic nodules and metastatic lymphadenopathy in the right hilar and subcarinal stations. This document has been electronically signed by: Marissa Cleveland MD on 10/25/2023 09:46 PM All CTs at this facility use dose modulation techniques and iterative reconstructions, and/or weight-based dosing when appropriate to reduce radiation to a low as reasonably achievable. CT HEAD WO CONTRAST    Result Date: 10/25/2023  1. Multiple subcentimeter brain metastases, at least 7, suspected in both cerebral hemishperes. Mild associated vasogenic edema. MRI brain with IV contrast recommended when clinically appropriate. This document has been electronically signed by: Marissa Cleveland MD on 10/25/2023 09:35 PM All CTs at this facility use dose modulation techniques and iterative reconstructions, and/or weight-based dosing when appropriate to reduce radiation to a low as reasonably achievable. CT CERVICAL SPINE WO CONTRAST    Result Date: 10/25/2023  1. No CT evidence of acute traumatic cervical spine injury. This document has been electronically signed by: Marissa Cleveland MD on 10/25/2023 09:35 PM All CTs at this facility use dose modulation techniques and iterative reconstructions, and/or weight-based dosing when appropriate to reduce radiation to a low as reasonably achievable. XR CHEST PORTABLE    Result Date: 10/25/2023  1. Support devices in appropriate position. 2. New left lung nodules could represent metastatic disease, particularly if there is history of malignancy. 3. Right lung nodular opacity is increased in size.  This document has been electronically signed by: Marissa Cleveland MD on 10/25/2023 09:09 PM      ASSESSMENT & PLAN     ASSESSMENT / PLAN:     IMPRESSION  This is a 46 y.o. male who presented with seizures and found to have right upper lung mass with B/L pulmonary nodules suspicious for

## 2023-11-02 NOTE — PROGRESS NOTES
Today's Date: 11/1/2023  Patient Name: Hayley Garcias. Date of admission: 10/26/2023  2:08 AM  Patient's age: 46 y.o., 1970  Admission Dx: Status epilepticus ([address]) [G40.901]  Seizure ([address]) [R56.9]      Requesting Physician: Brittnee Haro MD    CHIEF COMPLAINT: Status epilepticus. .  Consult for brain metastasis and lung mass. SUBJECTIVE:  Patient seen and examined  Breathing stable  Yesterday had small pneumothorax after Lung Bx  NO fever chills    BRIEF CASE HISTORY:    The patient is a 46 y.o.  male who is admitted to the hospital for further movement of status epilepticus. Patient presented to outside facility with seizure activities. It was difficult to control. He was transferred for further care. Further management and work-up reviewed multiple small brain lesions consistent with metastasis. MRI was ordered and it is pending. CT scan of the chest showed suspicious spiculated right upper lobe mass measuring 4.3 cm suggestive of malignancy. Positive evaluating the right hilar and subcarinal stations. No further history is obtainable from the patient. Past Medical History:   has a past medical history of Arthritis, Asthma, COPD (chronic obstructive pulmonary disease) ([address]), GERD (gastroesophageal reflux disease), Hypertension, Psychiatric problem, Schizophrenia ([address]), Severe malnutrition ([address]), and Unspecified cerebral artery occlusion with cerebral infarction. Past Surgical History:   has a past surgical history that includes CT NEEDLE BIOPSY LUNG PERCUTANEOUS (10/31/2023). Family History: family history includes Cancer in his father and mother; Other in his mother, sister, and sister. Social History:   reports that he has been smoking cigarettes. He has a 30.00 pack-year smoking history. He has never used smokeless tobacco. He reports that he does not currently use alcohol.  He reports that he does not currently use

## 2023-11-06 ENCOUNTER — TELEPHONE (OUTPATIENT)
Dept: CASE MANAGEMENT | Age: 53
End: 2023-11-06

## 2023-11-06 NOTE — TELEPHONE ENCOUNTER
Name: Arianne Franklin. : 1970  MRN: [id number]    Oncology Navigation- Initial Note:    Navigator reviewing chart and pt. Receiving Oncology F/U care in [facility].   Electronically signed by Virginia Pal RN on 2023 at 8:41 AM

## 2023-11-08 ENCOUNTER — APPOINTMENT (OUTPATIENT)
Dept: CT IMAGING | Age: 53
End: 2023-11-08
Payer: MEDICAID

## 2023-11-08 ENCOUNTER — HOSPITAL ENCOUNTER (OUTPATIENT)
Dept: RADIATION ONCOLOGY | Age: 53
Discharge: HOME | End: 2023-11-08
Payer: MEDICAID

## 2023-11-08 ENCOUNTER — APPOINTMENT (OUTPATIENT)
Dept: GENERAL RADIOLOGY | Age: 53
End: 2023-11-08
Payer: MEDICAID

## 2023-11-08 ENCOUNTER — HOSPITAL ENCOUNTER (OUTPATIENT)
Age: 53
Setting detail: OBSERVATION
Discharge: HOME | End: 2023-11-12
Attending: STUDENT IN AN ORGANIZED HEALTH CARE EDUCATION/TRAINING PROGRAM
Payer: MEDICAID

## 2023-11-08 ENCOUNTER — HOSPITAL ENCOUNTER (OUTPATIENT)
Dept: RADIATION ONCOLOGY | Age: 53
End: 2023-11-08
Payer: MEDICAID

## 2023-11-08 ENCOUNTER — TELEPHONE (OUTPATIENT)
Dept: RADIATION ONCOLOGY | Age: 53
End: 2023-11-08

## 2023-11-08 VITALS
WEIGHT: 118.8 LBS | HEIGHT: 65 IN | HEART RATE: 86 BPM | SYSTOLIC BLOOD PRESSURE: 116 MMHG | TEMPERATURE: 98.7 F | BODY MASS INDEX: 19.79 KG/M2 | DIASTOLIC BLOOD PRESSURE: 85 MMHG | OXYGEN SATURATION: 97 % | RESPIRATION RATE: 18 BRPM

## 2023-11-08 DIAGNOSIS — C79.31: ICD-10-CM

## 2023-11-08 DIAGNOSIS — C79.31 LUNG CANCER METASTATIC TO BRAIN (HCC): ICD-10-CM

## 2023-11-08 DIAGNOSIS — C34.91 ADENOCARCINOMA OF RIGHT LUNG, STAGE 4 (HCC): ICD-10-CM

## 2023-11-08 DIAGNOSIS — Z51.5 PALLIATIVE CARE PATIENT: ICD-10-CM

## 2023-11-08 DIAGNOSIS — J93.9 PNEUMOTHORAX ON RIGHT: ICD-10-CM

## 2023-11-08 DIAGNOSIS — C80.1: ICD-10-CM

## 2023-11-08 DIAGNOSIS — G89.3 CANCER RELATED PAIN: ICD-10-CM

## 2023-11-08 DIAGNOSIS — R62.7 FAILURE TO THRIVE IN ADULT: ICD-10-CM

## 2023-11-08 DIAGNOSIS — C34.90 LUNG CANCER METASTATIC TO BRAIN (HCC): ICD-10-CM

## 2023-11-08 DIAGNOSIS — C79.9 METASTATIC MALIGNANT NEOPLASM, UNSPECIFIED SITE (HCC): Primary | ICD-10-CM

## 2023-11-08 PROBLEM — R52 INTRACTABLE PAIN: Status: ACTIVE | Noted: 2023-11-08

## 2023-11-08 LAB
ALBUMIN SERPL BCG-MCNC: 3.4 G/DL (ref 3.5–5.1)
ALP SERPL-CCNC: 100 U/L (ref 38–126)
ALT SERPL W/O P-5'-P-CCNC: 31 U/L (ref 11–66)
ANION GAP SERPL CALC-SCNC: 10 MEQ/L (ref 8–16)
AST SERPL-CCNC: 22 U/L (ref 5–40)
BACTERIA URNS QL MICRO: ABNORMAL /HPF
BASOPHILS ABSOLUTE: 0.1 THOU/MM3 (ref 0–0.1)
BASOPHILS NFR BLD AUTO: 0.4 %
BILIRUB CONJ SERPL-MCNC: < 0.2 MG/DL (ref 0–0.3)
BILIRUB SERPL-MCNC: < 0.2 MG/DL (ref 0.3–1.2)
BILIRUB UR QL STRIP.AUTO: NEGATIVE
BUN SERPL-MCNC: 17 MG/DL (ref 7–22)
CALCIUM SERPL-MCNC: 8.6 MG/DL (ref 8.5–10.5)
CASTS #/AREA URNS LPF: ABNORMAL /LPF
CASTS 2: ABNORMAL /LPF
CHARACTER UR: ABNORMAL
CHLORIDE SERPL-SCNC: 103 MEQ/L (ref 98–111)
CO2 SERPL-SCNC: 26 MEQ/L (ref 23–33)
COLOR: YELLOW
CREAT SERPL-MCNC: 0.5 MG/DL (ref 0.4–1.2)
CRYSTALS URNS MICRO: ABNORMAL
DEPRECATED RDW RBC AUTO: 47.5 FL (ref 35–45)
EOSINOPHIL NFR BLD AUTO: 1.5 %
EOSINOPHILS ABSOLUTE: 0.3 THOU/MM3 (ref 0–0.4)
EPITHELIAL CELLS, UA: ABNORMAL /HPF
ERYTHROCYTE [DISTWIDTH] IN BLOOD BY AUTOMATED COUNT: 13.4 % (ref 11.5–14.5)
FLUAV RNA RESP QL NAA+PROBE: NOT DETECTED
FLUBV RNA RESP QL NAA+PROBE: NOT DETECTED
GFR SERPL CREATININE-BSD FRML MDRD: > 60 ML/MIN/1.73M2
GLUCOSE SERPL-MCNC: 99 MG/DL (ref 70–108)
GLUCOSE UR QL STRIP.AUTO: NEGATIVE MG/DL
HCT VFR BLD AUTO: 37.6 % (ref 42–52)
HGB BLD-MCNC: 12.1 GM/DL (ref 14–18)
HGB UR QL STRIP.AUTO: NEGATIVE
IMM GRANULOCYTES # BLD AUTO: 0.16 THOU/MM3 (ref 0–0.07)
IMM GRANULOCYTES NFR BLD AUTO: 0.8 %
KETONES UR QL STRIP.AUTO: NEGATIVE
LDH SERPL L TO P-CCNC: 285 U/L (ref 100–190)
LIPASE SERPL-CCNC: 16.3 U/L (ref 5.6–51.3)
LYMPHOCYTES ABSOLUTE: 2.1 THOU/MM3 (ref 1–4.8)
LYMPHOCYTES NFR BLD AUTO: 10.9 %
MCH RBC QN AUTO: 30.8 PG (ref 26–33)
MCHC RBC AUTO-ENTMCNC: 32.2 GM/DL (ref 32.2–35.5)
MCV RBC AUTO: 95.7 FL (ref 80–94)
MISCELLANEOUS 2: ABNORMAL
MONOCYTES ABSOLUTE: 1.4 THOU/MM3 (ref 0.4–1.3)
MONOCYTES NFR BLD AUTO: 7.2 %
NEUTROPHILS NFR BLD AUTO: 79.2 %
NITRITE UR QL STRIP: NEGATIVE
NRBC BLD AUTO-RTO: 0 /100 WBC
OSMOLALITY SERPL CALC.SUM OF ELEC: 279.1 MOSMOL/KG (ref 275–300)
PH UR STRIP.AUTO: 8 [PH] (ref 5–9)
PHOSPHATE SERPL-MCNC: 3.3 MG/DL (ref 2.4–4.7)
PLATELET # BLD AUTO: 418 THOU/MM3 (ref 130–400)
PMV BLD AUTO: 8.9 FL (ref 9.4–12.4)
POTASSIUM SERPL-SCNC: 4 MEQ/L (ref 3.5–5.2)
PROT SERPL-MCNC: 6 G/DL (ref 6.1–8)
PROT UR STRIP.AUTO-MCNC: NEGATIVE MG/DL
RBC # BLD AUTO: 3.93 MILL/MM3 (ref 4.7–6.1)
RBC URINE: ABNORMAL /HPF
RENAL EPI CELLS #/AREA URNS HPF: ABNORMAL /[HPF]
SARS-COV-2 RNA RESP QL NAA+PROBE: NOT DETECTED
SEGMENTED NEUTROPHILS ABSOLUTE COUNT: 15.6 THOU/MM3 (ref 1.8–7.7)
SODIUM SERPL-SCNC: 139 MEQ/L (ref 135–145)
SP GR UR REFRACT.AUTO: 1.01 (ref 1–1.03)
TROPONIN, HIGH SENSITIVITY: 12 NG/L (ref 0–12)
URATE SERPL-MCNC: 2.1 MG/DL (ref 3.7–7)
UROBILINOGEN, URINE: 0.2 EU/DL (ref 0–1)
WBC # BLD AUTO: 19.7 THOU/MM3 (ref 4.8–10.8)
WBC #/AREA URNS HPF: ABNORMAL /HPF
WBC #/AREA URNS HPF: NEGATIVE /[HPF]
YEAST LIKE FUNGI URNS QL MICRO: ABNORMAL

## 2023-11-08 PROCEDURE — 2500000003 HC RX 250 WO HCPCS: Performed by: PHYSICIAN ASSISTANT

## 2023-11-08 PROCEDURE — 6360000002 HC RX W HCPCS: Performed by: PHYSICIAN ASSISTANT

## 2023-11-08 PROCEDURE — 96374 THER/PROPH/DIAG INJ IV PUSH: CPT

## 2023-11-08 PROCEDURE — 96376 TX/PRO/DX INJ SAME DRUG ADON: CPT

## 2023-11-08 PROCEDURE — 96375 TX/PRO/DX INJ NEW DRUG ADDON: CPT

## 2023-11-08 PROCEDURE — 6370000000 HC RX 637 (ALT 250 FOR IP): Performed by: PHYSICIAN ASSISTANT

## 2023-11-08 PROCEDURE — 93005 ELECTROCARDIOGRAM TRACING: CPT | Performed by: STUDENT IN AN ORGANIZED HEALTH CARE EDUCATION/TRAINING PROGRAM

## 2023-11-08 PROCEDURE — 74177 CT ABD & PELVIS W/CONTRAST: CPT

## 2023-11-08 PROCEDURE — 81001 URINALYSIS AUTO W/SCOPE: CPT

## 2023-11-08 PROCEDURE — 99285 EMERGENCY DEPT VISIT HI MDM: CPT

## 2023-11-08 PROCEDURE — 87636 SARSCOV2 & INF A&B AMP PRB: CPT

## 2023-11-08 PROCEDURE — 84550 ASSAY OF BLOOD/URIC ACID: CPT

## 2023-11-08 PROCEDURE — 2580000003 HC RX 258: Performed by: PHYSICIAN ASSISTANT

## 2023-11-08 PROCEDURE — 99202 OFFICE O/P NEW SF 15 MIN: CPT | Performed by: RADIOLOGY

## 2023-11-08 PROCEDURE — G0378 HOSPITAL OBSERVATION PER HR: HCPCS

## 2023-11-08 PROCEDURE — 2580000003 HC RX 258: Performed by: STUDENT IN AN ORGANIZED HEALTH CARE EDUCATION/TRAINING PROGRAM

## 2023-11-08 PROCEDURE — 84100 ASSAY OF PHOSPHORUS: CPT

## 2023-11-08 PROCEDURE — 96361 HYDRATE IV INFUSION ADD-ON: CPT

## 2023-11-08 PROCEDURE — 85025 COMPLETE CBC W/AUTO DIFF WBC: CPT

## 2023-11-08 PROCEDURE — 36415 COLL VENOUS BLD VENIPUNCTURE: CPT

## 2023-11-08 PROCEDURE — 84484 ASSAY OF TROPONIN QUANT: CPT

## 2023-11-08 PROCEDURE — 80076 HEPATIC FUNCTION PANEL: CPT

## 2023-11-08 PROCEDURE — 83615 LACTATE (LD) (LDH) ENZYME: CPT

## 2023-11-08 PROCEDURE — 93010 ELECTROCARDIOGRAM REPORT: CPT | Performed by: INTERNAL MEDICINE

## 2023-11-08 PROCEDURE — 80048 BASIC METABOLIC PNL TOTAL CA: CPT

## 2023-11-08 PROCEDURE — 6360000002 HC RX W HCPCS: Performed by: STUDENT IN AN ORGANIZED HEALTH CARE EDUCATION/TRAINING PROGRAM

## 2023-11-08 PROCEDURE — 99223 1ST HOSP IP/OBS HIGH 75: CPT | Performed by: PHYSICIAN ASSISTANT

## 2023-11-08 PROCEDURE — 6360000004 HC RX CONTRAST MEDICATION: Performed by: SPECIALIST

## 2023-11-08 PROCEDURE — 83690 ASSAY OF LIPASE: CPT

## 2023-11-08 PROCEDURE — 71046 X-RAY EXAM CHEST 2 VIEWS: CPT

## 2023-11-08 RX ORDER — OLANZAPINE 5 MG/1
5 TABLET ORAL NIGHTLY
Status: DISCONTINUED | OUTPATIENT
Start: 2023-11-08 | End: 2023-11-12 | Stop reason: HOSPADM

## 2023-11-08 RX ORDER — IPRATROPIUM BROMIDE AND ALBUTEROL SULFATE 2.5; .5 MG/3ML; MG/3ML
1 SOLUTION RESPIRATORY (INHALATION)
Status: DISCONTINUED | OUTPATIENT
Start: 2023-11-08 | End: 2023-11-11

## 2023-11-08 RX ORDER — MORPHINE SULFATE 4 MG/ML
4 INJECTION, SOLUTION INTRAMUSCULAR; INTRAVENOUS ONCE
Status: COMPLETED | OUTPATIENT
Start: 2023-11-08 | End: 2023-11-08

## 2023-11-08 RX ORDER — POTASSIUM CHLORIDE 7.45 MG/ML
10 INJECTION INTRAVENOUS PRN
Status: DISCONTINUED | OUTPATIENT
Start: 2023-11-08 | End: 2023-11-12 | Stop reason: HOSPADM

## 2023-11-08 RX ORDER — SODIUM CHLORIDE 9 MG/ML
INJECTION, SOLUTION INTRAVENOUS PRN
Status: DISCONTINUED | OUTPATIENT
Start: 2023-11-08 | End: 2023-11-12 | Stop reason: HOSPADM

## 2023-11-08 RX ORDER — SODIUM CHLORIDE 0.9 % (FLUSH) 0.9 %
5-40 SYRINGE (ML) INJECTION PRN
Status: DISCONTINUED | OUTPATIENT
Start: 2023-11-08 | End: 2023-11-12 | Stop reason: HOSPADM

## 2023-11-08 RX ORDER — PANTOPRAZOLE SODIUM 40 MG/1
40 TABLET, DELAYED RELEASE ORAL
Status: DISCONTINUED | OUTPATIENT
Start: 2023-11-09 | End: 2023-11-12 | Stop reason: HOSPADM

## 2023-11-08 RX ORDER — LANOLIN ALCOHOL/MO/W.PET/CERES
100 CREAM (GRAM) TOPICAL DAILY
Status: DISCONTINUED | OUTPATIENT
Start: 2023-11-08 | End: 2023-11-12 | Stop reason: HOSPADM

## 2023-11-08 RX ORDER — ONDANSETRON 4 MG/1
4 TABLET, ORALLY DISINTEGRATING ORAL EVERY 8 HOURS PRN
Status: DISCONTINUED | OUTPATIENT
Start: 2023-11-08 | End: 2023-11-12 | Stop reason: HOSPADM

## 2023-11-08 RX ORDER — SODIUM CHLORIDE 0.9 % (FLUSH) 0.9 %
5-40 SYRINGE (ML) INJECTION EVERY 12 HOURS SCHEDULED
Status: DISCONTINUED | OUTPATIENT
Start: 2023-11-08 | End: 2023-11-12 | Stop reason: HOSPADM

## 2023-11-08 RX ORDER — FOLIC ACID 1 MG/1
1 TABLET ORAL DAILY
Status: DISCONTINUED | OUTPATIENT
Start: 2023-11-08 | End: 2023-11-12 | Stop reason: HOSPADM

## 2023-11-08 RX ORDER — DEXAMETHASONE 4 MG/1
4 TABLET ORAL 2 TIMES DAILY WITH MEALS
Status: DISCONTINUED | OUTPATIENT
Start: 2023-11-08 | End: 2023-11-12 | Stop reason: HOSPADM

## 2023-11-08 RX ORDER — ENOXAPARIN SODIUM 100 MG/ML
40 INJECTION SUBCUTANEOUS DAILY
Status: DISCONTINUED | OUTPATIENT
Start: 2023-11-08 | End: 2023-11-12 | Stop reason: HOSPADM

## 2023-11-08 RX ORDER — ACETAMINOPHEN 650 MG/1
650 SUPPOSITORY RECTAL EVERY 6 HOURS PRN
Status: DISCONTINUED | OUTPATIENT
Start: 2023-11-08 | End: 2023-11-12 | Stop reason: HOSPADM

## 2023-11-08 RX ORDER — POLYETHYLENE GLYCOL 3350 17 G/17G
17 POWDER, FOR SOLUTION ORAL DAILY PRN
Status: DISCONTINUED | OUTPATIENT
Start: 2023-11-08 | End: 2023-11-12 | Stop reason: HOSPADM

## 2023-11-08 RX ORDER — MAGNESIUM SULFATE IN WATER 40 MG/ML
2000 INJECTION, SOLUTION INTRAVENOUS PRN
Status: DISCONTINUED | OUTPATIENT
Start: 2023-11-08 | End: 2023-11-12 | Stop reason: HOSPADM

## 2023-11-08 RX ORDER — HYDROCODONE BITARTRATE AND ACETAMINOPHEN 5; 325 MG/1; MG/1
1 TABLET ORAL EVERY 6 HOURS PRN
Status: DISCONTINUED | OUTPATIENT
Start: 2023-11-08 | End: 2023-11-10

## 2023-11-08 RX ORDER — ACETAMINOPHEN 325 MG/1
650 TABLET ORAL EVERY 6 HOURS PRN
Status: DISCONTINUED | OUTPATIENT
Start: 2023-11-08 | End: 2023-11-12 | Stop reason: HOSPADM

## 2023-11-08 RX ORDER — PHENYTOIN SODIUM 100 MG/1
100 CAPSULE, EXTENDED RELEASE ORAL 3 TIMES DAILY
Status: DISCONTINUED | OUTPATIENT
Start: 2023-11-08 | End: 2023-11-12 | Stop reason: HOSPADM

## 2023-11-08 RX ORDER — ONDANSETRON 2 MG/ML
4 INJECTION INTRAMUSCULAR; INTRAVENOUS EVERY 6 HOURS PRN
Status: DISCONTINUED | OUTPATIENT
Start: 2023-11-08 | End: 2023-11-12 | Stop reason: HOSPADM

## 2023-11-08 RX ORDER — POTASSIUM CHLORIDE 20 MEQ/1
40 TABLET, EXTENDED RELEASE ORAL PRN
Status: DISCONTINUED | OUTPATIENT
Start: 2023-11-08 | End: 2023-11-12 | Stop reason: HOSPADM

## 2023-11-08 RX ORDER — CLONIDINE HYDROCHLORIDE 0.1 MG/1
0.1 TABLET ORAL 2 TIMES DAILY
Status: DISCONTINUED | OUTPATIENT
Start: 2023-11-08 | End: 2023-11-12 | Stop reason: HOSPADM

## 2023-11-08 RX ORDER — 0.9 % SODIUM CHLORIDE 0.9 %
1000 INTRAVENOUS SOLUTION INTRAVENOUS ONCE
Status: COMPLETED | OUTPATIENT
Start: 2023-11-08 | End: 2023-11-08

## 2023-11-08 RX ADMIN — MORPHINE SULFATE 4 MG: 4 INJECTION, SOLUTION INTRAMUSCULAR; INTRAVENOUS at 11:15

## 2023-11-08 RX ADMIN — HYDROMORPHONE HYDROCHLORIDE 0.5 MG: 1 INJECTION, SOLUTION INTRAMUSCULAR; INTRAVENOUS; SUBCUTANEOUS at 14:30

## 2023-11-08 RX ADMIN — CLONIDINE HYDROCHLORIDE 0.1 MG: 0.1 TABLET ORAL at 21:42

## 2023-11-08 RX ADMIN — HYDROCODONE BITARTRATE AND ACETAMINOPHEN 1 TABLET: 5; 325 TABLET ORAL at 21:50

## 2023-11-08 RX ADMIN — FOLIC ACID 1 MG: 1 TABLET ORAL at 21:42

## 2023-11-08 RX ADMIN — OLANZAPINE 5 MG: 5 TABLET, FILM COATED ORAL at 21:42

## 2023-11-08 RX ADMIN — HYDROMORPHONE HYDROCHLORIDE 0.5 MG: 1 INJECTION, SOLUTION INTRAMUSCULAR; INTRAVENOUS; SUBCUTANEOUS at 19:33

## 2023-11-08 RX ADMIN — Medication 100 MG: at 21:41

## 2023-11-08 RX ADMIN — PHENYTOIN SODIUM 100 MG: 100 CAPSULE ORAL at 21:41

## 2023-11-08 RX ADMIN — IOPAMIDOL 80 ML: 755 INJECTION, SOLUTION INTRAVENOUS at 15:36

## 2023-11-08 RX ADMIN — SODIUM CHLORIDE 1000 ML: 9 INJECTION, SOLUTION INTRAVENOUS at 11:14

## 2023-11-08 RX ADMIN — DEXAMETHASONE 4 MG: 4 TABLET ORAL at 21:42

## 2023-11-08 RX ADMIN — SODIUM CHLORIDE, PRESERVATIVE FREE 10 ML: 5 INJECTION INTRAVENOUS at 19:34

## 2023-11-08 ASSESSMENT — ENCOUNTER SYMPTOMS
SORE THROAT: 0
BLOOD IN STOOL: 0
RECTAL PAIN: 0
ABDOMINAL PAIN: 0
VOMITING: 0
SINUS PAIN: 0
BACK PAIN: 1
FACIAL SWELLING: 0
COUGH: 1
SINUS PRESSURE: 0
TROUBLE SWALLOWING: 0
VOICE CHANGE: 0
SHORTNESS OF BREATH: 1
NAUSEA: 0

## 2023-11-08 ASSESSMENT — PAIN DESCRIPTION - LOCATION
LOCATION: GENERALIZED
LOCATION: BACK
LOCATION: BACK
LOCATION: GENERALIZED

## 2023-11-08 ASSESSMENT — PAIN DESCRIPTION - FREQUENCY: FREQUENCY: CONTINUOUS

## 2023-11-08 ASSESSMENT — PAIN - FUNCTIONAL ASSESSMENT
PAIN_FUNCTIONAL_ASSESSMENT: 0-10
PAIN_FUNCTIONAL_ASSESSMENT: ACTIVITIES ARE NOT PREVENTED
PAIN_FUNCTIONAL_ASSESSMENT: 0-10
PAIN_FUNCTIONAL_ASSESSMENT: 0-10
PAIN_FUNCTIONAL_ASSESSMENT: WONG-BAKER FACES
PAIN_FUNCTIONAL_ASSESSMENT: 0-10
PAIN_FUNCTIONAL_ASSESSMENT: PREVENTS OR INTERFERES SOME ACTIVE ACTIVITIES AND ADLS
PAIN_FUNCTIONAL_ASSESSMENT: PREVENTS OR INTERFERES SOME ACTIVE ACTIVITIES AND ADLS

## 2023-11-08 ASSESSMENT — PAIN SCALES - GENERAL
PAINLEVEL_OUTOF10: 9
PAINLEVEL_OUTOF10: 9
PAINLEVEL_OUTOF10: 10
PAINLEVEL_OUTOF10: 8
PAINLEVEL_OUTOF10: 9
PAINLEVEL_OUTOF10: 10
PAINLEVEL_OUTOF10: 10
PAINLEVEL_OUTOF10: 9
PAINLEVEL_OUTOF10: 8

## 2023-11-08 ASSESSMENT — PAIN DESCRIPTION - ORIENTATION
ORIENTATION: UPPER;LOWER;RIGHT;LEFT
ORIENTATION: MID
ORIENTATION: RIGHT;LEFT;MID;POSTERIOR

## 2023-11-08 ASSESSMENT — PAIN DESCRIPTION - ONSET: ONSET: ON-GOING

## 2023-11-08 ASSESSMENT — PAIN DESCRIPTION - DESCRIPTORS
DESCRIPTORS: TIGHTNESS
DESCRIPTORS: SHARP;TIGHTNESS
DESCRIPTORS: ACHING

## 2023-11-08 ASSESSMENT — PAIN DESCRIPTION - PAIN TYPE: TYPE: ACUTE PAIN

## 2023-11-08 ASSESSMENT — PAIN SCALES - WONG BAKER: WONGBAKER_NUMERICALRESPONSE: 0

## 2023-11-08 NOTE — ED PROVIDER NOTES
[facility] EMERGENCY DEPT    EMERGENCY MEDICINE      Pt Name: Arianne Astudillo MRN: [id number]  Birthdate 1970  Date of evaluation: 11/8/2023  Treating Physician: Elzbieta Montes De Oca DO    CHIEF COMPLAINT       Chief Complaint   Patient presents with    Fatigue     History obtained from chart review and the patient. HISTORY OF PRESENT ILLNESS    HPI    Arianne Astudillo is a 46 y.o. male presents to the emergency department for evaluation of generalized pain and fatigue. Patient states this has been persistently getting worse. He was diagnosed with adenocarcinoma approximately 2 weeks ago. Been having difficulty getting out of bed and feeding himself at home. He was scheduled for follow-up with radiation oncology today with Dr. Maximino Gonzalez and was sent here for further evaluation and symptomatic control due to his ill-appearing state. Luiz Gold says he has occasional fevers and chills but does not check his temperature at home. Denies any chest pain, shortness of breath. Has not had any difficulty urinating or using the bathroom. The patient has no other acute complaints at this time.       PAST MEDICAL AND SURGICAL HISTORY     Past Medical History:   Diagnosis Date    Arthritis     Asthma     COPD (chronic obstructive pulmonary disease) (HCC)     GERD (gastroesophageal reflux disease)     Hypertension     Psychiatric problem     Schizophrenia ([address])     Severe malnutrition ([address]) 01/14/2021    Unspecified cerebral artery occlusion with cerebral infarction        Past Surgical History:   Procedure Laterality Date    COLONOSCOPY      CT NEEDLE BIOPSY LUNG PERCUTANEOUS  10/31/2023    CT NEEDLE BIOPSY LUNG PERCUTANEOUS 10/31/2023 [facility] CT SCAN    DILATATION, ESOPHAGUS      ENDOSCOPY, COLON, DIAGNOSTIC      PACEMAKER PLACEMENT  2013    Pt reports his father with same name had pacemaker, he doesn't remember having on placed       CURRENT MEDICATIONS     Current Discharge Medication List        CONTINUE these

## 2023-11-08 NOTE — H&P
Hospitalist History & Physical    Patient:  Johnny Brown. Unit/Bed:05/[facility]  YOB: 1970  MRN: [id number]   Acct: [de-identified]   PCP: Valdemar Kehr, APRN - CNP  Code Status: Full Code    Date of Service: Pt seen/examined on 11/08/23 and admitted to Observation with expected LOS less than two midnights due to medical therapy. Chief Complaint: Back/bone pain, shortness of breath    Assessment/Plan:    Metastatic adenocarcinoma of the lung with brain metastases  Recently diagnosed via biopsy in [facility] radiation oncology today, but was sent to the emergency department for further evaluation  We will order bone scan and CT abdomen pelvis at the request of radiation oncology for further evaluation  Continue Decadron 4 mg twice daily  Palliative care consult for symptom management and further discussion regarding goals of care  Seizure disorder  Recent episode of status epilepticus  Likely related to multiple intracranial metastases  Continue phenytoin  SIADH  Noted during recent admission. Patient with hyponatremia which improved with fluid restriction. We will continue fluid restriction at this time. No hyponatremia at this time. Leukocytosis  Likely secondary to dexamethasone. May also be component of dehydration. Repeat CBC tomorrow. Afebrile. Major depressive disorder  Continue home psychiatric regimen  GERD  On Protonix  COPD not in acute exacerbation  Not currently on any inhalers. DuoNebs as needed for wheezing or shortness of breath. Right apical pneumothorax  Following CT-guided biopsy  On room air. Chest x-ray today demonstrates approximately 10%. Monitor. Defer repeat imaging at this time unless clinical condition changes. Nicotine dependence  Nicotine patch provided  Goals of care  Discussed CODE STATUS with the patient. At this time, the patient wishes to remain a full code. Palliative care consulted as above.     History of Present Illness:  [place]

## 2023-11-08 NOTE — ED NOTES
Pt finished with meal tray. Seema Nazario PA-C at bedside discussing admission plan. Pt removed nasal cannula, stating it is uncomfortable.      Ansley Pablo RN  11/08/23 5813

## 2023-11-08 NOTE — TELEPHONE ENCOUNTER
Patient come for consult at [facility] today. On arrival patient stated not feeling well. Having periods of chills, feels like it is hard to breath, ears are popping and states generalized pain of 10/10. Vitals as recorded. Dr. Meg Simons spoke with patient and plan is evaluation at ER. Report called to ER and patient and s/o elected to go by private vehicle.

## 2023-11-08 NOTE — ED TRIAGE NOTES
Pt presents to the ED through triage with c/c generalized pain. Pt reports he was diagnosed with Adenocarcinoma approx 2 weeks ago. Pt rates pain 9/10 at this time. Dr. Lexy Bray (oncology) instructed pt to come to ED for admission.

## 2023-11-08 NOTE — ED NOTES
Pt transported to [facility] by cart in stable condition. Called [facility] and informed [facility] that the patient was on their way to the unit.       Jessenia Pichardo, KEI  81/81/24 [id number]

## 2023-11-08 NOTE — PROGRESS NOTES
Pt admitted to  96 [phone] 21 from ED and via cart/stretcher. Complaints: pain. IV site free of s/s of infection or infiltration. Vital signs obtained. Assessment and data collection initiated. Two nurse skin assessment performed by Lulu Hathaway and [facility] RN. Oriented to room. Policies and procedures for 6K explained. Micheal Fernandez RN discussed hourly rounding with patient addressing 5 P's. Fall prevention and safety brochure discussed with patient. Bed alarm on. Call light in reach. The best day to schedule a follow up Dr appointment is:  Tuesday and Thursday p.m. Explained patients right to have family, representative or physician notified of their admission. Patient has Declined for physician to be notified. Patient has Declined for family/representative to be notified. All questions answered with no further questions at this time.        Which family member is the designated  Oumou Bustos number [phone]  Do you want them contacted on admission and updated every shift no

## 2023-11-08 NOTE — ED NOTES
Pt resting on cot. Vitals stable. Meal tray ordered for patient. Voices no other needs or concerns.       Jose Nazario RN  11/08/23 3857

## 2023-11-08 NOTE — PROGRESS NOTES
body heights are maintained. No fracture or destructive osseous  lesion. There is degenerative disc disease with disc desiccation, mild disc  space narrowing and mild endplate changes. SPINAL CORD: There is heterogeneous T2 signal in the cervical spinal cord,  likely related to artifacts. SOFT TISSUES: No paraspinal mass identified. C2-3: There is no significant disc herniation, spinal canal stenosis or  neural foraminal narrowing. C3-4: There is no significant disc herniation, spinal canal stenosis or  neural foraminal narrowing. C4-5: There is no significant disc herniation, spinal canal stenosis or  neural foraminal narrowing. C5-6: There is disc bulge, mild bilateral facet arthrosis, with mild right  foraminal narrowing. No spinal canal narrowing. C6-7: There is disc bulge with mild to moderate left and moderate to severe  right foraminal narrowing. No spinal canal narrowing. C7-T1: There is no significant disc herniation, spinal canal stenosis or  neural foraminal narrowing. MRI thoracic spine:     BONES/ALIGNMENT: There is normal alignment of the thoracic spine. There is  mild chronic compression deformities of T6 and T8 with up to 15% height loss. The remainder of the vertebral body heights are maintained. No acute  fracture or destructive osseous lesion. SPINAL CORD: The signal and morphology of the thoracic spinal cord is within  normal limits. SOFT TISSUES: No paraspinal mass identified. Degenerative disc disease: There is no significant disc herniation, spinal  canal or foraminal stenosis in the thoracic spine. There are mildly  prominent right hilar lymph nodes, likely related to infection/inflammation. There are lung nodules in the bilateral lungs measuring up to 1 cm in the  right lower lobe. MRI lumbar spine:     BONES/ALIGNMENT: There is normal alignment of the lumbar spine. The  vertebral body heights are maintained.   No fracture or

## 2023-11-09 ENCOUNTER — APPOINTMENT (OUTPATIENT)
Dept: NUCLEAR MEDICINE | Age: 53
End: 2023-11-09
Payer: MEDICAID

## 2023-11-09 PROBLEM — C79.31 LUNG CANCER METASTATIC TO BRAIN (HCC): Status: ACTIVE | Noted: 2023-11-09

## 2023-11-09 PROBLEM — C34.90 LUNG CANCER METASTATIC TO BRAIN (HCC): Status: ACTIVE | Noted: 2023-11-09

## 2023-11-09 LAB
ANION GAP SERPL CALC-SCNC: 10 MEQ/L (ref 8–16)
BASOPHILS ABSOLUTE: 0 THOU/MM3 (ref 0–0.1)
BASOPHILS NFR BLD AUTO: 0.2 %
BUN SERPL-MCNC: 15 MG/DL (ref 7–22)
CALCIUM SERPL-MCNC: 9.1 MG/DL (ref 8.5–10.5)
CHLORIDE SERPL-SCNC: 103 MEQ/L (ref 98–111)
CO2 SERPL-SCNC: 22 MEQ/L (ref 23–33)
CREAT SERPL-MCNC: 0.4 MG/DL (ref 0.4–1.2)
DEPRECATED RDW RBC AUTO: 46.7 FL (ref 35–45)
EOSINOPHIL NFR BLD AUTO: 0.6 %
EOSINOPHILS ABSOLUTE: 0.1 THOU/MM3 (ref 0–0.4)
ERYTHROCYTE [DISTWIDTH] IN BLOOD BY AUTOMATED COUNT: 13.8 % (ref 11.5–14.5)
GFR SERPL CREATININE-BSD FRML MDRD: > 60 ML/MIN/1.73M2
GLUCOSE SERPL-MCNC: 94 MG/DL (ref 70–108)
HCT VFR BLD AUTO: 42.6 % (ref 42–52)
HGB BLD-MCNC: 14 GM/DL (ref 14–18)
IMM GRANULOCYTES # BLD AUTO: 0.14 THOU/MM3 (ref 0–0.07)
IMM GRANULOCYTES NFR BLD AUTO: 0.8 %
LYMPHOCYTES ABSOLUTE: 2.2 THOU/MM3 (ref 1–4.8)
LYMPHOCYTES NFR BLD AUTO: 12.2 %
MCH RBC QN AUTO: 30.8 PG (ref 26–33)
MCHC RBC AUTO-ENTMCNC: 32.9 GM/DL (ref 32.2–35.5)
MCV RBC AUTO: 93.8 FL (ref 80–94)
MONOCYTES ABSOLUTE: 0.9 THOU/MM3 (ref 0.4–1.3)
MONOCYTES NFR BLD AUTO: 4.9 %
NEUTROPHILS NFR BLD AUTO: 81.3 %
NRBC BLD AUTO-RTO: 0 /100 WBC
OSMOLALITY SERPL CALC.SUM OF ELEC: 270.7 MOSMOL/KG (ref 275–300)
PLATELET # BLD AUTO: 509 THOU/MM3 (ref 130–400)
PMV BLD AUTO: 9 FL (ref 9.4–12.4)
POTASSIUM SERPL-SCNC: 4.1 MEQ/L (ref 3.5–5.2)
RBC # BLD AUTO: 4.54 MILL/MM3 (ref 4.7–6.1)
SEGMENTED NEUTROPHILS ABSOLUTE COUNT: 14.4 THOU/MM3 (ref 1.8–7.7)
SODIUM SERPL-SCNC: 135 MEQ/L (ref 135–145)
WBC # BLD AUTO: 17.7 THOU/MM3 (ref 4.8–10.8)

## 2023-11-09 PROCEDURE — 2580000003 HC RX 258: Performed by: PHYSICIAN ASSISTANT

## 2023-11-09 PROCEDURE — 6360000002 HC RX W HCPCS: Performed by: PHYSICIAN ASSISTANT

## 2023-11-09 PROCEDURE — 6370000000 HC RX 637 (ALT 250 FOR IP): Performed by: PHYSICIAN ASSISTANT

## 2023-11-09 PROCEDURE — 96372 THER/PROPH/DIAG INJ SC/IM: CPT

## 2023-11-09 PROCEDURE — 94640 AIRWAY INHALATION TREATMENT: CPT

## 2023-11-09 PROCEDURE — G0378 HOSPITAL OBSERVATION PER HR: HCPCS

## 2023-11-09 PROCEDURE — A9503 TC99M MEDRONATE: HCPCS | Performed by: PHYSICIAN ASSISTANT

## 2023-11-09 PROCEDURE — 99232 SBSQ HOSP IP/OBS MODERATE 35: CPT | Performed by: PHYSICIAN ASSISTANT

## 2023-11-09 PROCEDURE — 96376 TX/PRO/DX INJ SAME DRUG ADON: CPT

## 2023-11-09 PROCEDURE — 99222 1ST HOSP IP/OBS MODERATE 55: CPT | Performed by: PHYSICIAN ASSISTANT

## 2023-11-09 PROCEDURE — 85025 COMPLETE CBC W/AUTO DIFF WBC: CPT

## 2023-11-09 PROCEDURE — 80048 BASIC METABOLIC PNL TOTAL CA: CPT

## 2023-11-09 PROCEDURE — 78306 BONE IMAGING WHOLE BODY: CPT | Performed by: PHYSICIAN ASSISTANT

## 2023-11-09 PROCEDURE — 3430000000 HC RX DIAGNOSTIC RADIOPHARMACEUTICAL: Performed by: PHYSICIAN ASSISTANT

## 2023-11-09 PROCEDURE — 36415 COLL VENOUS BLD VENIPUNCTURE: CPT

## 2023-11-09 PROCEDURE — 94761 N-INVAS EAR/PLS OXIMETRY MLT: CPT

## 2023-11-09 RX ORDER — ACETAMINOPHEN 500 MG
500 TABLET ORAL NIGHTLY
Status: DISCONTINUED | OUTPATIENT
Start: 2023-11-09 | End: 2023-11-12 | Stop reason: HOSPADM

## 2023-11-09 RX ORDER — DIPHENHYDRAMINE HCL 25 MG
25 TABLET ORAL NIGHTLY
Status: DISCONTINUED | OUTPATIENT
Start: 2023-11-09 | End: 2023-11-12 | Stop reason: HOSPADM

## 2023-11-09 RX ORDER — TC 99M MEDRONATE 20 MG/10ML
29 INJECTION, POWDER, LYOPHILIZED, FOR SOLUTION INTRAVENOUS
Status: COMPLETED | OUTPATIENT
Start: 2023-11-09 | End: 2023-11-09

## 2023-11-09 RX ADMIN — SODIUM CHLORIDE, PRESERVATIVE FREE 10 ML: 5 INJECTION INTRAVENOUS at 08:41

## 2023-11-09 RX ADMIN — SODIUM CHLORIDE, PRESERVATIVE FREE 10 ML: 5 INJECTION INTRAVENOUS at 21:39

## 2023-11-09 RX ADMIN — Medication 100 MG: at 08:41

## 2023-11-09 RX ADMIN — HYDROMORPHONE HYDROCHLORIDE 0.5 MG: 1 INJECTION, SOLUTION INTRAMUSCULAR; INTRAVENOUS; SUBCUTANEOUS at 21:33

## 2023-11-09 RX ADMIN — HYDROMORPHONE HYDROCHLORIDE 0.5 MG: 1 INJECTION, SOLUTION INTRAMUSCULAR; INTRAVENOUS; SUBCUTANEOUS at 18:20

## 2023-11-09 RX ADMIN — FOLIC ACID 1 MG: 1 TABLET ORAL at 08:41

## 2023-11-09 RX ADMIN — PHENYTOIN SODIUM 100 MG: 100 CAPSULE ORAL at 14:32

## 2023-11-09 RX ADMIN — CLONIDINE HYDROCHLORIDE 0.1 MG: 0.1 TABLET ORAL at 21:33

## 2023-11-09 RX ADMIN — IPRATROPIUM BROMIDE AND ALBUTEROL SULFATE 1 DOSE: .5; 3 SOLUTION RESPIRATORY (INHALATION) at 08:11

## 2023-11-09 RX ADMIN — ENOXAPARIN SODIUM 40 MG: 100 INJECTION SUBCUTANEOUS at 08:41

## 2023-11-09 RX ADMIN — POLYETHYLENE GLYCOL 3350 17 G: 17 POWDER, FOR SOLUTION ORAL at 21:33

## 2023-11-09 RX ADMIN — PANTOPRAZOLE SODIUM 40 MG: 40 TABLET, DELAYED RELEASE ORAL at 05:01

## 2023-11-09 RX ADMIN — HYDROCODONE BITARTRATE AND ACETAMINOPHEN 1 TABLET: 5; 325 TABLET ORAL at 15:03

## 2023-11-09 RX ADMIN — CLONIDINE HYDROCHLORIDE 0.1 MG: 0.1 TABLET ORAL at 08:41

## 2023-11-09 RX ADMIN — HYDROMORPHONE HYDROCHLORIDE 0.5 MG: 1 INJECTION, SOLUTION INTRAMUSCULAR; INTRAVENOUS; SUBCUTANEOUS at 14:04

## 2023-11-09 RX ADMIN — HYDROMORPHONE HYDROCHLORIDE 0.5 MG: 1 INJECTION, SOLUTION INTRAMUSCULAR; INTRAVENOUS; SUBCUTANEOUS at 00:43

## 2023-11-09 RX ADMIN — DIPHENHYDRAMINE HYDROCHLORIDE 25 MG: 25 TABLET ORAL at 21:33

## 2023-11-09 RX ADMIN — PHENYTOIN SODIUM 100 MG: 100 CAPSULE ORAL at 08:41

## 2023-11-09 RX ADMIN — ACETAMINOPHEN 650 MG: 325 TABLET ORAL at 21:33

## 2023-11-09 RX ADMIN — OLANZAPINE 5 MG: 5 TABLET, FILM COATED ORAL at 21:33

## 2023-11-09 RX ADMIN — TC 99M MEDRONATE 29 MILLICURIE: 20 INJECTION, POWDER, LYOPHILIZED, FOR SOLUTION INTRAVENOUS at 07:57

## 2023-11-09 RX ADMIN — PHENYTOIN SODIUM 100 MG: 100 CAPSULE ORAL at 21:33

## 2023-11-09 RX ADMIN — IPRATROPIUM BROMIDE AND ALBUTEROL SULFATE 1 DOSE: .5; 3 SOLUTION RESPIRATORY (INHALATION) at 22:26

## 2023-11-09 RX ADMIN — HYDROMORPHONE HYDROCHLORIDE 0.5 MG: 1 INJECTION, SOLUTION INTRAMUSCULAR; INTRAVENOUS; SUBCUTANEOUS at 05:01

## 2023-11-09 RX ADMIN — HYDROCODONE BITARTRATE AND ACETAMINOPHEN 1 TABLET: 5; 325 TABLET ORAL at 06:19

## 2023-11-09 RX ADMIN — DEXAMETHASONE 4 MG: 4 TABLET ORAL at 16:15

## 2023-11-09 RX ADMIN — DEXAMETHASONE 4 MG: 4 TABLET ORAL at 08:41

## 2023-11-09 RX ADMIN — IPRATROPIUM BROMIDE AND ALBUTEROL SULFATE 1 DOSE: .5; 3 SOLUTION RESPIRATORY (INHALATION) at 15:51

## 2023-11-09 RX ADMIN — HYDROMORPHONE HYDROCHLORIDE 0.5 MG: 1 INJECTION, SOLUTION INTRAMUSCULAR; INTRAVENOUS; SUBCUTANEOUS at 08:42

## 2023-11-09 ASSESSMENT — PAIN DESCRIPTION - LOCATION
LOCATION: BACK

## 2023-11-09 ASSESSMENT — PAIN DESCRIPTION - DESCRIPTORS
DESCRIPTORS: ACHING
DESCRIPTORS: DISCOMFORT
DESCRIPTORS: ACHING
DESCRIPTORS: DISCOMFORT
DESCRIPTORS: ACHING;SHOOTING;SORE
DESCRIPTORS: ACHING
DESCRIPTORS: TIGHTNESS

## 2023-11-09 ASSESSMENT — PAIN SCALES - GENERAL
PAINLEVEL_OUTOF10: 8
PAINLEVEL_OUTOF10: 6
PAINLEVEL_OUTOF10: 10
PAINLEVEL_OUTOF10: 6
PAINLEVEL_OUTOF10: 8
PAINLEVEL_OUTOF10: 10
PAINLEVEL_OUTOF10: 6
PAINLEVEL_OUTOF10: 9
PAINLEVEL_OUTOF10: 10

## 2023-11-09 ASSESSMENT — PAIN DESCRIPTION - PAIN TYPE
TYPE: ACUTE PAIN
TYPE: ACUTE PAIN

## 2023-11-09 ASSESSMENT — PAIN DESCRIPTION - FREQUENCY
FREQUENCY: CONTINUOUS
FREQUENCY: CONTINUOUS

## 2023-11-09 ASSESSMENT — PAIN - FUNCTIONAL ASSESSMENT
PAIN_FUNCTIONAL_ASSESSMENT: PREVENTS OR INTERFERES SOME ACTIVE ACTIVITIES AND ADLS

## 2023-11-09 ASSESSMENT — PAIN SCALES - WONG BAKER
WONGBAKER_NUMERICALRESPONSE: 6

## 2023-11-09 ASSESSMENT — PAIN DESCRIPTION - ORIENTATION
ORIENTATION: MID
ORIENTATION: RIGHT;LEFT;POSTERIOR
ORIENTATION: RIGHT;LEFT;LOWER;POSTERIOR
ORIENTATION: MID
ORIENTATION: LOWER
ORIENTATION: LOWER
ORIENTATION: RIGHT;LEFT;POSTERIOR

## 2023-11-09 ASSESSMENT — PAIN DESCRIPTION - ONSET
ONSET: ON-GOING
ONSET: PROGRESSIVE

## 2023-11-09 NOTE — PLAN OF CARE
Problem: Discharge Planning  Goal: Discharge to home or other facility with appropriate resources  11/9/2023 1731 by nOur Napier RN  Outcome: Progressing  Flowsheets (Taken 11/9/2023 0830)  Discharge to home or other facility with appropriate resources: Identify barriers to discharge with patient and caregiver  11/9/2023 0649 by Richard Velasquez RN  Outcome: Progressing  Flowsheets (Taken 11/8/2023 1915)  Discharge to home or other facility with appropriate resources: Identify barriers to discharge with patient and caregiver     Problem: Pain  Goal: Verbalizes/displays adequate comfort level or baseline comfort level  11/9/2023 1731 by Onur Napier RN  Outcome: Progressing  Flowsheets (Taken 11/9/2023 1731)  Verbalizes/displays adequate comfort level or baseline comfort level: Encourage patient to monitor pain and request assistance  11/9/2023 0649 by Richard Velasquez RN  Outcome: Not Progressing     Problem: Safety - Adult  Goal: Free from fall injury  Outcome: Progressing  Flowsheets (Taken 11/9/2023 1731)  Free From Fall Injury: Instruct family/caregiver on patient safety     Problem: Chronic Conditions and Co-morbidities  Goal: Patient's chronic conditions and co-morbidity symptoms are monitored and maintained or improved  Outcome: Progressing  Flowsheets (Taken 11/9/2023 0830)  Care Plan - Patient's Chronic Conditions and Co-Morbidity Symptoms are Monitored and Maintained or Improved: Monitor and assess patient's chronic conditions and comorbid symptoms for stability, deterioration, or improvement     Problem: Nutrition Deficit:  Goal: Optimize nutritional status  Outcome: Progressing  Flowsheets (Taken 11/9/2023 1731)  Nutrient intake appropriate for improving, restoring, or maintaining nutritional needs: Assess nutritional status and recommend course of action     Problem: Pain  Goal: Verbalizes/displays adequate comfort level or baseline comfort level  11/9/2023 1731 by Onur Napier

## 2023-11-09 NOTE — FLOWSHEET NOTE
11/09/23 1303   Safe Environment   Safety Measures Standard Safety Measures;Call light within reach     Call placed to patients room, patient responds to audio, permitted video. Patient alert and oriented, sitting up in bed. Patient stated he is in pain and informed nurse of switching pain meds. Patient educated on utilizing call light. Call light within reach, no signs or symtpoms of distress noted.

## 2023-11-09 NOTE — FLOWSHEET NOTE
11/09/23 1043   Safe Environment   Safety Measures Call light within reach; Family at bedside; Bed in low position;Standard Safety Measures     Placed call to patients room, role and self identified, services explained and accepted. Virtual Nurse completed admission required documents. Reviewed fall packet and use of call light.

## 2023-11-09 NOTE — PROGRESS NOTES
Comprehensive Nutrition Assessment    Type and Reason for Visit:  Initial, Positive Nutrition Screen (poor appetite/intake, unplanned weight loss)    Nutrition Recommendations/Plan:   Consider MVI. ONS: Ensure Enlive TID, encouraged patient to drink in between meals d/t complaints of early satiety at meals. Will provide yogurt TID. Continue current diet. Malnutrition Assessment:  Malnutrition Status:  Severe malnutrition (11/09/23 1540)    Context:  Chronic Illness     Findings of the 6 clinical characteristics of malnutrition:  Energy Intake:  75% or less estimated energy requirements for 1 month or longer  Weight Loss:   (no significant weight loss in the last year, previous report 30# weight loss, just can't gain weight)     Body Fat Loss:  Severe body fat loss Orbital, Triceps, Fat Overlying Ribs   Muscle Mass Loss:  Severe muscle mass loss Temples (temporalis), Clavicles (pectoralis & deltoids), Thigh (quadraceps), Calf (gastrocnemius)  Fluid Accumulation:  No significant fluid accumulation     Strength:  Not Performed    Nutrition Assessment:     Pt. severely malnourished AEB criteria listed above. At risk for further nutritional compromise r/t admit with failure to thrive, catabolic illness-lung cancer with mets to brain, early satiety, back pain, and underlying medical condition (hx: GERD, COPD, schizophrenia, CVA, HTN, severe malnutrition, smoking, polysubstance abuse). Nutrition Related Findings:    Pt. Report/Treatments/Miscellaneous: Patient seen, very cachectic. Reports back pain, RN present providing patient with pain medication. Reports ~ 30# weight loss over the years, struggled with appetite/intake for year, hx substance abuse, hard for him to gain weight, worsening appetite/intake recently, early satiety, only able to eat  few bites at meals and gets full. Agreeable to ONS. State he uses marijuana to assist with sleep and appetite prior to admit.  No teeth but having someone

## 2023-11-09 NOTE — PLAN OF CARE
Problem: Pain  Goal: Verbalizes/displays adequate comfort level or baseline comfort level  Outcome: Not Progressing     Problem: Discharge Planning  Goal: Discharge to home or other facility with appropriate resources  Outcome: Progressing  Flowsheets (Taken 11/8/2023 1915)  Discharge to home or other facility with appropriate resources: Identify barriers to discharge with patient and caregiver     Problem: Pain  Goal: Verbalizes/displays adequate comfort level or baseline comfort level  Outcome: Not Progressing

## 2023-11-09 NOTE — PROGRESS NOTES
Hospitalist Progress Note      Patient:  Feli Mccurdy. Unit/Bed:6K-07/007-A  YOB: 1970  MRN: [id number]   Acct: [de-identified]   PCP: RINA Pritchard CNP  Date of Admission: 11/8/2023    Assessment/Plan:    Metastatic adenocarcinoma of the lung with brain metastases  Oncology, Rad Onc & Palliative Care consulted. Recently diagnosed via biopsy in [facility]  Awaiting bone scan results   CT A/P did not show any new disease   Continue Decadron 4 mg twice daily    Seizure disorder  Recent episode of status epilepticus  Likely related to multiple intracranial metastases  Continue phenytoin    SIADH  Noted during recent admission. Patient with hyponatremia which improved with fluid restriction. We will continue fluid restriction at this time. No hyponatremia at this time. Sodium 139, 135    Leukocytosis  Likely secondary to dexamethasone. May also be component of dehydration. Repeat CBC tomorrow. Afebrile. WBC 19, 17. Major depressive disorder  Continue home psychiatric regimen    GERD  On Protonix    COPD not in acute exacerbation  Not currently on any inhalers. DuoNebs as needed for wheezing or shortness of breath. Right apical pneumothorax  Following CT-guided biopsy  On room air. Chest x-ray today demonstrates approximately 10%. Monitor. Defer repeat imaging at this time unless clinical condition changes. Nicotine dependence  Nicotine patch provided. Pt would like to sign a waiver to go out and smoke. Chief Complaint: Bone pain     Initial H and P:-    Initial H&P \"Sam Melendez is a 46 y.o. male with a history of major depressive disorder, seizure disorder, GERD, COPD, and recent diagnosis of metastatic adenocarcinoma of the lung who presented to [facility] with chief complaint of back pain and bone pain as well as shortness of breath. [facility]. Fredys in [facility].

## 2023-11-09 NOTE — CARE COORDINATION
Case Management Assessment  Initial Evaluation    Date/Time of Evaluation: 11/9/2023 8:31 AM  Assessment Completed by: Delphine Blanco RN    If patient is discharged prior to next notation, then this note serves as note for discharge by case management. Patient Name: Jessica Humphreys YOB: 1970  Diagnosis: Failure to thrive in adult [R62.7]  Pneumothorax on right [J93.9]  Intractable pain [R52]  Metastatic malignant neoplasm, unspecified site [facility]) [C79.9]                   Date / Time: 11/8/2023 10:48 AM  Location: [facility]/007-A     Patient Admission Status: Observation   Readmission Risk Low 0-14, Mod 15-19), High > 20: Readmission Risk Score: 12.3    Current PCP: RINA Schmidt CNP  PCP verified by CM? Yes    Chart Reviewed: Yes      History Provided by: Patient  Patient Orientation: Alert and Oriented    Patient Cognition: Alert    Hospitalization in the last 30 days (Readmission):  Yes  - at [facility]. V's  If yes, Readmission Assessment in CM Navigator will be completed. Advance Directives:      Code Status: Full Code   Patient's Primary Decision Maker is: Named in Scanned ACP Document    Primary Decision Maker: Angela Cruz - Girlfriend - [phone]    Secondary Decision Maker: Sp Tejada - Brother/Sister - [phone]    Discharge Planning:    Patient lives with: Spouse/Significant Other Type of Home: Apartment  Primary Care Giver: Self  Patient Support Systems include: Spouse/Significant Other   Current Financial resources: Medicaid  Current community resources: Other (Comment) (palliative radiation)  Current services prior to admission: None            Current DME:              Type of Home Care services:  None    ADLS  Prior functional level: Independent in ADLs/IADLs  Current functional level: Assistance with the following:, Mobility, [address], Bathing    Family can provide assistance at DC:  Yes  Would you like Case Management to discuss the discharge plan

## 2023-11-09 NOTE — PALLIATIVE CARE
Initial Evaluation        Patient:   Isi Saha. YOB: 1970  Age:  46 y.o. Room:  [address] [place] [address]  MRN:  [id number]   Acct: [de-identified]    Date of Admission:  11/8/2023 10:48 AM  Date of Service:  11/9/2023  Completed By:  Liane Betancourt RN                 Reason for Palliative Care Evaluation:-               [] Code Status Discussion              [] Goals of Care              [x] Pain/Symptom Management               [] Emotional Support              [] Other:                    Current Issues:-   [x]  Pain  []  Fatigue  []  Nausea  []  Anxiety  []  Depression  []  Shortness of Breath  []  Constipation  []  Appetite  []  Other:              Advance Directives:-    [] [facility] DNR Form  [] Living Will  [x] Medical POA              Current Code Status:-     [x] Full Resuscitation  [] DNR-Comfort Care-Arrest  [] DNR-Comfort Care       [] Limited Resuscitation             [] No CPR            [] No shock            [] No ET intubation/reintubation            [] No resuscitative medications            [] Other limitation:               Palliative Performance Status:-      [] 100%  Full ambulation; normal activity and work; no evidence of disease; able to do own self care; normal intake; fully conscious     [] 90%   Full ambulation; normal activity and work; some evidence of disease; able to do own self care; normal intake; fully conscious    [] 80%   Full ambulation; normal activity with effort; some evidence of disease; able to do own self care; normal or reduced intake; fully conscious    [x] 70%  Ambulation reduced; unable to perform normal job/work; significant  disease; able to do own self care; normal or reduced intake; fully conscious    [] 60%  Ambulation reduced; Significant disease;Can't do hobbies/housework; intake normal or reduced; occasional assist; LOC full/confusion    [] 50%  Mainly sit/lie;  Extensive disease; Can't do any work; Considerable assist; intake normal or reduced; LOC

## 2023-11-10 ENCOUNTER — SOCIAL WORK (OUTPATIENT)
Dept: RADIATION ONCOLOGY | Age: 53
End: 2023-11-10

## 2023-11-10 ENCOUNTER — TELEPHONE (OUTPATIENT)
Dept: ONCOLOGY | Age: 53
End: 2023-11-10

## 2023-11-10 PROBLEM — C79.9 METASTATIC MALIGNANT NEOPLASM (HCC): Status: ACTIVE | Noted: 2023-11-10

## 2023-11-10 PROBLEM — G89.3 CANCER RELATED PAIN: Status: ACTIVE | Noted: 2023-11-10

## 2023-11-10 PROBLEM — Z51.5 PALLIATIVE CARE PATIENT: Status: ACTIVE | Noted: 2023-11-10

## 2023-11-10 LAB
ANION GAP SERPL CALC-SCNC: 9 MEQ/L (ref 8–16)
BUN SERPL-MCNC: 22 MG/DL (ref 7–22)
CALCIUM SERPL-MCNC: 8.8 MG/DL (ref 8.5–10.5)
CHLORIDE SERPL-SCNC: 102 MEQ/L (ref 98–111)
CO2 SERPL-SCNC: 24 MEQ/L (ref 23–33)
CREAT SERPL-MCNC: 0.5 MG/DL (ref 0.4–1.2)
DEPRECATED RDW RBC AUTO: 49.7 FL (ref 35–45)
ERYTHROCYTE [DISTWIDTH] IN BLOOD BY AUTOMATED COUNT: 13.9 % (ref 11.5–14.5)
GFR SERPL CREATININE-BSD FRML MDRD: > 60 ML/MIN/1.73M2
GLUCOSE SERPL-MCNC: 80 MG/DL (ref 70–108)
HCT VFR BLD AUTO: 41.9 % (ref 42–52)
HGB BLD-MCNC: 13.3 GM/DL (ref 14–18)
MCH RBC QN AUTO: 30.7 PG (ref 26–33)
MCHC RBC AUTO-ENTMCNC: 31.7 GM/DL (ref 32.2–35.5)
MCV RBC AUTO: 96.8 FL (ref 80–94)
PLATELET # BLD AUTO: 458 THOU/MM3 (ref 130–400)
PMV BLD AUTO: 8.9 FL (ref 9.4–12.4)
POTASSIUM SERPL-SCNC: 4.6 MEQ/L (ref 3.5–5.2)
RBC # BLD AUTO: 4.33 MILL/MM3 (ref 4.7–6.1)
SODIUM SERPL-SCNC: 135 MEQ/L (ref 135–145)
WBC # BLD AUTO: 17.9 THOU/MM3 (ref 4.8–10.8)

## 2023-11-10 PROCEDURE — 99205 OFFICE O/P NEW HI 60 MIN: CPT | Performed by: STUDENT IN AN ORGANIZED HEALTH CARE EDUCATION/TRAINING PROGRAM

## 2023-11-10 PROCEDURE — G0378 HOSPITAL OBSERVATION PER HR: HCPCS

## 2023-11-10 PROCEDURE — 6370000000 HC RX 637 (ALT 250 FOR IP): Performed by: PHYSICIAN ASSISTANT

## 2023-11-10 PROCEDURE — 94640 AIRWAY INHALATION TREATMENT: CPT

## 2023-11-10 PROCEDURE — 6370000000 HC RX 637 (ALT 250 FOR IP): Performed by: STUDENT IN AN ORGANIZED HEALTH CARE EDUCATION/TRAINING PROGRAM

## 2023-11-10 PROCEDURE — 96376 TX/PRO/DX INJ SAME DRUG ADON: CPT

## 2023-11-10 PROCEDURE — 36415 COLL VENOUS BLD VENIPUNCTURE: CPT

## 2023-11-10 PROCEDURE — 99232 SBSQ HOSP IP/OBS MODERATE 35: CPT | Performed by: PHYSICIAN ASSISTANT

## 2023-11-10 PROCEDURE — 2580000003 HC RX 258: Performed by: PHYSICIAN ASSISTANT

## 2023-11-10 PROCEDURE — 85027 COMPLETE CBC AUTOMATED: CPT

## 2023-11-10 PROCEDURE — 6360000002 HC RX W HCPCS: Performed by: STUDENT IN AN ORGANIZED HEALTH CARE EDUCATION/TRAINING PROGRAM

## 2023-11-10 PROCEDURE — 80048 BASIC METABOLIC PNL TOTAL CA: CPT

## 2023-11-10 PROCEDURE — 6360000002 HC RX W HCPCS: Performed by: PHYSICIAN ASSISTANT

## 2023-11-10 RX ORDER — MORPHINE SULFATE 15 MG/1
15 TABLET, FILM COATED, EXTENDED RELEASE ORAL EVERY 12 HOURS SCHEDULED
Status: DISCONTINUED | OUTPATIENT
Start: 2023-11-10 | End: 2023-11-12 | Stop reason: HOSPADM

## 2023-11-10 RX ORDER — OXYCODONE HYDROCHLORIDE 5 MG/1
5 TABLET ORAL
Status: DISCONTINUED | OUTPATIENT
Start: 2023-11-10 | End: 2023-11-12 | Stop reason: HOSPADM

## 2023-11-10 RX ORDER — GABAPENTIN 100 MG/1
100 CAPSULE ORAL 3 TIMES DAILY
Status: DISCONTINUED | OUTPATIENT
Start: 2023-11-10 | End: 2023-11-12 | Stop reason: HOSPADM

## 2023-11-10 RX ORDER — OXYCODONE HYDROCHLORIDE 5 MG/1
10 TABLET ORAL
Status: DISCONTINUED | OUTPATIENT
Start: 2023-11-10 | End: 2023-11-12 | Stop reason: HOSPADM

## 2023-11-10 RX ORDER — SENNOSIDES A AND B 8.6 MG/1
1 TABLET, FILM COATED ORAL NIGHTLY PRN
Status: DISCONTINUED | OUTPATIENT
Start: 2023-11-10 | End: 2023-11-12 | Stop reason: HOSPADM

## 2023-11-10 RX ADMIN — DEXAMETHASONE 4 MG: 4 TABLET ORAL at 16:17

## 2023-11-10 RX ADMIN — OXYCODONE HYDROCHLORIDE 5 MG: 5 TABLET ORAL at 13:15

## 2023-11-10 RX ADMIN — OLANZAPINE 5 MG: 5 TABLET, FILM COATED ORAL at 19:19

## 2023-11-10 RX ADMIN — HYDROMORPHONE HYDROCHLORIDE 0.5 MG: 1 INJECTION, SOLUTION INTRAMUSCULAR; INTRAVENOUS; SUBCUTANEOUS at 16:17

## 2023-11-10 RX ADMIN — Medication 100 MG: at 09:06

## 2023-11-10 RX ADMIN — SODIUM CHLORIDE, PRESERVATIVE FREE 10 ML: 5 INJECTION INTRAVENOUS at 20:36

## 2023-11-10 RX ADMIN — CLONIDINE HYDROCHLORIDE 0.1 MG: 0.1 TABLET ORAL at 09:06

## 2023-11-10 RX ADMIN — HYDROMORPHONE HYDROCHLORIDE 0.5 MG: 1 INJECTION, SOLUTION INTRAMUSCULAR; INTRAVENOUS; SUBCUTANEOUS at 14:05

## 2023-11-10 RX ADMIN — CLONIDINE HYDROCHLORIDE 0.1 MG: 0.1 TABLET ORAL at 19:19

## 2023-11-10 RX ADMIN — PHENYTOIN SODIUM 100 MG: 100 CAPSULE ORAL at 19:19

## 2023-11-10 RX ADMIN — HYDROCODONE BITARTRATE AND ACETAMINOPHEN 1 TABLET: 5; 325 TABLET ORAL at 04:09

## 2023-11-10 RX ADMIN — HYDROMORPHONE HYDROCHLORIDE 0.5 MG: 1 INJECTION, SOLUTION INTRAMUSCULAR; INTRAVENOUS; SUBCUTANEOUS at 12:03

## 2023-11-10 RX ADMIN — GABAPENTIN 100 MG: 100 CAPSULE ORAL at 19:19

## 2023-11-10 RX ADMIN — SODIUM CHLORIDE, PRESERVATIVE FREE 10 ML: 5 INJECTION INTRAVENOUS at 09:05

## 2023-11-10 RX ADMIN — MORPHINE SULFATE 15 MG: 15 TABLET, FILM COATED, EXTENDED RELEASE ORAL at 20:34

## 2023-11-10 RX ADMIN — HYDROMORPHONE HYDROCHLORIDE 0.5 MG: 1 INJECTION, SOLUTION INTRAMUSCULAR; INTRAVENOUS; SUBCUTANEOUS at 06:32

## 2023-11-10 RX ADMIN — MORPHINE SULFATE 15 MG: 15 TABLET, FILM COATED, EXTENDED RELEASE ORAL at 11:11

## 2023-11-10 RX ADMIN — HYDROMORPHONE HYDROCHLORIDE 0.5 MG: 1 INJECTION, SOLUTION INTRAMUSCULAR; INTRAVENOUS; SUBCUTANEOUS at 18:24

## 2023-11-10 RX ADMIN — FOLIC ACID 1 MG: 1 TABLET ORAL at 09:06

## 2023-11-10 RX ADMIN — HYDROMORPHONE HYDROCHLORIDE 0.5 MG: 1 INJECTION, SOLUTION INTRAMUSCULAR; INTRAVENOUS; SUBCUTANEOUS at 01:55

## 2023-11-10 RX ADMIN — HYDROMORPHONE HYDROCHLORIDE 0.5 MG: 1 INJECTION, SOLUTION INTRAMUSCULAR; INTRAVENOUS; SUBCUTANEOUS at 09:04

## 2023-11-10 RX ADMIN — OXYCODONE HYDROCHLORIDE 10 MG: 5 TABLET ORAL at 19:16

## 2023-11-10 RX ADMIN — ACETAMINOPHEN 500 MG: 500 TABLET ORAL at 21:32

## 2023-11-10 RX ADMIN — PANTOPRAZOLE SODIUM 40 MG: 40 TABLET, DELAYED RELEASE ORAL at 06:32

## 2023-11-10 RX ADMIN — IPRATROPIUM BROMIDE AND ALBUTEROL SULFATE 1 DOSE: .5; 3 SOLUTION RESPIRATORY (INHALATION) at 16:54

## 2023-11-10 RX ADMIN — PHENYTOIN SODIUM 100 MG: 100 CAPSULE ORAL at 13:15

## 2023-11-10 RX ADMIN — DEXAMETHASONE 4 MG: 4 TABLET ORAL at 09:06

## 2023-11-10 RX ADMIN — DIPHENHYDRAMINE HYDROCHLORIDE 25 MG: 25 TABLET ORAL at 21:33

## 2023-11-10 RX ADMIN — PHENYTOIN SODIUM 100 MG: 100 CAPSULE ORAL at 09:06

## 2023-11-10 RX ADMIN — HYDROMORPHONE HYDROCHLORIDE 0.5 MG: 1 INJECTION, SOLUTION INTRAMUSCULAR; INTRAVENOUS; SUBCUTANEOUS at 21:33

## 2023-11-10 RX ADMIN — GABAPENTIN 100 MG: 100 CAPSULE ORAL at 14:05

## 2023-11-10 RX ADMIN — OXYCODONE HYDROCHLORIDE 10 MG: 5 TABLET ORAL at 22:40

## 2023-11-10 ASSESSMENT — PAIN - FUNCTIONAL ASSESSMENT
PAIN_FUNCTIONAL_ASSESSMENT: PREVENTS OR INTERFERES SOME ACTIVE ACTIVITIES AND ADLS
PAIN_FUNCTIONAL_ASSESSMENT: ACTIVITIES ARE NOT PREVENTED
PAIN_FUNCTIONAL_ASSESSMENT: PREVENTS OR INTERFERES SOME ACTIVE ACTIVITIES AND ADLS
PAIN_FUNCTIONAL_ASSESSMENT: ACTIVITIES ARE NOT PREVENTED
PAIN_FUNCTIONAL_ASSESSMENT: PREVENTS OR INTERFERES SOME ACTIVE ACTIVITIES AND ADLS

## 2023-11-10 ASSESSMENT — PAIN DESCRIPTION - LOCATION
LOCATION: BACK
LOCATION: CHEST;BACK
LOCATION: BACK
LOCATION: GENERALIZED
LOCATION: GENERALIZED
LOCATION: BACK
LOCATION: GENERALIZED
LOCATION: BACK
LOCATION: GENERALIZED
LOCATION: BACK

## 2023-11-10 ASSESSMENT — PAIN DESCRIPTION - DESCRIPTORS
DESCRIPTORS: ACHING
DESCRIPTORS: SHARP;SHOOTING
DESCRIPTORS: TIGHTNESS
DESCRIPTORS: SHARP;SHOOTING
DESCRIPTORS: ACHING
DESCRIPTORS: SHARP;SHOOTING
DESCRIPTORS: ACHING;DISCOMFORT
DESCRIPTORS: ACHING
DESCRIPTORS: SHARP;SHOOTING
DESCRIPTORS: TIGHTNESS
DESCRIPTORS: SHARP;SHOOTING
DESCRIPTORS: ACHING

## 2023-11-10 ASSESSMENT — PAIN SCALES - GENERAL
PAINLEVEL_OUTOF10: 8
PAINLEVEL_OUTOF10: 8
PAINLEVEL_OUTOF10: 9
PAINLEVEL_OUTOF10: 8
PAINLEVEL_OUTOF10: 10
PAINLEVEL_OUTOF10: 9
PAINLEVEL_OUTOF10: 10
PAINLEVEL_OUTOF10: 8
PAINLEVEL_OUTOF10: 8
PAINLEVEL_OUTOF10: 6
PAINLEVEL_OUTOF10: 10
PAINLEVEL_OUTOF10: 8
PAINLEVEL_OUTOF10: 10
PAINLEVEL_OUTOF10: 9
PAINLEVEL_OUTOF10: 10

## 2023-11-10 ASSESSMENT — PAIN DESCRIPTION - ONSET: ONSET: ON-GOING

## 2023-11-10 ASSESSMENT — PAIN DESCRIPTION - ORIENTATION
ORIENTATION: LOWER
ORIENTATION: MID
ORIENTATION: LOWER
ORIENTATION: RIGHT;LEFT;POSTERIOR;LOWER;UPPER
ORIENTATION: LEFT;LOWER
ORIENTATION: LOWER
ORIENTATION: LOWER
ORIENTATION: MID

## 2023-11-10 ASSESSMENT — PAIN SCALES - WONG BAKER: WONGBAKER_NUMERICALRESPONSE: 6

## 2023-11-10 ASSESSMENT — PAIN DESCRIPTION - FREQUENCY: FREQUENCY: INTERMITTENT

## 2023-11-10 ASSESSMENT — PAIN DESCRIPTION - PAIN TYPE: TYPE: ACUTE PAIN;CHRONIC PAIN

## 2023-11-10 NOTE — FLOWSHEET NOTE
11/10/23 1050   Safe Environment   Safety Measures Standard Safety Measures;Call light within reach; Bed in low position  (virtual safety round)     Pt responds to voice and permits camera. Pt resting in bed with call light in reach. Pt alert and oriented. Reminded to utilize call light when needed. Reminded of fall and safety precautions. No voiced questions or concerns. Pt states he is waiting for his new pain medication  to be ordered. He just spoke with the doctor.

## 2023-11-10 NOTE — PROGRESS NOTES
Hospitalist Progress Note      Patient:  Feli Mccurdy. Unit/Bed:607/007-A  YOB: 1970  MRN: [id number]   Acct: [de-identified]   PCP: RINA Pritchard CNP  Date of Admission: 11/8/2023    Assessment/Plan:    Metastatic adenocarcinoma of the lung with brain metastases  Oncology, Rad Onc & Palliative Care consulted. Recently diagnosed via biopsy in [facility]  Bone scan showed abnormal signaling in 9th & 10th rib. CT A/P did not show any new disease   Continue Decadron 4 mg twice daily  Palliative Care started Morphine ER 15mg BID, Oxy IR Q2H PRN, Gabapentin TID. Will watch to see his pain is more controlled. Seizure disorder  Recent episode of status epilepticus  Likely related to multiple intracranial metastases  Continue phenytoin    SIADH  Noted during recent admission. Patient with hyponatremia which improved with fluid restriction. We will continue fluid restriction at this time. No hyponatremia at this time. Sodium 139, 135    Leukocytosis  Likely secondary to dexamethasone. May also be component of dehydration. Repeat CBC tomorrow. Afebrile. WBC 19, 17, 17. Major depressive disorder  Continue home psychiatric regimen    GERD  On Protonix    COPD not in acute exacerbation  Not currently on any inhalers. DuoNebs as needed for wheezing or shortness of breath. Right apical pneumothorax  Following CT-guided biopsy  On room air. Chest x-ray today demonstrates approximately 10%. Monitor. Defer repeat imaging at this time unless clinical condition changes. Nicotine dependence  Nicotine patch provided. Pt would like to sign a waiver to go out and smoke.      Chief Complaint: Bone pain     Initial H and P:-    Initial H&P \"Sam Loo. is a 46 y.o. male with a history of major depressive disorder, seizure disorder, GERD, COPD, and recent diagnosis of metastatic adenocarcinoma of the lung

## 2023-11-10 NOTE — PLAN OF CARE
Problem: Discharge Planning  Goal: Discharge to home or other facility with appropriate resources  11/10/2023 1443 by Carlota Bautista RN  Outcome: Progressing  Flowsheets (Taken 11/10/2023 0900)  Discharge to home or other facility with appropriate resources: Identify barriers to discharge with patient and caregiver  11/10/2023 0410 by Amy Manzanares RN  Outcome: Progressing  11/10/2023 0409 by Amy Manzanares RN  Outcome: Progressing     Problem: Pain  Goal: Verbalizes/displays adequate comfort level or baseline comfort level  11/10/2023 1443 by Carlota Bautista RN  Outcome: Progressing  Flowsheets (Taken 11/9/2023 1945 by Amy Manzanares RN)  Verbalizes/displays adequate comfort level or baseline comfort level: Assess pain using appropriate pain scale  11/10/2023 0410 by Amy Manzanares RN  Outcome: Progressing  11/10/2023 0409 by Amy Manzanares RN  Outcome: Progressing  Flowsheets (Taken 11/9/2023 1945)  Verbalizes/displays adequate comfort level or baseline comfort level: Assess pain using appropriate pain scale     Problem: Safety - Adult  Goal: Free from fall injury  11/10/2023 1443 by Carlota Bautista RN  Outcome: Progressing  Flowsheets (Taken 11/9/2023 1731)  Free From Fall Injury: Instruct family/caregiver on patient safety  11/10/2023 0410 by Amy Manzanares RN  Outcome: Progressing  11/10/2023 0409 by Amy Manzanares RN  Outcome: Progressing     Problem: Chronic Conditions and Co-morbidities  Goal: Patient's chronic conditions and co-morbidity symptoms are monitored and maintained or improved  11/10/2023 1443 by Carlota Bautista RN  Outcome: Progressing  Flowsheets (Taken 11/10/2023 0900)  Care Plan - Patient's Chronic Conditions and Co-Morbidity Symptoms are Monitored and Maintained or Improved: Monitor and assess patient's chronic conditions and comorbid symptoms for stability, deterioration, or improvement  11/10/2023 0410 by Amy Manzanares RN  Outcome:

## 2023-11-10 NOTE — CARE COORDINATION
11/10/23, 2:26 PM EST    DISCHARGE ON GOING EVALUATION    Tamie Sung. Hospital day: 0  Location: 6K-07/007-A Reason for admit: Failure to thrive in adult [R62.7]  Pneumothorax on right [J93.9]  Intractable pain [R52]  Metastatic malignant neoplasm, unspecified site [facility]) [C79.9]   Procedure: 11/9 Bone scan showed abnormal signaling in 9th & 10th rib  Barriers to Discharge: Palliative care following, pain medications adjusted. Rad oncology and oncology following, working on OP treatment plan. Patient is ambulating independently and going outside to smoke. PCP: RINA Malin - CNP   %  Patient Goals/Plan/Treatment Preferences: Home with fiance. Jeannine Sanchez had mentioned possible need for [facility] and walker at discharge, at this time those services are not needed.

## 2023-11-10 NOTE — TELEPHONE ENCOUNTER
Unable to reach patient with numbers provided. Called emergency contact Alecia and left message for return call.

## 2023-11-10 NOTE — CONSULTS
ondansetron, polyethylene glycol, acetaminophen **OR** acetaminophen, HYDROmorphone **OR** HYDROmorphone, HYDROcodone 5 mg - acetaminophen  IV Drips/Infusions   sodium chloride       Past Medical History         Diagnosis Date    Arthritis     Asthma     COPD (chronic obstructive pulmonary disease) (HCC)     GERD (gastroesophageal reflux disease)     Hypertension     Psychiatric problem     Schizophrenia ([facility])     Severe malnutrition ([address] [facility]) 01/14/2021    Unspecified cerebral artery occlusion with cerebral infarction       Past Surgical History           Procedure Laterality Date    COLONOSCOPY      CT NEEDLE BIOPSY LUNG PERCUTANEOUS  10/31/2023    CT NEEDLE BIOPSY LUNG PERCUTANEOUS 10/31/2023 [facility] CT SCAN    DILATATION, ESOPHAGUS      ENDOSCOPY, COLON, DIAGNOSTIC      PACEMAKER PLACEMENT  2013    Pt reports his father with same name had pacemaker, he doesn't remember having on placed     Diet    ADULT DIET;  Regular; 1500 ml  ADULT ORAL NUTRITION SUPPLEMENT; Breakfast, Lunch, Dinner; Standard High Calorie/High Protein Oral Supplement  Allergies    Prochlorperazine, Toradol [ketorolac tromethamine], Ketorolac, and Compazine [prochlorperazine maleate]  Social History     Social History     Socioeconomic History    Marital status: Single     Spouse name: Not on file    Number of children: 2    Years of education: 9    Highest education level: Not on file   Occupational History    Not on file   Tobacco Use    Smoking status: Some Days     Packs/day: 1.00     Years: 30.00     Additional pack years: 0.00     Total pack years: 30.00     Types: Cigarettes    Smokeless tobacco: Never    Tobacco comments:     11-8-23: Currently 4 cigarettes daily   Vaping Use    Vaping Use: Never used   Substance and Sexual Activity    Alcohol use: Not Currently     Comment: x3 24 oz beer    Drug use: Not Currently     Types: Marijuana Sena Banister), Opiates , Cocaine     Comment: + Fentanyl 3/2023, + Cocaine 2021    Sexual activity: Yes     Partners:
 135 135   K 4.0 4.1 4.6    103 102   CO2 26 22* 24   BUN 17 15 22   CREATININE 0.5 0.4 0.5   GLUCOSE 99 94 80     LIVER PROFILE:   Recent Labs     11/08/23  1050   AST 22   ALT 31   LIPASE 16.3   BILIDIR <0.2   BILITOT <0.2*   ALKPHOS 100     PT/INR: No results for input(s): \"PROTIME\", \"INR\" in the last 72 hours. NM BONE SCAN WHOLE BODY   Final Result   1. Radiotracer cannulation at the right ninth and 10th ribs of indeterminate etiology but suspicious for metastatic disease. Radiographic correlation is recommended. 2. Probable degenerative arthropathic changes as detailed above. Final report electronically signed by Dr. Chelsea Day on 11/9/2023 1:43 PM      CT ABDOMEN PELVIS W IV CONTRAST Additional Contrast? None   Final Result   1. A right-sided pneumothorax is incompletely visualized at the limited images of the lung bases. 2. A wedge-shaped area of diminished enhancement in the lower pole of the right kidney is nonspecific (series 301, image 41) possibly a focus of infection/inflammation. Correlate with urinalysis. 3. Moderate retained fecal material seen throughout the colon suggesting fecal stasis. No bowel obstruction. No abdominal/pelvic metastatic disease visualized. Numerous chronic findings are discussed. **This report has been created using voice recognition software. It may contain minor errors which are inherent in voice recognition technology. **      Final report electronically signed by Dr Hannah Bullock on 11/8/2023 3:50 PM      XR CHEST (2 VW)   Final Result   1. A right apical pneumothorax is seen likely about 10%. The critical  finding(s) was called to Dr. Hernan Dasilva at 1153 hours on 11/8/2023 by Dr. Joaquín Hung. Verbal acknowledgment and readback was given. 2. A 4.6 x 2.4 cm right upper lobe mass is seen. Other pulmonary nodules are seen in the left lung            **This report has been created using voice recognition software.   It may contain

## 2023-11-10 NOTE — PROGRESS NOTES
Oncology Social Work    Date: 11/10/2023  Time: 1:54 PM  Name: Krystian French. MRN: [id number]     Contact Type: Follow-up    Note:   Situation: This staff called Krystian Amadourbon. via phone support to introduce myself as his Oncology Social Worker. It must be noted that Valerio Grigsby is in the hospital at the time of this writing. Background:  Valerio Grigsby had completed a Distress Thermometer at his consultation appointment in the Radiation Dept of the [address] [place] [address]. This staff was calling to review the results. Assessment: The contact number provided to this staff and Medical Records as his identified number shared a recording that the number is no longer valid as it has been disconnected. - Education regarding the services was not provided by the SW nor was a message left for him. - No community referrals was placed at this time because this staff has no indication of where Valerio Grigsby is in his treatment plan. Referral services may be reconsidered should he express needs regarding assistance. Recommendation: Follow-up will be initiated by Valerio Grigsby based on need.  provided him with my contact information and will remain available for support.         BASSEM Layton, LINO, MIRI  Oncology Social Worker      Electronically signed by BASSEM Layton, LINO, MIRI on 11/10/2023 at 1:54 PM

## 2023-11-10 NOTE — PLAN OF CARE
Problem: Discharge Planning  Goal: Discharge to home or other facility with appropriate resources  11/10/2023 0410 by Lynn Fernandez RN  Outcome: Progressing  11/10/2023 0409 by Lynn Fernandez RN  Outcome: Progressing  11/9/2023 1731 by Shelli Muir RN  Outcome: Progressing  Flowsheets (Taken 11/9/2023 0830)  Discharge to home or other facility with appropriate resources: Identify barriers to discharge with patient and caregiver     Problem: Pain  Goal: Verbalizes/displays adequate comfort level or baseline comfort level  11/10/2023 0410 by Lynn Fernandez RN  Outcome: Progressing  11/10/2023 0409 by Lynn Fernandez RN  Outcome: Progressing  Flowsheets (Taken 11/9/2023 1945)  Verbalizes/displays adequate comfort level or baseline comfort level: Assess pain using appropriate pain scale  11/9/2023 1731 by Shelli Muir RN  Outcome: Progressing  Flowsheets (Taken 11/9/2023 1731)  Verbalizes/displays adequate comfort level or baseline comfort level: Encourage patient to monitor pain and request assistance     Problem: Safety - Adult  Goal: Free from fall injury  11/10/2023 0410 by Lynn Fernandez RN  Outcome: Progressing  11/10/2023 0409 by Lynn Fernandez RN  Outcome: Progressing  11/9/2023 1731 by Shelli Muir RN  Outcome: Progressing  Flowsheets (Taken 11/9/2023 1731)  Free From Fall Injury: Instruct family/caregiver on patient safety     Problem: Chronic Conditions and Co-morbidities  Goal: Patient's chronic conditions and co-morbidity symptoms are monitored and maintained or improved  11/10/2023 0410 by Lynn Fernandez RN  Outcome: Progressing  11/10/2023 0409 by Lynn Fernandez RN  Outcome: Progressing  11/9/2023 1731 by Shelli Muir RN  Outcome: Progressing  Flowsheets (Taken 11/9/2023 0830)  Care Plan - Patient's Chronic Conditions and Co-Morbidity Symptoms are Monitored and Maintained or Improved: Monitor and assess patient's chronic conditions and comorbid symptoms for

## 2023-11-10 NOTE — TELEPHONE ENCOUNTER
Patient seen during hospital stay. Will forward to scheduling - Please schedule new patient slot in 2 weeks.

## 2023-11-11 PROCEDURE — 6370000000 HC RX 637 (ALT 250 FOR IP): Performed by: STUDENT IN AN ORGANIZED HEALTH CARE EDUCATION/TRAINING PROGRAM

## 2023-11-11 PROCEDURE — 6360000002 HC RX W HCPCS: Performed by: STUDENT IN AN ORGANIZED HEALTH CARE EDUCATION/TRAINING PROGRAM

## 2023-11-11 PROCEDURE — G0378 HOSPITAL OBSERVATION PER HR: HCPCS

## 2023-11-11 PROCEDURE — 6370000000 HC RX 637 (ALT 250 FOR IP): Performed by: PHYSICIAN ASSISTANT

## 2023-11-11 PROCEDURE — 99232 SBSQ HOSP IP/OBS MODERATE 35: CPT | Performed by: PHYSICIAN ASSISTANT

## 2023-11-11 PROCEDURE — 2580000003 HC RX 258: Performed by: PHYSICIAN ASSISTANT

## 2023-11-11 PROCEDURE — 96376 TX/PRO/DX INJ SAME DRUG ADON: CPT

## 2023-11-11 PROCEDURE — 6360000002 HC RX W HCPCS: Performed by: PHYSICIAN ASSISTANT

## 2023-11-11 RX ORDER — IPRATROPIUM BROMIDE AND ALBUTEROL SULFATE 2.5; .5 MG/3ML; MG/3ML
1 SOLUTION RESPIRATORY (INHALATION) EVERY 4 HOURS PRN
Status: DISCONTINUED | OUTPATIENT
Start: 2023-11-11 | End: 2023-11-12 | Stop reason: HOSPADM

## 2023-11-11 RX ADMIN — HYDROMORPHONE HYDROCHLORIDE 0.5 MG: 1 INJECTION, SOLUTION INTRAMUSCULAR; INTRAVENOUS; SUBCUTANEOUS at 20:18

## 2023-11-11 RX ADMIN — MORPHINE SULFATE 15 MG: 15 TABLET, FILM COATED, EXTENDED RELEASE ORAL at 08:43

## 2023-11-11 RX ADMIN — SODIUM CHLORIDE, PRESERVATIVE FREE 10 ML: 5 INJECTION INTRAVENOUS at 20:18

## 2023-11-11 RX ADMIN — GABAPENTIN 100 MG: 100 CAPSULE ORAL at 20:17

## 2023-11-11 RX ADMIN — DEXAMETHASONE 4 MG: 4 TABLET ORAL at 08:43

## 2023-11-11 RX ADMIN — FOLIC ACID 1 MG: 1 TABLET ORAL at 08:43

## 2023-11-11 RX ADMIN — SODIUM CHLORIDE, PRESERVATIVE FREE 10 ML: 5 INJECTION INTRAVENOUS at 08:48

## 2023-11-11 RX ADMIN — POLYETHYLENE GLYCOL 3350 17 G: 17 POWDER, FOR SOLUTION ORAL at 11:44

## 2023-11-11 RX ADMIN — OXYCODONE HYDROCHLORIDE 10 MG: 5 TABLET ORAL at 03:16

## 2023-11-11 RX ADMIN — HYDROMORPHONE HYDROCHLORIDE 0.5 MG: 1 INJECTION, SOLUTION INTRAMUSCULAR; INTRAVENOUS; SUBCUTANEOUS at 01:18

## 2023-11-11 RX ADMIN — PHENYTOIN SODIUM 100 MG: 100 CAPSULE ORAL at 14:57

## 2023-11-11 RX ADMIN — OXYCODONE HYDROCHLORIDE 10 MG: 5 TABLET ORAL at 13:51

## 2023-11-11 RX ADMIN — OXYCODONE HYDROCHLORIDE 10 MG: 5 TABLET ORAL at 18:31

## 2023-11-11 RX ADMIN — PHENYTOIN SODIUM 100 MG: 100 CAPSULE ORAL at 08:43

## 2023-11-11 RX ADMIN — HYDROMORPHONE HYDROCHLORIDE 0.5 MG: 1 INJECTION, SOLUTION INTRAMUSCULAR; INTRAVENOUS; SUBCUTANEOUS at 11:38

## 2023-11-11 RX ADMIN — ACETAMINOPHEN 500 MG: 500 TABLET ORAL at 20:07

## 2023-11-11 RX ADMIN — CLONIDINE HYDROCHLORIDE 0.1 MG: 0.1 TABLET ORAL at 20:17

## 2023-11-11 RX ADMIN — Medication 100 MG: at 08:43

## 2023-11-11 RX ADMIN — HYDROMORPHONE HYDROCHLORIDE 0.5 MG: 1 INJECTION, SOLUTION INTRAMUSCULAR; INTRAVENOUS; SUBCUTANEOUS at 06:34

## 2023-11-11 RX ADMIN — CLONIDINE HYDROCHLORIDE 0.1 MG: 0.1 TABLET ORAL at 08:43

## 2023-11-11 RX ADMIN — PHENYTOIN SODIUM 100 MG: 100 CAPSULE ORAL at 20:17

## 2023-11-11 RX ADMIN — DIPHENHYDRAMINE HYDROCHLORIDE 25 MG: 25 TABLET ORAL at 20:07

## 2023-11-11 RX ADMIN — PANTOPRAZOLE SODIUM 40 MG: 40 TABLET, DELAYED RELEASE ORAL at 06:34

## 2023-11-11 RX ADMIN — HYDROMORPHONE HYDROCHLORIDE 0.5 MG: 1 INJECTION, SOLUTION INTRAMUSCULAR; INTRAVENOUS; SUBCUTANEOUS at 14:57

## 2023-11-11 RX ADMIN — GABAPENTIN 100 MG: 100 CAPSULE ORAL at 14:57

## 2023-11-11 RX ADMIN — MORPHINE SULFATE 15 MG: 15 TABLET, FILM COATED, EXTENDED RELEASE ORAL at 20:17

## 2023-11-11 RX ADMIN — DEXAMETHASONE 4 MG: 4 TABLET ORAL at 16:34

## 2023-11-11 RX ADMIN — OLANZAPINE 5 MG: 5 TABLET, FILM COATED ORAL at 20:17

## 2023-11-11 RX ADMIN — GABAPENTIN 100 MG: 100 CAPSULE ORAL at 08:43

## 2023-11-11 RX ADMIN — OXYCODONE HYDROCHLORIDE 10 MG: 5 TABLET ORAL at 16:32

## 2023-11-11 ASSESSMENT — PAIN DESCRIPTION - DESCRIPTORS
DESCRIPTORS: STABBING;THROBBING;BURNING
DESCRIPTORS: SHARP;SHOOTING
DESCRIPTORS: STABBING;SHOOTING;THROBBING
DESCRIPTORS: ACHING
DESCRIPTORS: STABBING
DESCRIPTORS: SHARP
DESCRIPTORS: SHARP;SHOOTING
DESCRIPTORS: SHARP
DESCRIPTORS: SHARP;SHOOTING
DESCRIPTORS: STABBING;THROBBING

## 2023-11-11 ASSESSMENT — PAIN DESCRIPTION - ORIENTATION
ORIENTATION: LOWER
ORIENTATION: MID
ORIENTATION: LOWER

## 2023-11-11 ASSESSMENT — PAIN DESCRIPTION - LOCATION
LOCATION: BACK
LOCATION: BACK
LOCATION: GENERALIZED
LOCATION: BACK
LOCATION: GENERALIZED
LOCATION: BACK
LOCATION: BACK;GENERALIZED
LOCATION: BACK
LOCATION: GENERALIZED
LOCATION: GENERALIZED

## 2023-11-11 ASSESSMENT — PAIN - FUNCTIONAL ASSESSMENT
PAIN_FUNCTIONAL_ASSESSMENT: PREVENTS OR INTERFERES SOME ACTIVE ACTIVITIES AND ADLS
PAIN_FUNCTIONAL_ASSESSMENT: ACTIVITIES ARE NOT PREVENTED
PAIN_FUNCTIONAL_ASSESSMENT: PREVENTS OR INTERFERES SOME ACTIVE ACTIVITIES AND ADLS
PAIN_FUNCTIONAL_ASSESSMENT: ACTIVITIES ARE NOT PREVENTED
PAIN_FUNCTIONAL_ASSESSMENT: PREVENTS OR INTERFERES SOME ACTIVE ACTIVITIES AND ADLS
PAIN_FUNCTIONAL_ASSESSMENT: ACTIVITIES ARE NOT PREVENTED
PAIN_FUNCTIONAL_ASSESSMENT: PREVENTS OR INTERFERES SOME ACTIVE ACTIVITIES AND ADLS
PAIN_FUNCTIONAL_ASSESSMENT: ACTIVITIES ARE NOT PREVENTED

## 2023-11-11 ASSESSMENT — PAIN SCALES - GENERAL
PAINLEVEL_OUTOF10: 9
PAINLEVEL_OUTOF10: 10
PAINLEVEL_OUTOF10: 7
PAINLEVEL_OUTOF10: 10
PAINLEVEL_OUTOF10: 9
PAINLEVEL_OUTOF10: 7
PAINLEVEL_OUTOF10: 9
PAINLEVEL_OUTOF10: 10

## 2023-11-11 ASSESSMENT — PAIN DESCRIPTION - FREQUENCY: FREQUENCY: CONTINUOUS

## 2023-11-11 ASSESSMENT — PAIN DESCRIPTION - ONSET: ONSET: ON-GOING

## 2023-11-11 ASSESSMENT — PAIN DESCRIPTION - PAIN TYPE: TYPE: CHRONIC PAIN

## 2023-11-11 NOTE — FLOWSHEET NOTE
11/11/23 0386   Safe Environment   Safety Measures Other (comment)  (Virtual Nurse Safety Round)     VN called into patients room and introduced myself and role. Patient answered and permitted video. Video activated. Patient comfortable on the side of the bed. Patient reports he is feeling better with pain and voices no needs at this time. Hoping he will get to go home soon. Call light within reach.
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
## 2023-11-11 NOTE — PROGRESS NOTES
Hospitalist Progress Note      Patient:  Kristal Galdamez. Unit/Bed:6K-07/007-A  YOB: 1970  MRN: [id number]   Acct: [de-identified]   PCP: RINA Martin CNP  Date of Admission: 11/8/2023    Assessment/Plan:    Metastatic adenocarcinoma of the lung with brain metastases  Oncology, Rad Onc & Palliative Care consulted. Recently diagnosed via biopsy in [facility]  Bone scan showed abnormal signaling in 9th & 10th rib. CT A/P did not show any new disease   Continue Decadron 4 mg twice daily  Palliative Care started Morphine ER 15mg BID, Oxy IR Q2H PRN, Gabapentin TID. Pt and I had a long conversation today about the need to make today a \"dress rehearsal\" for tomorrow and not use the IV pain medication to see if he can go home with a satisfactory pain plan. Seizure disorder  Recent episode of status epilepticus  Likely related to multiple intracranial metastases  Continue phenytoin    SIADH  Noted during recent admission. Patient with hyponatremia which improved with fluid restriction. We will continue fluid restriction at this time. No hyponatremia at this time. Sodium 139, 135    Leukocytosis  Likely secondary to dexamethasone. May also be component of dehydration. Afebrile. WBC 19, 17, 17. Major depressive disorder  Continue home psychiatric regimen    GERD  On Protonix    COPD not in acute exacerbation  Not currently on any inhalers. DuoNebs as needed for wheezing or shortness of breath. Right apical pneumothorax  Following CT-guided biopsy  On room air. Chest x-ray today demonstrates approximately 10%. Monitor. Defer repeat imaging at this time unless clinical condition changes. Nicotine dependence  Nicotine patch provided. Pt would like to sign a waiver to go out and smoke.      Chief Complaint: Bone pain     Initial H and P:-    Initial H&P \"Sam Galeano. is a 46 y.o. male with a history

## 2023-11-12 ENCOUNTER — TELEPHONE (OUTPATIENT)
Dept: PALLATIVE CARE | Age: 53
End: 2023-11-12

## 2023-11-12 VITALS
SYSTOLIC BLOOD PRESSURE: 126 MMHG | WEIGHT: 115 LBS | BODY MASS INDEX: 17.43 KG/M2 | OXYGEN SATURATION: 98 % | HEIGHT: 68 IN | RESPIRATION RATE: 18 BRPM | TEMPERATURE: 98 F | HEART RATE: 91 BPM | DIASTOLIC BLOOD PRESSURE: 86 MMHG

## 2023-11-12 DIAGNOSIS — Z51.5 PALLIATIVE CARE PATIENT: ICD-10-CM

## 2023-11-12 DIAGNOSIS — C34.90 LUNG CANCER METASTATIC TO BRAIN (HCC): ICD-10-CM

## 2023-11-12 DIAGNOSIS — C79.31 LUNG CANCER METASTATIC TO BRAIN (HCC): ICD-10-CM

## 2023-11-12 DIAGNOSIS — C79.31: ICD-10-CM

## 2023-11-12 DIAGNOSIS — C80.1: ICD-10-CM

## 2023-11-12 DIAGNOSIS — C34.91 ADENOCARCINOMA OF RIGHT LUNG, STAGE 4 (HCC): ICD-10-CM

## 2023-11-12 DIAGNOSIS — G89.3 CANCER RELATED PAIN: ICD-10-CM

## 2023-11-12 PROCEDURE — 96376 TX/PRO/DX INJ SAME DRUG ADON: CPT

## 2023-11-12 PROCEDURE — 99239 HOSP IP/OBS DSCHRG MGMT >30: CPT | Performed by: PHYSICIAN ASSISTANT

## 2023-11-12 PROCEDURE — 6360000002 HC RX W HCPCS: Performed by: PHYSICIAN ASSISTANT

## 2023-11-12 PROCEDURE — 6370000000 HC RX 637 (ALT 250 FOR IP): Performed by: PHYSICIAN ASSISTANT

## 2023-11-12 PROCEDURE — 2580000003 HC RX 258: Performed by: PHYSICIAN ASSISTANT

## 2023-11-12 PROCEDURE — G0378 HOSPITAL OBSERVATION PER HR: HCPCS

## 2023-11-12 PROCEDURE — 6360000002 HC RX W HCPCS: Performed by: STUDENT IN AN ORGANIZED HEALTH CARE EDUCATION/TRAINING PROGRAM

## 2023-11-12 PROCEDURE — 6370000000 HC RX 637 (ALT 250 FOR IP): Performed by: STUDENT IN AN ORGANIZED HEALTH CARE EDUCATION/TRAINING PROGRAM

## 2023-11-12 RX ORDER — OXYCODONE HYDROCHLORIDE 10 MG/1
10 TABLET ORAL EVERY 4 HOURS PRN
Qty: 120 TABLET | Refills: 0 | Status: SHIPPED | OUTPATIENT
Start: 2023-11-12 | End: 2023-12-02

## 2023-11-12 RX ORDER — MORPHINE SULFATE 30 MG/1
30 TABLET, FILM COATED, EXTENDED RELEASE ORAL 2 TIMES DAILY
Qty: 60 TABLET | Refills: 0 | Status: SHIPPED | OUTPATIENT
Start: 2023-11-12 | End: 2023-12-12

## 2023-11-12 RX ORDER — MORPHINE SULFATE 30 MG/1
30 TABLET, FILM COATED, EXTENDED RELEASE ORAL 2 TIMES DAILY
Qty: 60 TABLET | Refills: 0 | Status: SHIPPED | OUTPATIENT
Start: 2023-11-12 | End: 2023-11-12 | Stop reason: SDUPTHER

## 2023-11-12 RX ORDER — GABAPENTIN 100 MG/1
100 CAPSULE ORAL 3 TIMES DAILY
Qty: 60 CAPSULE | Refills: 0 | Status: SHIPPED | OUTPATIENT
Start: 2023-11-12 | End: 2023-12-02

## 2023-11-12 RX ORDER — OXYCODONE HYDROCHLORIDE 10 MG/1
10 TABLET ORAL EVERY 4 HOURS PRN
Qty: 120 TABLET | Refills: 0 | Status: SHIPPED | OUTPATIENT
Start: 2023-11-12 | End: 2023-11-12 | Stop reason: SDUPTHER

## 2023-11-12 RX ORDER — DEXAMETHASONE 4 MG/1
4 TABLET ORAL 2 TIMES DAILY WITH MEALS
Qty: 20 TABLET | Refills: 0 | Status: SHIPPED | OUTPATIENT
Start: 2023-11-12 | End: 2023-11-22

## 2023-11-12 RX ADMIN — MORPHINE SULFATE 15 MG: 15 TABLET, FILM COATED, EXTENDED RELEASE ORAL at 07:58

## 2023-11-12 RX ADMIN — CLONIDINE HYDROCHLORIDE 0.1 MG: 0.1 TABLET ORAL at 07:58

## 2023-11-12 RX ADMIN — PANTOPRAZOLE SODIUM 40 MG: 40 TABLET, DELAYED RELEASE ORAL at 05:39

## 2023-11-12 RX ADMIN — GABAPENTIN 100 MG: 100 CAPSULE ORAL at 15:03

## 2023-11-12 RX ADMIN — OXYCODONE HYDROCHLORIDE 10 MG: 5 TABLET ORAL at 15:03

## 2023-11-12 RX ADMIN — OXYCODONE HYDROCHLORIDE 10 MG: 5 TABLET ORAL at 01:15

## 2023-11-12 RX ADMIN — OXYCODONE HYDROCHLORIDE 10 MG: 5 TABLET ORAL at 03:20

## 2023-11-12 RX ADMIN — OXYCODONE HYDROCHLORIDE 10 MG: 5 TABLET ORAL at 12:04

## 2023-11-12 RX ADMIN — DEXAMETHASONE 4 MG: 4 TABLET ORAL at 17:02

## 2023-11-12 RX ADMIN — PHENYTOIN SODIUM 100 MG: 100 CAPSULE ORAL at 07:57

## 2023-11-12 RX ADMIN — SODIUM CHLORIDE, PRESERVATIVE FREE 10 ML: 5 INJECTION INTRAVENOUS at 07:59

## 2023-11-12 RX ADMIN — Medication 100 MG: at 07:57

## 2023-11-12 RX ADMIN — OXYCODONE HYDROCHLORIDE 10 MG: 5 TABLET ORAL at 10:08

## 2023-11-12 RX ADMIN — GABAPENTIN 100 MG: 100 CAPSULE ORAL at 07:58

## 2023-11-12 RX ADMIN — DEXAMETHASONE 4 MG: 4 TABLET ORAL at 07:58

## 2023-11-12 RX ADMIN — FOLIC ACID 1 MG: 1 TABLET ORAL at 07:58

## 2023-11-12 RX ADMIN — HYDROMORPHONE HYDROCHLORIDE 0.5 MG: 1 INJECTION, SOLUTION INTRAMUSCULAR; INTRAVENOUS; SUBCUTANEOUS at 08:53

## 2023-11-12 RX ADMIN — OXYCODONE HYDROCHLORIDE 10 MG: 5 TABLET ORAL at 05:39

## 2023-11-12 RX ADMIN — OXYCODONE HYDROCHLORIDE 10 MG: 5 TABLET ORAL at 17:02

## 2023-11-12 RX ADMIN — PHENYTOIN SODIUM 100 MG: 100 CAPSULE ORAL at 15:03

## 2023-11-12 ASSESSMENT — PAIN DESCRIPTION - LOCATION
LOCATION: BACK

## 2023-11-12 ASSESSMENT — PAIN DESCRIPTION - DESCRIPTORS
DESCRIPTORS: STABBING;THROBBING;SHOOTING
DESCRIPTORS: SHARP;SHOOTING
DESCRIPTORS: STABBING;THROBBING;SHOOTING
DESCRIPTORS: STABBING;THROBBING;SHOOTING
DESCRIPTORS: SHARP;SHOOTING
DESCRIPTORS: SHOOTING
DESCRIPTORS: THROBBING;STABBING;SHOOTING

## 2023-11-12 ASSESSMENT — PAIN SCALES - GENERAL
PAINLEVEL_OUTOF10: 8
PAINLEVEL_OUTOF10: 7
PAINLEVEL_OUTOF10: 8
PAINLEVEL_OUTOF10: 10
PAINLEVEL_OUTOF10: 9
PAINLEVEL_OUTOF10: 10
PAINLEVEL_OUTOF10: 7
PAINLEVEL_OUTOF10: 10
PAINLEVEL_OUTOF10: 9

## 2023-11-12 ASSESSMENT — PAIN DESCRIPTION - ONSET: ONSET: ON-GOING

## 2023-11-12 ASSESSMENT — PAIN - FUNCTIONAL ASSESSMENT

## 2023-11-12 ASSESSMENT — PAIN DESCRIPTION - ORIENTATION
ORIENTATION: MID

## 2023-11-12 ASSESSMENT — PAIN DESCRIPTION - FREQUENCY: FREQUENCY: CONTINUOUS

## 2023-11-12 ASSESSMENT — PAIN DESCRIPTION - PAIN TYPE: TYPE: CHRONIC PAIN

## 2023-11-12 NOTE — FLOWSHEET NOTE
11/12/23 1216   Safe Environment   Safety Measures Other (comment)  (Virtual Nurse Safety Round)     VN called into patients room and introduced myself and role. Patient answered and permitted video. Video activated. Patient up on the side of the bed and denies any needs at this time. He has been so happy with the care he has received while in the hospital he stated and is hoping he will get to go home this evening. Call light within reach.

## 2023-11-12 NOTE — DISCHARGE SUMMARY
Hospitalist Discharge Note      Patient:  Kinjal Marquez. Unit/Bed:6-07/007-A  YOB: 1970  MRN: [id number]   Acct: [de-identified]     PCP: RINA Zepeda - CNP  Date of Admission: 11/8/2023      Discharge date: 11/12/2023    Chief Complaint on presentation :-  Bone Pain     Discharge Assessment and Plan:-   Metastatic adenocarcinoma of the lung with brain metastases  Oncology, Rad Onc & Palliative Care consulted. Recently diagnosed via biopsy in [facility]  Bone scan showed abnormal signaling in 9th & 10th rib. CT A/P did not show any new disease   Continue Decadron 4 mg twice daily  Palliative Care started Morphine ER 15mg BID, Oxy IR Q2H PRN, Gabapentin TID.    - Pain not controlled. Pallaitve Care increased Morphine ER 30mg BID and will prescribed pain medication for OP. Pt understands that he needs to follow up in the next week. Seizure disorder  Recent episode of status epilepticus  Likely related to multiple intracranial metastases  Continue phenytoin     SIADH  Noted during recent admission. Patient with hyponatremia which improved with fluid restriction. We will continue fluid restriction at this time. No hyponatremia at this time. Sodium 139, 135     Leukocytosis  Likely secondary to dexamethasone. May also be component of dehydration. Afebrile. WBC 19, 17, 17. Major depressive disorder  Continue home psychiatric regimen     GERD  On Protonix     COPD not in acute exacerbation  Not currently on any inhalers. DuoNebs as needed for wheezing or shortness of breath. Right apical pneumothorax  Following CT-guided biopsy  On room air. Chest x-ray today demonstrates approximately 10%. Monitor. Defer repeat imaging at this time unless clinical condition changes. Nicotine dependence  Nicotine patch provided. Pt would like to sign a waiver to go out and smoke.      Initial H and P and Hospital course:-  Initial H&P \"Sam BRINK Manual Anon. is a 46

## 2023-11-12 NOTE — FLOWSHEET NOTE
11/12/23 1043   Safe Environment   Safety Measures Other (comment)  (Virtual Nurse Safety Round)     VN called into patients room and introduced myself and role. Patient answered and permitted video. Video activated. Patient up in chair. Patient voiced no needs or concerns at this time. Pt denies pain. VN educated pt on utilizing call light if condition changes. Call light within reach.

## 2023-11-12 NOTE — PROGRESS NOTES
Discharge teaching and instructions for diagnosis/procedure of intractable pain completed with patient using teachback method. AVS reviewed. Printed prescriptions given to patient. Patient voiced understanding regarding prescriptions, follow up appointments, and care of self at home. Discharged ambulatory to  home with support per family.

## 2023-11-12 NOTE — PLAN OF CARE
Problem: Discharge Planning  Goal: Discharge to home or other facility with appropriate resources  Outcome: Progressing    Problem: Pain  Goal: Verbalizes/displays adequate comfort level or baseline comfort level  Outcome: Progressing     Problem: Safety - Adult  Goal: Free from fall injury  Outcome: Progressing     Problem: Chronic Conditions and Co-morbidities  Goal: Patient's chronic conditions and co-morbidity symptoms are monitored and maintained or improved  Outcome: Progressing    Problem: Nutrition Deficit:  Goal: Optimize nutritional status  Outcome: Progressing     Problem: Skin/Tissue Integrity  Goal: Absence of new skin breakdown  Description: 1. Monitor for areas of redness and/or skin breakdown  2. Assess vascular access sites hourly  3. Every 4-6 hours minimum:  Change oxygen saturation probe site  4. Every 4-6 hours:  If on nasal continuous positive airway pressure, respiratory therapy assess nares and determine need for appliance change or resting period.   Outcome: Progressing     Problem: Neurosensory - Adult  Goal: Achieves stable or improved neurological status  Outcome: Progressing    Problem: Neurosensory - Adult  Goal: Remains free of injury related to seizures activity  Outcome: Progressing    Problem: Neurosensory - Adult  Goal: Achieves maximal functionality and self care  Outcome: Progressing

## 2023-11-13 ENCOUNTER — SOCIAL WORK (OUTPATIENT)
Dept: RADIATION ONCOLOGY | Age: 53
End: 2023-11-13

## 2023-11-13 ENCOUNTER — TELEPHONE (OUTPATIENT)
Dept: PALLATIVE CARE | Age: 53
End: 2023-11-13

## 2023-11-13 RX ORDER — TRAZODONE HYDROCHLORIDE 50 MG/1
50 TABLET ORAL NIGHTLY PRN
Qty: 30 TABLET | Refills: 0 | Status: SHIPPED | OUTPATIENT
Start: 2023-11-13

## 2023-11-13 NOTE — TELEPHONE ENCOUNTER
Prior authorization Morphone ER 30mg Needed. Pt does not have any Morphone at this time. Insurance will only give 7 day supply of Oxycodone IR 10mg. Pt has 7 days 11/13/23. Prior auth for Oxycodone needed. PA's started in Cover mymeds.

## 2023-11-13 NOTE — PROGRESS NOTES
- This staff was contacted by [facility] nurse per request from his provider.  - Transportation was set up for [employer] through "[employer]. The patient will be picked up around 8:30a with a return after his simulation appt scheduled for 9:15.  - This staff called [employer], who was very thankful for the assistance. - This staff will remain available for support and further assistance.

## 2023-11-13 NOTE — TELEPHONE ENCOUNTER
Pt's POA [facility] states that pt was suppose to be discharged with Trazodone. Katiana Soto is asking for a refill. [facility] verified.

## 2023-11-14 ENCOUNTER — HOSPITAL ENCOUNTER (OUTPATIENT)
Dept: RADIATION ONCOLOGY | Age: 53
Discharge: HOME | End: 2023-11-14
Payer: MEDICAID

## 2023-11-14 ENCOUNTER — HOSPITAL ENCOUNTER (OUTPATIENT)
Dept: CT IMAGING | Age: 53
Discharge: HOME | End: 2023-11-14

## 2023-11-14 DIAGNOSIS — C79.31 SECONDARY MALIGNANT NEOPLASM OF BRAIN AND SPINAL CORD (HCC): ICD-10-CM

## 2023-11-14 DIAGNOSIS — C79.49 SECONDARY MALIGNANT NEOPLASM OF BRAIN AND SPINAL CORD (HCC): ICD-10-CM

## 2023-11-14 PROCEDURE — 3209999900 CT GUIDE RADIATION THERAPY NO CHARGE

## 2023-11-14 PROCEDURE — 77334 RADIATION TREATMENT AID(S): CPT | Performed by: RADIOLOGY

## 2023-11-14 PROCEDURE — 77290 THER RAD SIMULAJ FIELD CPLX: CPT | Performed by: RADIOLOGY

## 2023-11-15 DIAGNOSIS — G89.3 CANCER RELATED PAIN: ICD-10-CM

## 2023-11-15 DIAGNOSIS — C34.90 LUNG CANCER METASTATIC TO BRAIN (HCC): ICD-10-CM

## 2023-11-15 DIAGNOSIS — C34.91 ADENOCARCINOMA OF RIGHT LUNG, STAGE 4 (HCC): ICD-10-CM

## 2023-11-15 DIAGNOSIS — C79.31 LUNG CANCER METASTATIC TO BRAIN (HCC): ICD-10-CM

## 2023-11-15 DIAGNOSIS — C79.31: ICD-10-CM

## 2023-11-15 DIAGNOSIS — C80.1: ICD-10-CM

## 2023-11-15 DIAGNOSIS — Z51.5 PALLIATIVE CARE PATIENT: ICD-10-CM

## 2023-11-15 PROCEDURE — 77307 TELETHX ISODOSE PLAN CPLX: CPT | Performed by: RADIOLOGY

## 2023-11-15 PROCEDURE — 77334 RADIATION TREATMENT AID(S): CPT | Performed by: RADIOLOGY

## 2023-11-15 RX ORDER — OXYCODONE HYDROCHLORIDE 10 MG/1
10 TABLET ORAL EVERY 4 HOURS PRN
Qty: 180 TABLET | Refills: 0 | Status: SHIPPED | OUTPATIENT
Start: 2023-11-18 | End: 2023-12-18

## 2023-11-15 RX ORDER — OXYCODONE HYDROCHLORIDE 10 MG/1
10 TABLET ORAL EVERY 4 HOURS PRN
Qty: 180 TABLET | Refills: 0 | Status: SHIPPED | OUTPATIENT
Start: 2023-11-18 | End: 2023-11-15 | Stop reason: SDUPTHER

## 2023-11-15 NOTE — TELEPHONE ENCOUNTER
[facility] calling, order sent incorrectly to them and they are not able to fill narcotics.  Please resend to [facility]

## 2023-11-15 NOTE — TELEPHONE ENCOUNTER
Pt's POA Anca Guerrero called states that the insurance would only allow a 7 day supply for the 1st fill. Anca Guerrero is calling requesting a refill. Pharmacy inform pt that insurance will allow for a refill on 11/18/23. Pharmacy verified: [facility].

## 2023-11-16 ENCOUNTER — HOSPITAL ENCOUNTER (OUTPATIENT)
Dept: RADIATION ONCOLOGY | Age: 53
Discharge: HOME | End: 2023-11-16
Payer: MEDICAID

## 2023-11-16 PROCEDURE — 77412 RADIATION TX DELIVERY LVL 3: CPT | Performed by: RADIOLOGY

## 2023-11-16 PROCEDURE — 77280 THER RAD SIMULAJ FIELD SMPL: CPT | Performed by: RADIOLOGY

## 2023-11-17 ENCOUNTER — HOSPITAL ENCOUNTER (OUTPATIENT)
Dept: RADIATION ONCOLOGY | Age: 53
Discharge: HOME | End: 2023-11-17
Payer: MEDICAID

## 2023-11-17 PROCEDURE — 77412 RADIATION TX DELIVERY LVL 3: CPT | Performed by: RADIOLOGY

## 2023-11-19 ENCOUNTER — HOSPITAL ENCOUNTER (OUTPATIENT)
Dept: RADIATION ONCOLOGY | Age: 53
End: 2023-11-19
Payer: MEDICAID

## 2023-11-19 LAB
EKG ATRIAL RATE: 87 BPM
EKG P AXIS: 43 DEGREES
EKG P-R INTERVAL: 130 MS
EKG Q-T INTERVAL: 362 MS
EKG QRS DURATION: 88 MS
EKG QTC CALCULATION (BAZETT): 435 MS
EKG R AXIS: -62 DEGREES
EKG T AXIS: 88 DEGREES
EKG VENTRICULAR RATE: 87 BPM

## 2023-11-19 PROCEDURE — 77412 RADIATION TX DELIVERY LVL 3: CPT | Performed by: RADIOLOGY

## 2023-11-20 ENCOUNTER — APPOINTMENT (OUTPATIENT)
Dept: RADIATION ONCOLOGY | Age: 53
End: 2023-11-20
Payer: MEDICAID

## 2023-11-20 PROBLEM — C79.9 METASTATIC MALIGNANT NEOPLASM (HCC): Status: RESOLVED | Noted: 2023-11-10 | Resolved: 2023-11-20

## 2023-11-20 PROCEDURE — 77336 RADIATION PHYSICS CONSULT: CPT | Performed by: RADIOLOGY

## 2023-11-20 PROCEDURE — 77412 RADIATION TX DELIVERY LVL 3: CPT | Performed by: RADIOLOGY

## 2023-11-21 ENCOUNTER — APPOINTMENT (OUTPATIENT)
Dept: RADIATION ONCOLOGY | Age: 53
End: 2023-11-21
Payer: MEDICAID

## 2023-11-21 ENCOUNTER — HOSPITAL ENCOUNTER (OUTPATIENT)
Dept: RADIATION ONCOLOGY | Age: 53
Discharge: HOME | End: 2023-11-21
Payer: MEDICAID

## 2023-11-21 VITALS
BODY MASS INDEX: 17.83 KG/M2 | TEMPERATURE: 99.2 F | SYSTOLIC BLOOD PRESSURE: 125 MMHG | DIASTOLIC BLOOD PRESSURE: 79 MMHG | RESPIRATION RATE: 18 BRPM | WEIGHT: 117.28 LBS | HEART RATE: 99 BPM | OXYGEN SATURATION: 95 %

## 2023-11-21 PROCEDURE — 77412 RADIATION TX DELIVERY LVL 3: CPT | Performed by: RADIOLOGY

## 2023-11-21 ASSESSMENT — PAIN DESCRIPTION - LOCATION: LOCATION: RIB CAGE

## 2023-11-21 ASSESSMENT — PAIN SCALES - GENERAL: PAINLEVEL_OUTOF10: 8

## 2023-11-21 ASSESSMENT — PAIN DESCRIPTION - ORIENTATION: ORIENTATION: RIGHT

## 2023-11-21 NOTE — PROGRESS NOTES
[facility]          [id number] [place] [address], [address] [place] [address]        Jose A Lara: [phone]        F: [phone]       [email or web address]            Dr. Benji Kraus MD MS          Dr. Leandro Rojas MD PhD    ON TREATMENT VISIT (OTV) NOTE     Date of Service: 2023  Patient ID: Johnny Brown. : 1970  MRN: [id number]   Acct Number: [de-identified]     RADIATION ONCOLOGY ATTENDING:  Breezy Russo MD MS    DIAGNOSIS:   Cancer Staging   Adenocarcinoma of right lung, stage 4 ([address])  Staging form: Lung, AJCC 8th Edition  - Clinical stage from 10/27/2023: Stage IVB (cT4, cN2, cM1c) - Unsigned      Treatment Area: Brain    Current Total Dose(cGy): 1500  Current Fraction: 5/10  Final/Cumulative Rx. Dose (cGy): 3000    Patient was seen today for weekly visit. Wt Readings from Last 3 Encounters:   23 52.2 kg (115 lb)   23 53.9 kg (118 lb 12.8 oz)   10/28/23 61.2 kg (135 lb)       There were no vitals taken for this visit.     Lab Results   Component Value Date    WBC 17.9 (H) 11/10/2023    HGB 13.3 (L) 11/10/2023    HCT 41.9 (L) 11/10/2023     (H) 11/10/2023       Comfort Alteration  Fatigue:Must curtail daily activities even with rest periods and early bedtime    Pain Location: Right Rib Cage  Pain Intensity (Current): 8 Very severe pain  Pain Treatment: Opioid  Pain Relief: Pain relieved 25%    Emotional Alteration:   Coping: somewhat effective    Nutritional Alteration  Anorexia: Loss of appetite but able to eat smaller portions of food and/or liquids  Nausea: 3-4 episodes of nausea in 24 hours  Vomitin-4 episodes in 24 hours - Just started nausea meds  Salivary Glands- Acute: Mild PO dryness, thick saliva, altered taste, no change in PO intake   Taste Disturbance (Dysgeusia): Normal   Dyspepsia/Heartburn: Mild  Dysphagia/Esophagitis: None    Skin Alteration   Skin reaction: No changes noted  Alopecia: No

## 2023-11-21 NOTE — DISCHARGE INSTRUCTIONS
PATIENT DISCHARGE INSTRUCTIONS    Remember that side effects present at the end of your treatments will improve within a few weeks after the last treatment. Eat well balanced meals even though your treatments are finished. This will help speed the healing process. Continue any special diets prescribed to control side effects until these side effects have been resolved. Get plenty of rest.  If you have experienced fatigue and/or weakness, this may continue for several weeks after your last treatment. Continue with your daily activities according to the way you feel. Continue to be gentle with your skin. Follow your present skin care instructions until your follow-up visit. IF YOU DEVELOP ANY CHANGES IN YOUR SKIN IN THE AREA TREATED WITH RADIATION, PLEASE CALL THE RADIATION ONCOLOGY NURSE -[phone]. Protect your skin from any injury and avoid direct sun exposure in the treatment area. The skin in the treated area may always be more sensitive than the rest of your skin. Always use SPF 27 or higher sun block if you will be in the sun and cannot avoid exposure. Please contact your referring physician for a follow-up appointment in addition to your Radiation Oncology appointment. You may receive a survey via text message or e-mail at some point after treatment. We would appreciate your time in filling out this survey and giving us your honest feedback about your experience with us. We strive for excellence and hope that you were \"Very Satisfied\" with your care and our service. Presence of pain:   Medication Taper: No    See Instructions Dated: N/A  Follow up orders:  Will be discussed at Follow-Up

## 2023-11-22 ENCOUNTER — APPOINTMENT (OUTPATIENT)
Dept: RADIATION ONCOLOGY | Age: 53
End: 2023-11-22
Payer: MEDICAID

## 2023-11-22 PROCEDURE — 77412 RADIATION TX DELIVERY LVL 3: CPT | Performed by: RADIOLOGY

## 2023-11-22 PROCEDURE — 77417 THER RADIOLOGY PORT IMAGE(S): CPT | Performed by: RADIOLOGY

## 2023-11-27 ENCOUNTER — APPOINTMENT (OUTPATIENT)
Dept: RADIATION ONCOLOGY | Age: 53
End: 2023-11-27
Payer: MEDICAID

## 2023-11-27 ENCOUNTER — TELEPHONE (OUTPATIENT)
Dept: ONCOLOGY | Age: 53
End: 2023-11-27

## 2023-11-28 ENCOUNTER — APPOINTMENT (OUTPATIENT)
Dept: RADIATION ONCOLOGY | Age: 53
End: 2023-11-28
Payer: MEDICAID

## 2023-11-28 ENCOUNTER — HOSPITAL ENCOUNTER (OUTPATIENT)
Dept: RADIATION ONCOLOGY | Age: 53
Discharge: HOME | End: 2023-11-28
Payer: MEDICAID

## 2023-11-28 VITALS
TEMPERATURE: 98.7 F | HEART RATE: 83 BPM | WEIGHT: 121.91 LBS | OXYGEN SATURATION: 96 % | RESPIRATION RATE: 18 BRPM | BODY MASS INDEX: 18.54 KG/M2 | SYSTOLIC BLOOD PRESSURE: 144 MMHG | DIASTOLIC BLOOD PRESSURE: 93 MMHG

## 2023-11-28 DIAGNOSIS — G93.6 VASOGENIC EDEMA (HCC): Primary | ICD-10-CM

## 2023-11-28 PROCEDURE — 77417 THER RADIOLOGY PORT IMAGE(S): CPT | Performed by: RADIOLOGY

## 2023-11-28 PROCEDURE — 77412 RADIATION TX DELIVERY LVL 3: CPT | Performed by: RADIOLOGY

## 2023-11-28 RX ORDER — DEXAMETHASONE 2 MG/1
TABLET ORAL
Qty: 42 TABLET | Refills: 0 | Status: SHIPPED | OUTPATIENT
Start: 2023-11-28 | End: 2023-11-29 | Stop reason: DRUGHIGH

## 2023-11-28 ASSESSMENT — PAIN SCALES - GENERAL: PAINLEVEL_OUTOF10: 9

## 2023-11-28 ASSESSMENT — PAIN DESCRIPTION - LOCATION: LOCATION: BACK;RIB CAGE

## 2023-11-28 NOTE — PROGRESS NOTES
Saturation:  No components found for: \"PERCENTFE\"  TIBC:    Lab Results   Component Value Date/Time    TIBC 247 01/29/2020 08:55 AM     FERRITIN:    Lab Results   Component Value Date/Time    FERRITIN 96 01/29/2020 08:55 AM     PSA:   Lab Results   Component Value Date/Time    PSA 0.65 12/12/2022 10:05 AM        IMAGING:    NM BONE SCAN WHOLE BODY  Result Date: 11/9/2023  1. Radiotracer cannulation at the right ninth and 10th ribs of indeterminate etiology but suspicious for metastatic disease. Radiographic correlation is recommended. 2. Probable degenerative arthropathic changes as detailed above. Final report electronically signed by Dr. Tristan Magaña on 11/9/2023 1:43 PM    CT ABDOMEN PELVIS W IV CONTRAST Additional Contrast? None  Result Date: 11/8/2023  1. A right-sided pneumothorax is incompletely visualized at the limited images of the lung bases. 2. A wedge-shaped area of diminished enhancement in the lower pole of the right kidney is nonspecific (series 301, image 41) possibly a focus of infection/inflammation. Correlate with urinalysis. 3. Moderate retained fecal material seen throughout the colon suggesting fecal stasis. No bowel obstruction. No abdominal/pelvic metastatic disease visualized. Numerous chronic findings are discussed. **This report has been created using voice recognition software. It may contain minor errors which are inherent in voice recognition technology. ** Final report electronically signed by Dr Ignacio Palacios on 11/8/2023 3:50 PM    XR CHEST (2 VW)  Result Date: 11/8/2023  1. A right apical pneumothorax is seen likely about 10%. The critical  finding(s) was called to Dr. Shelby Garrido at 1153 hours on 11/8/2023 by Dr. Jean Paul Steele. Verbal acknowledgment and readback was given. 2. A 4.6 x 2.4 cm right upper lobe mass is seen. Other pulmonary nodules are seen in the left lung **This report has been created using voice recognition software.   It may contain minor errors which are inherent in

## 2023-11-28 NOTE — PROGRESS NOTES
Discussed prescription of dexamethasone with pharmacist Donavon Jung at [facility] with phenytoin use - she stated this should be fine, and that dexamethasone concentration may be decreased but that phenytoin should be unchanged. These two medications were prescribed together inpatient.

## 2023-11-29 ENCOUNTER — APPOINTMENT (OUTPATIENT)
Dept: RADIATION ONCOLOGY | Age: 53
End: 2023-11-29
Payer: MEDICAID

## 2023-11-29 ENCOUNTER — OFFICE VISIT (OUTPATIENT)
Dept: PALLATIVE CARE | Age: 53
End: 2023-11-29
Payer: MEDICAID

## 2023-11-29 VITALS
RESPIRATION RATE: 16 BRPM | WEIGHT: 120.4 LBS | HEIGHT: 68 IN | HEART RATE: 90 BPM | BODY MASS INDEX: 18.25 KG/M2 | OXYGEN SATURATION: 96 % | DIASTOLIC BLOOD PRESSURE: 72 MMHG | SYSTOLIC BLOOD PRESSURE: 124 MMHG

## 2023-11-29 DIAGNOSIS — T40.2X5A THERAPEUTIC OPIOID-INDUCED CONSTIPATION (OIC): ICD-10-CM

## 2023-11-29 DIAGNOSIS — G89.3 CANCER RELATED PAIN: ICD-10-CM

## 2023-11-29 DIAGNOSIS — M79.2 NEUROPATHIC PAIN: ICD-10-CM

## 2023-11-29 DIAGNOSIS — C34.90 LUNG CANCER METASTATIC TO BRAIN (HCC): Primary | ICD-10-CM

## 2023-11-29 DIAGNOSIS — C79.31 LUNG CANCER METASTATIC TO BRAIN (HCC): Primary | ICD-10-CM

## 2023-11-29 DIAGNOSIS — C34.91 ADENOCARCINOMA OF RIGHT LUNG, STAGE 4 (HCC): ICD-10-CM

## 2023-11-29 DIAGNOSIS — R11.2 NAUSEA AND VOMITING, UNSPECIFIED VOMITING TYPE: ICD-10-CM

## 2023-11-29 DIAGNOSIS — Z51.5 PALLIATIVE CARE PATIENT: ICD-10-CM

## 2023-11-29 DIAGNOSIS — R64 CANCER CACHEXIA (HCC): ICD-10-CM

## 2023-11-29 DIAGNOSIS — K59.03 THERAPEUTIC OPIOID-INDUCED CONSTIPATION (OIC): ICD-10-CM

## 2023-11-29 PROCEDURE — 77412 RADIATION TX DELIVERY LVL 3: CPT | Performed by: RADIOLOGY

## 2023-11-29 PROCEDURE — 1111F DSCHRG MED/CURRENT MED MERGE: CPT | Performed by: STUDENT IN AN ORGANIZED HEALTH CARE EDUCATION/TRAINING PROGRAM

## 2023-11-29 PROCEDURE — 3017F COLORECTAL CA SCREEN DOC REV: CPT | Performed by: STUDENT IN AN ORGANIZED HEALTH CARE EDUCATION/TRAINING PROGRAM

## 2023-11-29 PROCEDURE — 4004F PT TOBACCO SCREEN RCVD TLK: CPT | Performed by: STUDENT IN AN ORGANIZED HEALTH CARE EDUCATION/TRAINING PROGRAM

## 2023-11-29 PROCEDURE — G8484 FLU IMMUNIZE NO ADMIN: HCPCS | Performed by: STUDENT IN AN ORGANIZED HEALTH CARE EDUCATION/TRAINING PROGRAM

## 2023-11-29 PROCEDURE — G8427 DOCREV CUR MEDS BY ELIG CLIN: HCPCS | Performed by: STUDENT IN AN ORGANIZED HEALTH CARE EDUCATION/TRAINING PROGRAM

## 2023-11-29 PROCEDURE — G8419 CALC BMI OUT NRM PARAM NOF/U: HCPCS | Performed by: STUDENT IN AN ORGANIZED HEALTH CARE EDUCATION/TRAINING PROGRAM

## 2023-11-29 PROCEDURE — 99205 OFFICE O/P NEW HI 60 MIN: CPT | Performed by: STUDENT IN AN ORGANIZED HEALTH CARE EDUCATION/TRAINING PROGRAM

## 2023-11-29 RX ORDER — GABAPENTIN 100 MG/1
100 CAPSULE ORAL 3 TIMES DAILY
Qty: 90 CAPSULE | Refills: 0 | Status: SHIPPED | OUTPATIENT
Start: 2023-11-29 | End: 2023-12-29

## 2023-11-29 RX ORDER — OLANZAPINE 5 MG/1
5 TABLET ORAL NIGHTLY
Qty: 30 TABLET | Refills: 2 | Status: SHIPPED | OUTPATIENT
Start: 2023-11-29

## 2023-11-29 RX ORDER — DEXAMETHASONE 4 MG/1
4 TABLET ORAL
Qty: 30 TABLET | Refills: 0 | Status: SHIPPED | OUTPATIENT
Start: 2023-11-29 | End: 2023-12-29

## 2023-11-29 RX ORDER — MORPHINE SULFATE 60 MG/1
60 TABLET, FILM COATED, EXTENDED RELEASE ORAL 2 TIMES DAILY
Qty: 60 TABLET | Refills: 0 | Status: SHIPPED | OUTPATIENT
Start: 2023-11-29 | End: 2023-12-29

## 2023-11-29 RX ORDER — ONDANSETRON 4 MG/1
4 TABLET, ORALLY DISINTEGRATING ORAL 3 TIMES DAILY PRN
Qty: 30 TABLET | Refills: 5 | Status: SHIPPED | OUTPATIENT
Start: 2023-11-29

## 2023-11-29 RX ORDER — SENNOSIDES 8.6 MG
1 TABLET ORAL DAILY
Qty: 120 TABLET | Refills: 0 | Status: SHIPPED | OUTPATIENT
Start: 2023-11-29

## 2023-11-29 NOTE — PATIENT INSTRUCTIONS
Increase Morphine ER 60 mg twice a day scheduled for worsening cancer related pain  Continue Oxycodone IR 10 mg every 4 hours as needed for breakthrough cancer related pain  Start Dexamethasone 4 mg every Morning scheduled for  cancer related pain, fatigue, and decreased appetite  Continue Gabapentin 100 mg three times a day scheduled for stable neuropathic pain  Start Olanzapine 5 mg every night scheduled for  nausea and vomiting  Continue Zofran 4 mg every 8 hours as needed for breakthrough nausea and vomiting  Continue Senna 1 tab every day scheduled for  opioid induced constipation  Will order protein shakes today for cancer related cachexia  Please call the office if you develop new or worsening symptoms

## 2023-11-29 NOTE — PROGRESS NOTES
Odalys Reid is a 46 y.o. male who presents to the palliative care clinic today for:  Chief Complaint   Patient presents with    [facility] follow up 11/12/23    Pain     Requesting increase pain meds       HPI:   Odalys Reid presents to the palliative care clinic today for follow up for His cancer related pain. His pain is not well-controlled today. Symptoms:  Pain:   Location- Generalized chest, Back, and everywhere  Quality- sharp, numbness  Duration- Constant  Severity- 9 on scale of 1-10  Exacerbating factors- lying down  Pain affects- sleep  Relieving factors: pain medication  Current Treatments: They have Morphine ER 30 mg BID for scheduled pain relief. From 9 AM yesterday to 9 AM today, they have used Oxycodone IR 10 every 4 hours for as needed pain relief. Effectiveness of Current Treatment: The Oxycodone IR 10 mg is taking 1 hour to take effect and it is lasting 1 hour. Their pain medications are not working well to control their pain. Shortness of breath: Patient denies shortness of breath or difficulty breathing. They report cough. Sleep: They are not sleeping well due to cancer related pain. Fatigue/Drowsiness: The patient reports fatigue. Appetite: Patient reports poor appetite. He was doing better on the steroid  Wt. Loss:  Has lost weight since diagnosis  Nausea/Vomiting: Patient denies nausea and vomiting. Constipation/Diarrhea: Patient reports constipation. Their last BM was yesterday and it was hard. They are currently taking Senna 1 tabs daily to help with their constipation. Their medication is  working well to help with their bowel movements. Depression: Patient denies depression or issues with mood  Anxiety: Patient reports anxiety. They are not currently taking any medications for their anxiety.     Current Outpatient Medications   Medication Sig Dispense Refill    OLANZapine (ZYPREXA) 5 MG tablet Take 1 tablet by mouth nightly 30 tablet 2

## 2023-11-30 ENCOUNTER — SOCIAL WORK (OUTPATIENT)
Dept: RADIATION ONCOLOGY | Age: 53
End: 2023-11-30

## 2023-11-30 ENCOUNTER — APPOINTMENT (OUTPATIENT)
Dept: RADIATION ONCOLOGY | Age: 53
End: 2023-11-30
Payer: MEDICAID

## 2023-11-30 PROCEDURE — 77412 RADIATION TX DELIVERY LVL 3: CPT | Performed by: RADIOLOGY

## 2023-11-30 NOTE — PROGRESS NOTES
- Dexter Gutierrez (goes by Gabbi Paz) asked for a Medical verification letter for his disability application. The letter was signed and given to Gabbi Paz at his last radiation treatment. - This staff will remain available for support.

## 2023-12-01 ENCOUNTER — HOSPITAL ENCOUNTER (OUTPATIENT)
Dept: RADIATION ONCOLOGY | Age: 53
Discharge: HOME | End: 2023-12-01
Payer: MEDICAID

## 2023-12-01 PROCEDURE — 77412 RADIATION TX DELIVERY LVL 3: CPT | Performed by: RADIOLOGY

## 2023-12-04 ENCOUNTER — TELEPHONE (OUTPATIENT)
Dept: PALLATIVE CARE | Age: 53
End: 2023-12-04

## 2023-12-04 DIAGNOSIS — Z51.5 PALLIATIVE CARE PATIENT: ICD-10-CM

## 2023-12-04 DIAGNOSIS — C79.31 LUNG CANCER METASTATIC TO BRAIN (HCC): ICD-10-CM

## 2023-12-04 DIAGNOSIS — G89.3 CANCER RELATED PAIN: ICD-10-CM

## 2023-12-04 DIAGNOSIS — C34.90 LUNG CANCER METASTATIC TO BRAIN (HCC): ICD-10-CM

## 2023-12-04 DIAGNOSIS — C34.91 ADENOCARCINOMA OF RIGHT LUNG, STAGE 4 (HCC): ICD-10-CM

## 2023-12-04 RX ORDER — OXYCODONE HYDROCHLORIDE 20 MG/1
20 TABLET ORAL EVERY 4 HOURS PRN
Qty: 90 TABLET | Refills: 0 | Status: SHIPPED | OUTPATIENT
Start: 2023-12-04 | End: 2023-12-19

## 2023-12-04 RX ORDER — DEXAMETHASONE 4 MG/1
4 TABLET ORAL 2 TIMES DAILY WITH MEALS
Qty: 60 TABLET | Refills: 0 | Status: SHIPPED | OUTPATIENT
Start: 2023-12-04 | End: 2024-01-03

## 2023-12-04 NOTE — TELEPHONE ENCOUNTER
I increased the dexamethasone to 4 twice a day. He can take Morphine ER 60 mg three times a day. I did this based on the amount of extended release he currently has in addition to the amount of oxycodone he is currently taking. I will increase the oxycodone to 20 mg every 4 hours as needed for pain. He should not need a refill on the morphine extended release for another week or so. I will refill the oxycodone 10 mg as he will likely run out of these really quickly due to the dose increase.

## 2023-12-04 NOTE — TELEPHONE ENCOUNTER
Pt sister Sierra Staley called states that patients medication: Dexamethasone 4mg is suppose to be Twice a day. Last script was sent for once a day. Patient needs a new script for twice a day. Patient states that the Morphine ER is working however it is not lasting the full 12 hours. Pt states he is using oxycodone 10mg every 4 hours for the break through pain but the 10mg is not lasting for the 4 hours. Pt is asking for an increase on the oxycodone IR. [facility].

## 2023-12-04 NOTE — TELEPHONE ENCOUNTER
Spoke with Malorie Obregon, informed her of the change in dose per Dr Nguyen. Sister verbalized understanding.

## 2023-12-11 ENCOUNTER — HOSPITAL ENCOUNTER (OUTPATIENT)
Dept: INFUSION THERAPY | Age: 53
Discharge: HOME | End: 2023-12-11
Payer: MEDICAID

## 2023-12-11 ENCOUNTER — OFFICE VISIT (OUTPATIENT)
Dept: ONCOLOGY | Age: 53
End: 2023-12-11
Payer: MEDICAID

## 2023-12-11 ENCOUNTER — HOSPITAL ENCOUNTER (OUTPATIENT)
Dept: INFUSION THERAPY | Age: 53
Discharge: HOME | End: 2023-12-11

## 2023-12-11 ENCOUNTER — CLINICAL DOCUMENTATION (OUTPATIENT)
Dept: CASE MANAGEMENT | Age: 53
End: 2023-12-11

## 2023-12-11 VITALS
SYSTOLIC BLOOD PRESSURE: 114 MMHG | OXYGEN SATURATION: 97 % | RESPIRATION RATE: 16 BRPM | HEART RATE: 92 BPM | BODY MASS INDEX: 18.46 KG/M2 | DIASTOLIC BLOOD PRESSURE: 69 MMHG | TEMPERATURE: 98.4 F | WEIGHT: 121.4 LBS

## 2023-12-11 VITALS
RESPIRATION RATE: 16 BRPM | OXYGEN SATURATION: 97 % | DIASTOLIC BLOOD PRESSURE: 69 MMHG | TEMPERATURE: 98.4 F | SYSTOLIC BLOOD PRESSURE: 114 MMHG | HEART RATE: 92 BPM

## 2023-12-11 DIAGNOSIS — C79.31 LUNG CANCER METASTATIC TO BRAIN (HCC): ICD-10-CM

## 2023-12-11 DIAGNOSIS — C34.90 LUNG CANCER METASTATIC TO BRAIN (HCC): Primary | ICD-10-CM

## 2023-12-11 DIAGNOSIS — C34.91 ADENOCARCINOMA OF RIGHT LUNG, STAGE 4 (HCC): ICD-10-CM

## 2023-12-11 DIAGNOSIS — C79.31 LUNG CANCER METASTATIC TO BRAIN (HCC): Primary | ICD-10-CM

## 2023-12-11 DIAGNOSIS — C34.90 LUNG CANCER METASTATIC TO BRAIN (HCC): ICD-10-CM

## 2023-12-11 LAB
ALBUMIN SERPL BCG-MCNC: 4.1 G/DL (ref 3.5–5.1)
ALP SERPL-CCNC: 84 U/L (ref 38–126)
ALT SERPL W/O P-5'-P-CCNC: 15 U/L (ref 11–66)
ANION GAP SERPL CALC-SCNC: 11 MEQ/L (ref 8–16)
AST SERPL-CCNC: 18 U/L (ref 5–40)
BASOPHILS ABSOLUTE: 0 THOU/MM3 (ref 0–0.1)
BASOPHILS NFR BLD AUTO: 0.4 %
BILIRUB SERPL-MCNC: 0.2 MG/DL (ref 0.3–1.2)
BUN SERPL-MCNC: 15 MG/DL (ref 7–22)
CALCIUM SERPL-MCNC: 9 MG/DL (ref 8.5–10.5)
CHLORIDE SERPL-SCNC: 101 MEQ/L (ref 98–111)
CO2 SERPL-SCNC: 23 MEQ/L (ref 23–33)
CREAT SERPL-MCNC: 0.5 MG/DL (ref 0.4–1.2)
DEPRECATED RDW RBC AUTO: 48.8 FL (ref 35–45)
EOSINOPHIL NFR BLD AUTO: 0.3 %
EOSINOPHILS ABSOLUTE: 0 THOU/MM3 (ref 0–0.4)
ERYTHROCYTE [DISTWIDTH] IN BLOOD BY AUTOMATED COUNT: 13.6 % (ref 11.5–14.5)
GFR SERPL CREATININE-BSD FRML MDRD: > 60 ML/MIN/1.73M2
GLUCOSE SERPL-MCNC: 121 MG/DL (ref 70–108)
HCT VFR BLD AUTO: 42.4 % (ref 42–52)
HGB BLD-MCNC: 13.6 GM/DL (ref 14–18)
IMM GRANULOCYTES # BLD AUTO: 0.06 THOU/MM3 (ref 0–0.07)
IMM GRANULOCYTES NFR BLD AUTO: 0.6 %
LYMPHOCYTES ABSOLUTE: 1.1 THOU/MM3 (ref 1–4.8)
LYMPHOCYTES NFR BLD AUTO: 10.8 %
MCH RBC QN AUTO: 31 PG (ref 26–33)
MCHC RBC AUTO-ENTMCNC: 32.1 GM/DL (ref 32.2–35.5)
MCV RBC AUTO: 96.6 FL (ref 80–94)
MONOCYTES ABSOLUTE: 0.5 THOU/MM3 (ref 0.4–1.3)
MONOCYTES NFR BLD AUTO: 5.1 %
NEUTROPHILS NFR BLD AUTO: 82.8 %
NRBC BLD AUTO-RTO: 0 /100 WBC
PLATELET # BLD AUTO: 282 THOU/MM3 (ref 130–400)
PMV BLD AUTO: 10.4 FL (ref 9.4–12.4)
POTASSIUM SERPL-SCNC: 4.4 MEQ/L (ref 3.5–5.2)
PROT SERPL-MCNC: 6.6 G/DL (ref 6.1–8)
RBC # BLD AUTO: 4.39 MILL/MM3 (ref 4.7–6.1)
SEGMENTED NEUTROPHILS ABSOLUTE COUNT: 8.8 THOU/MM3 (ref 1.8–7.7)
SODIUM SERPL-SCNC: 135 MEQ/L (ref 135–145)
WBC # BLD AUTO: 10.6 THOU/MM3 (ref 4.8–10.8)

## 2023-12-11 PROCEDURE — G8427 DOCREV CUR MEDS BY ELIG CLIN: HCPCS | Performed by: INTERNAL MEDICINE

## 2023-12-11 PROCEDURE — 36415 COLL VENOUS BLD VENIPUNCTURE: CPT

## 2023-12-11 PROCEDURE — 4004F PT TOBACCO SCREEN RCVD TLK: CPT | Performed by: INTERNAL MEDICINE

## 2023-12-11 PROCEDURE — 99211 OFF/OP EST MAY X REQ PHY/QHP: CPT

## 2023-12-11 PROCEDURE — 85025 COMPLETE CBC W/AUTO DIFF WBC: CPT

## 2023-12-11 PROCEDURE — G8419 CALC BMI OUT NRM PARAM NOF/U: HCPCS | Performed by: INTERNAL MEDICINE

## 2023-12-11 PROCEDURE — G8484 FLU IMMUNIZE NO ADMIN: HCPCS | Performed by: INTERNAL MEDICINE

## 2023-12-11 PROCEDURE — 99205 OFFICE O/P NEW HI 60 MIN: CPT | Performed by: INTERNAL MEDICINE

## 2023-12-11 PROCEDURE — 80053 COMPREHEN METABOLIC PANEL: CPT

## 2023-12-11 PROCEDURE — 3017F COLORECTAL CA SCREEN DOC REV: CPT | Performed by: INTERNAL MEDICINE

## 2023-12-11 NOTE — PATIENT INSTRUCTIONS
Draw labs today CBC, CMP and foundation one liquid for mutations (solid tumor lung)  Talk to PCP about anti-seizure med given issues with blisters and reacting with pain meds  Senna up to 2 pills a day and add on stool softener  RTC in approx 7-10 business days to review results of labs from today Dr Vickie Ortiz

## 2023-12-13 ENCOUNTER — HOSPITAL ENCOUNTER (OUTPATIENT)
Dept: RADIATION ONCOLOGY | Age: 53
Discharge: HOME | End: 2023-12-13
Payer: MEDICAID

## 2023-12-13 VITALS
TEMPERATURE: 98.5 F | BODY MASS INDEX: 18.25 KG/M2 | RESPIRATION RATE: 16 BRPM | SYSTOLIC BLOOD PRESSURE: 121 MMHG | WEIGHT: 120 LBS | HEART RATE: 94 BPM | OXYGEN SATURATION: 95 % | DIASTOLIC BLOOD PRESSURE: 72 MMHG

## 2023-12-13 DIAGNOSIS — Z51.5 PALLIATIVE CARE PATIENT: ICD-10-CM

## 2023-12-13 DIAGNOSIS — C34.91 ADENOCARCINOMA OF RIGHT LUNG, STAGE 4 (HCC): ICD-10-CM

## 2023-12-13 DIAGNOSIS — C34.90 LUNG CANCER METASTATIC TO BRAIN (HCC): ICD-10-CM

## 2023-12-13 DIAGNOSIS — C79.31 LUNG CANCER METASTATIC TO BRAIN (HCC): ICD-10-CM

## 2023-12-13 DIAGNOSIS — G89.3 CANCER RELATED PAIN: ICD-10-CM

## 2023-12-13 PROCEDURE — 99212 OFFICE O/P EST SF 10 MIN: CPT

## 2023-12-13 RX ORDER — MORPHINE SULFATE 60 MG/1
60 TABLET, FILM COATED, EXTENDED RELEASE ORAL 3 TIMES DAILY
Qty: 90 TABLET | Refills: 0 | Status: SHIPPED | OUTPATIENT
Start: 2023-12-13 | End: 2024-01-12

## 2023-12-13 ASSESSMENT — PAIN DESCRIPTION - ORIENTATION: ORIENTATION: RIGHT

## 2023-12-13 ASSESSMENT — PAIN DESCRIPTION - LOCATION: LOCATION: RIB CAGE
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
## 2023-12-13 NOTE — PROGRESS NOTES
[facility]           [id number] [place] [address], [address] [place] [address]        Jules López: [phone]        F: [phone]       [email or web address]            FOLLOW UP NOTE    Date of Service: 2023  Patient ID: Amber Correa. : 1970  MRN: [id number]   Acct Number: [de-identified]       DATE OF SERVICE: 2023   LOCATION: [facility]  PROVIDER: Dione Callahan PA-C    FOLLOW UP PHYSICIANS: Dr. Abimbola Odonnell ([facility]) Dr. Cristy Chun (Palliative)    ASSESSMENT AND PLAN:   Cancer Staging   Adenocarcinoma of right lung, stage 4 ([address])  Staging form: Lung, AJCC 8th Edition  - Clinical stage from 10/27/2023: Stage IVB (cT4, cN2, cM1c) - Jennifer Lee. is a 46 y.o. male who presents today with family member for regularly-scheduled follow-up for his radiation to his brain metastasis. He completed radiation to the brain on 23  - The patient appears to be stable, with stable headaches and dizziness on dexamethasone; dexamethasone prescription has been taken over by palliative care. He notes some scalp tenderness and irritation since treatment. He continues to complain of right ribcage pain, controlled on pain medication  - In discussion with Dr. Florence Lopez, I will order PET/CT to be completed at the patient's earliest convenience, to better elucidate whether right rib cage pain is due to his cancer, and for updated imaging in preparation for possible systemic therapy. He has the number to schedule. I will also refer to nutrition for his anorexia  - Continue to follow with medical oncology and all other medical providers. Continue pain medication and dexamethasone as per palliative care  - I advised the patient to call with any new/worsening symptoms that may arise before their return visit; the patient voiced understanding and agreement    The patient will return to clinic in 2 weeks or sooner if clinically indicated.  They have our clinic

## 2023-12-13 NOTE — TELEPHONE ENCOUNTER
Sister called in stating that the prescription for Pt's Morphine dosage/instructions were not changed per the pharmacist.    See telephone encounter from 12/4/23. Prescription will need to be sent in to [facility].

## 2023-12-13 NOTE — TELEPHONE ENCOUNTER
Dose updated on the morphine extended release and sent to the pharmacy. He should be appropriate for refill.

## 2023-12-14 DIAGNOSIS — C34.91 ADENOCARCINOMA OF RIGHT LUNG, STAGE 4 (HCC): Primary | ICD-10-CM

## 2023-12-15 ENCOUNTER — APPOINTMENT (OUTPATIENT)
Dept: CT IMAGING | Age: 53
End: 2023-12-15
Payer: MEDICAID

## 2023-12-15 ENCOUNTER — APPOINTMENT (OUTPATIENT)
Dept: GENERAL RADIOLOGY | Age: 53
End: 2023-12-15
Payer: MEDICAID

## 2023-12-15 ENCOUNTER — HOSPITAL ENCOUNTER (EMERGENCY)
Age: 53
Discharge: HOME | End: 2023-12-16
Attending: EMERGENCY MEDICINE
Payer: MEDICAID

## 2023-12-15 DIAGNOSIS — R41.82 ALTERED MENTAL STATUS, UNSPECIFIED ALTERED MENTAL STATUS TYPE: Primary | ICD-10-CM

## 2023-12-15 LAB
APTT PPP: 27.7 SECONDS (ref 22–38)
BASOPHILS ABSOLUTE: 0 THOU/MM3 (ref 0–0.1)
BASOPHILS NFR BLD AUTO: 0.4 %
DEPRECATED RDW RBC AUTO: 47.9 FL (ref 35–45)
EOSINOPHIL NFR BLD AUTO: 0.6 %
EOSINOPHILS ABSOLUTE: 0.1 THOU/MM3 (ref 0–0.4)
ERYTHROCYTE [DISTWIDTH] IN BLOOD BY AUTOMATED COUNT: 13.4 % (ref 11.5–14.5)
FLUAV RNA RESP QL NAA+PROBE: NOT DETECTED
FLUBV RNA RESP QL NAA+PROBE: NOT DETECTED
HCT VFR BLD AUTO: 36.6 % (ref 42–52)
HGB BLD-MCNC: 11.6 GM/DL (ref 14–18)
IMM GRANULOCYTES # BLD AUTO: 0.05 THOU/MM3 (ref 0–0.07)
IMM GRANULOCYTES NFR BLD AUTO: 0.6 %
INR PPP: 0.88 (ref 0.85–1.13)
LYMPHOCYTES ABSOLUTE: 0.7 THOU/MM3 (ref 1–4.8)
LYMPHOCYTES NFR BLD AUTO: 8.4 %
MCH RBC QN AUTO: 30.9 PG (ref 26–33)
MCHC RBC AUTO-ENTMCNC: 31.7 GM/DL (ref 32.2–35.5)
MCV RBC AUTO: 97.6 FL (ref 80–94)
MONOCYTES ABSOLUTE: 0.5 THOU/MM3 (ref 0.4–1.3)
MONOCYTES NFR BLD AUTO: 5.4 %
NEUTROPHILS NFR BLD AUTO: 84.6 %
NRBC BLD AUTO-RTO: 0 /100 WBC
PLATELET # BLD AUTO: 277 THOU/MM3 (ref 130–400)
PMV BLD AUTO: 9.2 FL (ref 9.4–12.4)
RBC # BLD AUTO: 3.75 MILL/MM3 (ref 4.7–6.1)
SARS-COV-2 RNA RESP QL NAA+PROBE: NOT DETECTED
SEGMENTED NEUTROPHILS ABSOLUTE COUNT: 7.5 THOU/MM3 (ref 1.8–7.7)
WBC # BLD AUTO: 8.9 THOU/MM3 (ref 4.8–10.8)

## 2023-12-15 PROCEDURE — 85025 COMPLETE CBC W/AUTO DIFF WBC: CPT

## 2023-12-15 PROCEDURE — 82248 BILIRUBIN DIRECT: CPT

## 2023-12-15 PROCEDURE — 93005 ELECTROCARDIOGRAM TRACING: CPT | Performed by: EMERGENCY MEDICINE

## 2023-12-15 PROCEDURE — 71045 X-RAY EXAM CHEST 1 VIEW: CPT

## 2023-12-15 PROCEDURE — 82077 ASSAY SPEC XCP UR&BREATH IA: CPT

## 2023-12-15 PROCEDURE — 85610 PROTHROMBIN TIME: CPT

## 2023-12-15 PROCEDURE — 80053 COMPREHEN METABOLIC PANEL: CPT

## 2023-12-15 PROCEDURE — 84484 ASSAY OF TROPONIN QUANT: CPT

## 2023-12-15 PROCEDURE — 87636 SARSCOV2 & INF A&B AMP PRB: CPT

## 2023-12-15 PROCEDURE — 70450 CT HEAD/BRAIN W/O DYE: CPT

## 2023-12-15 PROCEDURE — 82140 ASSAY OF AMMONIA: CPT

## 2023-12-15 PROCEDURE — 85730 THROMBOPLASTIN TIME PARTIAL: CPT

## 2023-12-15 PROCEDURE — 84443 ASSAY THYROID STIM HORMONE: CPT

## 2023-12-15 PROCEDURE — 83690 ASSAY OF LIPASE: CPT

## 2023-12-15 PROCEDURE — 83735 ASSAY OF MAGNESIUM: CPT

## 2023-12-15 PROCEDURE — 36415 COLL VENOUS BLD VENIPUNCTURE: CPT

## 2023-12-15 PROCEDURE — 83880 ASSAY OF NATRIURETIC PEPTIDE: CPT

## 2023-12-15 ASSESSMENT — PAIN DESCRIPTION - LOCATION: LOCATION: RIB CAGE

## 2023-12-15 ASSESSMENT — PAIN - FUNCTIONAL ASSESSMENT: PAIN_FUNCTIONAL_ASSESSMENT: 0-10

## 2023-12-15 ASSESSMENT — PAIN SCALES - GENERAL: PAINLEVEL_OUTOF10: 9

## 2023-12-16 VITALS
OXYGEN SATURATION: 95 % | WEIGHT: 120 LBS | RESPIRATION RATE: 16 BRPM | HEART RATE: 80 BPM | TEMPERATURE: 98 F | HEIGHT: 68 IN | SYSTOLIC BLOOD PRESSURE: 116 MMHG | DIASTOLIC BLOOD PRESSURE: 84 MMHG | BODY MASS INDEX: 18.19 KG/M2

## 2023-12-16 LAB
ALBUMIN SERPL BCG-MCNC: 3.5 G/DL (ref 3.5–5.1)
ALP SERPL-CCNC: 86 U/L (ref 38–126)
ALT SERPL W/O P-5'-P-CCNC: 12 U/L (ref 11–66)
AMMONIA PLAS-MCNC: 44 UMOL/L (ref 11–60)
AMPHETAMINES UR QL SCN: POSITIVE
ANION GAP SERPL CALC-SCNC: 10 MEQ/L (ref 8–16)
AST SERPL-CCNC: 13 U/L (ref 5–40)
BARBITURATES UR QL SCN: NEGATIVE
BENZODIAZ UR QL SCN: NEGATIVE
BILIRUB CONJ SERPL-MCNC: < 0.2 MG/DL (ref 0–0.3)
BILIRUB SERPL-MCNC: 0.2 MG/DL (ref 0.3–1.2)
BILIRUB UR QL STRIP.AUTO: NEGATIVE
BUN SERPL-MCNC: 17 MG/DL (ref 7–22)
BZE UR QL SCN: NEGATIVE
CALCIUM SERPL-MCNC: 8.4 MG/DL (ref 8.5–10.5)
CANNABINOIDS UR QL SCN: POSITIVE
CHARACTER UR: CLEAR
CHLORIDE SERPL-SCNC: 105 MEQ/L (ref 98–111)
CO2 SERPL-SCNC: 26 MEQ/L (ref 23–33)
COLOR: ABNORMAL
CREAT SERPL-MCNC: 0.5 MG/DL (ref 0.4–1.2)
EKG ATRIAL RATE: 63 BPM
EKG P AXIS: 66 DEGREES
EKG P-R INTERVAL: 154 MS
EKG Q-T INTERVAL: 392 MS
EKG QRS DURATION: 90 MS
EKG QTC CALCULATION (BAZETT): 401 MS
EKG R AXIS: -22 DEGREES
EKG T AXIS: 74 DEGREES
EKG VENTRICULAR RATE: 63 BPM
ETHANOL SERPL-MCNC: < 0.01 %
FENTANYL: POSITIVE
GFR SERPL CREATININE-BSD FRML MDRD: > 60 ML/MIN/1.73M2
GLUCOSE SERPL-MCNC: 127 MG/DL (ref 70–108)
GLUCOSE UR QL STRIP.AUTO: NEGATIVE MG/DL
HGB UR QL STRIP.AUTO: NEGATIVE
KETONES UR QL STRIP.AUTO: ABNORMAL
LIPASE SERPL-CCNC: 9.7 U/L (ref 5.6–51.3)
MAGNESIUM SERPL-MCNC: 1.7 MG/DL (ref 1.6–2.4)
NITRITE UR QL STRIP: NEGATIVE
NT-PROBNP SERPL IA-MCNC: 49.8 PG/ML (ref 0–124)
OPIATES UR QL SCN: POSITIVE
OSMOLALITY SERPL CALC.SUM OF ELEC: 284.4 MOSMOL/KG (ref 275–300)
OXYCODONE: POSITIVE
PCP UR QL SCN: NEGATIVE
PH UR STRIP.AUTO: 5.5 [PH] (ref 5–9)
POTASSIUM SERPL-SCNC: 4.2 MEQ/L (ref 3.5–5.2)
PROT SERPL-MCNC: 5.6 G/DL (ref 6.1–8)
PROT UR STRIP.AUTO-MCNC: NEGATIVE MG/DL
SODIUM SERPL-SCNC: 141 MEQ/L (ref 135–145)
SP GR UR REFRACT.AUTO: 1.02 (ref 1–1.03)
TROPONIN, HIGH SENSITIVITY: < 6 NG/L (ref 0–12)
TSH SERPL DL<=0.005 MIU/L-ACNC: 0.77 UIU/ML (ref 0.4–4.2)
UROBILINOGEN, URINE: 0.2 EU/DL (ref 0–1)
WBC #/AREA URNS HPF: NEGATIVE /[HPF]

## 2023-12-16 PROCEDURE — 81003 URINALYSIS AUTO W/O SCOPE: CPT

## 2023-12-16 PROCEDURE — 80307 DRUG TEST PRSMV CHEM ANLYZR: CPT

## 2023-12-16 PROCEDURE — 93010 ELECTROCARDIOGRAM REPORT: CPT | Performed by: INTERNAL MEDICINE

## 2023-12-16 NOTE — ED NOTES
Pt discharged at this time during EPIC downtime. Pt SO at entrance with vehicle for transport home. No acute signs of distress noted at discharge.        Karyle Dings, [address]  12/16/23 9157

## 2023-12-16 NOTE — ED NOTES
Patient does not feel he is able to urinate for sample. Patient resting in bed. Respirations easy and unlabored. No distress noted. Call light within reach.      Philip Brown RN  12/15/23 9998

## 2023-12-16 NOTE — ED PROVIDER NOTES
[facility]  eMERGENCY dEPARTMENT eNCOUnter          CHIEF COMPLAINT       Chief Complaint   Patient presents with    Hallucinations       Nurses Notes reviewed and I agree except as noted in the HPI. HISTORY OF PRESENT ILLNESS    Alfonso Patterson is a 46 y.o. male who presents for hallucinations. Daughter at bedside states that he was starting to act spacey just like he did when he was first diagnosed with lung cancer. Apparently he has metastasis to the brain. He was on phenytoin they just switched him to [address] [place] [address]. Here today the patient is awake alert and oriented. Daughter at bedside states that he was talking to things that were not there. Acting very bizarre, but still able to recognize her. Patient has not able to tell me what he was hallucinating. Patient is on radiation treatment for his brain. He has not started chemotherapy yet. Patient denies any fevers chills sweats. Patient is complaining of some mild abdominal pain. This is located on the right side upper abdomen. Patient has had no nausea or vomiting. Here today patient is able to carry a conversation. He is not hallucinating at this time. He is able to move all his extremities. He is otherwise resting comfortably on the cot no apparent distress no other physical complaints at this time. REVIEW OF SYSTEMS     Review of Systems   Constitutional:  Negative for activity change, appetite change, diaphoresis, fatigue and unexpected weight change. HENT:  Negative for congestion, ear discharge, ear pain, hearing loss, rhinorrhea, sinus pressure, sore throat, trouble swallowing and voice change. Eyes:  Negative for photophobia, pain, discharge, redness and itching. Respiratory:  Negative for cough, choking, chest tightness, shortness of breath and wheezing. Cardiovascular:  Negative for chest pain, palpitations and leg swelling.    Gastrointestinal:  Negative for abdominal distention, abdominal pain, blood in

## 2023-12-16 NOTE — ED TRIAGE NOTES
Patient to ED from home via [facility] c/c hallucinations. Patient states he has been unaware until \"the end\" of him speaking with things that aren't there. Family at bedside states patient has been having hallucinations today and has been really \"spaced out\" today. Patient complains of pain in the right ribs. Pt hx cancer in the lungs, brain, and ribs. IV access established.

## 2023-12-16 NOTE — ED NOTES
Received report from [facility]. Pt resting in bed with no acute signs of distress noted at this time. Call light in reach. Pt states no further needs.        Rekha Plasencia RN  12/16/23 6145

## 2024-01-01 ENCOUNTER — HOSPITAL ENCOUNTER (INPATIENT)
Age: 54
LOS: 1 days | DRG: 862 | End: 2024-04-23
Attending: STUDENT IN AN ORGANIZED HEALTH CARE EDUCATION/TRAINING PROGRAM | Admitting: STUDENT IN AN ORGANIZED HEALTH CARE EDUCATION/TRAINING PROGRAM
Payer: MEDICAID

## 2024-01-01 ENCOUNTER — CLINICAL DOCUMENTATION (OUTPATIENT)
Dept: CASE MANAGEMENT | Age: 54
End: 2024-01-01

## 2024-01-01 ENCOUNTER — APPOINTMENT (OUTPATIENT)
Dept: MRI IMAGING | Age: 54
DRG: 720 | End: 2024-01-01
Payer: MEDICAID

## 2024-01-01 ENCOUNTER — APPOINTMENT (OUTPATIENT)
Dept: GENERAL RADIOLOGY | Age: 54
DRG: 720 | End: 2024-01-01
Payer: MEDICAID

## 2024-01-01 ENCOUNTER — APPOINTMENT (OUTPATIENT)
Age: 54
DRG: 720 | End: 2024-01-01
Payer: MEDICAID

## 2024-01-01 ENCOUNTER — APPOINTMENT (OUTPATIENT)
Dept: INTERVENTIONAL RADIOLOGY/VASCULAR | Age: 54
DRG: 720 | End: 2024-01-01
Payer: MEDICAID

## 2024-01-01 ENCOUNTER — APPOINTMENT (OUTPATIENT)
Dept: CT IMAGING | Age: 54
DRG: 720 | End: 2024-01-01
Payer: MEDICAID

## 2024-01-01 ENCOUNTER — HOSPITAL ENCOUNTER (INPATIENT)
Age: 54
LOS: 6 days | Discharge: HOSPICE | DRG: 720 | End: 2024-04-23
Attending: EMERGENCY MEDICINE | Admitting: INTERNAL MEDICINE
Payer: MEDICAID

## 2024-01-01 VITALS
BODY MASS INDEX: 14.97 KG/M2 | OXYGEN SATURATION: 94 % | TEMPERATURE: 100 F | HEART RATE: 98 BPM | DIASTOLIC BLOOD PRESSURE: 72 MMHG | RESPIRATION RATE: 31 BRPM | WEIGHT: 98.77 LBS | HEIGHT: 68 IN | SYSTOLIC BLOOD PRESSURE: 104 MMHG

## 2024-01-01 VITALS
SYSTOLIC BLOOD PRESSURE: 119 MMHG | DIASTOLIC BLOOD PRESSURE: 74 MMHG | OXYGEN SATURATION: 65 % | HEART RATE: 100 BPM | RESPIRATION RATE: 33 BRPM

## 2024-01-01 DIAGNOSIS — J18.9 PNEUMONIA OF LEFT LUNG DUE TO INFECTIOUS ORGANISM, UNSPECIFIED PART OF LUNG: Primary | ICD-10-CM

## 2024-01-01 DIAGNOSIS — I26.99 PULMONARY EMBOLISM WITHOUT ACUTE COR PULMONALE, UNSPECIFIED CHRONICITY, UNSPECIFIED PULMONARY EMBOLISM TYPE (HCC): ICD-10-CM

## 2024-01-01 DIAGNOSIS — C34.90 MALIGNANT NEOPLASM OF LUNG, UNSPECIFIED LATERALITY, UNSPECIFIED PART OF LUNG (HCC): ICD-10-CM

## 2024-01-01 DIAGNOSIS — J96.01 ACUTE HYPOXIC RESPIRATORY FAILURE (HCC): ICD-10-CM

## 2024-01-01 DIAGNOSIS — R57.9 SHOCK (HCC): ICD-10-CM

## 2024-01-01 DIAGNOSIS — J96.01 ACUTE RESPIRATORY FAILURE WITH HYPOXIA (HCC): ICD-10-CM

## 2024-01-01 LAB
ACB COMPLEX DNA BLD POS QL NAA+NON-PROBE: NOT DETECTED
ACINETOBACTER CALCOACETICUS-BAUMANNII BY PCR: NOT DETECTED
ALBUMIN SERPL BCG-MCNC: 2 G/DL (ref 3.5–5.1)
ALBUMIN SERPL BCG-MCNC: 2.1 G/DL (ref 3.5–5.1)
ALBUMIN SERPL BCG-MCNC: 3 G/DL (ref 3.5–5.1)
ALP SERPL-CCNC: 106 U/L (ref 38–126)
ALP SERPL-CCNC: 128 U/L (ref 38–126)
ALP SERPL-CCNC: 229 U/L (ref 38–126)
ALT SERPL W/O P-5'-P-CCNC: 12 U/L (ref 11–66)
ALT SERPL W/O P-5'-P-CCNC: 144 U/L (ref 11–66)
ALT SERPL W/O P-5'-P-CCNC: 8 U/L (ref 11–66)
AMORPH SED URNS QL MICRO: ABNORMAL
ANION GAP SERPL CALC-SCNC: 10 MEQ/L (ref 8–16)
ANION GAP SERPL CALC-SCNC: 11 MEQ/L (ref 8–16)
ANION GAP SERPL CALC-SCNC: 12 MEQ/L (ref 8–16)
ANION GAP SERPL CALC-SCNC: 14 MEQ/L (ref 8–16)
ANION GAP SERPL CALC-SCNC: 19 MEQ/L (ref 8–16)
ANION GAP SERPL CALC-SCNC: 7 MEQ/L (ref 8–16)
ANION GAP SERPL CALC-SCNC: 8 MEQ/L (ref 8–16)
ANION GAP SERPL CALC-SCNC: 9 MEQ/L (ref 8–16)
ANION GAP SERPL CALC-SCNC: 9 MEQ/L (ref 8–16)
APTT PPP: 35.8 SECONDS (ref 22–38)
APTT PPP: 42.7 SECONDS (ref 22–38)
APTT PPP: 62.8 SECONDS (ref 22–38)
ARTERIAL PATENCY WRIST A: ABNORMAL
ARTERIAL PATENCY WRIST A: POSITIVE
ARTERIAL PATENCY WRIST A: POSITIVE
AST SERPL-CCNC: 12 U/L (ref 5–40)
AST SERPL-CCNC: 17 U/L (ref 5–40)
AST SERPL-CCNC: 69 U/L (ref 5–40)
AUTO DIFF PNL BLD: ABNORMAL
B FRAGILIS DNA BLD POS QL NAA+NON-PROBE: NOT DETECTED
B-OH-BUTYR SERPL-MSCNC: 3.16 MG/DL (ref 0.2–2.81)
BACTERIA BLD AEROBE CULT: ABNORMAL
BACTERIA BLD AEROBE CULT: NORMAL
BACTERIA SPEC RESP CULT: ABNORMAL
BACTERIA SPEC RESP CULT: ABNORMAL
BACTERIA UR CULT: NORMAL
BACTERIA: ABNORMAL
BACTERIA: NORMAL
BASE EXCESS BLDA CALC-SCNC: -2.8 MMOL/L (ref -2.5–2.5)
BASE EXCESS BLDA CALC-SCNC: -3.4 MMOL/L (ref -2.5–2.5)
BASE EXCESS BLDA CALC-SCNC: -5.5 MMOL/L (ref -2.5–2.5)
BASE EXCESS BLDA CALC-SCNC: -5.8 MMOL/L (ref -2–3)
BASE EXCESS BLDA CALC-SCNC: -6.3 MMOL/L (ref -2.5–2.5)
BASE EXCESS BLDA CALC-SCNC: -7.2 MMOL/L (ref -2.5–2.5)
BASE EXCESS BLDA CALC-SCNC: -8.7 MMOL/L (ref -2.5–2.5)
BASE EXCESS BLDA CALC-SCNC: -8.8 MMOL/L (ref -2.5–2.5)
BASE EXCESS BLDA CALC-SCNC: 1.2 MMOL/L (ref -2.5–2.5)
BASE EXCESS BLDA CALC-SCNC: 4.3 MMOL/L (ref -2.5–2.5)
BASOPHILS ABSOLUTE: 0 THOU/MM3 (ref 0–0.1)
BASOPHILS ABSOLUTE: 0.1 THOU/MM3 (ref 0–0.1)
BASOPHILS NFR BLD AUTO: 0.1 %
BASOPHILS NFR BLD AUTO: 0.2 %
BASOPHILS NFR BLD AUTO: 0.2 %
BASOPHILS NFR BLD AUTO: 0.3 %
BASOPHILS NFR BLD AUTO: 0.5 %
BDY SITE: ABNORMAL
BILIRUB CONJ SERPL-MCNC: 0.3 MG/DL (ref 0–0.3)
BILIRUB CONJ SERPL-MCNC: 0.3 MG/DL (ref 0–0.3)
BILIRUB CONJ SERPL-MCNC: 0.4 MG/DL (ref 0–0.3)
BILIRUB SERPL-MCNC: 0.3 MG/DL (ref 0.3–1.2)
BILIRUB SERPL-MCNC: 0.5 MG/DL (ref 0.3–1.2)
BILIRUB SERPL-MCNC: 0.7 MG/DL (ref 0.3–1.2)
BILIRUB UR QL STRIP: NEGATIVE
BILIRUB UR QL STRIP: NEGATIVE
BLACTX-M ISLT/SPM QL: NOT DETECTED
BLACTX-M ISLT/SPM QL: NOT DETECTED
BLAIMP ISLT/SPM QL: NOT DETECTED
BLAIMP ISLT/SPM QL: NOT DETECTED
BLAKPC ISLT/SPM QL: NOT DETECTED
BLAKPC ISLT/SPM QL: NOT DETECTED
BLAOXA-48-LIKE ISLT/SPM QL: NOT DETECTED
BLAOXA-48-LIKE ISLT/SPM QL: NOT DETECTED
BLAVIM ISLT/SPM QL: NOT DETECTED
BLAVIM ISLT/SPM QL: NOT DETECTED
BOTTLE TYPE: ABNORMAL
BREATHS SETTING VENT: 14 BPM
BREATHS SETTING VENT: 20 BPM
BUN SERPL-MCNC: 12 MG/DL (ref 7–22)
BUN SERPL-MCNC: 14 MG/DL (ref 7–22)
BUN SERPL-MCNC: 14 MG/DL (ref 7–22)
BUN SERPL-MCNC: 17 MG/DL (ref 7–22)
BUN SERPL-MCNC: 17 MG/DL (ref 7–22)
BUN SERPL-MCNC: 18 MG/DL (ref 7–22)
BUN SERPL-MCNC: 19 MG/DL (ref 7–22)
BUN SERPL-MCNC: 20 MG/DL (ref 7–22)
BUN SERPL-MCNC: 21 MG/DL (ref 7–22)
BUN SERPL-MCNC: 21 MG/DL (ref 7–22)
BUN SERPL-MCNC: 22 MG/DL (ref 7–22)
BUN SERPL-MCNC: 22 MG/DL (ref 7–22)
BUN SERPL-MCNC: 9 MG/DL (ref 7–22)
BURR CELLS BLD QL SMEAR: ABNORMAL
BURR CELLS BLD QL SMEAR: ABNORMAL
C ALBICANS DNA BLD POS QL NAA+NON-PROBE: NOT DETECTED
C AURIS DNA BLD POS QL NAA+NON-PROBE: NOT DETECTED
C GATTII+NEOFOR DNA BLD POS QL NAA+N-PRB: NOT DETECTED
C GLABRATA DNA BLD POS QL NAA+NON-PROBE: NOT DETECTED
C KRUSEI DNA BLD POS QL NAA+NON-PROBE: NOT DETECTED
C PARAP DNA BLD POS QL NAA+NON-PROBE: NOT DETECTED
C PNEUM DNA LOWER RESP QL NAA+NON-PROBE: NOT DETECTED
C TROPICLS DNA BLD POS QL NAA+NON-PROBE: NOT DETECTED
CA-I BLD ISE-SCNC: 1.19 MMOL/L (ref 1.12–1.32)
CA-I BLD ISE-SCNC: 1.21 MMOL/L (ref 1.12–1.32)
CA-I BLD ISE-SCNC: 1.31 MMOL/L (ref 1.12–1.32)
CA-I BLD ISE-SCNC: 1.38 MMOL/L (ref 1.12–1.32)
CALCIUM SERPL-MCNC: 7.6 MG/DL (ref 8.5–10.5)
CALCIUM SERPL-MCNC: 7.7 MG/DL (ref 8.5–10.5)
CALCIUM SERPL-MCNC: 7.8 MG/DL (ref 8.5–10.5)
CALCIUM SERPL-MCNC: 7.9 MG/DL (ref 8.5–10.5)
CALCIUM SERPL-MCNC: 8 MG/DL (ref 8.5–10.5)
CALCIUM SERPL-MCNC: 8.2 MG/DL (ref 8.5–10.5)
CALCIUM SERPL-MCNC: 8.3 MG/DL (ref 8.5–10.5)
CALCIUM SERPL-MCNC: 8.3 MG/DL (ref 8.5–10.5)
CALCIUM SERPL-MCNC: 8.5 MG/DL (ref 8.5–10.5)
CALCIUM SERPL-MCNC: 8.6 MG/DL (ref 8.5–10.5)
CALCIUM SERPL-MCNC: 8.7 MG/DL (ref 8.5–10.5)
CALCIUM SERPL-MCNC: 8.8 MG/DL (ref 8.5–10.5)
CALCIUM SERPL-MCNC: 8.9 MG/DL (ref 8.5–10.5)
CALCIUM SERPL-MCNC: 9.1 MG/DL (ref 8.5–10.5)
CASTS #/AREA URNS LPF: ABNORMAL /LPF
CASTS #/AREA URNS LPF: ABNORMAL /LPF
CASTS #/AREA URNS LPF: NORMAL /LPF
CASTS #/AREA URNS LPF: NORMAL /LPF
CHARACTER UR: ABNORMAL
CHARACTER UR: CLEAR
CHARCOAL URNS QL MICRO: ABNORMAL
CHARCOAL URNS QL MICRO: NORMAL
CHLORIDE 24H UR-SRATE: < 20 MEQ/L
CHLORIDE BLD-SCNC: 125 MEQ/L (ref 98–109)
CHLORIDE SERPL-SCNC: 104 MEQ/L (ref 98–111)
CHLORIDE SERPL-SCNC: 118 MEQ/L (ref 98–111)
CHLORIDE SERPL-SCNC: 119 MEQ/L (ref 98–111)
CHLORIDE SERPL-SCNC: 120 MEQ/L (ref 98–111)
CHLORIDE SERPL-SCNC: 121 MEQ/L (ref 98–111)
CHLORIDE SERPL-SCNC: 122 MEQ/L (ref 98–111)
CHLORIDE SERPL-SCNC: 123 MEQ/L (ref 98–111)
CHLORIDE SERPL-SCNC: 124 MEQ/L (ref 98–111)
CHLORIDE SERPL-SCNC: 124 MEQ/L (ref 98–111)
CHLORIDE SERPL-SCNC: 125 MEQ/L (ref 98–111)
CHLORIDE SERPL-SCNC: 125 MEQ/L (ref 98–111)
CHLORIDE SERPL-SCNC: 128 MEQ/L (ref 98–111)
CHLORIDE SERPL-SCNC: 128 MEQ/L (ref 98–111)
CHLORIDE SERPL-SCNC: 129 MEQ/L (ref 98–111)
CO2 SERPL-SCNC: 17 MEQ/L (ref 23–33)
CO2 SERPL-SCNC: 19 MEQ/L (ref 23–33)
CO2 SERPL-SCNC: 20 MEQ/L (ref 23–33)
CO2 SERPL-SCNC: 21 MEQ/L (ref 23–33)
CO2 SERPL-SCNC: 25 MEQ/L (ref 23–33)
CO2 SERPL-SCNC: 26 MEQ/L (ref 23–33)
CO2 SERPL-SCNC: 27 MEQ/L (ref 23–33)
CO2 SERPL-SCNC: 28 MEQ/L (ref 23–33)
CO2 SERPL-SCNC: 28 MEQ/L (ref 23–33)
CO2 SERPL-SCNC: 29 MEQ/L (ref 23–33)
CO2 SERPL-SCNC: 30 MEQ/L (ref 23–33)
CO2 SERPL-SCNC: 31 MEQ/L (ref 23–33)
COAG NEG STAPH DNA BLD QL NAA+PROBE: NOT DETECTED
COLISTIN RES MCR-1 ISLT/SPM QL: ABNORMAL
COLLECTED BY:: ABNORMAL
COLOR UR: YELLOW
COLOR UR: YELLOW
CORTIS SERPL-MCNC: 47.3 UG/DL
CREAT SERPL-MCNC: 0.3 MG/DL (ref 0.4–1.2)
CREAT SERPL-MCNC: 0.4 MG/DL (ref 0.4–1.2)
CREAT SERPL-MCNC: 0.5 MG/DL (ref 0.4–1.2)
CREAT SERPL-MCNC: 0.5 MG/DL (ref 0.4–1.2)
CREAT SERPL-MCNC: 0.6 MG/DL (ref 0.4–1.2)
CREAT SERPL-MCNC: 0.6 MG/DL (ref 0.4–1.2)
CREAT SERPL-MCNC: 0.7 MG/DL (ref 0.4–1.2)
CREAT SERPL-MCNC: 0.7 MG/DL (ref 0.4–1.2)
CREAT SERPL-MCNC: 0.8 MG/DL (ref 0.4–1.2)
CREAT UR-MCNC: 43.8 MG/DL
CREAT UR-MCNC: 9.5 MG/DL
CRYSTALS URNS QL MICRO: ABNORMAL
CRYSTALS URNS QL MICRO: ABNORMAL
CRYSTALS URNS QL MICRO: NORMAL
DEPRECATED MEAN GLUCOSE BLD GHB EST-ACNC: 129 MG/DL (ref 70–126)
DEPRECATED RDW RBC AUTO: 49.2 FL (ref 35–45)
DEPRECATED RDW RBC AUTO: 51.6 FL (ref 35–45)
DEPRECATED RDW RBC AUTO: 53.1 FL (ref 35–45)
DEPRECATED RDW RBC AUTO: 53.9 FL (ref 35–45)
DEPRECATED RDW RBC AUTO: 54.6 FL (ref 35–45)
DEPRECATED RDW RBC AUTO: 55 FL (ref 35–45)
DEPRECATED RDW RBC AUTO: 55.8 FL (ref 35–45)
DEPRECATED RDW RBC AUTO: 57.4 FL (ref 35–45)
DEVICE: ABNORMAL
E CLOAC COMP DNA BLD POS NAA+NON-PROBE: NOT DETECTED
E COLI DNA BLD POS QL NAA+NON-PROBE: NOT DETECTED
E FAECALIS DNA BLD POS QL NAA+NON-PROBE: NOT DETECTED
E FAECIUM DNA BLD POS QL NAA+NON-PROBE: NOT DETECTED
ECHO AV CUSP MM: 2.3 CM
ECHO AV PEAK GRADIENT: 5 MMHG
ECHO AV PEAK VELOCITY: 1.1 M/S
ECHO AV VELOCITY RATIO: 0.55
ECHO BSA: 1.55 M2
ECHO EST RA PRESSURE: 5 MMHG
ECHO IVC PROX: 1.5 CM
ECHO LA AREA 4C: 10.2 CM2
ECHO LA DIAMETER INDEX: 1.58 CM/M2
ECHO LA DIAMETER: 2.4 CM
ECHO LA MAJOR AXIS: 3.9 CM
ECHO LA VOL MOD A4C: 21 ML (ref 18–58)
ECHO LA VOLUME INDEX MOD A4C: 14 ML/M2 (ref 16–34)
ECHO LV E' LATERAL VELOCITY: 11 CM/S
ECHO LV E' SEPTAL VELOCITY: 7 CM/S
ECHO LV FRACTIONAL SHORTENING: 25 % (ref 28–44)
ECHO LV INTERNAL DIMENSION DIASTOLE INDEX: 2.37 CM/M2
ECHO LV INTERNAL DIMENSION DIASTOLIC: 3.6 CM (ref 4.2–5.9)
ECHO LV INTERNAL DIMENSION SYSTOLIC INDEX: 1.78 CM/M2
ECHO LV INTERNAL DIMENSION SYSTOLIC: 2.7 CM
ECHO LV ISOVOLUMETRIC RELAXATION TIME (IVRT): 99 MS
ECHO LV IVSD: 0.9 CM (ref 0.6–1)
ECHO LV MASS 2D: 100.2 G (ref 88–224)
ECHO LV MASS INDEX 2D: 65.9 G/M2 (ref 49–115)
ECHO LV POSTERIOR WALL DIASTOLIC: 1 CM (ref 0.6–1)
ECHO LV RELATIVE WALL THICKNESS RATIO: 0.56
ECHO LVOT PEAK GRADIENT: 2 MMHG
ECHO LVOT PEAK VELOCITY: 0.6 M/S
ECHO MV A VELOCITY: 0.66 M/S
ECHO MV E DECELERATION TIME (DT): 218 MS
ECHO MV E VELOCITY: 0.57 M/S
ECHO MV E/A RATIO: 0.86
ECHO MV E/E' LATERAL: 5.18
ECHO MV E/E' RATIO (AVERAGED): 6.66
ECHO PULMONARY ARTERY SYSTOLIC PRESSURE (PASP): 45 MMHG
ECHO PV MAX VELOCITY: 0.8 M/S
ECHO PV PEAK GRADIENT: 2 MMHG
ECHO RIGHT VENTRICULAR SYSTOLIC PRESSURE (RVSP): 52 MMHG
ECHO RV INTERNAL DIMENSION: 3 CM
ECHO RV TAPSE: 1.2 CM (ref 1.7–?)
ECHO TV E WAVE: 0.5 M/S
ECHO TV REGURGITANT MAX VELOCITY: 3.44 M/S
ECHO TV REGURGITANT PEAK GRADIENT: 47 MMHG
EKG ATRIAL RATE: 107 BPM
EKG ATRIAL RATE: 116 BPM
EKG P AXIS: 65 DEGREES
EKG P AXIS: 83 DEGREES
EKG P-R INTERVAL: 118 MS
EKG P-R INTERVAL: 128 MS
EKG Q-T INTERVAL: 324 MS
EKG Q-T INTERVAL: 344 MS
EKG Q-T INTERVAL: 384 MS
EKG QRS DURATION: 112 MS
EKG QRS DURATION: 68 MS
EKG QRS DURATION: 84 MS
EKG QTC CALCULATION (BAZETT): 459 MS
EKG QTC CALCULATION (BAZETT): 522 MS
EKG QTC CALCULATION (BAZETT): 533 MS
EKG R AXIS: -115 DEGREES
EKG R AXIS: -84 DEGREES
EKG R AXIS: -92 DEGREES
EKG T AXIS: 67 DEGREES
EKG T AXIS: 75 DEGREES
EKG T AXIS: 81 DEGREES
EKG VENTRICULAR RATE: 107 BPM
EKG VENTRICULAR RATE: 116 BPM
EKG VENTRICULAR RATE: 156 BPM
ENTEROBACTER CLOACAE COMPLEX BY PCR: NOT DETECTED
ENTEROBACTERALES DNA BLD POS NAA+N-PRB: DETECTED
EOSINOPHIL NFR BLD AUTO: 0 %
EOSINOPHIL NFR BLD AUTO: 0.1 %
EOSINOPHIL NFR BLD AUTO: 0.2 %
EOSINOPHILS ABSOLUTE: 0 THOU/MM3 (ref 0–0.4)
EPITHELIAL CELLS, UA: ABNORMAL /HPF
EPITHELIAL CELLS, UA: NORMAL /HPF
ERYTHROCYTE [DISTWIDTH] IN BLOOD BY AUTOMATED COUNT: 14 % (ref 11.5–14.5)
ERYTHROCYTE [DISTWIDTH] IN BLOOD BY AUTOMATED COUNT: 14.1 % (ref 11.5–14.5)
ERYTHROCYTE [DISTWIDTH] IN BLOOD BY AUTOMATED COUNT: 14.3 % (ref 11.5–14.5)
ERYTHROCYTE [DISTWIDTH] IN BLOOD BY AUTOMATED COUNT: 14.4 % (ref 11.5–14.5)
ERYTHROCYTE [DISTWIDTH] IN BLOOD BY AUTOMATED COUNT: 14.7 % (ref 11.5–14.5)
ERYTHROCYTE [DISTWIDTH] IN BLOOD BY AUTOMATED COUNT: 15 % (ref 11.5–14.5)
ERYTHROCYTE [DISTWIDTH] IN BLOOD BY AUTOMATED COUNT: 15.3 % (ref 11.5–14.5)
ERYTHROCYTE [DISTWIDTH] IN BLOOD BY AUTOMATED COUNT: 15.4 % (ref 11.5–14.5)
ESCHERICHIA COLI BY PCR: NOT DETECTED
FIO2 ON VENT O2 ANALYZER: 100 %
FIO2 ON VENT O2 ANALYZER: 50 %
FIO2 ON VENT O2 ANALYZER: 60 %
FIO2 ON VENT O2 ANALYZER: 70 %
FLUAV RNA LOWER RESP QL NAA+NON-PROBE: NOT DETECTED
FLUAV RNA RESP QL NAA+PROBE: NOT DETECTED
FLUBV RNA LOWER RESP QL NAA+NON-PROBE: NOT DETECTED
FLUBV RNA RESP QL NAA+PROBE: NOT DETECTED
GFR SERPL CREATININE-BSD FRML MDRD: > 90 ML/MIN/1.73M2
GLUCOSE BLD STRIP.AUTO-MCNC: 133 MG/DL (ref 70–108)
GLUCOSE BLD STRIP.AUTO-MCNC: 140 MG/DL (ref 70–108)
GLUCOSE BLD STRIP.AUTO-MCNC: 158 MG/DL (ref 70–108)
GLUCOSE BLD STRIP.AUTO-MCNC: 170 MG/DL (ref 70–108)
GLUCOSE BLD STRIP.AUTO-MCNC: 175 MG/DL (ref 70–108)
GLUCOSE BLD STRIP.AUTO-MCNC: 189 MG/DL (ref 70–108)
GLUCOSE BLD STRIP.AUTO-MCNC: 193 MG/DL (ref 70–108)
GLUCOSE BLD STRIP.AUTO-MCNC: 217 MG/DL (ref 70–108)
GLUCOSE BLD STRIP.AUTO-MCNC: 250 MG/DL (ref 70–108)
GLUCOSE BLD STRIP.AUTO-MCNC: 253 MG/DL (ref 70–108)
GLUCOSE BLD STRIP.AUTO-MCNC: 255 MG/DL (ref 70–108)
GLUCOSE BLD STRIP.AUTO-MCNC: 259 MG/DL (ref 70–108)
GLUCOSE BLD STRIP.AUTO-MCNC: 285 MG/DL (ref 70–108)
GLUCOSE BLD STRIP.AUTO-MCNC: 287 MG/DL (ref 70–108)
GLUCOSE BLD STRIP.AUTO-MCNC: 304 MG/DL (ref 70–108)
GLUCOSE BLD STRIP.AUTO-MCNC: 331 MG/DL (ref 70–108)
GLUCOSE BLD-MCNC: 171 MG/DL (ref 70–108)
GLUCOSE BLD-MCNC: 174 MG/DL (ref 70–108)
GLUCOSE BLD-MCNC: 293 MG/DL (ref 70–108)
GLUCOSE SERPL-MCNC: 122 MG/DL (ref 70–108)
GLUCOSE SERPL-MCNC: 125 MG/DL (ref 70–108)
GLUCOSE SERPL-MCNC: 134 MG/DL (ref 70–108)
GLUCOSE SERPL-MCNC: 137 MG/DL (ref 70–108)
GLUCOSE SERPL-MCNC: 142 MG/DL (ref 70–108)
GLUCOSE SERPL-MCNC: 155 MG/DL (ref 70–108)
GLUCOSE SERPL-MCNC: 162 MG/DL (ref 70–108)
GLUCOSE SERPL-MCNC: 164 MG/DL (ref 70–108)
GLUCOSE SERPL-MCNC: 175 MG/DL (ref 70–108)
GLUCOSE SERPL-MCNC: 177 MG/DL (ref 70–108)
GLUCOSE SERPL-MCNC: 179 MG/DL (ref 70–108)
GLUCOSE SERPL-MCNC: 185 MG/DL (ref 70–108)
GLUCOSE SERPL-MCNC: 191 MG/DL (ref 70–108)
GLUCOSE SERPL-MCNC: 195 MG/DL (ref 70–108)
GLUCOSE SERPL-MCNC: 209 MG/DL (ref 70–108)
GLUCOSE SERPL-MCNC: 223 MG/DL (ref 70–108)
GLUCOSE SERPL-MCNC: 223 MG/DL (ref 70–108)
GLUCOSE SERPL-MCNC: 244 MG/DL (ref 70–108)
GLUCOSE SERPL-MCNC: 256 MG/DL (ref 70–108)
GLUCOSE SERPL-MCNC: 259 MG/DL (ref 70–108)
GLUCOSE SERPL-MCNC: 269 MG/DL (ref 70–108)
GLUCOSE SERPL-MCNC: 287 MG/DL (ref 70–108)
GLUCOSE UR QL STRIP.AUTO: NEGATIVE MG/DL
GLUCOSE UR QL STRIP.AUTO: NEGATIVE MG/DL
GP B STREP DNA SPEC QL NAA+PROBE: NOT DETECTED
GP B STREP DNA SPEC QL NAA+PROBE: NOT DETECTED
GRAM STN SPEC: ABNORMAL
HADV DNA LOWER RESP QL NAA+NON-PROBE: NOT DETECTED
HAEM INFLU DNA BLD POS QL NAA+NON-PROBE: NOT DETECTED
HAEMOPHILUS INFLUENZAE BY PCR: NOT DETECTED
HBA1C MFR BLD HPLC: 6.3 % (ref 4.4–6.4)
HCO3 BLDA-SCNC: 19 MMOL/L (ref 23–28)
HCO3 BLDA-SCNC: 20 MMOL/L (ref 23–28)
HCO3 BLDA-SCNC: 21 MMOL/L (ref 23–28)
HCO3 BLDA-SCNC: 22 MMOL/L (ref 23–28)
HCO3 BLDA-SCNC: 23 MMOL/L (ref 23–28)
HCO3 BLDA-SCNC: 23 MMOL/L (ref 23–28)
HCO3 BLDA-SCNC: 29 MMOL/L (ref 23–28)
HCO3 BLDA-SCNC: 30 MMOL/L (ref 23–28)
HCOV RNA LOWER RESP QL NAA+NON-PROBE: NOT DETECTED
HCT VFR BLD AUTO: 28.9 % (ref 42–52)
HCT VFR BLD AUTO: 29.1 % (ref 42–52)
HCT VFR BLD AUTO: 29.8 % (ref 42–52)
HCT VFR BLD AUTO: 31.6 % (ref 42–52)
HCT VFR BLD AUTO: 34.4 % (ref 42–52)
HCT VFR BLD AUTO: 40.7 % (ref 42–52)
HCT VFR BLD AUTO: 41.2 % (ref 42–52)
HCT VFR BLD AUTO: 43.9 % (ref 42–52)
HEPARIN UNFRACTIONATED: 0.16 U/ML (ref 0.3–0.7)
HEPARIN UNFRACTIONATED: 0.21 U/ML (ref 0.3–0.7)
HEPARIN UNFRACTIONATED: 0.29 U/ML (ref 0.3–0.7)
HEPARIN UNFRACTIONATED: 0.29 U/ML (ref 0.3–0.7)
HEPARIN UNFRACTIONATED: 0.35 U/ML (ref 0.3–0.7)
HEPARIN UNFRACTIONATED: 0.37 U/ML (ref 0.3–0.7)
HEPARIN UNFRACTIONATED: 0.39 U/ML (ref 0.3–0.7)
HEPARIN UNFRACTIONATED: 0.4 U/ML (ref 0.3–0.7)
HEPARIN UNFRACTIONATED: 0.42 U/ML (ref 0.3–0.7)
HEPARIN UNFRACTIONATED: 0.46 U/ML (ref 0.3–0.7)
HEPARIN UNFRACTIONATED: 0.48 U/ML (ref 0.3–0.7)
HEPARIN UNFRACTIONATED: 0.49 U/ML (ref 0.3–0.7)
HEPARIN UNFRACTIONATED: 0.5 U/ML (ref 0.3–0.7)
HGB BLD-MCNC: 10.6 GM/DL (ref 14–18)
HGB BLD-MCNC: 12.7 GM/DL (ref 14–18)
HGB BLD-MCNC: 12.9 GM/DL (ref 14–18)
HGB BLD-MCNC: 14.3 GM/DL (ref 14–18)
HGB BLD-MCNC: 8.7 GM/DL (ref 14–18)
HGB BLD-MCNC: 9.1 GM/DL (ref 14–18)
HGB BLD-MCNC: 9.3 GM/DL (ref 14–18)
HGB BLD-MCNC: 9.8 GM/DL (ref 14–18)
HGB UR QL STRIP.AUTO: ABNORMAL
HGB UR QL STRIP.AUTO: NEGATIVE
HMPV RNA LOWER RESP QL NAA+NON-PROBE: NOT DETECTED
HPIV RNA LOWER RESP QL NAA+NON-PROBE: NOT DETECTED
IMM GRANULOCYTES # BLD AUTO: 0.03 THOU/MM3 (ref 0–0.07)
IMM GRANULOCYTES # BLD AUTO: 0.14 THOU/MM3 (ref 0–0.07)
IMM GRANULOCYTES # BLD AUTO: 0.21 THOU/MM3 (ref 0–0.07)
IMM GRANULOCYTES # BLD AUTO: 0.25 THOU/MM3 (ref 0–0.07)
IMM GRANULOCYTES # BLD AUTO: 0.47 THOU/MM3 (ref 0–0.07)
IMM GRANULOCYTES # BLD AUTO: 0.64 THOU/MM3 (ref 0–0.07)
IMM GRANULOCYTES # BLD AUTO: 0.74 THOU/MM3 (ref 0–0.07)
IMM GRANULOCYTES # BLD AUTO: 1.41 THOU/MM3 (ref 0–0.07)
IMM GRANULOCYTES NFR BLD AUTO: 0.2 %
IMM GRANULOCYTES NFR BLD AUTO: 0.9 %
IMM GRANULOCYTES NFR BLD AUTO: 0.9 %
IMM GRANULOCYTES NFR BLD AUTO: 1 %
IMM GRANULOCYTES NFR BLD AUTO: 1.5 %
IMM GRANULOCYTES NFR BLD AUTO: 2.7 %
IMM GRANULOCYTES NFR BLD AUTO: 4.9 %
IMM GRANULOCYTES NFR BLD AUTO: 5.9 %
INR PPP: 1.21 (ref 0.85–1.13)
K OXYTOCA DNA BLD POS QL NAA+NON-PROBE: NOT DETECTED
K OXYTOCA DNA BLD POS QL NAA+NON-PROBE: NOT DETECTED
KEPPRA: 19 UG/ML
KETONES UR QL STRIP.AUTO: NEGATIVE
KLEBSIELLA AEROGENES BY PCR: NOT DETECTED
KLEBSIELLA OXYTOCA BY PCR: NOT DETECTED
KLEBSIELLA PNEUMONIAE GROUP BY PCR: NOT DETECTED
KLEBSIELLA SP DNA BLD POS QL NAA+NON-PRB: NOT DETECTED
L MONOCYTOG DNA BLD POS QL NAA+NON-PROBE: NOT DETECTED
L PNEUMO DNA LOWER RESP QL NAA+NON-PROBE: NOT DETECTED
LACTATE BLD-SCNC: 1.8 MMOL/L (ref 0.5–1.9)
LACTATE SERPL-SCNC: 1.8 MMOL/L (ref 0.5–2)
LACTATE SERPL-SCNC: 2 MMOL/L (ref 0.5–2)
LACTATE SERPL-SCNC: 2.2 MMOL/L (ref 0.5–2)
LACTATE SERPL-SCNC: 2.3 MMOL/L (ref 0.5–2)
LACTATE SERPL-SCNC: 2.3 MMOL/L (ref 0.5–2)
LACTATE SERPL-SCNC: 2.4 MMOL/L (ref 0.5–2)
LACTATE SERPL-SCNC: 2.5 MMOL/L (ref 0.5–2)
LACTATE SERPL-SCNC: 2.5 MMOL/L (ref 0.5–2)
LACTATE SERPL-SCNC: 2.6 MMOL/L (ref 0.5–2)
LACTATE SERPL-SCNC: 2.9 MMOL/L (ref 0.5–2)
LACTATE SERPL-SCNC: 3.2 MMOL/L (ref 0.5–2)
LACTATE SERPL-SCNC: 3.3 MMOL/L (ref 0.5–2)
LACTATE SERPL-SCNC: 3.3 MMOL/L (ref 0.5–2)
LACTATE SERPL-SCNC: 3.6 MMOL/L (ref 0.5–2)
LACTATE SERPL-SCNC: 3.7 MMOL/L (ref 0.5–2)
LACTIC ACID, SEPSIS: 5.4 MMOL/L (ref 0.5–1.9)
LDH SERPL L TO P-CCNC: 404 U/L (ref 100–190)
LEUKOCYTE ESTERASE UR QL STRIP.AUTO: NEGATIVE
LEUKOCYTE ESTERASE UR QL STRIP.AUTO: NEGATIVE
LYMPHOCYTES ABSOLUTE: 0.2 THOU/MM3 (ref 1–4.8)
LYMPHOCYTES ABSOLUTE: 0.4 THOU/MM3 (ref 1–4.8)
LYMPHOCYTES ABSOLUTE: 0.4 THOU/MM3 (ref 1–4.8)
LYMPHOCYTES ABSOLUTE: 0.5 THOU/MM3 (ref 1–4.8)
LYMPHOCYTES ABSOLUTE: 0.5 THOU/MM3 (ref 1–4.8)
LYMPHOCYTES ABSOLUTE: 0.6 THOU/MM3 (ref 1–4.8)
LYMPHOCYTES ABSOLUTE: 0.6 THOU/MM3 (ref 1–4.8)
LYMPHOCYTES ABSOLUTE: 0.8 THOU/MM3 (ref 1–4.8)
LYMPHOCYTES NFR BLD AUTO: 1.1 %
LYMPHOCYTES NFR BLD AUTO: 1.4 %
LYMPHOCYTES NFR BLD AUTO: 1.6 %
LYMPHOCYTES NFR BLD AUTO: 1.9 %
LYMPHOCYTES NFR BLD AUTO: 2.6 %
LYMPHOCYTES NFR BLD AUTO: 2.7 %
LYMPHOCYTES NFR BLD AUTO: 3 %
LYMPHOCYTES NFR BLD AUTO: 3.2 %
M PNEUMO DNA LOWER RESP QL NAA+NON-PROBE: NOT DETECTED
MAGNESIUM SERPL-MCNC: 1.5 MG/DL (ref 1.6–2.4)
MAGNESIUM SERPL-MCNC: 1.9 MG/DL (ref 1.6–2.4)
MAGNESIUM SERPL-MCNC: 2 MG/DL (ref 1.6–2.4)
MAGNESIUM SERPL-MCNC: 2 MG/DL (ref 1.6–2.4)
MAGNESIUM SERPL-MCNC: 2.2 MG/DL (ref 1.6–2.4)
MCH RBC QN AUTO: 30.9 PG (ref 26–33)
MCH RBC QN AUTO: 31 PG (ref 26–33)
MCH RBC QN AUTO: 31.1 PG (ref 26–33)
MCH RBC QN AUTO: 31.2 PG (ref 26–33)
MCH RBC QN AUTO: 31.4 PG (ref 26–33)
MCH RBC QN AUTO: 31.5 PG (ref 26–33)
MCH RBC QN AUTO: 31.6 PG (ref 26–33)
MCH RBC QN AUTO: 31.9 PG (ref 26–33)
MCHC RBC AUTO-ENTMCNC: 30.1 GM/DL (ref 32.2–35.5)
MCHC RBC AUTO-ENTMCNC: 30.8 GM/DL (ref 32.2–35.5)
MCHC RBC AUTO-ENTMCNC: 30.8 GM/DL (ref 32.2–35.5)
MCHC RBC AUTO-ENTMCNC: 31 GM/DL (ref 32.2–35.5)
MCHC RBC AUTO-ENTMCNC: 31.2 GM/DL (ref 32.2–35.5)
MCHC RBC AUTO-ENTMCNC: 31.3 GM/DL (ref 32.2–35.5)
MCHC RBC AUTO-ENTMCNC: 31.7 GM/DL (ref 32.2–35.5)
MCHC RBC AUTO-ENTMCNC: 32.6 GM/DL (ref 32.2–35.5)
MCV RBC AUTO: 101 FL (ref 80–94)
MCV RBC AUTO: 101.9 FL (ref 80–94)
MCV RBC AUTO: 102.1 FL (ref 80–94)
MCV RBC AUTO: 102.1 FL (ref 80–94)
MCV RBC AUTO: 102.5 FL (ref 80–94)
MCV RBC AUTO: 95.6 FL (ref 80–94)
MCV RBC AUTO: 99 FL (ref 80–94)
MCV RBC AUTO: 99.3 FL (ref 80–94)
MECA ISLT/SPM QL: ABNORMAL
MECA+MECC+MREJ ISLT/SPM QL: ABNORMAL
MISCELLANEOUS LAB TEST RESULT: ABNORMAL
MONOCYTES ABSOLUTE: 0.1 THOU/MM3 (ref 0.4–1.3)
MONOCYTES ABSOLUTE: 0.4 THOU/MM3 (ref 0.4–1.3)
MONOCYTES ABSOLUTE: 0.6 THOU/MM3 (ref 0.4–1.3)
MONOCYTES ABSOLUTE: 0.7 THOU/MM3 (ref 0.4–1.3)
MONOCYTES ABSOLUTE: 0.8 THOU/MM3 (ref 0.4–1.3)
MONOCYTES ABSOLUTE: 0.9 THOU/MM3 (ref 0.4–1.3)
MONOCYTES NFR BLD AUTO: 0.9 %
MONOCYTES NFR BLD AUTO: 1.8 %
MONOCYTES NFR BLD AUTO: 2.2 %
MONOCYTES NFR BLD AUTO: 2.3 %
MONOCYTES NFR BLD AUTO: 2.7 %
MONOCYTES NFR BLD AUTO: 3.8 %
MONOCYTES NFR BLD AUTO: 3.9 %
MONOCYTES NFR BLD AUTO: 4.2 %
MORAXELLA CATARRHALIS BY PCR: NOT DETECTED
MRSA DNA SPEC QL NAA+PROBE: NEGATIVE
MUCOUS THREADS URNS QL MICRO: ABNORMAL
N MEN DNA BLD POS QL NAA+NON-PROBE: NOT DETECTED
NDM: NOT DETECTED
NEUTROPHILS NFR BLD AUTO: 87.1 %
NEUTROPHILS NFR BLD AUTO: 87.8 %
NEUTROPHILS NFR BLD AUTO: 91.1 %
NEUTROPHILS NFR BLD AUTO: 92.7 %
NEUTROPHILS NFR BLD AUTO: 94.1 %
NEUTROPHILS NFR BLD AUTO: 95 %
NEUTROPHILS NFR BLD AUTO: 95.4 %
NEUTROPHILS NFR BLD AUTO: 96.8 %
NITRITE UR QL STRIP.AUTO: NEGATIVE
NITRITE UR QL STRIP.AUTO: NEGATIVE
NRBC BLD AUTO-RTO: 0 /100 WBC
NT-PROBNP SERPL IA-MCNC: 1665 PG/ML (ref 0–124)
ORGANISM: ABNORMAL
ORGANISM: ABNORMAL
OSMOLALITY SERPL CALC.SUM OF ELEC: 284.9 MOSMOL/KG (ref 275–300)
OSMOLALITY SERPL: 344 MOSMOL/KG (ref 275–295)
OSMOLALITY UR: 149 MOSMOL/KG (ref 250–750)
OSMOLALITY UR: 467 MOSMOL/KG (ref 250–750)
P AERUGINOSA DNA BLD POS NAA+NON-PROBE: NOT DETECTED
PATHOLOGIST REVIEW: ABNORMAL
PCO2 BLDA: 31 MMHG (ref 35–45)
PCO2 BLDA: 42 MMHG (ref 35–45)
PCO2 BLDA: 42 MMHG (ref 35–45)
PCO2 BLDA: 43 MMHG (ref 35–45)
PCO2 BLDA: 48 MMHG (ref 35–45)
PCO2 BLDA: 48 MMHG (ref 35–45)
PCO2 BLDA: 52 MMHG (ref 35–45)
PCO2 BLDA: 56 MMHG (ref 35–45)
PCO2 BLDA: 73 MMHG (ref 35–45)
PCO2 TEMP ADJ BLDMV: 61 MMHG (ref 41–51)
PEEP SETTING VENT: 5 MMHG
PEEP SETTING VENT: 6 MMHG
PEEP SETTING VENT: 6 MMHG
PEEP SETTING VENT: 8 MMHG
PH BLDA: 7.17 [PH] (ref 7.35–7.45)
PH BLDA: 7.21 [PH] (ref 7.35–7.45)
PH BLDA: 7.22 [PH] (ref 7.35–7.45)
PH BLDA: 7.23 [PH] (ref 7.35–7.45)
PH BLDA: 7.24 [PH] (ref 7.35–7.45)
PH BLDA: 7.29 [PH] (ref 7.35–7.45)
PH BLDA: 7.35 [PH] (ref 7.35–7.45)
PH BLDA: 7.42 [PH] (ref 7.35–7.45)
PH BLDA: 7.45 [PH] (ref 7.35–7.45)
PH BLDMV: 7.19 [PH] (ref 7.31–7.41)
PH UR STRIP.AUTO: 6 [PH] (ref 5–9)
PH UR STRIP.AUTO: 6.5 [PH] (ref 5–9)
PHENYTOIN SERPL-MCNC: < 0.8 UG/ML (ref 6–18)
PHOSPHATE SERPL-MCNC: 0.9 MG/DL (ref 2.4–4.7)
PHOSPHATE SERPL-MCNC: 1.2 MG/DL (ref 2.4–4.7)
PHOSPHATE SERPL-MCNC: 1.3 MG/DL (ref 2.4–4.7)
PHOSPHATE SERPL-MCNC: 1.7 MG/DL (ref 2.4–4.7)
PHOSPHATE SERPL-MCNC: 1.7 MG/DL (ref 2.4–4.7)
PHOSPHATE SERPL-MCNC: 1.8 MG/DL (ref 2.4–4.7)
PHOSPHATE SERPL-MCNC: 2 MG/DL (ref 2.4–4.7)
PHOSPHATE SERPL-MCNC: 3 MG/DL (ref 2.4–4.7)
PIP: 13 CMH2O
PIP: 24 CMH2O
PLATELET # BLD AUTO: 136 THOU/MM3 (ref 130–400)
PLATELET # BLD AUTO: 139 THOU/MM3 (ref 130–400)
PLATELET # BLD AUTO: 143 THOU/MM3 (ref 130–400)
PLATELET # BLD AUTO: 150 THOU/MM3 (ref 130–400)
PLATELET # BLD AUTO: 165 THOU/MM3 (ref 130–400)
PLATELET # BLD AUTO: 186 THOU/MM3 (ref 130–400)
PLATELET # BLD AUTO: 193 THOU/MM3 (ref 130–400)
PLATELET # BLD AUTO: 203 THOU/MM3 (ref 130–400)
PLATELET BLD QL SMEAR: ADEQUATE
PMV BLD AUTO: 11.1 FL (ref 9.4–12.4)
PMV BLD AUTO: 11.5 FL (ref 9.4–12.4)
PMV BLD AUTO: 11.9 FL (ref 9.4–12.4)
PMV BLD AUTO: 12.3 FL (ref 9.4–12.4)
PMV BLD AUTO: 12.4 FL (ref 9.4–12.4)
PMV BLD AUTO: 12.6 FL (ref 9.4–12.4)
PMV BLD AUTO: 12.6 FL (ref 9.4–12.4)
PMV BLD AUTO: 12.8 FL (ref 9.4–12.4)
PO2 BLDA: 118 MMHG (ref 71–104)
PO2 BLDA: 60 MMHG (ref 71–104)
PO2 BLDA: 60 MMHG (ref 71–104)
PO2 BLDA: 63 MMHG (ref 71–104)
PO2 BLDA: 64 MMHG (ref 71–104)
PO2 BLDA: 66 MMHG (ref 71–104)
PO2 BLDA: 73 MMHG (ref 71–104)
PO2 BLDA: 80 MMHG (ref 71–104)
PO2 BLDA: 91 MMHG (ref 71–104)
PO2 BLDMV: 45 MMHG (ref 25–40)
POC CREATININE WHOLE BLOOD: 0.5 MG/DL (ref 0.5–1.2)
POTASSIUM BLD-SCNC: 3.5 MEQ/L (ref 3.5–4.9)
POTASSIUM SERPL-SCNC: 3.3 MEQ/L (ref 3.5–5.2)
POTASSIUM SERPL-SCNC: 3.3 MEQ/L (ref 3.5–5.2)
POTASSIUM SERPL-SCNC: 3.4 MEQ/L (ref 3.5–5.2)
POTASSIUM SERPL-SCNC: 3.5 MEQ/L (ref 3.5–5.2)
POTASSIUM SERPL-SCNC: 3.6 MEQ/L (ref 3.5–5.2)
POTASSIUM SERPL-SCNC: 3.7 MEQ/L (ref 3.5–5.2)
POTASSIUM SERPL-SCNC: 3.7 MEQ/L (ref 3.5–5.2)
POTASSIUM SERPL-SCNC: 3.8 MEQ/L (ref 3.5–5.2)
POTASSIUM SERPL-SCNC: 3.8 MEQ/L (ref 3.5–5.2)
POTASSIUM SERPL-SCNC: 3.9 MEQ/L (ref 3.5–5.2)
POTASSIUM SERPL-SCNC: 3.9 MEQ/L (ref 3.5–5.2)
POTASSIUM SERPL-SCNC: 4.2 MEQ/L (ref 3.5–5.2)
POTASSIUM SERPL-SCNC: 4.3 MEQ/L (ref 3.5–5.2)
POTASSIUM SERPL-SCNC: 4.7 MEQ/L (ref 3.5–5.2)
POTASSIUM SERPL-SCNC: 4.7 MEQ/L (ref 3.5–5.2)
POTASSIUM SERPL-SCNC: 4.8 MEQ/L (ref 3.5–5.2)
POTASSIUM SERPL-SCNC: 5 MEQ/L (ref 3.5–5.2)
POTASSIUM UR-SCNC: 11.1 MEQ/L
PRESSURE SUPPORT SETTING VENT: 10 CMH2O
PRESSURE SUPPORT SETTING VENT: 20 CMH2O
PRESSURE SUPPORT SETTING VENT: 8 CMH2O
PROCALCITONIN SERPL IA-MCNC: 0.43 NG/ML (ref 0.01–0.09)
PROCALCITONIN SERPL IA-MCNC: 51.19 NG/ML (ref 0.01–0.09)
PROT SERPL-MCNC: 4.3 G/DL (ref 6.1–8)
PROT SERPL-MCNC: 4.6 G/DL (ref 6.1–8)
PROT SERPL-MCNC: 6.3 G/DL (ref 6.1–8)
PROT UR STRIP.AUTO-MCNC: 30 MG/DL
PROT UR STRIP.AUTO-MCNC: NEGATIVE MG/DL
PROTEUS SPECIES BY PCR: NOT DETECTED
PROTEUS SPP: NOT DETECTED
PSEUDOMONAS AERUGINOSA BY PCR: NOT DETECTED
RBC # BLD AUTO: 2.82 MILL/MM3 (ref 4.7–6.1)
RBC # BLD AUTO: 2.85 MILL/MM3 (ref 4.7–6.1)
RBC # BLD AUTO: 3 MILL/MM3 (ref 4.7–6.1)
RBC # BLD AUTO: 3.1 MILL/MM3 (ref 4.7–6.1)
RBC # BLD AUTO: 3.37 MILL/MM3 (ref 4.7–6.1)
RBC # BLD AUTO: 4.08 MILL/MM3 (ref 4.7–6.1)
RBC # BLD AUTO: 4.11 MILL/MM3 (ref 4.7–6.1)
RBC # BLD AUTO: 4.59 MILL/MM3 (ref 4.7–6.1)
RBC #/AREA URNS HPF: ABNORMAL /HPF
RBC #/AREA URNS HPF: NORMAL /HPF
REASON FOR REJECTION: NORMAL
REJECTED TEST: NORMAL
RENAL EPI CELLS #/AREA URNS HPF: ABNORMAL /[HPF]
RENAL EPI CELLS #/AREA URNS HPF: NORMAL /[HPF]
RESISTANT GENE MECA/C & MREJ BY PCR: NOT DETECTED
RESISTANT GENE NDM BY PCR: NOT DETECTED
RSV RNA LOWER RESP QL NAA+NON-PROBE: NOT DETECTED
RV+EV RNA LOWER RESP QL NAA+NON-PROBE: NOT DETECTED
S AUREUS DNA BLD POS QL NAA+NON-PROBE: NOT DETECTED
S EPIDERMIDIS DNA BLD POS QL NAA+NON-PRB: NOT DETECTED
S LUGDUNENSIS DNA BLD POS QL NAA+NON-PRB: NOT DETECTED
S MALTOPHILIA DNA BLD POS QL NAA+NON-PRB: NOT DETECTED
S MARCESCENS DNA BLD POS NAA+NON-PROBE: DETECTED
S PYO DNA THROAT QL NAA+PROBE: NOT DETECTED
SALMONELLA DNA BLD POS QL NAA+NON-PROBE: NOT DETECTED
SAO2 % BLDA: 85 %
SAO2 % BLDA: 90 %
SAO2 % BLDA: 90 %
SAO2 % BLDA: 91 %
SAO2 % BLDA: 91 %
SAO2 % BLDA: 93 %
SAO2 % BLDA: 94 %
SAO2 % BLDA: 94 %
SAO2 % BLDA: 98 %
SAO2 % BLDMV: 68 %
SARS-COV-2 RNA RESP QL NAA+PROBE: NOT DETECTED
SCAN OF BLOOD SMEAR: NORMAL
SEGMENTED NEUTROPHILS ABSOLUTE COUNT: 13.3 THOU/MM3 (ref 1.8–7.7)
SEGMENTED NEUTROPHILS ABSOLUTE COUNT: 13.6 THOU/MM3 (ref 1.8–7.7)
SEGMENTED NEUTROPHILS ABSOLUTE COUNT: 17 THOU/MM3 (ref 1.8–7.7)
SEGMENTED NEUTROPHILS ABSOLUTE COUNT: 21 THOU/MM3 (ref 1.8–7.7)
SEGMENTED NEUTROPHILS ABSOLUTE COUNT: 21.5 THOU/MM3 (ref 1.8–7.7)
SEGMENTED NEUTROPHILS ABSOLUTE COUNT: 21.7 THOU/MM3 (ref 1.8–7.7)
SEGMENTED NEUTROPHILS ABSOLUTE COUNT: 26.4 THOU/MM3 (ref 1.8–7.7)
SEGMENTED NEUTROPHILS ABSOLUTE COUNT: 30.1 THOU/MM3 (ref 1.8–7.7)
SERRATIA MARCESCENS BY PCR: DETECTED
SET PEEP: 8 MMHG
SET PRESS SUPP: 16 CMH2O
SET RESPIRATORY RATE: 16 BPM
SODIUM BLD-SCNC: 154 MEQ/L (ref 138–146)
SODIUM SERPL-SCNC: 142 MEQ/L (ref 135–145)
SODIUM SERPL-SCNC: 148 MEQ/L (ref 135–145)
SODIUM SERPL-SCNC: 149 MEQ/L (ref 135–145)
SODIUM SERPL-SCNC: 151 MEQ/L (ref 135–145)
SODIUM SERPL-SCNC: 152 MEQ/L (ref 135–145)
SODIUM SERPL-SCNC: 153 MEQ/L (ref 135–145)
SODIUM SERPL-SCNC: 156 MEQ/L (ref 135–145)
SODIUM SERPL-SCNC: 157 MEQ/L (ref 135–145)
SODIUM SERPL-SCNC: 158 MEQ/L (ref 135–145)
SODIUM SERPL-SCNC: 158 MEQ/L (ref 135–145)
SODIUM SERPL-SCNC: 159 MEQ/L (ref 135–145)
SODIUM SERPL-SCNC: 159 MEQ/L (ref 135–145)
SODIUM SERPL-SCNC: 160 MEQ/L (ref 135–145)
SODIUM SERPL-SCNC: 160 MEQ/L (ref 135–145)
SODIUM SERPL-SCNC: 161 MEQ/L (ref 135–145)
SODIUM SERPL-SCNC: 162 MEQ/L (ref 135–145)
SODIUM SERPL-SCNC: 163 MEQ/L (ref 135–145)
SODIUM UR-SCNC: < 20 MEQ/L
SODIUM UR-SCNC: < 20 MEQ/L
SOURCE OF BLOOD CULTURE: ABNORMAL
SOURCE: ABNORMAL
SPECIFIC GRAVITY UA: 1.01 (ref 1–1.03)
SPECIFIC GRAVITY UA: 1.02 (ref 1–1.03)
SPECIMEN ACCEPTABILITY: ABNORMAL
STAPH AUREUS BY PCR: DETECTED
STREP AGALACTIAE BY PCR: NOT DETECTED
STREP PNEUMONIAE BY PCR: NOT DETECTED
STREP PYOGENES BY PCR: NOT DETECTED
STREPTOCOCCUS DNA BLD QL NAA+PROBE: NOT DETECTED
T4 FREE SERPL-MCNC: 0.97 NG/DL (ref 0.93–1.68)
TOXIC GRANULES BLD QL SMEAR: PRESENT
TROPONIN, HIGH SENSITIVITY: 25 NG/L (ref 0–12)
TROPONIN, HIGH SENSITIVITY: 44 NG/L (ref 0–12)
TSH SERPL DL<=0.005 MIU/L-ACNC: 3.59 UIU/ML (ref 0.4–4.2)
URATE 24H UR-MCNC: 9.8 MG/DL
URATE SERPL-MCNC: 5.9 MG/DL (ref 3.7–7)
UROBILINOGEN, URINE: 0.2 EU/DL (ref 0–1)
UROBILINOGEN, URINE: 0.2 EU/DL (ref 0–1)
UUN 24H UR-MCNC: 211 MG/DL
VANA+VANB ISLT/SPM QL: ABNORMAL
VENTILATION MODE VENT: ABNORMAL
WBC # BLD AUTO: 14 THOU/MM3 (ref 4.8–10.8)
WBC # BLD AUTO: 15.2 THOU/MM3 (ref 4.8–10.8)
WBC # BLD AUTO: 18.3 THOU/MM3 (ref 4.8–10.8)
WBC # BLD AUTO: 22.7 THOU/MM3 (ref 4.8–10.8)
WBC # BLD AUTO: 23.6 THOU/MM3 (ref 4.8–10.8)
WBC # BLD AUTO: 24.1 THOU/MM3 (ref 4.8–10.8)
WBC # BLD AUTO: 27.8 THOU/MM3 (ref 4.8–10.8)
WBC # BLD AUTO: 32 THOU/MM3 (ref 4.8–10.8)
WBC #/AREA URNS HPF: ABNORMAL /HPF
WBC #/AREA URNS HPF: NORMAL /HPF
YEAST LIKE FUNGI URNS QL MICRO: ABNORMAL
YEAST LIKE FUNGI URNS QL MICRO: NORMAL

## 2024-01-01 PROCEDURE — 6370000000 HC RX 637 (ALT 250 FOR IP): Performed by: INTERNAL MEDICINE

## 2024-01-01 PROCEDURE — 36415 COLL VENOUS BLD VENIPUNCTURE: CPT

## 2024-01-01 PROCEDURE — 84100 ASSAY OF PHOSPHORUS: CPT

## 2024-01-01 PROCEDURE — 6360000002 HC RX W HCPCS

## 2024-01-01 PROCEDURE — 87077 CULTURE AEROBIC IDENTIFY: CPT

## 2024-01-01 PROCEDURE — 71045 X-RAY EXAM CHEST 1 VIEW: CPT

## 2024-01-01 PROCEDURE — 31500 INSERT EMERGENCY AIRWAY: CPT

## 2024-01-01 PROCEDURE — 82948 REAGENT STRIP/BLOOD GLUCOSE: CPT

## 2024-01-01 PROCEDURE — 2500000003 HC RX 250 WO HCPCS: Performed by: STUDENT IN AN ORGANIZED HEALTH CARE EDUCATION/TRAINING PROGRAM

## 2024-01-01 PROCEDURE — 84133 ASSAY OF URINE POTASSIUM: CPT

## 2024-01-01 PROCEDURE — 85520 HEPARIN ASSAY: CPT

## 2024-01-01 PROCEDURE — 84540 ASSAY OF URINE/UREA-N: CPT

## 2024-01-01 PROCEDURE — 85025 COMPLETE CBC W/AUTO DIFF WBC: CPT

## 2024-01-01 PROCEDURE — 83880 ASSAY OF NATRIURETIC PEPTIDE: CPT

## 2024-01-01 PROCEDURE — 82330 ASSAY OF CALCIUM: CPT

## 2024-01-01 PROCEDURE — 6360000002 HC RX W HCPCS: Performed by: STUDENT IN AN ORGANIZED HEALTH CARE EDUCATION/TRAINING PROGRAM

## 2024-01-01 PROCEDURE — 83605 ASSAY OF LACTIC ACID: CPT

## 2024-01-01 PROCEDURE — 81003 URINALYSIS AUTO W/O SCOPE: CPT

## 2024-01-01 PROCEDURE — 6370000000 HC RX 637 (ALT 250 FOR IP): Performed by: STUDENT IN AN ORGANIZED HEALTH CARE EDUCATION/TRAINING PROGRAM

## 2024-01-01 PROCEDURE — 2580000003 HC RX 258: Performed by: STUDENT IN AN ORGANIZED HEALTH CARE EDUCATION/TRAINING PROGRAM

## 2024-01-01 PROCEDURE — 2500000003 HC RX 250 WO HCPCS

## 2024-01-01 PROCEDURE — 99223 1ST HOSP IP/OBS HIGH 75: CPT | Performed by: STUDENT IN AN ORGANIZED HEALTH CARE EDUCATION/TRAINING PROGRAM

## 2024-01-01 PROCEDURE — 83735 ASSAY OF MAGNESIUM: CPT

## 2024-01-01 PROCEDURE — 82947 ASSAY GLUCOSE BLOOD QUANT: CPT

## 2024-01-01 PROCEDURE — 99291 CRITICAL CARE FIRST HOUR: CPT | Performed by: INTERNAL MEDICINE

## 2024-01-01 PROCEDURE — 94761 N-INVAS EAR/PLS OXIMETRY MLT: CPT

## 2024-01-01 PROCEDURE — 93005 ELECTROCARDIOGRAM TRACING: CPT

## 2024-01-01 PROCEDURE — 87486 CHLMYD PNEUM DNA AMP PROBE: CPT

## 2024-01-01 PROCEDURE — 87040 BLOOD CULTURE FOR BACTERIA: CPT

## 2024-01-01 PROCEDURE — 87798 DETECT AGENT NOS DNA AMP: CPT

## 2024-01-01 PROCEDURE — 94003 VENT MGMT INPAT SUBQ DAY: CPT

## 2024-01-01 PROCEDURE — 82570 ASSAY OF URINE CREATININE: CPT

## 2024-01-01 PROCEDURE — 37185 PRIM ART M-THRMBC SBSQ VSL: CPT

## 2024-01-01 PROCEDURE — 02CP3ZZ EXTIRPATION OF MATTER FROM PULMONARY TRUNK, PERCUTANEOUS APPROACH: ICD-10-PCS | Performed by: RADIOLOGY

## 2024-01-01 PROCEDURE — 82010 KETONE BODYS QUAN: CPT

## 2024-01-01 PROCEDURE — 80177 DRUG SCRN QUAN LEVETIRACETAM: CPT

## 2024-01-01 PROCEDURE — 81001 URINALYSIS AUTO W/SCOPE: CPT

## 2024-01-01 PROCEDURE — 82803 BLOOD GASES ANY COMBINATION: CPT

## 2024-01-01 PROCEDURE — 3E05317 INTRODUCTION OF OTHER THROMBOLYTIC INTO PERIPHERAL ARTERY, PERCUTANEOUS APPROACH: ICD-10-PCS | Performed by: RADIOLOGY

## 2024-01-01 PROCEDURE — 83615 LACTATE (LD) (LDH) ENZYME: CPT

## 2024-01-01 PROCEDURE — 2000000000 HC ICU R&B

## 2024-01-01 PROCEDURE — 2580000003 HC RX 258

## 2024-01-01 PROCEDURE — 6370000000 HC RX 637 (ALT 250 FOR IP)

## 2024-01-01 PROCEDURE — 84300 ASSAY OF URINE SODIUM: CPT

## 2024-01-01 PROCEDURE — 84295 ASSAY OF SERUM SODIUM: CPT

## 2024-01-01 PROCEDURE — 83930 ASSAY OF BLOOD OSMOLALITY: CPT

## 2024-01-01 PROCEDURE — 37799 UNLISTED PX VASCULAR SURGERY: CPT

## 2024-01-01 PROCEDURE — 5A1955Z RESPIRATORY VENTILATION, GREATER THAN 96 CONSECUTIVE HOURS: ICD-10-PCS | Performed by: STUDENT IN AN ORGANIZED HEALTH CARE EDUCATION/TRAINING PROGRAM

## 2024-01-01 PROCEDURE — 93306 TTE W/DOPPLER COMPLETE: CPT

## 2024-01-01 PROCEDURE — 2700000000 HC OXYGEN THERAPY PER DAY

## 2024-01-01 PROCEDURE — 87801 DETECT AGNT MULT DNA AMPLI: CPT

## 2024-01-01 PROCEDURE — 93306 TTE W/DOPPLER COMPLETE: CPT | Performed by: NUCLEAR MEDICINE

## 2024-01-01 PROCEDURE — 6360000004 HC RX CONTRAST MEDICATION: Performed by: STUDENT IN AN ORGANIZED HEALTH CARE EDUCATION/TRAINING PROGRAM

## 2024-01-01 PROCEDURE — 84484 ASSAY OF TROPONIN QUANT: CPT

## 2024-01-01 PROCEDURE — 87631 RESP VIRUS 3-5 TARGETS: CPT

## 2024-01-01 PROCEDURE — 84439 ASSAY OF FREE THYROXINE: CPT

## 2024-01-01 PROCEDURE — 82435 ASSAY OF BLOOD CHLORIDE: CPT

## 2024-01-01 PROCEDURE — 94002 VENT MGMT INPAT INIT DAY: CPT

## 2024-01-01 PROCEDURE — 94640 AIRWAY INHALATION TREATMENT: CPT

## 2024-01-01 PROCEDURE — 80053 COMPREHEN METABOLIC PANEL: CPT

## 2024-01-01 PROCEDURE — 84145 PROCALCITONIN (PCT): CPT

## 2024-01-01 PROCEDURE — 36015 PLACE CATHETER IN ARTERY: CPT

## 2024-01-01 PROCEDURE — 36556 INSERT NON-TUNNEL CV CATH: CPT

## 2024-01-01 PROCEDURE — 84560 ASSAY OF URINE/URIC ACID: CPT

## 2024-01-01 PROCEDURE — 99285 EMERGENCY DEPT VISIT HI MDM: CPT

## 2024-01-01 PROCEDURE — 70553 MRI BRAIN STEM W/O & W/DYE: CPT

## 2024-01-01 PROCEDURE — 87150 DNA/RNA AMPLIFIED PROBE: CPT

## 2024-01-01 PROCEDURE — 02CQ3ZZ EXTIRPATION OF MATTER FROM RIGHT PULMONARY ARTERY, PERCUTANEOUS APPROACH: ICD-10-PCS | Performed by: RADIOLOGY

## 2024-01-01 PROCEDURE — 82248 BILIRUBIN DIRECT: CPT

## 2024-01-01 PROCEDURE — C9113 INJ PANTOPRAZOLE SODIUM, VIA: HCPCS | Performed by: STUDENT IN AN ORGANIZED HEALTH CARE EDUCATION/TRAINING PROGRAM

## 2024-01-01 PROCEDURE — 2580000003 HC RX 258: Performed by: NURSE PRACTITIONER

## 2024-01-01 PROCEDURE — 80185 ASSAY OF PHENYTOIN TOTAL: CPT

## 2024-01-01 PROCEDURE — 02CR3ZZ EXTIRPATION OF MATTER FROM LEFT PULMONARY ARTERY, PERCUTANEOUS APPROACH: ICD-10-PCS | Performed by: RADIOLOGY

## 2024-01-01 PROCEDURE — 80048 BASIC METABOLIC PNL TOTAL CA: CPT

## 2024-01-01 PROCEDURE — 82565 ASSAY OF CREATININE: CPT

## 2024-01-01 PROCEDURE — 87070 CULTURE OTHR SPECIMN AEROBIC: CPT

## 2024-01-01 PROCEDURE — 85730 THROMBOPLASTIN TIME PARTIAL: CPT

## 2024-01-01 PROCEDURE — 2580000003 HC RX 258: Performed by: INTERNAL MEDICINE

## 2024-01-01 PROCEDURE — C1757 CATH, THROMBECTOMY/EMBOLECT: HCPCS

## 2024-01-01 PROCEDURE — 83935 ASSAY OF URINE OSMOLALITY: CPT

## 2024-01-01 PROCEDURE — 95819 EEG AWAKE AND ASLEEP: CPT

## 2024-01-01 PROCEDURE — 95718 EEG PHYS/QHP 2-12 HR W/VEEG: CPT | Performed by: PSYCHIATRY & NEUROLOGY

## 2024-01-01 PROCEDURE — 2500000003 HC RX 250 WO HCPCS: Performed by: NURSE PRACTITIONER

## 2024-01-01 PROCEDURE — 71275 CT ANGIOGRAPHY CHEST: CPT

## 2024-01-01 PROCEDURE — 6360000004 HC RX CONTRAST MEDICATION: Performed by: RADIOLOGY

## 2024-01-01 PROCEDURE — 82533 TOTAL CORTISOL: CPT

## 2024-01-01 PROCEDURE — 93010 ELECTROCARDIOGRAM REPORT: CPT | Performed by: INTERNAL MEDICINE

## 2024-01-01 PROCEDURE — 87086 URINE CULTURE/COLONY COUNT: CPT

## 2024-01-01 PROCEDURE — 84132 ASSAY OF SERUM POTASSIUM: CPT

## 2024-01-01 PROCEDURE — 85610 PROTHROMBIN TIME: CPT

## 2024-01-01 PROCEDURE — 37184 PRIM ART M-THRMBC 1ST VSL: CPT

## 2024-01-01 PROCEDURE — 99223 1ST HOSP IP/OBS HIGH 75: CPT | Performed by: INTERNAL MEDICINE

## 2024-01-01 PROCEDURE — A9579 GAD-BASE MR CONTRAST NOS,1ML: HCPCS | Performed by: STUDENT IN AN ORGANIZED HEALTH CARE EDUCATION/TRAINING PROGRAM

## 2024-01-01 PROCEDURE — 87641 MR-STAPH DNA AMP PROBE: CPT

## 2024-01-01 PROCEDURE — 99233 SBSQ HOSP IP/OBS HIGH 50: CPT | Performed by: STUDENT IN AN ORGANIZED HEALTH CARE EDUCATION/TRAINING PROGRAM

## 2024-01-01 PROCEDURE — 99233 SBSQ HOSP IP/OBS HIGH 50: CPT | Performed by: INTERNAL MEDICINE

## 2024-01-01 PROCEDURE — 6360000004 HC RX CONTRAST MEDICATION: Performed by: EMERGENCY MEDICINE

## 2024-01-01 PROCEDURE — 87541 LEGION PNEUMO DNA AMP PROB: CPT

## 2024-01-01 PROCEDURE — 2500000003 HC RX 250 WO HCPCS: Performed by: INTERNAL MEDICINE

## 2024-01-01 PROCEDURE — 51702 INSERT TEMP BLADDER CATH: CPT

## 2024-01-01 PROCEDURE — 2500000003 HC RX 250 WO HCPCS: Performed by: RADIOLOGY

## 2024-01-01 PROCEDURE — 87581 M.PNEUMON DNA AMP PROBE: CPT

## 2024-01-01 PROCEDURE — 83036 HEMOGLOBIN GLYCOSYLATED A1C: CPT

## 2024-01-01 PROCEDURE — 84550 ASSAY OF BLOOD/URIC ACID: CPT

## 2024-01-01 PROCEDURE — 5A1935Z RESPIRATORY VENTILATION, LESS THAN 24 CONSECUTIVE HOURS: ICD-10-PCS | Performed by: STUDENT IN AN ORGANIZED HEALTH CARE EDUCATION/TRAINING PROGRAM

## 2024-01-01 PROCEDURE — 36620 INSERTION CATHETER ARTERY: CPT

## 2024-01-01 PROCEDURE — 82436 ASSAY OF URINE CHLORIDE: CPT

## 2024-01-01 PROCEDURE — P9047 ALBUMIN (HUMAN), 25%, 50ML: HCPCS

## 2024-01-01 PROCEDURE — 6360000002 HC RX W HCPCS: Performed by: RADIOLOGY

## 2024-01-01 PROCEDURE — 87636 SARSCOV2 & INF A&B AMP PRB: CPT

## 2024-01-01 PROCEDURE — 87186 SC STD MICRODIL/AGAR DIL: CPT

## 2024-01-01 PROCEDURE — 36600 WITHDRAWAL OF ARTERIAL BLOOD: CPT

## 2024-01-01 PROCEDURE — 89220 SPUTUM SPECIMEN COLLECTION: CPT

## 2024-01-01 PROCEDURE — 84443 ASSAY THYROID STIM HORMONE: CPT

## 2024-01-01 PROCEDURE — 87205 SMEAR GRAM STAIN: CPT

## 2024-01-01 PROCEDURE — 0BH17EZ INSERTION OF ENDOTRACHEAL AIRWAY INTO TRACHEA, VIA NATURAL OR ARTIFICIAL OPENING: ICD-10-PCS | Performed by: STUDENT IN AN ORGANIZED HEALTH CARE EDUCATION/TRAINING PROGRAM

## 2024-01-01 PROCEDURE — 96374 THER/PROPH/DIAG INJ IV PUSH: CPT

## 2024-01-01 PROCEDURE — 95711 VEEG 2-12 HR UNMONITORED: CPT

## 2024-01-01 RX ORDER — OLANZAPINE 5 MG/1
5 TABLET ORAL NIGHTLY
Status: DISCONTINUED | OUTPATIENT
Start: 2024-01-01 | End: 2024-01-01 | Stop reason: HOSPADM

## 2024-01-01 RX ORDER — POTASSIUM CHLORIDE 7.45 MG/ML
10 INJECTION INTRAVENOUS PRN
Status: DISCONTINUED | OUTPATIENT
Start: 2024-01-01 | End: 2024-01-01

## 2024-01-01 RX ORDER — TRAZODONE HYDROCHLORIDE 100 MG/1
100 TABLET ORAL NIGHTLY
Status: DISCONTINUED | OUTPATIENT
Start: 2024-01-01 | End: 2024-01-01 | Stop reason: HOSPADM

## 2024-01-01 RX ORDER — FENTANYL CITRATE-0.9 % NACL/PF 10 MCG/ML
25-200 PLASTIC BAG, INJECTION (ML) INTRAVENOUS CONTINUOUS
Status: CANCELLED | OUTPATIENT
Start: 2024-01-01

## 2024-01-01 RX ORDER — BUDESONIDE 0.5 MG/2ML
0.5 INHALANT ORAL
Status: COMPLETED | OUTPATIENT
Start: 2024-01-01 | End: 2024-01-01

## 2024-01-01 RX ORDER — LORAZEPAM 2 MG/ML
1 INJECTION INTRAMUSCULAR ONCE
Status: COMPLETED | OUTPATIENT
Start: 2024-01-01 | End: 2024-01-01

## 2024-01-01 RX ORDER — DRONABINOL 5 MG/1
5 CAPSULE ORAL
Status: DISCONTINUED | OUTPATIENT
Start: 2024-01-01 | End: 2024-01-01

## 2024-01-01 RX ORDER — ACETAMINOPHEN 650 MG/1
650 SUPPOSITORY RECTAL EVERY 6 HOURS PRN
Status: CANCELLED | OUTPATIENT
Start: 2024-01-01

## 2024-01-01 RX ORDER — MORPHINE SULFATE 2 MG/ML
2 INJECTION, SOLUTION INTRAMUSCULAR; INTRAVENOUS
Status: DISCONTINUED | OUTPATIENT
Start: 2024-01-01 | End: 2024-01-01

## 2024-01-01 RX ORDER — ONDANSETRON 4 MG/1
4 TABLET, ORALLY DISINTEGRATING ORAL EVERY 6 HOURS PRN
Status: DISCONTINUED | OUTPATIENT
Start: 2024-01-01 | End: 2024-01-01 | Stop reason: HOSPADM

## 2024-01-01 RX ORDER — QUETIAPINE FUMARATE 100 MG/1
100 TABLET, FILM COATED ORAL NIGHTLY
Status: DISCONTINUED | OUTPATIENT
Start: 2024-01-01 | End: 2024-01-01 | Stop reason: HOSPADM

## 2024-01-01 RX ORDER — MIDAZOLAM HYDROCHLORIDE 1 MG/ML
INJECTION INTRAMUSCULAR; INTRAVENOUS PRN
Status: COMPLETED | OUTPATIENT
Start: 2024-01-01 | End: 2024-01-01

## 2024-01-01 RX ORDER — POTASSIUM CHLORIDE 29.8 MG/ML
20 INJECTION INTRAVENOUS
Status: DISCONTINUED | OUTPATIENT
Start: 2024-01-01 | End: 2024-01-01

## 2024-01-01 RX ORDER — POLYETHYLENE GLYCOL 3350 17 G/17G
17 POWDER, FOR SOLUTION ORAL DAILY PRN
Status: DISCONTINUED | OUTPATIENT
Start: 2024-01-01 | End: 2024-01-01 | Stop reason: HOSPADM

## 2024-01-01 RX ORDER — CLONIDINE HYDROCHLORIDE 0.1 MG/1
0.1 TABLET ORAL 2 TIMES DAILY
COMMUNITY

## 2024-01-01 RX ORDER — MIDAZOLAM HYDROCHLORIDE 1 MG/ML
1-10 INJECTION, SOLUTION INTRAVENOUS CONTINUOUS
Status: DISCONTINUED | OUTPATIENT
Start: 2024-01-01 | End: 2024-01-01

## 2024-01-01 RX ORDER — INSULIN LISPRO 100 [IU]/ML
0-16 INJECTION, SOLUTION INTRAVENOUS; SUBCUTANEOUS
Status: DISCONTINUED | OUTPATIENT
Start: 2024-01-01 | End: 2024-01-01 | Stop reason: HOSPADM

## 2024-01-01 RX ORDER — DOXEPIN 3 MG/1
6 TABLET, FILM COATED ORAL NIGHTLY
Status: CANCELLED | OUTPATIENT
Start: 2024-01-01

## 2024-01-01 RX ORDER — INSULIN LISPRO 100 [IU]/ML
0-4 INJECTION, SOLUTION INTRAVENOUS; SUBCUTANEOUS
Status: DISCONTINUED | OUTPATIENT
Start: 2024-01-01 | End: 2024-01-01

## 2024-01-01 RX ORDER — ONDANSETRON 2 MG/ML
4 INJECTION INTRAMUSCULAR; INTRAVENOUS EVERY 6 HOURS PRN
Status: DISCONTINUED | OUTPATIENT
Start: 2024-01-01 | End: 2024-01-01 | Stop reason: HOSPADM

## 2024-01-01 RX ORDER — INSULIN GLARGINE 100 [IU]/ML
5 INJECTION, SOLUTION SUBCUTANEOUS DAILY
Status: DISCONTINUED | OUTPATIENT
Start: 2024-01-01 | End: 2024-01-01 | Stop reason: HOSPADM

## 2024-01-01 RX ORDER — FENTANYL CITRATE-0.9 % NACL/PF 10 MCG/ML
25-200 PLASTIC BAG, INJECTION (ML) INTRAVENOUS CONTINUOUS
Status: DISCONTINUED | OUTPATIENT
Start: 2024-01-01 | End: 2024-01-01 | Stop reason: HOSPADM

## 2024-01-01 RX ORDER — LORAZEPAM 2 MG/ML
0.5 INJECTION INTRAMUSCULAR EVERY 30 MIN PRN
Status: DISCONTINUED | OUTPATIENT
Start: 2024-01-01 | End: 2024-01-01

## 2024-01-01 RX ORDER — GABAPENTIN 100 MG/1
200 CAPSULE ORAL 3 TIMES DAILY
Status: DISCONTINUED | OUTPATIENT
Start: 2024-01-01 | End: 2024-01-01 | Stop reason: HOSPADM

## 2024-01-01 RX ORDER — SODIUM CHLORIDE, SODIUM LACTATE, POTASSIUM CHLORIDE, AND CALCIUM CHLORIDE .6; .31; .03; .02 G/100ML; G/100ML; G/100ML; G/100ML
500 INJECTION, SOLUTION INTRAVENOUS ONCE
Status: COMPLETED | OUTPATIENT
Start: 2024-01-01 | End: 2024-01-01

## 2024-01-01 RX ORDER — QUETIAPINE FUMARATE 100 MG/1
100 TABLET, FILM COATED ORAL NIGHTLY
Status: CANCELLED | OUTPATIENT
Start: 2024-01-01

## 2024-01-01 RX ORDER — DILTIAZEM HYDROCHLORIDE 5 MG/ML
10 INJECTION INTRAVENOUS ONCE
Status: COMPLETED | OUTPATIENT
Start: 2024-01-01 | End: 2024-01-01

## 2024-01-01 RX ORDER — NOREPINEPHRINE BITARTRATE 0.06 MG/ML
1-100 INJECTION, SOLUTION INTRAVENOUS CONTINUOUS
Status: DISCONTINUED | OUTPATIENT
Start: 2024-01-01 | End: 2024-01-01

## 2024-01-01 RX ORDER — PHENYTOIN SODIUM 50 MG/ML
100 INJECTION INTRAMUSCULAR; INTRAVENOUS EVERY 8 HOURS
Status: CANCELLED | OUTPATIENT
Start: 2024-01-01

## 2024-01-01 RX ORDER — MORPHINE SULFATE 10 MG/ML
6 INJECTION, SOLUTION INTRAMUSCULAR; INTRAVENOUS ONCE
Status: CANCELLED | OUTPATIENT
Start: 2024-01-01 | End: 2024-01-01

## 2024-01-01 RX ORDER — SODIUM CHLORIDE 0.9 % (FLUSH) 0.9 %
5-40 SYRINGE (ML) INJECTION EVERY 12 HOURS SCHEDULED
Status: DISCONTINUED | OUTPATIENT
Start: 2024-01-01 | End: 2024-01-01

## 2024-01-01 RX ORDER — DOXEPIN HYDROCHLORIDE 10 MG/ML
6 SOLUTION ORAL NIGHTLY
Status: DISCONTINUED | OUTPATIENT
Start: 2024-01-01 | End: 2024-01-01

## 2024-01-01 RX ORDER — INSULIN LISPRO 100 [IU]/ML
0-4 INJECTION, SOLUTION INTRAVENOUS; SUBCUTANEOUS NIGHTLY
Status: DISCONTINUED | OUTPATIENT
Start: 2024-01-01 | End: 2024-01-01 | Stop reason: HOSPADM

## 2024-01-01 RX ORDER — POLYETHYLENE GLYCOL 3350 17 G/17G
17 POWDER, FOR SOLUTION ORAL DAILY PRN
Status: DISCONTINUED | OUTPATIENT
Start: 2024-01-01 | End: 2024-01-01

## 2024-01-01 RX ORDER — INSULIN LISPRO 100 [IU]/ML
0-4 INJECTION, SOLUTION INTRAVENOUS; SUBCUTANEOUS EVERY 6 HOURS SCHEDULED
Status: DISCONTINUED | OUTPATIENT
Start: 2024-01-01 | End: 2024-01-01

## 2024-01-01 RX ORDER — POTASSIUM CHLORIDE 20 MEQ/1
40 TABLET, EXTENDED RELEASE ORAL PRN
Status: DISCONTINUED | OUTPATIENT
Start: 2024-01-01 | End: 2024-01-01 | Stop reason: HOSPADM

## 2024-01-01 RX ORDER — GLUCAGON 1 MG/ML
1 KIT INJECTION PRN
Status: DISCONTINUED | OUTPATIENT
Start: 2024-01-01 | End: 2024-01-01 | Stop reason: HOSPADM

## 2024-01-01 RX ORDER — MORPHINE SULFATE 2 MG/ML
1 INJECTION, SOLUTION INTRAMUSCULAR; INTRAVENOUS EVERY 4 HOURS PRN
Status: DISCONTINUED | OUTPATIENT
Start: 2024-01-01 | End: 2024-01-01

## 2024-01-01 RX ORDER — MORPHINE SULFATE 30 MG/1
60 TABLET, FILM COATED, EXTENDED RELEASE ORAL 3 TIMES DAILY
Status: DISCONTINUED | OUTPATIENT
Start: 2024-01-01 | End: 2024-01-01

## 2024-01-01 RX ORDER — MORPHINE SULFATE 2 MG/ML
1 INJECTION, SOLUTION INTRAMUSCULAR; INTRAVENOUS EVERY 4 HOURS PRN
Status: DISCONTINUED | OUTPATIENT
Start: 2024-01-01 | End: 2024-01-01 | Stop reason: HOSPADM

## 2024-01-01 RX ORDER — HEPARIN SODIUM 1000 [USP'U]/ML
80 INJECTION, SOLUTION INTRAVENOUS; SUBCUTANEOUS PRN
Status: DISCONTINUED | OUTPATIENT
Start: 2024-01-01 | End: 2024-01-01 | Stop reason: HOSPADM

## 2024-01-01 RX ORDER — FENTANYL CITRATE 50 UG/ML
INJECTION, SOLUTION INTRAMUSCULAR; INTRAVENOUS PRN
Status: COMPLETED | OUTPATIENT
Start: 2024-01-01 | End: 2024-01-01

## 2024-01-01 RX ORDER — GLYCOPYRROLATE 0.2 MG/ML
0.4 INJECTION INTRAMUSCULAR; INTRAVENOUS
Status: DISCONTINUED | OUTPATIENT
Start: 2024-01-01 | End: 2024-01-01 | Stop reason: HOSPADM

## 2024-01-01 RX ORDER — POTASSIUM CHLORIDE 29.8 MG/ML
20 INJECTION INTRAVENOUS PRN
Status: DISCONTINUED | OUTPATIENT
Start: 2024-01-01 | End: 2024-01-01

## 2024-01-01 RX ORDER — LORAZEPAM 2 MG/ML
0.5 INJECTION INTRAMUSCULAR EVERY 30 MIN PRN
Status: CANCELLED | OUTPATIENT
Start: 2024-01-01

## 2024-01-01 RX ORDER — LORAZEPAM 2 MG/ML
1 INJECTION INTRAMUSCULAR EVERY 30 MIN PRN
Status: DISCONTINUED | OUTPATIENT
Start: 2024-01-01 | End: 2024-01-01

## 2024-01-01 RX ORDER — GABAPENTIN 100 MG/1
200 CAPSULE ORAL 3 TIMES DAILY
Status: CANCELLED | OUTPATIENT
Start: 2024-01-01

## 2024-01-01 RX ORDER — MAGNESIUM SULFATE IN WATER 40 MG/ML
2000 INJECTION, SOLUTION INTRAVENOUS PRN
Status: DISCONTINUED | OUTPATIENT
Start: 2024-01-01 | End: 2024-01-01

## 2024-01-01 RX ORDER — IPRATROPIUM BROMIDE AND ALBUTEROL SULFATE 2.5; .5 MG/3ML; MG/3ML
1 SOLUTION RESPIRATORY (INHALATION) EVERY 4 HOURS PRN
Status: DISCONTINUED | OUTPATIENT
Start: 2024-01-01 | End: 2024-01-01 | Stop reason: HOSPADM

## 2024-01-01 RX ORDER — ACETAMINOPHEN 650 MG/1
650 SUPPOSITORY RECTAL EVERY 6 HOURS PRN
Status: DISCONTINUED | OUTPATIENT
Start: 2024-01-01 | End: 2024-01-01 | Stop reason: HOSPADM

## 2024-01-01 RX ORDER — MAGNESIUM SULFATE IN WATER 40 MG/ML
2000 INJECTION, SOLUTION INTRAVENOUS PRN
Status: DISCONTINUED | OUTPATIENT
Start: 2024-01-01 | End: 2024-01-01 | Stop reason: HOSPADM

## 2024-01-01 RX ORDER — MIDAZOLAM HYDROCHLORIDE 1 MG/ML
1-10 INJECTION, SOLUTION INTRAVENOUS CONTINUOUS
Status: DISCONTINUED | OUTPATIENT
Start: 2024-01-01 | End: 2024-01-01 | Stop reason: HOSPADM

## 2024-01-01 RX ORDER — DOXEPIN 3 MG/1
6 TABLET, FILM COATED ORAL NIGHTLY
Status: DISCONTINUED | OUTPATIENT
Start: 2024-01-01 | End: 2024-01-01 | Stop reason: HOSPADM

## 2024-01-01 RX ORDER — CISATRACURIUM BESYLATE 2 MG/ML
10 INJECTION, SOLUTION INTRAVENOUS ONCE
Status: DISCONTINUED | OUTPATIENT
Start: 2024-01-01 | End: 2024-01-01

## 2024-01-01 RX ORDER — INSULIN LISPRO 100 [IU]/ML
0-4 INJECTION, SOLUTION INTRAVENOUS; SUBCUTANEOUS NIGHTLY
Status: DISCONTINUED | OUTPATIENT
Start: 2024-01-01 | End: 2024-01-01

## 2024-01-01 RX ORDER — FEEDER CONTAINER WITH PUMP SET
1 EACH MISCELLANEOUS
COMMUNITY

## 2024-01-01 RX ORDER — MAGNESIUM SULFATE IN WATER 40 MG/ML
2000 INJECTION, SOLUTION INTRAVENOUS ONCE
Status: COMPLETED | OUTPATIENT
Start: 2024-01-01 | End: 2024-01-01

## 2024-01-01 RX ORDER — MORPHINE SULFATE 30 MG/1
60 TABLET, FILM COATED, EXTENDED RELEASE ORAL 3 TIMES DAILY
Status: DISCONTINUED | OUTPATIENT
Start: 2024-01-01 | End: 2024-01-01 | Stop reason: HOSPADM

## 2024-01-01 RX ORDER — MIDAZOLAM HYDROCHLORIDE 1 MG/ML
4 INJECTION INTRAMUSCULAR; INTRAVENOUS ONCE
Status: COMPLETED | OUTPATIENT
Start: 2024-01-01 | End: 2024-01-01

## 2024-01-01 RX ORDER — ARFORMOTEROL TARTRATE 15 UG/2ML
15 SOLUTION RESPIRATORY (INHALATION)
Status: COMPLETED | OUTPATIENT
Start: 2024-01-01 | End: 2024-01-01

## 2024-01-01 RX ORDER — DEXAMETHASONE 4 MG/1
4 TABLET ORAL 2 TIMES DAILY WITH MEALS
COMMUNITY

## 2024-01-01 RX ORDER — HEPARIN SODIUM 10000 [USP'U]/100ML
5-30 INJECTION, SOLUTION INTRAVENOUS CONTINUOUS
Status: CANCELLED | OUTPATIENT
Start: 2024-01-01

## 2024-01-01 RX ORDER — ALBUMIN (HUMAN) 12.5 G/50ML
25 SOLUTION INTRAVENOUS ONCE
Status: COMPLETED | OUTPATIENT
Start: 2024-01-01 | End: 2024-01-01

## 2024-01-01 RX ORDER — GLYCOPYRROLATE 0.2 MG/ML
0.4 INJECTION INTRAMUSCULAR; INTRAVENOUS
Status: CANCELLED | OUTPATIENT
Start: 2024-01-01

## 2024-01-01 RX ORDER — DULOXETIN HYDROCHLORIDE 20 MG/1
20 CAPSULE, DELAYED RELEASE ORAL 2 TIMES DAILY
Status: CANCELLED | OUTPATIENT
Start: 2024-01-01

## 2024-01-01 RX ORDER — DEXMEDETOMIDINE HYDROCHLORIDE 4 UG/ML
.1-1.5 INJECTION, SOLUTION INTRAVENOUS CONTINUOUS
Status: DISCONTINUED | OUTPATIENT
Start: 2024-01-01 | End: 2024-01-01 | Stop reason: HOSPADM

## 2024-01-01 RX ORDER — DULOXETIN HYDROCHLORIDE 20 MG/1
20 CAPSULE, DELAYED RELEASE ORAL 2 TIMES DAILY
Status: DISCONTINUED | OUTPATIENT
Start: 2024-01-01 | End: 2024-01-01 | Stop reason: HOSPADM

## 2024-01-01 RX ORDER — MIDAZOLAM HYDROCHLORIDE 1 MG/ML
INJECTION INTRAMUSCULAR; INTRAVENOUS
Status: COMPLETED
Start: 2024-01-01 | End: 2024-01-01

## 2024-01-01 RX ORDER — MORPHINE SULFATE 2 MG/ML
2 INJECTION, SOLUTION INTRAMUSCULAR; INTRAVENOUS EVERY 4 HOURS PRN
Status: DISCONTINUED | OUTPATIENT
Start: 2024-01-01 | End: 2024-01-01

## 2024-01-01 RX ORDER — QUETIAPINE FUMARATE 100 MG/1
100 TABLET, FILM COATED ORAL NIGHTLY
Status: DISCONTINUED | OUTPATIENT
Start: 2024-01-01 | End: 2024-01-01

## 2024-01-01 RX ORDER — HEPARIN SODIUM 1000 [USP'U]/ML
40 INJECTION, SOLUTION INTRAVENOUS; SUBCUTANEOUS PRN
Status: DISCONTINUED | OUTPATIENT
Start: 2024-01-01 | End: 2024-01-01

## 2024-01-01 RX ORDER — DEXMEDETOMIDINE HYDROCHLORIDE 4 UG/ML
.1-1.5 INJECTION, SOLUTION INTRAVENOUS CONTINUOUS
Status: CANCELLED | OUTPATIENT
Start: 2024-01-01

## 2024-01-01 RX ORDER — SENNOSIDES A AND B 8.6 MG/1
2 TABLET, FILM COATED ORAL 2 TIMES DAILY
Status: DISCONTINUED | OUTPATIENT
Start: 2024-01-01 | End: 2024-01-01 | Stop reason: HOSPADM

## 2024-01-01 RX ORDER — ONDANSETRON 4 MG/1
4 TABLET, ORALLY DISINTEGRATING ORAL EVERY 6 HOURS PRN
Status: CANCELLED | OUTPATIENT
Start: 2024-01-01

## 2024-01-01 RX ORDER — OXYCODONE HYDROCHLORIDE 5 MG/1
20 TABLET ORAL EVERY 4 HOURS PRN
Status: DISCONTINUED | OUTPATIENT
Start: 2024-01-01 | End: 2024-01-01

## 2024-01-01 RX ORDER — ONDANSETRON 4 MG/1
4 TABLET, ORALLY DISINTEGRATING ORAL EVERY 8 HOURS PRN
Status: DISCONTINUED | OUTPATIENT
Start: 2024-01-01 | End: 2024-01-01 | Stop reason: HOSPADM

## 2024-01-01 RX ORDER — SODIUM CHLORIDE 0.9 % (FLUSH) 0.9 %
5-40 SYRINGE (ML) INJECTION PRN
Status: DISCONTINUED | OUTPATIENT
Start: 2024-01-01 | End: 2024-01-01

## 2024-01-01 RX ORDER — LORAZEPAM 2 MG/ML
1 INJECTION INTRAMUSCULAR ONCE
Status: CANCELLED | OUTPATIENT
Start: 2024-01-01 | End: 2024-01-01

## 2024-01-01 RX ORDER — DRONABINOL 5 MG/1
5 CAPSULE ORAL
Status: CANCELLED | OUTPATIENT
Start: 2024-01-01

## 2024-01-01 RX ORDER — DEXTROSE MONOHYDRATE 50 MG/ML
INJECTION, SOLUTION INTRAVENOUS CONTINUOUS
Status: DISCONTINUED | OUTPATIENT
Start: 2024-01-01 | End: 2024-01-01

## 2024-01-01 RX ORDER — PANTOPRAZOLE SODIUM 40 MG/1
40 TABLET, DELAYED RELEASE ORAL
Status: DISCONTINUED | OUTPATIENT
Start: 2024-01-01 | End: 2024-01-01

## 2024-01-01 RX ORDER — HYDROCORTISONE 10 MG/1
10 TABLET ORAL 2 TIMES DAILY
Status: DISCONTINUED | OUTPATIENT
Start: 2024-01-01 | End: 2024-01-01 | Stop reason: HOSPADM

## 2024-01-01 RX ORDER — MORPHINE SULFATE 2 MG/ML
INJECTION, SOLUTION INTRAMUSCULAR; INTRAVENOUS
Status: COMPLETED
Start: 2024-01-01 | End: 2024-01-01

## 2024-01-01 RX ORDER — SODIUM CHLORIDE, SODIUM LACTATE, POTASSIUM CHLORIDE, CALCIUM CHLORIDE 600; 310; 30; 20 MG/100ML; MG/100ML; MG/100ML; MG/100ML
INJECTION, SOLUTION INTRAVENOUS CONTINUOUS
Status: DISCONTINUED | OUTPATIENT
Start: 2024-01-01 | End: 2024-01-01

## 2024-01-01 RX ORDER — DEXTROSE MONOHYDRATE 100 MG/ML
INJECTION, SOLUTION INTRAVENOUS CONTINUOUS PRN
Status: DISCONTINUED | OUTPATIENT
Start: 2024-01-01 | End: 2024-01-01 | Stop reason: HOSPADM

## 2024-01-01 RX ORDER — POTASSIUM CHLORIDE 7.45 MG/ML
10 INJECTION INTRAVENOUS PRN
Status: DISCONTINUED | OUTPATIENT
Start: 2024-01-01 | End: 2024-01-01 | Stop reason: HOSPADM

## 2024-01-01 RX ORDER — DRONABINOL 5 MG/1
5 CAPSULE ORAL
Status: DISCONTINUED | OUTPATIENT
Start: 2024-01-01 | End: 2024-01-01 | Stop reason: HOSPADM

## 2024-01-01 RX ORDER — SODIUM CHLORIDE 9 MG/ML
INJECTION, SOLUTION INTRAVENOUS PRN
Status: DISCONTINUED | OUTPATIENT
Start: 2024-01-01 | End: 2024-01-01

## 2024-01-01 RX ORDER — CISATRACURIUM BESYLATE 2 MG/ML
20 INJECTION, SOLUTION INTRAVENOUS ONCE
Status: DISCONTINUED | OUTPATIENT
Start: 2024-01-01 | End: 2024-01-01

## 2024-01-01 RX ORDER — NICOTINE 21 MG/24HR
1 PATCH, TRANSDERMAL 24 HOURS TRANSDERMAL DAILY
Status: DISCONTINUED | OUTPATIENT
Start: 2024-01-01 | End: 2024-01-01 | Stop reason: HOSPADM

## 2024-01-01 RX ORDER — THIAMINE HYDROCHLORIDE 100 MG/ML
100 INJECTION, SOLUTION INTRAMUSCULAR; INTRAVENOUS DAILY
Status: DISCONTINUED | OUTPATIENT
Start: 2024-01-01 | End: 2024-01-01 | Stop reason: HOSPADM

## 2024-01-01 RX ORDER — ONDANSETRON 2 MG/ML
4 INJECTION INTRAMUSCULAR; INTRAVENOUS EVERY 6 HOURS PRN
Status: DISCONTINUED | OUTPATIENT
Start: 2024-01-01 | End: 2024-01-01

## 2024-01-01 RX ORDER — ALBUTEROL SULFATE 90 UG/1
2 AEROSOL, METERED RESPIRATORY (INHALATION) EVERY 6 HOURS PRN
Status: CANCELLED | OUTPATIENT
Start: 2024-01-01

## 2024-01-01 RX ORDER — SODIUM CHLORIDE 0.9 % (FLUSH) 0.9 %
5-40 SYRINGE (ML) INJECTION EVERY 12 HOURS SCHEDULED
Status: DISCONTINUED | OUTPATIENT
Start: 2024-01-01 | End: 2024-01-01 | Stop reason: HOSPADM

## 2024-01-01 RX ORDER — IPRATROPIUM BROMIDE AND ALBUTEROL SULFATE 2.5; .5 MG/3ML; MG/3ML
1 SOLUTION RESPIRATORY (INHALATION)
Status: DISCONTINUED | OUTPATIENT
Start: 2024-01-01 | End: 2024-01-01

## 2024-01-01 RX ORDER — SODIUM CHLORIDE 9 MG/ML
INJECTION, SOLUTION INTRAVENOUS PRN
Status: DISCONTINUED | OUTPATIENT
Start: 2024-01-01 | End: 2024-01-01 | Stop reason: HOSPADM

## 2024-01-01 RX ORDER — HEPARIN SODIUM 10000 [USP'U]/100ML
5-30 INJECTION, SOLUTION INTRAVENOUS CONTINUOUS
Status: DISCONTINUED | OUTPATIENT
Start: 2024-01-01 | End: 2024-01-01

## 2024-01-01 RX ORDER — HALOPERIDOL 5 MG/ML
2 INJECTION INTRAMUSCULAR
Status: CANCELLED | OUTPATIENT
Start: 2024-01-01

## 2024-01-01 RX ORDER — SODIUM CHLORIDE 9 MG/ML
INJECTION, SOLUTION INTRAVENOUS CONTINUOUS
Status: ACTIVE | OUTPATIENT
Start: 2024-01-01 | End: 2024-01-01

## 2024-01-01 RX ORDER — MORPHINE SULFATE 10 MG/ML
8 INJECTION, SOLUTION INTRAMUSCULAR; INTRAVENOUS EVERY 10 MIN PRN
Status: DISCONTINUED | OUTPATIENT
Start: 2024-01-01 | End: 2024-01-01 | Stop reason: HOSPADM

## 2024-01-01 RX ORDER — NICOTINE 21 MG/24HR
1 PATCH, TRANSDERMAL 24 HOURS TRANSDERMAL DAILY
Status: DISCONTINUED | OUTPATIENT
Start: 2024-01-01 | End: 2024-01-01

## 2024-01-01 RX ORDER — TRAZODONE HYDROCHLORIDE 100 MG/1
100 TABLET ORAL NIGHTLY
Status: DISCONTINUED | OUTPATIENT
Start: 2024-01-01 | End: 2024-01-01

## 2024-01-01 RX ORDER — MORPHINE SULFATE 30 MG/1
60 TABLET, FILM COATED, EXTENDED RELEASE ORAL 3 TIMES DAILY
Status: CANCELLED | OUTPATIENT
Start: 2024-01-01

## 2024-01-01 RX ORDER — IPRATROPIUM BROMIDE AND ALBUTEROL SULFATE 2.5; .5 MG/3ML; MG/3ML
1 SOLUTION RESPIRATORY (INHALATION) EVERY 4 HOURS PRN
Status: CANCELLED | OUTPATIENT
Start: 2024-01-01

## 2024-01-01 RX ORDER — 0.9 % SODIUM CHLORIDE 0.9 %
1000 INTRAVENOUS SOLUTION INTRAVENOUS ONCE
Status: COMPLETED | OUTPATIENT
Start: 2024-01-01 | End: 2024-01-01

## 2024-01-01 RX ORDER — ALBUTEROL SULFATE 90 UG/1
2 AEROSOL, METERED RESPIRATORY (INHALATION) EVERY 6 HOURS PRN
Status: DISCONTINUED | OUTPATIENT
Start: 2024-01-01 | End: 2024-01-01 | Stop reason: HOSPADM

## 2024-01-01 RX ORDER — POTASSIUM CHLORIDE 29.8 MG/ML
20 INJECTION INTRAVENOUS
Status: COMPLETED | OUTPATIENT
Start: 2024-01-01 | End: 2024-01-01

## 2024-01-01 RX ORDER — MIDAZOLAM HYDROCHLORIDE 1 MG/ML
1-10 INJECTION, SOLUTION INTRAVENOUS CONTINUOUS
Status: CANCELLED | OUTPATIENT
Start: 2024-01-01

## 2024-01-01 RX ORDER — MORPHINE SULFATE 2 MG/ML
2 INJECTION, SOLUTION INTRAMUSCULAR; INTRAVENOUS EVERY 4 HOURS PRN
Status: DISCONTINUED | OUTPATIENT
Start: 2024-01-01 | End: 2024-01-01 | Stop reason: HOSPADM

## 2024-01-01 RX ORDER — DOCUSATE SODIUM 100 MG/1
100 CAPSULE, LIQUID FILLED ORAL 2 TIMES DAILY
COMMUNITY

## 2024-01-01 RX ORDER — ACETAMINOPHEN 325 MG/1
650 TABLET ORAL EVERY 6 HOURS PRN
Status: DISCONTINUED | OUTPATIENT
Start: 2024-01-01 | End: 2024-01-01

## 2024-01-01 RX ORDER — MORPHINE SULFATE 10 MG/ML
6 INJECTION, SOLUTION INTRAMUSCULAR; INTRAVENOUS EVERY 10 MIN PRN
Status: DISCONTINUED | OUTPATIENT
Start: 2024-01-01 | End: 2024-01-01

## 2024-01-01 RX ORDER — DOXEPIN HYDROCHLORIDE 10 MG/ML
0.6 SOLUTION ORAL NIGHTLY PRN
COMMUNITY

## 2024-01-01 RX ORDER — FENTANYL CITRATE-0.9 % NACL/PF 10 MCG/ML
PLASTIC BAG, INJECTION (ML) INTRAVENOUS
Status: COMPLETED
Start: 2024-01-01 | End: 2024-01-01

## 2024-01-01 RX ORDER — HEPARIN SODIUM 1000 [USP'U]/ML
80 INJECTION, SOLUTION INTRAVENOUS; SUBCUTANEOUS ONCE
Status: COMPLETED | OUTPATIENT
Start: 2024-01-01 | End: 2024-01-01

## 2024-01-01 RX ORDER — PHENYTOIN SODIUM 50 MG/ML
100 INJECTION INTRAMUSCULAR; INTRAVENOUS EVERY 8 HOURS
Status: DISCONTINUED | OUTPATIENT
Start: 2024-01-01 | End: 2024-01-01 | Stop reason: HOSPADM

## 2024-01-01 RX ORDER — SENNOSIDES A AND B 8.6 MG/1
2 TABLET, FILM COATED ORAL 2 TIMES DAILY
Status: CANCELLED | OUTPATIENT
Start: 2024-01-01

## 2024-01-01 RX ORDER — GABAPENTIN 100 MG/1
200 CAPSULE ORAL 3 TIMES DAILY
Status: DISCONTINUED | OUTPATIENT
Start: 2024-01-01 | End: 2024-01-01

## 2024-01-01 RX ORDER — HEPARIN SODIUM 1000 [USP'U]/ML
80 INJECTION, SOLUTION INTRAVENOUS; SUBCUTANEOUS PRN
Status: DISCONTINUED | OUTPATIENT
Start: 2024-01-01 | End: 2024-01-01

## 2024-01-01 RX ORDER — MORPHINE SULFATE 2 MG/ML
2 INJECTION, SOLUTION INTRAMUSCULAR; INTRAVENOUS ONCE
Status: COMPLETED | OUTPATIENT
Start: 2024-01-01 | End: 2024-01-01

## 2024-01-01 RX ORDER — PANTOPRAZOLE SODIUM 40 MG/10ML
40 INJECTION, POWDER, LYOPHILIZED, FOR SOLUTION INTRAVENOUS DAILY
Status: CANCELLED | OUTPATIENT
Start: 2024-04-24

## 2024-01-01 RX ORDER — PANTOPRAZOLE SODIUM 40 MG/10ML
40 INJECTION, POWDER, LYOPHILIZED, FOR SOLUTION INTRAVENOUS DAILY
Status: DISCONTINUED | OUTPATIENT
Start: 2024-01-01 | End: 2024-01-01 | Stop reason: HOSPADM

## 2024-01-01 RX ORDER — LANOLIN ALCOHOL/MO/W.PET/CERES
100 CREAM (GRAM) TOPICAL DAILY
Status: DISCONTINUED | OUTPATIENT
Start: 2024-01-01 | End: 2024-01-01

## 2024-01-01 RX ORDER — LEVETIRACETAM 500 MG/1
500 TABLET ORAL 2 TIMES DAILY
Status: DISCONTINUED | OUTPATIENT
Start: 2024-01-01 | End: 2024-01-01

## 2024-01-01 RX ORDER — OLANZAPINE 5 MG/1
5 TABLET ORAL NIGHTLY
Status: CANCELLED | OUTPATIENT
Start: 2024-01-01

## 2024-01-01 RX ORDER — SODIUM CHLORIDE 0.9 % (FLUSH) 0.9 %
10 SYRINGE (ML) INJECTION PRN
Status: DISCONTINUED | OUTPATIENT
Start: 2024-01-01 | End: 2024-01-01 | Stop reason: HOSPADM

## 2024-01-01 RX ORDER — METOPROLOL TARTRATE 1 MG/ML
5 INJECTION, SOLUTION INTRAVENOUS ONCE
Status: COMPLETED | OUTPATIENT
Start: 2024-01-01 | End: 2024-01-01

## 2024-01-01 RX ORDER — ACETAMINOPHEN 650 MG/1
650 SUPPOSITORY RECTAL EVERY 6 HOURS PRN
Status: DISCONTINUED | OUTPATIENT
Start: 2024-01-01 | End: 2024-01-01

## 2024-01-01 RX ORDER — ONDANSETRON 2 MG/ML
4 INJECTION INTRAMUSCULAR; INTRAVENOUS EVERY 6 HOURS PRN
Status: CANCELLED | OUTPATIENT
Start: 2024-01-01

## 2024-01-01 RX ORDER — HEPARIN SODIUM 10000 [USP'U]/100ML
5-30 INJECTION, SOLUTION INTRAVENOUS CONTINUOUS
Status: DISCONTINUED | OUTPATIENT
Start: 2024-01-01 | End: 2024-01-01 | Stop reason: HOSPADM

## 2024-01-01 RX ORDER — HALOPERIDOL 5 MG/ML
2 INJECTION INTRAMUSCULAR
Status: DISCONTINUED | OUTPATIENT
Start: 2024-01-01 | End: 2024-01-01 | Stop reason: HOSPADM

## 2024-01-01 RX ORDER — MORPHINE SULFATE 2 MG/ML
1 INJECTION, SOLUTION INTRAMUSCULAR; INTRAVENOUS
Status: DISCONTINUED | OUTPATIENT
Start: 2024-01-01 | End: 2024-01-01

## 2024-01-01 RX ORDER — ONDANSETRON 4 MG/1
4 TABLET, ORALLY DISINTEGRATING ORAL EVERY 8 HOURS PRN
Status: DISCONTINUED | OUTPATIENT
Start: 2024-01-01 | End: 2024-01-01

## 2024-01-01 RX ORDER — MORPHINE SULFATE 10 MG/ML
6 INJECTION, SOLUTION INTRAMUSCULAR; INTRAVENOUS EVERY 10 MIN PRN
Status: CANCELLED | OUTPATIENT
Start: 2024-01-01

## 2024-01-01 RX ORDER — LORAZEPAM 2 MG/ML
2 INJECTION INTRAMUSCULAR EVERY 30 MIN PRN
Status: DISCONTINUED | OUTPATIENT
Start: 2024-01-01 | End: 2024-01-01 | Stop reason: HOSPADM

## 2024-01-01 RX ORDER — POLYETHYLENE GLYCOL 3350 17 G/17G
17 POWDER, FOR SOLUTION ORAL DAILY PRN
Status: CANCELLED | OUTPATIENT
Start: 2024-01-01

## 2024-01-01 RX ORDER — PANTOPRAZOLE SODIUM 40 MG/10ML
40 INJECTION, POWDER, LYOPHILIZED, FOR SOLUTION INTRAVENOUS DAILY
Status: DISCONTINUED | OUTPATIENT
Start: 2024-04-24 | End: 2024-01-01 | Stop reason: HOSPADM

## 2024-01-01 RX ORDER — FENTANYL CITRATE-0.9 % NACL/PF 10 MCG/ML
25-200 PLASTIC BAG, INJECTION (ML) INTRAVENOUS CONTINUOUS
Status: DISCONTINUED | OUTPATIENT
Start: 2024-01-01 | End: 2024-01-01

## 2024-01-01 RX ORDER — ACETAMINOPHEN 325 MG/1
650 TABLET ORAL EVERY 6 HOURS PRN
Status: DISCONTINUED | OUTPATIENT
Start: 2024-01-01 | End: 2024-01-01 | Stop reason: HOSPADM

## 2024-01-01 RX ORDER — IPRATROPIUM BROMIDE AND ALBUTEROL SULFATE 2.5; .5 MG/3ML; MG/3ML
2 SOLUTION RESPIRATORY (INHALATION) ONCE
Status: COMPLETED | OUTPATIENT
Start: 2024-01-01 | End: 2024-01-01

## 2024-01-01 RX ORDER — FOLIC ACID 1 MG/1
1 TABLET ORAL DAILY
Status: DISCONTINUED | OUTPATIENT
Start: 2024-01-01 | End: 2024-01-01

## 2024-01-01 RX ORDER — ACETAMINOPHEN 325 MG/1
650 TABLET ORAL EVERY 6 HOURS PRN
Status: CANCELLED | OUTPATIENT
Start: 2024-01-01

## 2024-01-01 RX ORDER — LIDOCAINE HYDROCHLORIDE 20 MG/ML
INJECTION, SOLUTION INFILTRATION; PERINEURAL PRN
Status: COMPLETED | OUTPATIENT
Start: 2024-01-01 | End: 2024-01-01

## 2024-01-01 RX ORDER — ONDANSETRON 4 MG/1
8 TABLET, ORALLY DISINTEGRATING ORAL EVERY 8 HOURS PRN
COMMUNITY

## 2024-01-01 RX ORDER — DEXTROSE MONOHYDRATE 50 MG/ML
INJECTION, SOLUTION INTRAVENOUS CONTINUOUS
Status: DISCONTINUED | OUTPATIENT
Start: 2024-01-01 | End: 2024-01-01 | Stop reason: HOSPADM

## 2024-01-01 RX ORDER — FOLIC ACID 5 MG/ML
1 INJECTION, SOLUTION INTRAMUSCULAR; INTRAVENOUS; SUBCUTANEOUS DAILY
Status: DISCONTINUED | OUTPATIENT
Start: 2024-01-01 | End: 2024-01-01 | Stop reason: HOSPADM

## 2024-01-01 RX ORDER — HEPARIN SODIUM 1000 [USP'U]/ML
40 INJECTION, SOLUTION INTRAVENOUS; SUBCUTANEOUS PRN
Status: DISCONTINUED | OUTPATIENT
Start: 2024-01-01 | End: 2024-01-01 | Stop reason: HOSPADM

## 2024-01-01 RX ORDER — MORPHINE SULFATE 10 MG/ML
6 INJECTION, SOLUTION INTRAMUSCULAR; INTRAVENOUS ONCE
Status: COMPLETED | OUTPATIENT
Start: 2024-01-01 | End: 2024-01-01

## 2024-01-01 RX ORDER — DEXMEDETOMIDINE HYDROCHLORIDE 4 UG/ML
.1-1.5 INJECTION, SOLUTION INTRAVENOUS CONTINUOUS
Status: DISCONTINUED | OUTPATIENT
Start: 2024-01-01 | End: 2024-01-01

## 2024-01-01 RX ADMIN — THIAMINE HYDROCHLORIDE 100 MG: 100 INJECTION, SOLUTION INTRAMUSCULAR; INTRAVENOUS at 07:53

## 2024-01-01 RX ADMIN — LEVETIRACETAM 500 MG: 100 INJECTION, SOLUTION INTRAVENOUS at 21:09

## 2024-01-01 RX ADMIN — Medication 200 MCG/HR: at 19:58

## 2024-01-01 RX ADMIN — SODIUM CHLORIDE, POTASSIUM CHLORIDE, SODIUM LACTATE AND CALCIUM CHLORIDE: 600; 310; 30; 20 INJECTION, SOLUTION INTRAVENOUS at 12:01

## 2024-01-01 RX ADMIN — THIAMINE HYDROCHLORIDE 100 MG: 100 INJECTION, SOLUTION INTRAMUSCULAR; INTRAVENOUS at 07:49

## 2024-01-01 RX ADMIN — LEVETIRACETAM 500 MG: 500 TABLET, FILM COATED ORAL at 23:37

## 2024-01-01 RX ADMIN — PHENYTOIN SODIUM 100 MG: 50 INJECTION INTRAMUSCULAR; INTRAVENOUS at 20:17

## 2024-01-01 RX ADMIN — Medication 200 MCG/HR: at 06:25

## 2024-01-01 RX ADMIN — Medication 200 MCG/HR: at 07:43

## 2024-01-01 RX ADMIN — MAGNESIUM SULFATE HEPTAHYDRATE 2000 MG: 40 INJECTION, SOLUTION INTRAVENOUS at 16:02

## 2024-01-01 RX ADMIN — METOPROLOL TARTRATE 5 MG: 5 INJECTION INTRAVENOUS at 01:36

## 2024-01-01 RX ADMIN — SODIUM PHOSPHATE, MONOBASIC, MONOHYDRATE AND SODIUM PHOSPHATE, DIBASIC, ANHYDROUS 15 MMOL: 142; 276 INJECTION, SOLUTION INTRAVENOUS at 07:08

## 2024-01-01 RX ADMIN — HYDROCORTISONE SODIUM SUCCINATE 100 MG: 100 INJECTION, POWDER, FOR SOLUTION INTRAMUSCULAR; INTRAVENOUS at 02:43

## 2024-01-01 RX ADMIN — HYDROCORTISONE SODIUM SUCCINATE 100 MG: 100 INJECTION, POWDER, FOR SOLUTION INTRAMUSCULAR; INTRAVENOUS at 02:30

## 2024-01-01 RX ADMIN — INSULIN LISPRO 12 UNITS: 100 INJECTION, SOLUTION INTRAVENOUS; SUBCUTANEOUS at 15:54

## 2024-01-01 RX ADMIN — SODIUM CHLORIDE: 9 INJECTION, SOLUTION INTRAVENOUS at 22:33

## 2024-01-01 RX ADMIN — INSULIN LISPRO 8 UNITS: 100 INJECTION, SOLUTION INTRAVENOUS; SUBCUTANEOUS at 08:58

## 2024-01-01 RX ADMIN — IPRATROPIUM BROMIDE AND ALBUTEROL SULFATE 1 DOSE: .5; 3 SOLUTION RESPIRATORY (INHALATION) at 15:39

## 2024-01-01 RX ADMIN — INSULIN GLARGINE 5 UNITS: 100 INJECTION, SOLUTION SUBCUTANEOUS at 08:58

## 2024-01-01 RX ADMIN — MORPHINE SULFATE 2 MG: 2 INJECTION, SOLUTION INTRAMUSCULAR; INTRAVENOUS at 23:10

## 2024-01-01 RX ADMIN — HEPARIN SODIUM 2000 UNITS: 1000 INJECTION INTRAVENOUS; SUBCUTANEOUS at 18:56

## 2024-01-01 RX ADMIN — SODIUM CHLORIDE, POTASSIUM CHLORIDE, SODIUM LACTATE AND CALCIUM CHLORIDE: 600; 310; 30; 20 INJECTION, SOLUTION INTRAVENOUS at 18:33

## 2024-01-01 RX ADMIN — POTASSIUM CHLORIDE 20 MEQ: 29.8 INJECTION, SOLUTION INTRAVENOUS at 05:35

## 2024-01-01 RX ADMIN — PHENYTOIN SODIUM 100 MG: 50 INJECTION INTRAMUSCULAR; INTRAVENOUS at 02:43

## 2024-01-01 RX ADMIN — HYDROCORTISONE SODIUM SUCCINATE 125 MG: 100 INJECTION, POWDER, FOR SOLUTION INTRAMUSCULAR; INTRAVENOUS at 16:30

## 2024-01-01 RX ADMIN — CEFEPIME 2000 MG: 2 INJECTION, POWDER, FOR SOLUTION INTRAVENOUS at 20:13

## 2024-01-01 RX ADMIN — HEPARIN SODIUM 2000 UNITS: 1000 INJECTION INTRAVENOUS; SUBCUTANEOUS at 14:56

## 2024-01-01 RX ADMIN — ARFORMOTEROL TARTRATE 15 MCG: 15 SOLUTION RESPIRATORY (INHALATION) at 20:22

## 2024-01-01 RX ADMIN — PHENYTOIN SODIUM 100 MG: 50 INJECTION INTRAMUSCULAR; INTRAVENOUS at 12:43

## 2024-01-01 RX ADMIN — LEVETIRACETAM 500 MG: 100 INJECTION, SOLUTION INTRAVENOUS at 09:12

## 2024-01-01 RX ADMIN — Medication 150 MCG/HR: at 02:23

## 2024-01-01 RX ADMIN — POTASSIUM PHOSPHATE, MONOBASIC POTASSIUM PHOSPHATE, DIBASIC 15 MMOL: 224; 236 INJECTION, SOLUTION, CONCENTRATE INTRAVENOUS at 05:37

## 2024-01-01 RX ADMIN — Medication 5 MCG/MIN: at 17:54

## 2024-01-01 RX ADMIN — SODIUM CHLORIDE, PRESERVATIVE FREE 10 ML: 5 INJECTION INTRAVENOUS at 07:53

## 2024-01-01 RX ADMIN — Medication 0.9 MCG/KG/HR: at 23:17

## 2024-01-01 RX ADMIN — Medication 100 MCG/HR: at 10:22

## 2024-01-01 RX ADMIN — HYDROCORTISONE SODIUM SUCCINATE 100 MG: 100 INJECTION, POWDER, FOR SOLUTION INTRAMUSCULAR; INTRAVENOUS at 02:55

## 2024-01-01 RX ADMIN — VASOPRESSIN 0.03 UNITS/MIN: 20 INJECTION INTRAVENOUS at 19:33

## 2024-01-01 RX ADMIN — IPRATROPIUM BROMIDE AND ALBUTEROL SULFATE 1 DOSE: .5; 3 SOLUTION RESPIRATORY (INHALATION) at 12:46

## 2024-01-01 RX ADMIN — CEFEPIME 2000 MG: 2 INJECTION, POWDER, FOR SOLUTION INTRAVENOUS at 12:06

## 2024-01-01 RX ADMIN — PHENYTOIN SODIUM 100 MG: 50 INJECTION INTRAMUSCULAR; INTRAVENOUS at 02:55

## 2024-01-01 RX ADMIN — HEPARIN SODIUM 2000 UNITS: 1000 INJECTION INTRAVENOUS; SUBCUTANEOUS at 05:29

## 2024-01-01 RX ADMIN — IOPAMIDOL 80 ML: 755 INJECTION, SOLUTION INTRAVENOUS at 14:14

## 2024-01-01 RX ADMIN — MEROPENEM 1000 MG: 1 INJECTION, POWDER, FOR SOLUTION INTRAVENOUS at 10:27

## 2024-01-01 RX ADMIN — LEVETIRACETAM 500 MG: 100 INJECTION, SOLUTION INTRAVENOUS at 21:36

## 2024-01-01 RX ADMIN — THIAMINE HYDROCHLORIDE 100 MG: 100 INJECTION, SOLUTION INTRAMUSCULAR; INTRAVENOUS at 07:58

## 2024-01-01 RX ADMIN — BUDESONIDE 500 MCG: 0.5 INHALANT RESPIRATORY (INHALATION) at 21:31

## 2024-01-01 RX ADMIN — MORPHINE SULFATE 1 MG: 2 INJECTION, SOLUTION INTRAMUSCULAR; INTRAVENOUS at 21:55

## 2024-01-01 RX ADMIN — Medication 35 MCG/MIN: at 08:55

## 2024-01-01 RX ADMIN — Medication 1.5 MCG/KG/HR: at 07:31

## 2024-01-01 RX ADMIN — POTASSIUM BICARBONATE 40 MEQ: 782 TABLET, EFFERVESCENT ORAL at 07:37

## 2024-01-01 RX ADMIN — HEPARIN SODIUM 18 UNITS/KG/HR: 10000 INJECTION, SOLUTION INTRAVENOUS at 16:01

## 2024-01-01 RX ADMIN — CEFEPIME 2000 MG: 2 INJECTION, POWDER, FOR SOLUTION INTRAVENOUS at 02:53

## 2024-01-01 RX ADMIN — ARFORMOTEROL TARTRATE 15 MCG: 15 SOLUTION RESPIRATORY (INHALATION) at 21:31

## 2024-01-01 RX ADMIN — CEFEPIME 2000 MG: 2 INJECTION, POWDER, FOR SOLUTION INTRAVENOUS at 16:50

## 2024-01-01 RX ADMIN — FOLIC ACID 1 MG: 5 INJECTION, SOLUTION INTRAMUSCULAR; INTRAVENOUS; SUBCUTANEOUS at 09:05

## 2024-01-01 RX ADMIN — PANTOPRAZOLE SODIUM 40 MG: 40 INJECTION, POWDER, FOR SOLUTION INTRAVENOUS at 08:18

## 2024-01-01 RX ADMIN — PHENYTOIN SODIUM 100 MG: 50 INJECTION INTRAMUSCULAR; INTRAVENOUS at 11:41

## 2024-01-01 RX ADMIN — BUDESONIDE 500 MCG: 0.5 INHALANT RESPIRATORY (INHALATION) at 20:21

## 2024-01-01 RX ADMIN — Medication 175 MCG/HR: at 19:23

## 2024-01-01 RX ADMIN — DEXTROSE MONOHYDRATE: 50 INJECTION, SOLUTION INTRAVENOUS at 06:39

## 2024-01-01 RX ADMIN — POTASSIUM CHLORIDE 20 MEQ: 29.8 INJECTION, SOLUTION INTRAVENOUS at 04:33

## 2024-01-01 RX ADMIN — Medication 1.1 MCG/KG/HR: at 14:28

## 2024-01-01 RX ADMIN — Medication 100 MCG/HR: at 12:52

## 2024-01-01 RX ADMIN — POTASSIUM BICARBONATE 40 MEQ: 782 TABLET, EFFERVESCENT ORAL at 06:37

## 2024-01-01 RX ADMIN — POTASSIUM BICARBONATE 40 MEQ: 782 TABLET, EFFERVESCENT ORAL at 07:44

## 2024-01-01 RX ADMIN — MORPHINE SULFATE 6 MG: 10 INJECTION INTRAVENOUS at 16:29

## 2024-01-01 RX ADMIN — Medication 0.2 MCG/KG/HR: at 07:52

## 2024-01-01 RX ADMIN — Medication 100 MCG/HR: at 02:59

## 2024-01-01 RX ADMIN — HYDROCORTISONE SODIUM SUCCINATE 100 MG: 100 INJECTION, POWDER, FOR SOLUTION INTRAMUSCULAR; INTRAVENOUS at 11:35

## 2024-01-01 RX ADMIN — Medication 125 MCG/HR: at 12:05

## 2024-01-01 RX ADMIN — FOLIC ACID 1 MG: 5 INJECTION, SOLUTION INTRAMUSCULAR; INTRAVENOUS; SUBCUTANEOUS at 10:09

## 2024-01-01 RX ADMIN — POTASSIUM BICARBONATE 40 MEQ: 782 TABLET, EFFERVESCENT ORAL at 04:31

## 2024-01-01 RX ADMIN — PHENYTOIN SODIUM 100 MG: 50 INJECTION INTRAMUSCULAR; INTRAVENOUS at 11:40

## 2024-01-01 RX ADMIN — LEVETIRACETAM 500 MG: 100 INJECTION, SOLUTION INTRAVENOUS at 21:11

## 2024-01-01 RX ADMIN — OLANZAPINE 5 MG: 5 TABLET, FILM COATED ORAL at 23:37

## 2024-01-01 RX ADMIN — LEVETIRACETAM 500 MG: 100 INJECTION, SOLUTION INTRAVENOUS at 21:22

## 2024-01-01 RX ADMIN — Medication 7 MG/HR: at 12:06

## 2024-01-01 RX ADMIN — SODIUM CHLORIDE 1000 ML: 9 INJECTION, SOLUTION INTRAVENOUS at 14:24

## 2024-01-01 RX ADMIN — MIDAZOLAM 4 MG: 1 INJECTION INTRAMUSCULAR; INTRAVENOUS at 02:23

## 2024-01-01 RX ADMIN — SODIUM CHLORIDE, PRESERVATIVE FREE 10 ML: 5 INJECTION INTRAVENOUS at 07:45

## 2024-01-01 RX ADMIN — HEPARIN SODIUM 22 UNITS/KG/HR: 10000 INJECTION, SOLUTION INTRAVENOUS at 17:22

## 2024-01-01 RX ADMIN — Medication 7 MCG/MIN: at 22:31

## 2024-01-01 RX ADMIN — FOLIC ACID 1 MG: 5 INJECTION, SOLUTION INTRAMUSCULAR; INTRAVENOUS; SUBCUTANEOUS at 07:56

## 2024-01-01 RX ADMIN — IPRATROPIUM BROMIDE AND ALBUTEROL SULFATE 1 DOSE: .5; 3 SOLUTION RESPIRATORY (INHALATION) at 17:36

## 2024-01-01 RX ADMIN — HYDROCORTISONE SODIUM SUCCINATE 100 MG: 100 INJECTION, POWDER, FOR SOLUTION INTRAMUSCULAR; INTRAVENOUS at 20:14

## 2024-01-01 RX ADMIN — HEPARIN SODIUM 3990 UNITS: 1000 INJECTION INTRAVENOUS; SUBCUTANEOUS at 16:00

## 2024-01-01 RX ADMIN — DOXEPIN 3 MG: 3 TABLET, FILM COATED ORAL at 23:37

## 2024-01-01 RX ADMIN — HEPARIN SODIUM 20 UNITS/KG/HR: 10000 INJECTION, SOLUTION INTRAVENOUS at 18:38

## 2024-01-01 RX ADMIN — HYDROCORTISONE SODIUM SUCCINATE 100 MG: 100 INJECTION, POWDER, FOR SOLUTION INTRAMUSCULAR; INTRAVENOUS at 21:02

## 2024-01-01 RX ADMIN — CEFEPIME 2000 MG: 2 INJECTION, POWDER, FOR SOLUTION INTRAVENOUS at 10:45

## 2024-01-01 RX ADMIN — HYDROCORTISONE SODIUM SUCCINATE 100 MG: 100 INJECTION, POWDER, FOR SOLUTION INTRAMUSCULAR; INTRAVENOUS at 10:11

## 2024-01-01 RX ADMIN — Medication 10 MCG/MIN: at 17:20

## 2024-01-01 RX ADMIN — MEROPENEM 1000 MG: 1 INJECTION, POWDER, FOR SOLUTION INTRAVENOUS at 01:23

## 2024-01-01 RX ADMIN — INSULIN LISPRO 8 UNITS: 100 INJECTION, SOLUTION INTRAVENOUS; SUBCUTANEOUS at 16:41

## 2024-01-01 RX ADMIN — CEFEPIME 2000 MG: 2 INJECTION, POWDER, FOR SOLUTION INTRAVENOUS at 20:30

## 2024-01-01 RX ADMIN — CEFEPIME 2000 MG: 2 INJECTION, POWDER, FOR SOLUTION INTRAVENOUS at 03:36

## 2024-01-01 RX ADMIN — DEXTROSE MONOHYDRATE: 50 INJECTION, SOLUTION INTRAVENOUS at 18:16

## 2024-01-01 RX ADMIN — GLYCOPYRROLATE 0.4 MG: 0.2 INJECTION INTRAMUSCULAR; INTRAVENOUS at 14:13

## 2024-01-01 RX ADMIN — SODIUM CHLORIDE, PRESERVATIVE FREE 10 ML: 5 INJECTION INTRAVENOUS at 09:03

## 2024-01-01 RX ADMIN — SODIUM CHLORIDE, PRESERVATIVE FREE 10 ML: 5 INJECTION INTRAVENOUS at 08:18

## 2024-01-01 RX ADMIN — POTASSIUM BICARBONATE 40 MEQ: 782 TABLET, EFFERVESCENT ORAL at 17:19

## 2024-01-01 RX ADMIN — INSULIN LISPRO 3 UNITS: 100 INJECTION, SOLUTION INTRAVENOUS; SUBCUTANEOUS at 12:19

## 2024-01-01 RX ADMIN — SODIUM PHOSPHATE, MONOBASIC, MONOHYDRATE AND SODIUM PHOSPHATE, DIBASIC, ANHYDROUS 15 MMOL: 142; 276 INJECTION, SOLUTION INTRAVENOUS at 12:38

## 2024-01-01 RX ADMIN — PANTOPRAZOLE SODIUM 40 MG: 40 INJECTION, POWDER, FOR SOLUTION INTRAVENOUS at 07:52

## 2024-01-01 RX ADMIN — SODIUM CHLORIDE, PRESERVATIVE FREE 10 ML: 5 INJECTION INTRAVENOUS at 20:19

## 2024-01-01 RX ADMIN — SODIUM PHOSPHATE, MONOBASIC, MONOHYDRATE AND SODIUM PHOSPHATE, DIBASIC, ANHYDROUS 15 MMOL: 142; 276 INJECTION, SOLUTION INTRAVENOUS at 22:37

## 2024-01-01 RX ADMIN — MORPHINE SULFATE 2 MG: 2 INJECTION, SOLUTION INTRAMUSCULAR; INTRAVENOUS at 01:28

## 2024-01-01 RX ADMIN — HYDROCORTISONE SODIUM SUCCINATE 100 MG: 100 INJECTION, POWDER, FOR SOLUTION INTRAMUSCULAR; INTRAVENOUS at 02:56

## 2024-01-01 RX ADMIN — PANTOPRAZOLE SODIUM 40 MG: 40 INJECTION, POWDER, FOR SOLUTION INTRAVENOUS at 09:01

## 2024-01-01 RX ADMIN — ALTEPLASE 4 MG: 2.2 INJECTION, POWDER, LYOPHILIZED, FOR SOLUTION INTRAVENOUS at 20:47

## 2024-01-01 RX ADMIN — MORPHINE SULFATE 6 MG: 10 INJECTION INTRAVENOUS at 14:52

## 2024-01-01 RX ADMIN — PHENYTOIN SODIUM 100 MG: 50 INJECTION INTRAMUSCULAR; INTRAVENOUS at 02:54

## 2024-01-01 RX ADMIN — LEVETIRACETAM 500 MG: 100 INJECTION, SOLUTION INTRAVENOUS at 08:17

## 2024-01-01 RX ADMIN — AZITHROMYCIN DIHYDRATE 500 MG: 500 INJECTION, POWDER, LYOPHILIZED, FOR SOLUTION INTRAVENOUS at 18:30

## 2024-01-01 RX ADMIN — INSULIN GLARGINE 5 UNITS: 100 INJECTION, SOLUTION SUBCUTANEOUS at 08:04

## 2024-01-01 RX ADMIN — BUDESONIDE 500 MCG: 0.5 INHALANT RESPIRATORY (INHALATION) at 09:07

## 2024-01-01 RX ADMIN — SODIUM PHOSPHATE, MONOBASIC, MONOHYDRATE AND SODIUM PHOSPHATE, DIBASIC, ANHYDROUS 15 MMOL: 142; 276 INJECTION, SOLUTION INTRAVENOUS at 14:17

## 2024-01-01 RX ADMIN — Medication 50 MCG/HR: at 01:00

## 2024-01-01 RX ADMIN — HYDROCORTISONE SODIUM SUCCINATE 100 MG: 100 INJECTION, POWDER, FOR SOLUTION INTRAMUSCULAR; INTRAVENOUS at 03:36

## 2024-01-01 RX ADMIN — HYDROCORTISONE SODIUM SUCCINATE 100 MG: 100 INJECTION, POWDER, FOR SOLUTION INTRAMUSCULAR; INTRAVENOUS at 09:08

## 2024-01-01 RX ADMIN — INSULIN GLARGINE 5 UNITS: 100 INJECTION, SOLUTION SUBCUTANEOUS at 15:58

## 2024-01-01 RX ADMIN — PANTOPRAZOLE SODIUM 40 MG: 40 INJECTION, POWDER, FOR SOLUTION INTRAVENOUS at 07:45

## 2024-01-01 RX ADMIN — LORAZEPAM 1 MG: 2 INJECTION INTRAMUSCULAR; INTRAVENOUS at 15:39

## 2024-01-01 RX ADMIN — GADOTERIDOL 15 ML: 279.3 INJECTION, SOLUTION INTRAVENOUS at 13:45

## 2024-01-01 RX ADMIN — ALBUMIN (HUMAN) 25 G: 0.25 INJECTION, SOLUTION INTRAVENOUS at 06:51

## 2024-01-01 RX ADMIN — IOPAMIDOL 25 ML: 612 INJECTION, SOLUTION INTRAVENOUS at 21:25

## 2024-01-01 RX ADMIN — SODIUM CHLORIDE, POTASSIUM CHLORIDE, SODIUM LACTATE AND CALCIUM CHLORIDE 500 ML: 600; 310; 30; 20 INJECTION, SOLUTION INTRAVENOUS at 10:50

## 2024-01-01 RX ADMIN — PHENYTOIN SODIUM 100 MG: 50 INJECTION INTRAMUSCULAR; INTRAVENOUS at 04:44

## 2024-01-01 RX ADMIN — FOLIC ACID 1 MG: 5 INJECTION, SOLUTION INTRAMUSCULAR; INTRAVENOUS; SUBCUTANEOUS at 10:39

## 2024-01-01 RX ADMIN — HYDROCORTISONE SODIUM SUCCINATE 100 MG: 100 INJECTION, POWDER, FOR SOLUTION INTRAMUSCULAR; INTRAVENOUS at 18:49

## 2024-01-01 RX ADMIN — HYDROCORTISONE SODIUM SUCCINATE 100 MG: 100 INJECTION, POWDER, FOR SOLUTION INTRAMUSCULAR; INTRAVENOUS at 10:07

## 2024-01-01 RX ADMIN — IPRATROPIUM BROMIDE AND ALBUTEROL SULFATE 1 DOSE: .5; 3 SOLUTION RESPIRATORY (INHALATION) at 08:28

## 2024-01-01 RX ADMIN — HEPARIN SODIUM 18 UNITS/KG/HR: 10000 INJECTION, SOLUTION INTRAVENOUS at 22:27

## 2024-01-01 RX ADMIN — HYDROCORTISONE SODIUM SUCCINATE 50 MG: 100 INJECTION, POWDER, FOR SOLUTION INTRAMUSCULAR; INTRAVENOUS at 04:36

## 2024-01-01 RX ADMIN — IPRATROPIUM BROMIDE AND ALBUTEROL SULFATE 1 DOSE: .5; 3 SOLUTION RESPIRATORY (INHALATION) at 03:29

## 2024-01-01 RX ADMIN — QUETIAPINE FUMARATE 100 MG: 100 TABLET ORAL at 23:37

## 2024-01-01 RX ADMIN — MORPHINE SULFATE 6 MG: 10 INJECTION INTRAVENOUS at 14:04

## 2024-01-01 RX ADMIN — SODIUM CHLORIDE, POTASSIUM CHLORIDE, SODIUM LACTATE AND CALCIUM CHLORIDE: 600; 310; 30; 20 INJECTION, SOLUTION INTRAVENOUS at 01:47

## 2024-01-01 RX ADMIN — Medication 200 MCG/HR: at 10:56

## 2024-01-01 RX ADMIN — HYDROCORTISONE SODIUM SUCCINATE 100 MG: 100 INJECTION, POWDER, FOR SOLUTION INTRAMUSCULAR; INTRAVENOUS at 18:29

## 2024-01-01 RX ADMIN — ARFORMOTEROL TARTRATE 15 MCG: 15 SOLUTION RESPIRATORY (INHALATION) at 09:07

## 2024-01-01 RX ADMIN — MIDAZOLAM 0.5 MG: 1 INJECTION INTRAMUSCULAR; INTRAVENOUS at 20:52

## 2024-01-01 RX ADMIN — GABAPENTIN 100 MG: 100 CAPSULE ORAL at 23:37

## 2024-01-01 RX ADMIN — PHENYTOIN SODIUM 100 MG: 50 INJECTION INTRAMUSCULAR; INTRAVENOUS at 21:15

## 2024-01-01 RX ADMIN — PHENYTOIN SODIUM 100 MG: 50 INJECTION INTRAMUSCULAR; INTRAVENOUS at 18:30

## 2024-01-01 RX ADMIN — HEPARIN SODIUM 26 UNITS/KG/HR: 10000 INJECTION, SOLUTION INTRAVENOUS at 06:34

## 2024-01-01 RX ADMIN — ACETAMINOPHEN 650 MG: 325 TABLET ORAL at 23:45

## 2024-01-01 RX ADMIN — Medication 0.9 MCG/KG/HR: at 08:12

## 2024-01-01 RX ADMIN — HYDROCORTISONE SODIUM SUCCINATE 100 MG: 100 INJECTION, POWDER, FOR SOLUTION INTRAMUSCULAR; INTRAVENOUS at 17:24

## 2024-01-01 RX ADMIN — Medication 7 MG/HR: at 07:38

## 2024-01-01 RX ADMIN — FOLIC ACID 1 MG: 5 INJECTION, SOLUTION INTRAMUSCULAR; INTRAVENOUS; SUBCUTANEOUS at 09:42

## 2024-01-01 RX ADMIN — Medication 75 MCG/HR: at 23:43

## 2024-01-01 RX ADMIN — LEVETIRACETAM 500 MG: 100 INJECTION, SOLUTION INTRAVENOUS at 10:00

## 2024-01-01 RX ADMIN — MEROPENEM 1000 MG: 1 INJECTION, POWDER, FOR SOLUTION INTRAVENOUS at 17:26

## 2024-01-01 RX ADMIN — PHENYTOIN SODIUM 100 MG: 50 INJECTION INTRAMUSCULAR; INTRAVENOUS at 02:30

## 2024-01-01 RX ADMIN — SODIUM CHLORIDE, POTASSIUM CHLORIDE, SODIUM LACTATE AND CALCIUM CHLORIDE: 600; 310; 30; 20 INJECTION, SOLUTION INTRAVENOUS at 13:40

## 2024-01-01 RX ADMIN — SODIUM CHLORIDE, PRESERVATIVE FREE 10 ML: 5 INJECTION INTRAVENOUS at 09:40

## 2024-01-01 RX ADMIN — LORAZEPAM 1 MG: 2 INJECTION INTRAMUSCULAR; INTRAVENOUS at 14:04

## 2024-01-01 RX ADMIN — DEXTROSE MONOHYDRATE 1000 ML: 50 INJECTION, SOLUTION INTRAVENOUS at 09:15

## 2024-01-01 RX ADMIN — CEFEPIME 2000 MG: 2 INJECTION, POWDER, FOR SOLUTION INTRAVENOUS at 04:36

## 2024-01-01 RX ADMIN — FENTANYL CITRATE 25 MCG: 50 INJECTION, SOLUTION INTRAMUSCULAR; INTRAVENOUS at 20:35

## 2024-01-01 RX ADMIN — VASOPRESSIN 0.03 UNITS/MIN: 20 INJECTION INTRAVENOUS at 09:06

## 2024-01-01 RX ADMIN — PHENYTOIN SODIUM 100 MG: 50 INJECTION INTRAMUSCULAR; INTRAVENOUS at 10:40

## 2024-01-01 RX ADMIN — HEPARIN SODIUM 26 UNITS/KG/HR: 10000 INJECTION, SOLUTION INTRAVENOUS at 01:58

## 2024-01-01 RX ADMIN — CEFEPIME 2000 MG: 2 INJECTION, POWDER, FOR SOLUTION INTRAVENOUS at 00:34

## 2024-01-01 RX ADMIN — CEFEPIME 2000 MG: 2 INJECTION, POWDER, FOR SOLUTION INTRAVENOUS at 12:02

## 2024-01-01 RX ADMIN — Medication 8 MG/HR: at 03:21

## 2024-01-01 RX ADMIN — ARFORMOTEROL TARTRATE 15 MCG: 15 SOLUTION RESPIRATORY (INHALATION) at 08:57

## 2024-01-01 RX ADMIN — LEVETIRACETAM 500 MG: 100 INJECTION, SOLUTION INTRAVENOUS at 09:41

## 2024-01-01 RX ADMIN — MORPHINE SULFATE 6 MG: 10 INJECTION INTRAVENOUS at 14:18

## 2024-01-01 RX ADMIN — SODIUM CHLORIDE, PRESERVATIVE FREE 10 ML: 5 INJECTION INTRAVENOUS at 21:02

## 2024-01-01 RX ADMIN — LORAZEPAM 1 MG: 2 INJECTION INTRAMUSCULAR; INTRAVENOUS at 14:52

## 2024-01-01 RX ADMIN — DEXTROSE MONOHYDRATE: 50 INJECTION, SOLUTION INTRAVENOUS at 16:33

## 2024-01-01 RX ADMIN — TRAZODONE HYDROCHLORIDE 100 MG: 100 TABLET ORAL at 23:37

## 2024-01-01 RX ADMIN — Medication 100 MCG/HR: at 00:42

## 2024-01-01 RX ADMIN — IPRATROPIUM BROMIDE AND ALBUTEROL SULFATE 1 DOSE: .5; 3 SOLUTION RESPIRATORY (INHALATION) at 21:27

## 2024-01-01 RX ADMIN — PHENYTOIN SODIUM 100 MG: 50 INJECTION INTRAMUSCULAR; INTRAVENOUS at 12:59

## 2024-01-01 RX ADMIN — THIAMINE HYDROCHLORIDE 100 MG: 100 INJECTION, SOLUTION INTRAMUSCULAR; INTRAVENOUS at 09:37

## 2024-01-01 RX ADMIN — Medication 200 MCG/HR: at 14:37

## 2024-01-01 RX ADMIN — BUDESONIDE 500 MCG: 0.5 INHALANT RESPIRATORY (INHALATION) at 08:57

## 2024-01-01 RX ADMIN — DEXTROSE MONOHYDRATE: 50 INJECTION, SOLUTION INTRAVENOUS at 04:06

## 2024-01-01 RX ADMIN — SODIUM CHLORIDE, POTASSIUM CHLORIDE, SODIUM LACTATE AND CALCIUM CHLORIDE 500 ML: 600; 310; 30; 20 INJECTION, SOLUTION INTRAVENOUS at 09:53

## 2024-01-01 RX ADMIN — MIDAZOLAM HYDROCHLORIDE 4 MG: 1 INJECTION INTRAMUSCULAR; INTRAVENOUS at 02:23

## 2024-01-01 RX ADMIN — LEVETIRACETAM 500 MG: 100 INJECTION, SOLUTION INTRAVENOUS at 08:58

## 2024-01-01 RX ADMIN — POLYETHYLENE GLYCOL 3350 17 G: 17 POWDER, FOR SOLUTION ORAL at 07:45

## 2024-01-01 RX ADMIN — DILTIAZEM HYDROCHLORIDE 10 MG: 5 INJECTION, SOLUTION INTRAVENOUS at 13:55

## 2024-01-01 RX ADMIN — CEFEPIME 2000 MG: 2 INJECTION, POWDER, FOR SOLUTION INTRAVENOUS at 12:47

## 2024-01-01 RX ADMIN — INSULIN GLARGINE 5 UNITS: 100 INJECTION, SOLUTION SUBCUTANEOUS at 08:10

## 2024-01-01 RX ADMIN — IPRATROPIUM BROMIDE AND ALBUTEROL SULFATE 1 DOSE: .5; 3 SOLUTION RESPIRATORY (INHALATION) at 16:41

## 2024-01-01 RX ADMIN — INSULIN LISPRO 8 UNITS: 100 INJECTION, SOLUTION INTRAVENOUS; SUBCUTANEOUS at 12:42

## 2024-01-01 RX ADMIN — LEVETIRACETAM 500 MG: 100 INJECTION, SOLUTION INTRAVENOUS at 08:26

## 2024-01-01 RX ADMIN — HYDROCORTISONE SODIUM SUCCINATE 100 MG: 100 INJECTION, POWDER, FOR SOLUTION INTRAMUSCULAR; INTRAVENOUS at 10:24

## 2024-01-01 RX ADMIN — MEROPENEM 1000 MG: 1 INJECTION, POWDER, FOR SOLUTION INTRAVENOUS at 10:17

## 2024-01-01 RX ADMIN — THIAMINE HYDROCHLORIDE 100 MG: 100 INJECTION, SOLUTION INTRAMUSCULAR; INTRAVENOUS at 08:21

## 2024-01-01 RX ADMIN — LEVETIRACETAM 500 MG: 100 INJECTION, SOLUTION INTRAVENOUS at 21:06

## 2024-01-01 RX ADMIN — PANTOPRAZOLE SODIUM 40 MG: 40 INJECTION, POWDER, FOR SOLUTION INTRAVENOUS at 11:35

## 2024-01-01 RX ADMIN — PHENYTOIN SODIUM 100 MG: 50 INJECTION INTRAMUSCULAR; INTRAVENOUS at 19:55

## 2024-01-01 RX ADMIN — PHENYTOIN SODIUM 100 MG: 50 INJECTION INTRAMUSCULAR; INTRAVENOUS at 03:36

## 2024-01-01 RX ADMIN — CEFEPIME 2000 MG: 2 INJECTION, POWDER, FOR SOLUTION INTRAVENOUS at 15:44

## 2024-01-01 RX ADMIN — INSULIN LISPRO 4 UNITS: 100 INJECTION, SOLUTION INTRAVENOUS; SUBCUTANEOUS at 08:16

## 2024-01-01 RX ADMIN — DEXTROSE MONOHYDRATE: 50 INJECTION, SOLUTION INTRAVENOUS at 05:48

## 2024-01-01 RX ADMIN — LIDOCAINE HYDROCHLORIDE 10 ML: 20 INJECTION, SOLUTION INFILTRATION; PERINEURAL at 21:18

## 2024-01-01 RX ADMIN — HEPARIN SODIUM 26 UNITS/KG/HR: 10000 INJECTION, SOLUTION INTRAVENOUS at 10:21

## 2024-01-01 RX ADMIN — IPRATROPIUM BROMIDE AND ALBUTEROL SULFATE 2 DOSE: .5; 3 SOLUTION RESPIRATORY (INHALATION) at 08:43

## 2024-01-01 RX ADMIN — Medication 2 MG/HR: at 02:41

## 2024-01-01 RX ADMIN — Medication 0.8 MCG/KG/HR: at 04:06

## 2024-01-01 RX ADMIN — THIAMINE HYDROCHLORIDE 100 MG: 100 INJECTION, SOLUTION INTRAMUSCULAR; INTRAVENOUS at 09:09

## 2024-01-01 RX ADMIN — Medication 200 MCG/HR: at 01:06

## 2024-01-01 RX ADMIN — FOLIC ACID 1 MG: 5 INJECTION, SOLUTION INTRAMUSCULAR; INTRAVENOUS; SUBCUTANEOUS at 10:13

## 2024-01-01 RX ADMIN — PANTOPRAZOLE SODIUM 40 MG: 40 INJECTION, POWDER, FOR SOLUTION INTRAVENOUS at 07:51

## 2024-01-01 RX ADMIN — INSULIN LISPRO 4 UNITS: 100 INJECTION, SOLUTION INTRAVENOUS; SUBCUTANEOUS at 08:09

## 2024-01-01 RX ADMIN — Medication 200 MCG/HR: at 06:11

## 2024-01-01 RX ADMIN — CEFEPIME 2000 MG: 2 INJECTION, POWDER, FOR SOLUTION INTRAVENOUS at 04:56

## 2024-01-01 RX ADMIN — HEPARIN SODIUM 26 UNITS/KG/HR: 10000 INJECTION, SOLUTION INTRAVENOUS at 14:55

## 2024-01-01 RX ADMIN — SODIUM PHOSPHATE, MONOBASIC, MONOHYDRATE AND SODIUM PHOSPHATE, DIBASIC, ANHYDROUS 15 MMOL: 142; 276 INJECTION, SOLUTION INTRAVENOUS at 18:45

## 2024-01-01 RX ADMIN — CEFEPIME 2000 MG: 2 INJECTION, POWDER, FOR SOLUTION INTRAVENOUS at 21:43

## 2024-01-01 RX ADMIN — GLYCOPYRROLATE 0.4 MG: 0.2 INJECTION INTRAMUSCULAR; INTRAVENOUS at 15:55

## 2024-01-01 RX ADMIN — ALTEPLASE 4 MG: 2.2 INJECTION, POWDER, LYOPHILIZED, FOR SOLUTION INTRAVENOUS at 21:10

## 2024-01-01 RX ADMIN — Medication 2 MG/HR: at 08:19

## 2024-01-01 RX ADMIN — SODIUM CHLORIDE, PRESERVATIVE FREE 10 ML: 5 INJECTION INTRAVENOUS at 22:08

## 2024-01-01 RX ADMIN — PHENYTOIN SODIUM 100 MG: 50 INJECTION INTRAMUSCULAR; INTRAVENOUS at 10:14

## 2024-01-01 RX ADMIN — MORPHINE SULFATE 30 MG: 30 TABLET, EXTENDED RELEASE ORAL at 23:37

## 2024-01-01 RX ADMIN — IPRATROPIUM BROMIDE AND ALBUTEROL SULFATE 1 DOSE: .5; 3 SOLUTION RESPIRATORY (INHALATION) at 20:21

## 2024-01-01 RX ADMIN — IPRATROPIUM BROMIDE AND ALBUTEROL SULFATE 1 DOSE: .5; 3 SOLUTION RESPIRATORY (INHALATION) at 08:57

## 2024-01-01 RX ADMIN — SODIUM CHLORIDE, PRESERVATIVE FREE 10 ML: 5 INJECTION INTRAVENOUS at 07:51

## 2024-01-01 RX ADMIN — Medication 0.2 MCG/KG/HR: at 01:05

## 2024-01-01 RX ADMIN — INSULIN GLARGINE 5 UNITS: 100 INJECTION, SOLUTION SUBCUTANEOUS at 08:16

## 2024-01-01 RX ADMIN — Medication 25 MCG/MIN: at 21:10

## 2024-01-01 RX ADMIN — IOPAMIDOL 80 ML: 755 INJECTION, SOLUTION INTRAVENOUS at 03:33

## 2024-01-01 RX ADMIN — ACETAMINOPHEN 650 MG: 325 TABLET ORAL at 07:54

## 2024-01-01 RX ADMIN — MORPHINE SULFATE 6 MG: 10 INJECTION INTRAVENOUS at 15:39

## 2024-01-01 ASSESSMENT — PULMONARY FUNCTION TESTS
PIF_VALUE: 29
PIF_VALUE: 21
PIF_VALUE: 32
PIF_VALUE: 30
PIF_VALUE: 28
PIF_VALUE: 21
PIF_VALUE: 25
PIF_VALUE: 21
PIF_VALUE: 16
PIF_VALUE: 20
PIF_VALUE: 28
PIF_VALUE: 25
PIF_VALUE: 24
PIF_VALUE: 20
PIF_VALUE: 30
PIF_VALUE: 23
PIF_VALUE: 20
PIF_VALUE: 30
PIF_VALUE: 32
PIF_VALUE: 24
PIF_VALUE: 30
PIF_VALUE: 29
PIF_VALUE: 628
PIF_VALUE: 20
PIF_VALUE: 22
PIF_VALUE: 223
PIF_VALUE: 28
PIF_VALUE: 30
PIF_VALUE: 28
PIF_VALUE: 16
PIF_VALUE: 20
PIF_VALUE: 17
PIF_VALUE: 24
PIF_VALUE: 28

## 2024-01-01 ASSESSMENT — PAIN - FUNCTIONAL ASSESSMENT: PAIN_FUNCTIONAL_ASSESSMENT: 0-10

## 2024-01-03 ENCOUNTER — CLINICAL DOCUMENTATION (OUTPATIENT)
Dept: NUTRITION | Age: 54
End: 2024-01-03

## 2024-01-03 DIAGNOSIS — C34.91 ADENOCARCINOMA OF RIGHT LUNG, STAGE 4 (HCC): ICD-10-CM

## 2024-01-03 DIAGNOSIS — Z51.5 PALLIATIVE CARE PATIENT: ICD-10-CM

## 2024-01-03 DIAGNOSIS — C79.31 LUNG CANCER METASTATIC TO BRAIN (HCC): ICD-10-CM

## 2024-01-03 DIAGNOSIS — G89.3 CANCER RELATED PAIN: ICD-10-CM

## 2024-01-03 DIAGNOSIS — C34.90 LUNG CANCER METASTATIC TO BRAIN (HCC): ICD-10-CM

## 2024-01-03 NOTE — TELEPHONE ENCOUNTER
Pt's sister Mery called requesting Oxycodone 20mg IR 1 tab every 4 hours refill. Pt states he has 2 pills left for today. Pt states his pain is pretty well controlled with the 20mg IR. [facility] verified.

## 2024-01-03 NOTE — PROGRESS NOTES
Nutrition Assessment    Reason for Visit:   1/3/24 - referral from JOSE Garland - lung cancer with brain mets, BMI 18, no appetite    Nutrition Recommendations:   PO at best efforts  Small and frequent meals/snacks  Eat on a schedule  ONS daily  Adequate hydration  Soft and Moist protein foods    Malnutrition Assessment: (1/3/24)  Malnutrition Status: severe malnutrition  Context: acute illness  Findings of the 6 clinical characteristics of malnutrition (minimum of 2 out of 6 clinical characteristics is required to make the dx of moderate or severe Protein Calorie Malnutrition based on AND/ASPEN Guidelines):   1. Energy Intake: decreased intake, no appetite   2. Weight Loss: -27# in a \"couple months\" (19% loss of body weight)   3. Fat Loss: severe loss   4. Muscle Loss: severe loss   5. Fluid Accumulation: none noted   6.  Strength: not measured    Nutrition Diagnosis:   Problem: severe malnutrition in the context of acute illness  Etiology: catabolic illness  Signs and Symptoms: patient report of no appetite, unintentional weight loss, severe fat and muscle loss    Nutrition Assessment:   History: tobacco abuse, alcohol abuse, gastritis, COPD, stage 4 lung cancer with brain mets, radiation to brain completed 12/2/2023, paranoid schizophrenia, severe malnutrition, marijuana use, MDD.  Subjective:   1/3/24 - Patient seen today with sister. Patient reports no appetite which started before he knew he had cancer. Patient reports a normal weight of 145# which he weighed a \"couple months ago\". Sister remarks that they are suppose to be getting ONS (ensure or boost) sent to them. Neither is able to tell me who/where the ONS is suppose to be coming from. Sister mentions that patient is on steroids but still no appetite. Patient is edentulous and currently is not wearing dentures due to ulcer on gums. Patient does report that his bowels are starting to move. Sister mentions that patient is on a lot of meds. Patient is

## 2024-01-03 NOTE — TELEPHONE ENCOUNTER
Spoke with Mery, Mery states Pt's significant other keeps track of the oxycodone. Johny payton informed Mery that the patient is close to due for the oxycodone.

## 2024-01-03 NOTE — TELEPHONE ENCOUNTER
How often is he taking the oxycodone?  He got his prescription filled on December 22 for 15 days for total 90 tablets.  It has not yet been 15 days from the 22nd.

## 2024-01-05 RX ORDER — OXYCODONE HYDROCHLORIDE 20 MG/1
20 TABLET ORAL EVERY 4 HOURS PRN
Qty: 90 TABLET | Refills: 0 | Status: SHIPPED | OUTPATIENT
Start: 2024-01-05 | End: 2024-01-20

## 2024-01-08 ENCOUNTER — HOSPITAL ENCOUNTER (OUTPATIENT)
Dept: PET IMAGING | Age: 54
Discharge: HOME | End: 2024-01-08

## 2024-01-08 DIAGNOSIS — C34.91 ADENOCARCINOMA OF RIGHT LUNG, STAGE 4 (HCC): ICD-10-CM

## 2024-01-11 DIAGNOSIS — M79.2 NEUROPATHIC PAIN: ICD-10-CM

## 2024-01-11 DIAGNOSIS — G89.3 CANCER RELATED PAIN: ICD-10-CM

## 2024-01-11 DIAGNOSIS — C79.31 LUNG CANCER METASTATIC TO BRAIN (HCC): ICD-10-CM

## 2024-01-11 DIAGNOSIS — Z51.5 PALLIATIVE CARE PATIENT: ICD-10-CM

## 2024-01-11 DIAGNOSIS — C34.90 LUNG CANCER METASTATIC TO BRAIN (HCC): ICD-10-CM

## 2024-01-11 DIAGNOSIS — C34.91 ADENOCARCINOMA OF RIGHT LUNG, STAGE 4 (HCC): ICD-10-CM

## 2024-01-11 DIAGNOSIS — R11.2 NAUSEA AND VOMITING, UNSPECIFIED VOMITING TYPE: ICD-10-CM

## 2024-01-11 RX ORDER — MORPHINE SULFATE 60 MG/1
60 TABLET, FILM COATED, EXTENDED RELEASE ORAL 3 TIMES DAILY
Qty: 90 TABLET | Refills: 0 | Status: SHIPPED | OUTPATIENT
Start: 2024-01-11 | End: 2024-02-05 | Stop reason: SDUPTHER

## 2024-01-11 RX ORDER — ONDANSETRON 4 MG/1
4 TABLET, ORALLY DISINTEGRATING ORAL 3 TIMES DAILY PRN
Qty: 30 TABLET | Refills: 5 | Status: SHIPPED | OUTPATIENT
Start: 2024-01-11 | End: 2024-01-23 | Stop reason: SDUPTHER

## 2024-01-11 RX ORDER — GABAPENTIN 100 MG/1
200 CAPSULE ORAL 3 TIMES DAILY
Qty: 90 CAPSULE | Refills: 0 | Status: SHIPPED | OUTPATIENT
Start: 2024-01-11 | End: 2024-01-23 | Stop reason: SDUPTHER

## 2024-01-17 ENCOUNTER — TELEPHONE (OUTPATIENT)
Dept: PALLATIVE CARE | Age: 54
End: 2024-01-17

## 2024-01-17 NOTE — TELEPHONE ENCOUNTER
Nurse Blanco from [facility] Pt's PCP called asking to compare pt's medication list. Bella faxed over the medication list that the pt has from the PCP.  PCP medication list scanned into pt's chart.       These are the medications that PCP has the pt on that are NOT on our medication list:   Quetiapine fumarate 100mg   Albuterol sulfate HFA  Spiriva respimat  Triamcinolone acetonide 0.1% mouth paste  Levetiracetam 500mg   Sertraline 50mg  Promethazine 2mg  Trazodone 100mg  Nicotine 21mg/25hr patch    [facility] said she was not going to fill pt's trazodone and Quetiapine fumarate since the pt is on Olanzapine.     PCP wanted to touch base with you on medications since you see the patient more.     Are you opposed to any of the other medications?

## 2024-01-19 RX ORDER — ALBUTEROL SULFATE 90 UG/1
AEROSOL, METERED RESPIRATORY (INHALATION)
COMMUNITY
Start: 2023-12-14

## 2024-01-19 RX ORDER — LEVETIRACETAM 500 MG/1
500 TABLET ORAL 2 TIMES DAILY
COMMUNITY
Start: 2023-12-14

## 2024-01-19 RX ORDER — NICOTINE 21 MG/24HR
PATCH, TRANSDERMAL 24 HOURS TRANSDERMAL
COMMUNITY
Start: 2023-12-18

## 2024-01-19 RX ORDER — TRIAMCINOLONE ACETONIDE 0.1 %
PASTE (GRAM) DENTAL
COMMUNITY
Start: 2023-12-14

## 2024-01-19 RX ORDER — PROMETHAZINE HYDROCHLORIDE 25 MG/1
TABLET ORAL
COMMUNITY
Start: 2023-12-14

## 2024-01-19 RX ORDER — TIOTROPIUM BROMIDE INHALATION SPRAY 3.12 UG/1
SPRAY, METERED RESPIRATORY (INHALATION)
COMMUNITY
Start: 2023-12-15

## 2024-01-23 ENCOUNTER — TELEPHONE (OUTPATIENT)
Dept: PALLATIVE CARE | Age: 54
End: 2024-01-23

## 2024-01-23 ENCOUNTER — OFFICE VISIT (OUTPATIENT)
Dept: PALLATIVE CARE | Age: 54
End: 2024-01-23
Payer: MEDICAID

## 2024-01-23 VITALS
OXYGEN SATURATION: 97 % | WEIGHT: 120.2 LBS | HEIGHT: 68 IN | DIASTOLIC BLOOD PRESSURE: 86 MMHG | RESPIRATION RATE: 16 BRPM | HEART RATE: 86 BPM | BODY MASS INDEX: 18.22 KG/M2 | SYSTOLIC BLOOD PRESSURE: 124 MMHG

## 2024-01-23 DIAGNOSIS — K59.03 THERAPEUTIC OPIOID-INDUCED CONSTIPATION (OIC): ICD-10-CM

## 2024-01-23 DIAGNOSIS — C79.31 LUNG CANCER METASTATIC TO BRAIN (HCC): Primary | ICD-10-CM

## 2024-01-23 DIAGNOSIS — G89.3 CANCER RELATED PAIN: ICD-10-CM

## 2024-01-23 DIAGNOSIS — F32.A ANXIETY AND DEPRESSION: ICD-10-CM

## 2024-01-23 DIAGNOSIS — R64 CANCER CACHEXIA (HCC): ICD-10-CM

## 2024-01-23 DIAGNOSIS — Z51.5 PALLIATIVE CARE PATIENT: ICD-10-CM

## 2024-01-23 DIAGNOSIS — R11.2 NAUSEA AND VOMITING, UNSPECIFIED VOMITING TYPE: ICD-10-CM

## 2024-01-23 DIAGNOSIS — G47.09 OTHER INSOMNIA: ICD-10-CM

## 2024-01-23 DIAGNOSIS — F41.9 ANXIETY AND DEPRESSION: ICD-10-CM

## 2024-01-23 DIAGNOSIS — C34.90 LUNG CANCER METASTATIC TO BRAIN (HCC): Primary | ICD-10-CM

## 2024-01-23 DIAGNOSIS — T40.2X5A THERAPEUTIC OPIOID-INDUCED CONSTIPATION (OIC): ICD-10-CM

## 2024-01-23 DIAGNOSIS — C34.91 ADENOCARCINOMA OF RIGHT LUNG, STAGE 4 (HCC): ICD-10-CM

## 2024-01-23 DIAGNOSIS — M79.2 NEUROPATHIC PAIN: ICD-10-CM

## 2024-01-23 PROCEDURE — 99215 OFFICE O/P EST HI 40 MIN: CPT | Performed by: STUDENT IN AN ORGANIZED HEALTH CARE EDUCATION/TRAINING PROGRAM

## 2024-01-23 PROCEDURE — G8419 CALC BMI OUT NRM PARAM NOF/U: HCPCS | Performed by: STUDENT IN AN ORGANIZED HEALTH CARE EDUCATION/TRAINING PROGRAM

## 2024-01-23 PROCEDURE — G8484 FLU IMMUNIZE NO ADMIN: HCPCS | Performed by: STUDENT IN AN ORGANIZED HEALTH CARE EDUCATION/TRAINING PROGRAM

## 2024-01-23 PROCEDURE — 3017F COLORECTAL CA SCREEN DOC REV: CPT | Performed by: STUDENT IN AN ORGANIZED HEALTH CARE EDUCATION/TRAINING PROGRAM

## 2024-01-23 PROCEDURE — G8427 DOCREV CUR MEDS BY ELIG CLIN: HCPCS | Performed by: STUDENT IN AN ORGANIZED HEALTH CARE EDUCATION/TRAINING PROGRAM

## 2024-01-23 PROCEDURE — 4004F PT TOBACCO SCREEN RCVD TLK: CPT | Performed by: STUDENT IN AN ORGANIZED HEALTH CARE EDUCATION/TRAINING PROGRAM

## 2024-01-23 RX ORDER — OXYCODONE HYDROCHLORIDE 20 MG/1
20 TABLET ORAL EVERY 4 HOURS PRN
Qty: 180 TABLET | Refills: 0 | Status: SHIPPED | OUTPATIENT
Start: 2024-01-23 | End: 2024-02-22

## 2024-01-23 RX ORDER — ONDANSETRON 4 MG/1
4 TABLET, ORALLY DISINTEGRATING ORAL 3 TIMES DAILY PRN
Qty: 30 TABLET | Refills: 5 | Status: SHIPPED | OUTPATIENT
Start: 2024-01-23

## 2024-01-23 RX ORDER — MORPHINE SULFATE 60 MG/1
60 TABLET, FILM COATED, EXTENDED RELEASE ORAL 3 TIMES DAILY
Qty: 90 TABLET | Refills: 0 | Status: CANCELLED | OUTPATIENT
Start: 2024-01-23 | End: 2024-02-22

## 2024-01-23 RX ORDER — GABAPENTIN 100 MG/1
200 CAPSULE ORAL 3 TIMES DAILY
Qty: 180 CAPSULE | Refills: 2 | Status: SHIPPED | OUTPATIENT
Start: 2024-01-23 | End: 2024-04-22

## 2024-01-23 RX ORDER — DOXEPIN HYDROCHLORIDE 10 MG/ML
6 SOLUTION ORAL NIGHTLY
Qty: 118 ML | Refills: 2 | Status: SHIPPED | OUTPATIENT
Start: 2024-01-23

## 2024-01-23 RX ORDER — SENNOSIDES 8.6 MG
2 TABLET ORAL 2 TIMES DAILY
Qty: 120 TABLET | Refills: 2 | Status: SHIPPED | OUTPATIENT
Start: 2024-01-23

## 2024-01-23 RX ORDER — DEXAMETHASONE 4 MG/1
4 TABLET ORAL 2 TIMES DAILY WITH MEALS
Qty: 60 TABLET | Refills: 2 | Status: SHIPPED | OUTPATIENT
Start: 2024-01-23 | End: 2024-04-22

## 2024-01-23 RX ORDER — POLYETHYLENE GLYCOL 3350 17 G/17G
17 POWDER, FOR SOLUTION ORAL DAILY
Qty: 1530 G | Refills: 1 | Status: SHIPPED | OUTPATIENT
Start: 2024-01-23 | End: 2024-07-21

## 2024-01-23 RX ORDER — OLANZAPINE 5 MG/1
5 TABLET ORAL NIGHTLY
Qty: 30 TABLET | Refills: 2 | Status: SHIPPED | OUTPATIENT
Start: 2024-01-23

## 2024-01-23 NOTE — PROGRESS NOTES
taking Senna 2 tabs twice a day and Miralax 17 g daily to help with their constipation. Their medication is  working well to help with their bowel movements.  Depression: Patient reports depression. They are currently taking Zoloft for their depression. It is  working well for them. and Sometimes when he thinks about dying  Anxiety: Patient reports anxiety. They are not currently taking any medications for their anxiety.    Current Outpatient Medications   Medication Sig Dispense Refill    doxepin (SINEQUAN) 10 MG/ML solution Take 0.6 mLs by mouth nightly 118 mL 2    gabapentin (NEURONTIN) 100 MG capsule Take 2 capsules by mouth 3 times daily for 90 days. Intended supply: 30 days 180 capsule 2    ondansetron (ZOFRAN-ODT) 4 MG disintegrating tablet Take 1 tablet by mouth 3 times daily as needed for Nausea or Vomiting 30 tablet 5    polyethylene glycol (GLYCOLAX) 17 GM/SCOOP powder Take 17 g by mouth daily 1530 g 1    OLANZapine (ZYPREXA) 5 MG tablet Take 1 tablet by mouth nightly 30 tablet 2    senna (SENOKOT) 8.6 MG TABS tablet Take 2 tablets by mouth 2 times daily 120 tablet 2    dexAMETHasone (DECADRON) 4 MG tablet Take 1 tablet by mouth 2 times daily (with meals) 60 tablet 2    oxyCODONE (ROXICODONE) 20 MG immediate release tablet Take 1 tablet by mouth every 4 hours as needed for Pain for up to 30 days. Max Daily Amount: 120 mg 180 tablet 0    albuterol sulfate HFA (PROVENTIL;VENTOLIN;PROAIR) 108 (90 Base) MCG/ACT inhaler inhale 1 to 2 puffs by mouth and INTO THE LUNGS every 4 to 6 hours if needed      levETIRAcetam (KEPPRA) 500 MG tablet Take 1 tablet by mouth 2 times daily      nicotine (NICODERM CQ) 21 MG/24HR apply 1 patch ONTO THE SKIN once daily      promethazine (PHENERGAN) 25 MG tablet take 1 tablet by mouth every 6 hours if needed IF ONDANSETRON IS NOT EFFECTIVE      SPIRIVA RESPIMAT 2.5 MCG/ACT AERS inhaler inhale 2 puffs by mouth and INTO THE LUNGS once daily      triamcinolone acetonide (KENALOG) 0.1

## 2024-01-24 ENCOUNTER — HOSPITAL ENCOUNTER (OUTPATIENT)
Dept: PET IMAGING | Age: 54
Discharge: HOME | End: 2024-01-24
Payer: MEDICAID

## 2024-01-24 PROCEDURE — A9609 HC RX DIAGNOSTIC RADIOPHARMACEUTICAL: HCPCS

## 2024-01-24 PROCEDURE — 78815 PET IMAGE W/CT SKULL-THIGH: CPT

## 2024-01-24 PROCEDURE — 3430000000 HC RX DIAGNOSTIC RADIOPHARMACEUTICAL

## 2024-01-24 RX ORDER — FLUDEOXYGLUCOSE F 18 200 MCI/ML
11.3 INJECTION, SOLUTION INTRAVENOUS
Status: COMPLETED | OUTPATIENT
Start: 2024-01-24 | End: 2024-01-24

## 2024-01-24 RX ADMIN — FLUDEOXYGLUCOSE F 18 11.3 MILLICURIE: 200 INJECTION, SOLUTION INTRAVENOUS at 09:35

## 2024-01-26 ENCOUNTER — HOSPITAL ENCOUNTER (OUTPATIENT)
Dept: RADIATION ONCOLOGY | Age: 54
Discharge: HOME | End: 2024-01-26

## 2024-01-29 ENCOUNTER — TELEPHONE (OUTPATIENT)
Dept: ONCOLOGY | Age: 54
End: 2024-01-29

## 2024-01-29 NOTE — TELEPHONE ENCOUNTER
----- Message from Merissa Kilpatrick MD PhD sent at 1/24/2024 12:51 PM EST -----  Regarding: follow up?  He no-showed to his first 4 consults with me for various reasons, and no-showed to his follow up- off the top of my head I'm not sure what happened when the office tried to reschedule him. I'll send this to scheduling to see if they can get him back in.      Erica do I have any slots next week we can offer to get him in?   ----- Message -----  From: Gill Dobbins PA-C  Sent: 1/24/2024  11:50 AM EST  To: Merissa Kilpatrick MD PhD    FYI. I have a follow up with him 1/26/24. I don't believe he is scheduled to see you.

## 2024-02-01 ENCOUNTER — TELEPHONE (OUTPATIENT)
Dept: PALLATIVE CARE | Age: 54
End: 2024-02-01

## 2024-02-05 DIAGNOSIS — C34.90 LUNG CANCER METASTATIC TO BRAIN (HCC): ICD-10-CM

## 2024-02-05 DIAGNOSIS — C79.31 LUNG CANCER METASTATIC TO BRAIN (HCC): ICD-10-CM

## 2024-02-05 DIAGNOSIS — G89.3 CANCER RELATED PAIN: ICD-10-CM

## 2024-02-05 DIAGNOSIS — C34.91 ADENOCARCINOMA OF RIGHT LUNG, STAGE 4 (HCC): ICD-10-CM

## 2024-02-05 DIAGNOSIS — Z51.5 PALLIATIVE CARE PATIENT: ICD-10-CM

## 2024-02-05 RX ORDER — MORPHINE SULFATE 60 MG/1
60 TABLET, FILM COATED, EXTENDED RELEASE ORAL 3 TIMES DAILY
Qty: 90 TABLET | Refills: 0 | Status: SHIPPED | OUTPATIENT
Start: 2024-02-10 | End: 2024-03-11

## 2024-02-05 NOTE — TELEPHONE ENCOUNTER
When calling pt to switch appointment, Pt's wife stated that the patient has been in a bit more pain than normal, his morphine helps take away this pain if he takes it as prescribed. She stated that he is getting low and requested a refill. She is aware that they may a couple days to wait before the fill date and stated they have enough medication to last this week.   Pharmacy Verified.

## 2024-02-08 NOTE — TELEPHONE ENCOUNTER
I tried to reach out to patient again. A message was left for him to call office and give us an update on how he is doing and possibly schedule appointment.

## 2024-02-20 ENCOUNTER — HOSPITAL ENCOUNTER (OUTPATIENT)
Dept: RADIATION ONCOLOGY | Age: 54
Discharge: HOME | End: 2024-02-20
Payer: MEDICAID

## 2024-02-20 VITALS
SYSTOLIC BLOOD PRESSURE: 101 MMHG | DIASTOLIC BLOOD PRESSURE: 70 MMHG | HEART RATE: 80 BPM | OXYGEN SATURATION: 96 % | TEMPERATURE: 98 F | RESPIRATION RATE: 18 BRPM

## 2024-02-20 DIAGNOSIS — C34.90 LUNG CANCER METASTATIC TO BRAIN (HCC): Primary | ICD-10-CM

## 2024-02-20 DIAGNOSIS — C79.31 LUNG CANCER METASTATIC TO BRAIN (HCC): Primary | ICD-10-CM

## 2024-02-20 PROCEDURE — 99212 OFFICE O/P EST SF 10 MIN: CPT

## 2024-02-20 PROCEDURE — 99213 OFFICE O/P EST LOW 20 MIN: CPT

## 2024-02-20 ASSESSMENT — ENCOUNTER SYMPTOMS
TROUBLE SWALLOWING: 0
CHEST TIGHTNESS: 0
BACK PAIN: 1
CONSTIPATION: 1
ABDOMINAL PAIN: 0
APNEA: 0
VOMITING: 0
SORE THROAT: 0
NAUSEA: 1
BLOOD IN STOOL: 0
WHEEZING: 0
SHORTNESS OF BREATH: 1
COUGH: 1

## 2024-02-20 ASSESSMENT — PAIN SCALES - GENERAL: PAINLEVEL_OUTOF10: 8

## 2024-02-20 ASSESSMENT — PAIN DESCRIPTION - LOCATION: LOCATION: RIB CAGE

## 2024-02-20 ASSESSMENT — PAIN DESCRIPTION - ORIENTATION: ORIENTATION: RIGHT

## 2024-02-20 NOTE — PROGRESS NOTES
2    dexAMETHasone (DECADRON) 4 MG tablet Take 1 tablet by mouth 2 times daily (with meals) 60 tablet 2    albuterol sulfate HFA (PROVENTIL;VENTOLIN;PROAIR) 108 (90 Base) MCG/ACT inhaler inhale 1 to 2 puffs by mouth and INTO THE LUNGS every 4 to 6 hours if needed      levETIRAcetam (KEPPRA) 500 MG tablet Take 1 tablet by mouth 2 times daily      nicotine (NICODERM CQ) 21 MG/24HR apply 1 patch ONTO THE SKIN once daily      promethazine (PHENERGAN) 25 MG tablet take 1 tablet by mouth every 6 hours if needed IF ONDANSETRON IS NOT EFFECTIVE      SPIRIVA RESPIMAT 2.5 MCG/ACT AERS inhaler inhale 2 puffs by mouth and INTO THE LUNGS once daily      triamcinolone acetonide (KENALOG) 0.1 % paste apply thin layer to SORES IN MOUTH twice a day      sertraline (ZOLOFT) 50 MG tablet Take 1 tablet by mouth every morning      Nutritional Supplements (ENSURE PLUS HIGH PROTEIN) LIQD Take one after each meal. 2730 mL 5    phenytoin (DILANTIN) 100 MG ER capsule Take 1 capsule by mouth 3 times daily 60 capsule 3    folic acid (FOLVITE) 1 MG tablet Take 1 tablet by mouth daily 30 tablet 3    thermotabs (MEDI-LYTE) TABS tablet Take 1 tablet by mouth daily 30 tablet 3&    cloNIDine (CATAPRES) 0.1 MG tablet Take 1 tablet by mouth 2 times daily 60 tablet 0    pantoprazole (PROTONIX) 40 MG tablet Take 1 tablet by mouth every morning (before breakfast) 30 tablet 0    thiamine 100 MG tablet Take 1 tablet by mouth daily 30 tablet 0     No current facility-administered medications for this encounter.     All portions of this note that were completed during the initial nursing assessment were discussed and reviewed in detail with the nursing staff member who completed this portion of the note and I agree with the information and assessment as written. A complete review of systems was performed and found to be negative except as presented above.    PHYSICAL EXAMINATION:  VITAL SIGNS: /70   Pulse 80   Temp 98 °F (36.7 °C) (Infrared)   Resp

## 2024-02-28 ENCOUNTER — CLINICAL DOCUMENTATION (OUTPATIENT)
Dept: CASE MANAGEMENT | Age: 54
End: 2024-02-28

## 2024-02-28 ENCOUNTER — OFFICE VISIT (OUTPATIENT)
Dept: ONCOLOGY | Age: 54
End: 2024-02-28
Payer: MEDICAID

## 2024-02-28 ENCOUNTER — HOSPITAL ENCOUNTER (OUTPATIENT)
Dept: INFUSION THERAPY | Age: 54
Discharge: HOME | End: 2024-02-28
Payer: MEDICAID

## 2024-02-28 ENCOUNTER — TELEPHONE (OUTPATIENT)
Dept: PALLATIVE CARE | Age: 54
End: 2024-02-28

## 2024-02-28 VITALS
DIASTOLIC BLOOD PRESSURE: 82 MMHG | SYSTOLIC BLOOD PRESSURE: 117 MMHG | TEMPERATURE: 98.2 F | BODY MASS INDEX: 16 KG/M2 | HEART RATE: 81 BPM | OXYGEN SATURATION: 96 % | WEIGHT: 105.6 LBS | RESPIRATION RATE: 16 BRPM | HEIGHT: 68 IN

## 2024-02-28 VITALS
TEMPERATURE: 98.2 F | HEART RATE: 81 BPM | SYSTOLIC BLOOD PRESSURE: 117 MMHG | OXYGEN SATURATION: 96 % | DIASTOLIC BLOOD PRESSURE: 82 MMHG | RESPIRATION RATE: 16 BRPM

## 2024-02-28 DIAGNOSIS — C34.90 LUNG CANCER METASTATIC TO BRAIN (HCC): ICD-10-CM

## 2024-02-28 DIAGNOSIS — C34.91 ADENOCARCINOMA OF RIGHT LUNG, STAGE 4 (HCC): Primary | ICD-10-CM

## 2024-02-28 DIAGNOSIS — C79.31 LUNG CANCER METASTATIC TO BRAIN (HCC): ICD-10-CM

## 2024-02-28 PROCEDURE — 99213 OFFICE O/P EST LOW 20 MIN: CPT | Performed by: INTERNAL MEDICINE

## 2024-02-28 PROCEDURE — 99211 OFF/OP EST MAY X REQ PHY/QHP: CPT

## 2024-02-28 ASSESSMENT — ENCOUNTER SYMPTOMS
EYES NEGATIVE: 1
ALLERGIC/IMMUNOLOGIC NEGATIVE: 1
GASTROINTESTINAL NEGATIVE: 1
RESPIRATORY NEGATIVE: 1

## 2024-02-28 NOTE — PATIENT INSTRUCTIONS
Follow up in 3 weeks order foor physical therapy eval of home and foundation one on lung tissue and reflex to liquid biopsy if not enough tissue.

## 2024-02-28 NOTE — TELEPHONE ENCOUNTER
Fax received from [facility] for a CMN for pt's ensure drinks. Form in folder to be signed.

## 2024-02-29 NOTE — PROGRESS NOTES
Name: Sam De La Cruz Jr.  : 1970  MRN: [id number]    Oncology Navigation Follow-Up Note    Contact Type:  Medical Oncology  Sister Mery accompanied appointment  To call Sister Mery or spouse Alecia for any appt- pt gets confused/ why appt were missed    Subjective: no appetite/c/o nausea/weak/fatigue/shortness breath  Sister asking for RX lit chair. Asking for medication for appetite  Completed brain radiation 2023- now wants tx for lung    Objective: MD informed need to referral to PT for home evaluation     ONCPOC:  -informed need further testing - [facility] on lung tissue before tx plan can be determined  - discussed oral medication for appetite - not effective/ has side effects. Recommend chewing edibles - not smoking marijuana  - pt follows Shanika Geiger dietary  - return MD 3/20 if foundation reported    Assistance Needed: denies any at this time    Receptive to Advanced Care Planning / Palliative Care:  already sees Dr. Lopez    Referrals: PT for home evaluation    Education: mini reiterated ONCPOC    Notes: mini following to assist & support as needed    Electronically signed by Kaila Cantu, RN on 2024 at 3:12 PM

## 2024-03-06 ENCOUNTER — HOSPITAL ENCOUNTER (OUTPATIENT)
Age: 54
Setting detail: SPECIMEN
Discharge: HOME | End: 2024-03-06

## 2024-03-10 DIAGNOSIS — C79.31 LUNG CANCER METASTATIC TO BRAIN (HCC): ICD-10-CM

## 2024-03-10 DIAGNOSIS — C34.90 LUNG CANCER METASTATIC TO BRAIN (HCC): ICD-10-CM

## 2024-03-10 DIAGNOSIS — Z51.5 PALLIATIVE CARE PATIENT: ICD-10-CM

## 2024-03-10 DIAGNOSIS — G89.3 CANCER RELATED PAIN: ICD-10-CM

## 2024-03-10 DIAGNOSIS — C34.91 ADENOCARCINOMA OF RIGHT LUNG, STAGE 4 (HCC): ICD-10-CM

## 2024-03-11 ENCOUNTER — HOSPITAL ENCOUNTER (OUTPATIENT)
Dept: MRI IMAGING | Age: 54
Discharge: HOME | End: 2024-03-11
Payer: MEDICAID

## 2024-03-11 DIAGNOSIS — C34.90 LUNG CANCER METASTATIC TO BRAIN (HCC): ICD-10-CM

## 2024-03-11 DIAGNOSIS — C79.31 LUNG CANCER METASTATIC TO BRAIN (HCC): ICD-10-CM

## 2024-03-11 PROCEDURE — A9579 GAD-BASE MR CONTRAST NOS,1ML: HCPCS

## 2024-03-11 PROCEDURE — 70553 MRI BRAIN STEM W/O & W/DYE: CPT

## 2024-03-11 PROCEDURE — 6360000004 HC RX CONTRAST MEDICATION

## 2024-03-11 RX ORDER — MORPHINE SULFATE 60 MG/1
60 TABLET, FILM COATED, EXTENDED RELEASE ORAL 3 TIMES DAILY
Qty: 90 TABLET | Refills: 0 | Status: SHIPPED | OUTPATIENT
Start: 2024-03-11 | End: 2024-04-10

## 2024-03-11 RX ORDER — OXYCODONE HYDROCHLORIDE 20 MG/1
20 TABLET ORAL EVERY 4 HOURS PRN
Qty: 180 TABLET | Refills: 0 | Status: SHIPPED | OUTPATIENT
Start: 2024-03-15 | End: 2024-04-14

## 2024-03-11 RX ADMIN — GADOTERIDOL 10 ML: 279.3 INJECTION, SOLUTION INTRAVENOUS at 14:21

## 2024-03-11 NOTE — TELEPHONE ENCOUNTER
PDMP reviewed.  Appropriate referral refill for morphine extended release immediate release not due to be refilled until the 15th

## 2024-03-15 DIAGNOSIS — C34.90 LUNG CANCER METASTATIC TO BRAIN (HCC): Primary | ICD-10-CM

## 2024-03-15 DIAGNOSIS — C79.31 LUNG CANCER METASTATIC TO BRAIN (HCC): Primary | ICD-10-CM

## 2024-03-15 LAB — SURGICAL PATHOLOGY REPORT: NORMAL

## 2024-03-25 ENCOUNTER — HOSPITAL ENCOUNTER (INPATIENT)
Age: 54
LOS: 2 days | Discharge: HOME | DRG: 134 | End: 2024-03-27
Attending: EMERGENCY MEDICINE | Admitting: STUDENT IN AN ORGANIZED HEALTH CARE EDUCATION/TRAINING PROGRAM
Payer: MEDICAID

## 2024-03-25 ENCOUNTER — APPOINTMENT (OUTPATIENT)
Dept: GENERAL RADIOLOGY | Age: 54
DRG: 134 | End: 2024-03-25
Payer: MEDICAID

## 2024-03-25 ENCOUNTER — APPOINTMENT (OUTPATIENT)
Dept: CT IMAGING | Age: 54
DRG: 134 | End: 2024-03-25
Payer: MEDICAID

## 2024-03-25 DIAGNOSIS — I26.99 BILATERAL PULMONARY EMBOLISM (HCC): ICD-10-CM

## 2024-03-25 DIAGNOSIS — C34.90 LUNG CANCER METASTATIC TO BRAIN (HCC): ICD-10-CM

## 2024-03-25 DIAGNOSIS — C79.31 LUNG CANCER METASTATIC TO BRAIN (HCC): ICD-10-CM

## 2024-03-25 DIAGNOSIS — I26.94 MULTIPLE SUBSEGMENTAL PULMONARY EMBOLI WITHOUT ACUTE COR PULMONALE (HCC): Primary | ICD-10-CM

## 2024-03-25 DIAGNOSIS — C78.00 METASTATIC LUNG CARCINOMA, UNSPECIFIED LATERALITY (HCC): ICD-10-CM

## 2024-03-25 LAB
ALBUMIN SERPL BCG-MCNC: 2.8 G/DL (ref 3.5–5.1)
ALP SERPL-CCNC: 88 U/L (ref 38–126)
ALT SERPL W/O P-5'-P-CCNC: 7 U/L (ref 11–66)
ANION GAP SERPL CALC-SCNC: 12 MEQ/L (ref 8–16)
APTT PPP: 79.9 SECONDS (ref 22–38)
AST SERPL-CCNC: 12 U/L (ref 5–40)
BASOPHILS ABSOLUTE: 0 THOU/MM3 (ref 0–0.1)
BASOPHILS NFR BLD AUTO: 0.3 %
BILIRUB CONJ SERPL-MCNC: < 0.2 MG/DL (ref 0–0.3)
BILIRUB SERPL-MCNC: 0.4 MG/DL (ref 0.3–1.2)
BILIRUB UR QL STRIP: NEGATIVE
BUN SERPL-MCNC: 7 MG/DL (ref 7–22)
CALCIUM SERPL-MCNC: 8.1 MG/DL (ref 8.5–10.5)
CHARACTER UR: CLEAR
CHLORIDE SERPL-SCNC: 108 MEQ/L (ref 98–111)
CO2 SERPL-SCNC: 22 MEQ/L (ref 23–33)
COLOR UR: YELLOW
CREAT SERPL-MCNC: 0.4 MG/DL (ref 0.4–1.2)
DEPRECATED RDW RBC AUTO: 48.9 FL (ref 35–45)
DEPRECATED RDW RBC AUTO: 49.8 FL (ref 35–45)
EOSINOPHIL NFR BLD AUTO: 1.8 %
EOSINOPHILS ABSOLUTE: 0.2 THOU/MM3 (ref 0–0.4)
ERYTHROCYTE [DISTWIDTH] IN BLOOD BY AUTOMATED COUNT: 14 % (ref 11.5–14.5)
ERYTHROCYTE [DISTWIDTH] IN BLOOD BY AUTOMATED COUNT: 14.1 % (ref 11.5–14.5)
FLUAV RNA RESP QL NAA+PROBE: NOT DETECTED
FLUBV RNA RESP QL NAA+PROBE: NOT DETECTED
GFR SERPL CREATININE-BSD FRML MDRD: > 90 ML/MIN/1.73M2
GLUCOSE SERPL-MCNC: 95 MG/DL (ref 70–108)
GLUCOSE UR QL STRIP.AUTO: NEGATIVE MG/DL
HCT VFR BLD AUTO: 37.8 % (ref 42–52)
HCT VFR BLD AUTO: 42.5 % (ref 42–52)
HEPARIN UNFRACTIONATED: 0.84 U/ML (ref 0.3–0.7)
HGB BLD-MCNC: 12.5 GM/DL (ref 14–18)
HGB BLD-MCNC: 13.5 GM/DL (ref 14–18)
HGB UR QL STRIP.AUTO: NEGATIVE
IMM GRANULOCYTES # BLD AUTO: 0.1 THOU/MM3 (ref 0–0.07)
IMM GRANULOCYTES NFR BLD AUTO: 0.8 %
INR PPP: 0.97 (ref 0.85–1.13)
KETONES UR QL STRIP.AUTO: NEGATIVE
LACTIC ACID, SEPSIS: 0.7 MMOL/L (ref 0.5–1.9)
LEUKOCYTE ESTERASE UR QL STRIP.AUTO: NEGATIVE
LYMPHOCYTES ABSOLUTE: 1.4 THOU/MM3 (ref 1–4.8)
LYMPHOCYTES NFR BLD AUTO: 11.3 %
MCH RBC QN AUTO: 30.7 PG (ref 26–33)
MCH RBC QN AUTO: 31.1 PG (ref 26–33)
MCHC RBC AUTO-ENTMCNC: 31.8 GM/DL (ref 32.2–35.5)
MCHC RBC AUTO-ENTMCNC: 33.1 GM/DL (ref 32.2–35.5)
MCV RBC AUTO: 94 FL (ref 80–94)
MCV RBC AUTO: 96.6 FL (ref 80–94)
MONOCYTES ABSOLUTE: 1 THOU/MM3 (ref 0.4–1.3)
MONOCYTES NFR BLD AUTO: 8.7 %
NEUTROPHILS NFR BLD AUTO: 77.1 %
NITRITE UR QL STRIP.AUTO: NEGATIVE
NRBC BLD AUTO-RTO: 0 /100 WBC
OSMOLALITY SERPL CALC.SUM OF ELEC: 280.9 MOSMOL/KG (ref 275–300)
PH UR STRIP.AUTO: 7.5 [PH] (ref 5–9)
PLATELET # BLD AUTO: 220 THOU/MM3 (ref 130–400)
PLATELET # BLD AUTO: 229 THOU/MM3 (ref 130–400)
PMV BLD AUTO: 10.8 FL (ref 9.4–12.4)
PMV BLD AUTO: 11 FL (ref 9.4–12.4)
POTASSIUM SERPL-SCNC: 3.1 MEQ/L (ref 3.5–5.2)
PROCALCITONIN SERPL IA-MCNC: 0.06 NG/ML (ref 0.01–0.09)
PROT SERPL-MCNC: 5 G/DL (ref 6.1–8)
PROT UR STRIP.AUTO-MCNC: NEGATIVE MG/DL
RBC # BLD AUTO: 4.02 MILL/MM3 (ref 4.7–6.1)
RBC # BLD AUTO: 4.4 MILL/MM3 (ref 4.7–6.1)
SARS-COV-2 RNA RESP QL NAA+PROBE: NOT DETECTED
SEGMENTED NEUTROPHILS ABSOLUTE COUNT: 9.3 THOU/MM3 (ref 1.8–7.7)
SODIUM SERPL-SCNC: 142 MEQ/L (ref 135–145)
SP GR UR REFRACT.AUTO: 1.01 (ref 1–1.03)
TROPONIN, HIGH SENSITIVITY: 10 NG/L (ref 0–12)
UROBILINOGEN UR QL STRIP.AUTO: 0.2 EU/DL (ref 0–1)
WBC # BLD AUTO: 11.9 THOU/MM3 (ref 4.8–10.8)
WBC # BLD AUTO: 12 THOU/MM3 (ref 4.8–10.8)

## 2024-03-25 PROCEDURE — 85730 THROMBOPLASTIN TIME PARTIAL: CPT

## 2024-03-25 PROCEDURE — 84145 PROCALCITONIN (PCT): CPT

## 2024-03-25 PROCEDURE — 82248 BILIRUBIN DIRECT: CPT

## 2024-03-25 PROCEDURE — 85027 COMPLETE CBC AUTOMATED: CPT

## 2024-03-25 PROCEDURE — 85025 COMPLETE CBC W/AUTO DIFF WBC: CPT

## 2024-03-25 PROCEDURE — 2140000000 HC CCU INTERMEDIATE R&B

## 2024-03-25 PROCEDURE — 71046 X-RAY EXAM CHEST 2 VIEWS: CPT

## 2024-03-25 PROCEDURE — 36415 COLL VENOUS BLD VENIPUNCTURE: CPT

## 2024-03-25 PROCEDURE — 6360000004 HC RX CONTRAST MEDICATION: Performed by: EMERGENCY MEDICINE

## 2024-03-25 PROCEDURE — 99223 1ST HOSP IP/OBS HIGH 75: CPT

## 2024-03-25 PROCEDURE — 84484 ASSAY OF TROPONIN QUANT: CPT

## 2024-03-25 PROCEDURE — 70450 CT HEAD/BRAIN W/O DYE: CPT

## 2024-03-25 PROCEDURE — 2580000003 HC RX 258: Performed by: EMERGENCY MEDICINE

## 2024-03-25 PROCEDURE — 85610 PROTHROMBIN TIME: CPT

## 2024-03-25 PROCEDURE — 87636 SARSCOV2 & INF A&B AMP PRB: CPT

## 2024-03-25 PROCEDURE — 96365 THER/PROPH/DIAG IV INF INIT: CPT

## 2024-03-25 PROCEDURE — 85520 HEPARIN ASSAY: CPT

## 2024-03-25 PROCEDURE — 96360 HYDRATION IV INFUSION INIT: CPT

## 2024-03-25 PROCEDURE — 99285 EMERGENCY DEPT VISIT HI MDM: CPT

## 2024-03-25 PROCEDURE — 83605 ASSAY OF LACTIC ACID: CPT

## 2024-03-25 PROCEDURE — 71275 CT ANGIOGRAPHY CHEST: CPT

## 2024-03-25 PROCEDURE — 6360000002 HC RX W HCPCS: Performed by: EMERGENCY MEDICINE

## 2024-03-25 PROCEDURE — 80053 COMPREHEN METABOLIC PANEL: CPT

## 2024-03-25 PROCEDURE — 81003 URINALYSIS AUTO W/O SCOPE: CPT

## 2024-03-25 PROCEDURE — 87040 BLOOD CULTURE FOR BACTERIA: CPT

## 2024-03-25 PROCEDURE — 93005 ELECTROCARDIOGRAM TRACING: CPT | Performed by: EMERGENCY MEDICINE

## 2024-03-25 RX ORDER — SODIUM CHLORIDE 0.9 % (FLUSH) 0.9 %
5-40 SYRINGE (ML) INJECTION PRN
Status: DISCONTINUED | OUTPATIENT
Start: 2024-03-25 | End: 2024-03-27 | Stop reason: HOSPADM

## 2024-03-25 RX ORDER — HEPARIN SODIUM 10000 [USP'U]/100ML
5-30 INJECTION, SOLUTION INTRAVENOUS CONTINUOUS
Status: DISCONTINUED | OUTPATIENT
Start: 2024-03-25 | End: 2024-03-25 | Stop reason: SDUPTHER

## 2024-03-25 RX ORDER — 0.9 % SODIUM CHLORIDE 0.9 %
1000 INTRAVENOUS SOLUTION INTRAVENOUS ONCE
Status: COMPLETED | OUTPATIENT
Start: 2024-03-25 | End: 2024-03-25

## 2024-03-25 RX ORDER — HEPARIN SODIUM 1000 [USP'U]/ML
80 INJECTION, SOLUTION INTRAVENOUS; SUBCUTANEOUS ONCE
Status: COMPLETED | OUTPATIENT
Start: 2024-03-25 | End: 2024-03-25

## 2024-03-25 RX ORDER — HEPARIN SODIUM 1000 [USP'U]/ML
80 INJECTION, SOLUTION INTRAVENOUS; SUBCUTANEOUS PRN
Status: DISCONTINUED | OUTPATIENT
Start: 2024-03-25 | End: 2024-03-27

## 2024-03-25 RX ORDER — HEPARIN SODIUM 1000 [USP'U]/ML
80 INJECTION, SOLUTION INTRAVENOUS; SUBCUTANEOUS PRN
Status: DISCONTINUED | OUTPATIENT
Start: 2024-03-25 | End: 2024-03-25 | Stop reason: SDUPTHER

## 2024-03-25 RX ORDER — HEPARIN SODIUM 1000 [USP'U]/ML
40 INJECTION, SOLUTION INTRAVENOUS; SUBCUTANEOUS PRN
Status: DISCONTINUED | OUTPATIENT
Start: 2024-03-25 | End: 2024-03-27

## 2024-03-25 RX ORDER — SODIUM CHLORIDE, SODIUM LACTATE, POTASSIUM CHLORIDE, CALCIUM CHLORIDE 600; 310; 30; 20 MG/100ML; MG/100ML; MG/100ML; MG/100ML
INJECTION, SOLUTION INTRAVENOUS CONTINUOUS
Status: ACTIVE | OUTPATIENT
Start: 2024-03-25 | End: 2024-03-26

## 2024-03-25 RX ORDER — SODIUM CHLORIDE, SODIUM LACTATE, POTASSIUM CHLORIDE, CALCIUM CHLORIDE 600; 310; 30; 20 MG/100ML; MG/100ML; MG/100ML; MG/100ML
INJECTION, SOLUTION INTRAVENOUS CONTINUOUS
Status: DISCONTINUED | OUTPATIENT
Start: 2024-03-25 | End: 2024-03-25

## 2024-03-25 RX ORDER — POTASSIUM CHLORIDE 20 MEQ/1
40 TABLET, EXTENDED RELEASE ORAL PRN
Status: DISCONTINUED | OUTPATIENT
Start: 2024-03-25 | End: 2024-03-27 | Stop reason: HOSPADM

## 2024-03-25 RX ORDER — POLYETHYLENE GLYCOL 3350 17 G/17G
17 POWDER, FOR SOLUTION ORAL DAILY PRN
Status: DISCONTINUED | OUTPATIENT
Start: 2024-03-25 | End: 2024-03-27 | Stop reason: HOSPADM

## 2024-03-25 RX ORDER — SODIUM CHLORIDE 9 MG/ML
INJECTION, SOLUTION INTRAVENOUS PRN
Status: DISCONTINUED | OUTPATIENT
Start: 2024-03-25 | End: 2024-03-27 | Stop reason: HOSPADM

## 2024-03-25 RX ORDER — ONDANSETRON 2 MG/ML
4 INJECTION INTRAMUSCULAR; INTRAVENOUS EVERY 6 HOURS PRN
Status: DISCONTINUED | OUTPATIENT
Start: 2024-03-25 | End: 2024-03-27 | Stop reason: HOSPADM

## 2024-03-25 RX ORDER — HEPARIN SODIUM 1000 [USP'U]/ML
40 INJECTION, SOLUTION INTRAVENOUS; SUBCUTANEOUS PRN
Status: DISCONTINUED | OUTPATIENT
Start: 2024-03-25 | End: 2024-03-25 | Stop reason: SDUPTHER

## 2024-03-25 RX ORDER — POTASSIUM CHLORIDE 7.45 MG/ML
10 INJECTION INTRAVENOUS PRN
Status: DISCONTINUED | OUTPATIENT
Start: 2024-03-25 | End: 2024-03-27 | Stop reason: HOSPADM

## 2024-03-25 RX ORDER — ONDANSETRON 4 MG/1
4 TABLET, ORALLY DISINTEGRATING ORAL EVERY 8 HOURS PRN
Status: DISCONTINUED | OUTPATIENT
Start: 2024-03-25 | End: 2024-03-27 | Stop reason: HOSPADM

## 2024-03-25 RX ORDER — MAGNESIUM SULFATE IN WATER 40 MG/ML
2000 INJECTION, SOLUTION INTRAVENOUS PRN
Status: DISCONTINUED | OUTPATIENT
Start: 2024-03-25 | End: 2024-03-27 | Stop reason: HOSPADM

## 2024-03-25 RX ORDER — HEPARIN SODIUM 10000 [USP'U]/100ML
5-30 INJECTION, SOLUTION INTRAVENOUS CONTINUOUS
Status: DISCONTINUED | OUTPATIENT
Start: 2024-03-25 | End: 2024-03-27

## 2024-03-25 RX ORDER — SODIUM CHLORIDE 0.9 % (FLUSH) 0.9 %
5-40 SYRINGE (ML) INJECTION EVERY 12 HOURS SCHEDULED
Status: DISCONTINUED | OUTPATIENT
Start: 2024-03-25 | End: 2024-03-27 | Stop reason: HOSPADM

## 2024-03-25 RX ORDER — ACETAMINOPHEN 650 MG/1
650 SUPPOSITORY RECTAL EVERY 6 HOURS PRN
Status: DISCONTINUED | OUTPATIENT
Start: 2024-03-25 | End: 2024-03-27 | Stop reason: HOSPADM

## 2024-03-25 RX ORDER — ACETAMINOPHEN 325 MG/1
650 TABLET ORAL EVERY 6 HOURS PRN
Status: DISCONTINUED | OUTPATIENT
Start: 2024-03-25 | End: 2024-03-27 | Stop reason: HOSPADM

## 2024-03-25 RX ADMIN — SODIUM CHLORIDE 1000 ML: 9 INJECTION, SOLUTION INTRAVENOUS at 20:30

## 2024-03-25 RX ADMIN — HEPARIN SODIUM 18 UNITS/KG/HR: 10000 INJECTION, SOLUTION INTRAVENOUS at 22:01

## 2024-03-25 RX ADMIN — IOPAMIDOL 80 ML: 755 INJECTION, SOLUTION INTRAVENOUS at 20:33

## 2024-03-25 RX ADMIN — HEPARIN SODIUM 3850 UNITS: 1000 INJECTION INTRAVENOUS; SUBCUTANEOUS at 21:57

## 2024-03-25 RX ADMIN — CEFTRIAXONE SODIUM 1000 MG: 1 INJECTION, POWDER, FOR SOLUTION INTRAMUSCULAR; INTRAVENOUS at 22:43

## 2024-03-25 ASSESSMENT — PAIN - FUNCTIONAL ASSESSMENT
PAIN_FUNCTIONAL_ASSESSMENT: 0-10
PAIN_FUNCTIONAL_ASSESSMENT: NONE - DENIES PAIN

## 2024-03-25 ASSESSMENT — PAIN SCALES - GENERAL: PAINLEVEL_OUTOF10: 8

## 2024-03-25 ASSESSMENT — LIFESTYLE VARIABLES
HOW MANY STANDARD DRINKS CONTAINING ALCOHOL DO YOU HAVE ON A TYPICAL DAY: PATIENT DOES NOT DRINK
HOW OFTEN DO YOU HAVE A DRINK CONTAINING ALCOHOL: NEVER

## 2024-03-25 ASSESSMENT — PAIN DESCRIPTION - LOCATION: LOCATION: CHEST

## 2024-03-25 NOTE — ED TRIAGE NOTES
Patient presents to ED via [facility] from home with report of chest pain that has been occurring the past week and SOB. Patient is A/O x4 on arrival and states he has a hx of cancer and will start chemo this month. Patient appears malnourished on arrival and EMS states patient was hypotensive in route. Fluid bolus started by EMS in route. EKG obtained shows NSR.

## 2024-03-26 ENCOUNTER — APPOINTMENT (OUTPATIENT)
Dept: INTERVENTIONAL RADIOLOGY/VASCULAR | Age: 54
DRG: 134 | End: 2024-03-26
Payer: MEDICAID

## 2024-03-26 ENCOUNTER — APPOINTMENT (OUTPATIENT)
Age: 54
DRG: 134 | End: 2024-03-26
Attending: INTERNAL MEDICINE
Payer: MEDICAID

## 2024-03-26 PROBLEM — I26.94 MULTIPLE SUBSEGMENTAL PULMONARY EMBOLI WITHOUT ACUTE COR PULMONALE (HCC): Status: ACTIVE | Noted: 2024-03-25

## 2024-03-26 LAB
ANION GAP SERPL CALC-SCNC: 13 MEQ/L (ref 8–16)
BASOPHILS ABSOLUTE: 0 THOU/MM3 (ref 0–0.1)
BASOPHILS NFR BLD AUTO: 0.2 %
BUN SERPL-MCNC: 7 MG/DL (ref 7–22)
CALCIUM SERPL-MCNC: 7.9 MG/DL (ref 8.5–10.5)
CHLORIDE SERPL-SCNC: 111 MEQ/L (ref 98–111)
CO2 SERPL-SCNC: 21 MEQ/L (ref 23–33)
CORTIS SERPL-MCNC: 6.73 UG/DL
CORTISOL COLLECTION INFO: NORMAL
CREAT SERPL-MCNC: 0.4 MG/DL (ref 0.4–1.2)
DEPRECATED RDW RBC AUTO: 50.8 FL (ref 35–45)
ECHO AO ASC DIAM: 3.9 CM
ECHO AO ASCENDING AORTA INDEX: 2.45 CM/M2
ECHO AV CUSP MM: 2.3 CM
ECHO BSA: 1.53 M2
ECHO EST RA PRESSURE: 5 MMHG
ECHO LA AREA 2C: 6.5 CM2
ECHO LA AREA 4C: 8.6 CM2
ECHO LA DIAMETER INDEX: 1.76 CM/M2
ECHO LA DIAMETER: 2.8 CM
ECHO LA MAJOR AXIS: 3.3 CM
ECHO LA MINOR AXIS: 2.9 CM
ECHO LA VOL BP: 14 ML (ref 18–58)
ECHO LA VOL MOD A2C: 12 ML (ref 18–58)
ECHO LA VOL MOD A4C: 15 ML (ref 18–58)
ECHO LA VOL/BSA BIPLANE: 9 ML/M2 (ref 16–34)
ECHO LA VOLUME INDEX MOD A2C: 8 ML/M2 (ref 16–34)
ECHO LA VOLUME INDEX MOD A4C: 9 ML/M2 (ref 16–34)
ECHO LV EDV A2C: 39 ML
ECHO LV EDV A4C: 63 ML
ECHO LV EDV INDEX A4C: 40 ML/M2
ECHO LV EDV NDEX A2C: 25 ML/M2
ECHO LV EJECTION FRACTION A2C: 56 %
ECHO LV EJECTION FRACTION A4C: 46 %
ECHO LV EJECTION FRACTION BIPLANE: 50 % (ref 55–100)
ECHO LV ESV A2C: 17 ML
ECHO LV ESV A4C: 34 ML
ECHO LV ESV INDEX A2C: 11 ML/M2
ECHO LV ESV INDEX A4C: 21 ML/M2
ECHO LV FRACTIONAL SHORTENING: 20 % (ref 28–44)
ECHO LV INTERNAL DIMENSION DIASTOLE INDEX: 2.58 CM/M2
ECHO LV INTERNAL DIMENSION DIASTOLIC: 4.1 CM (ref 4.2–5.9)
ECHO LV INTERNAL DIMENSION SYSTOLIC INDEX: 2.08 CM/M2
ECHO LV INTERNAL DIMENSION SYSTOLIC: 3.3 CM
ECHO LV ISOVOLUMETRIC RELAXATION TIME (IVRT): 63 MS
ECHO LV IVSD: 0.8 CM (ref 0.6–1)
ECHO LV MASS 2D: 89.4 G (ref 88–224)
ECHO LV MASS INDEX 2D: 56.2 G/M2 (ref 49–115)
ECHO LV POSTERIOR WALL DIASTOLIC: 0.7 CM (ref 0.6–1)
ECHO LV RELATIVE WALL THICKNESS RATIO: 0.34
ECHO LVOT PEAK GRADIENT: 3 MMHG
ECHO LVOT PEAK VELOCITY: 0.9 M/S
ECHO MV A VELOCITY: 0.77 M/S
ECHO MV E DECELERATION TIME (DT): 266 MS
ECHO MV E VELOCITY: 0.69 M/S
ECHO MV E/A RATIO: 0.9
ECHO PV MAX VELOCITY: 0.6 M/S
ECHO PV PEAK GRADIENT: 1 MMHG
ECHO RIGHT VENTRICULAR SYSTOLIC PRESSURE (RVSP): 40 MMHG
ECHO RV GLOBAL SYSTOLIC STRAIN (GLS): -21.7 %
ECHO RV INTERNAL DIMENSION: 2.2 CM
ECHO RV TAPSE: 2 CM (ref 1.7–?)
ECHO TV E WAVE: 0.5 M/S
ECHO TV REGURGITANT MAX VELOCITY: 2.96 M/S
ECHO TV REGURGITANT PEAK GRADIENT: 35 MMHG
EOSINOPHIL NFR BLD AUTO: 2.3 %
EOSINOPHILS ABSOLUTE: 0.3 THOU/MM3 (ref 0–0.4)
ERYTHROCYTE [DISTWIDTH] IN BLOOD BY AUTOMATED COUNT: 14.2 % (ref 11.5–14.5)
GFR SERPL CREATININE-BSD FRML MDRD: > 90 ML/MIN/1.73M2
GLUCOSE SERPL-MCNC: 75 MG/DL (ref 70–108)
HCT VFR BLD AUTO: 39.1 % (ref 42–52)
HEPARIN UNFRACTIONATED: 0.26 U/ML (ref 0.3–0.7)
HEPARIN UNFRACTIONATED: 0.29 U/ML (ref 0.3–0.7)
HEPARIN UNFRACTIONATED: 0.37 U/ML (ref 0.3–0.7)
HGB BLD-MCNC: 12.5 GM/DL (ref 14–18)
IMM GRANULOCYTES # BLD AUTO: 0.14 THOU/MM3 (ref 0–0.07)
IMM GRANULOCYTES NFR BLD AUTO: 1.1 %
LYMPHOCYTES ABSOLUTE: 1.4 THOU/MM3 (ref 1–4.8)
LYMPHOCYTES NFR BLD AUTO: 11.2 %
MAGNESIUM SERPL-MCNC: 1.6 MG/DL (ref 1.6–2.4)
MCH RBC QN AUTO: 30.9 PG (ref 26–33)
MCHC RBC AUTO-ENTMCNC: 32 GM/DL (ref 32.2–35.5)
MCV RBC AUTO: 96.8 FL (ref 80–94)
MONOCYTES ABSOLUTE: 1.1 THOU/MM3 (ref 0.4–1.3)
MONOCYTES NFR BLD AUTO: 8.8 %
NEUTROPHILS NFR BLD AUTO: 76.4 %
NRBC BLD AUTO-RTO: 0 /100 WBC
OSMOLALITY SERPL CALC.SUM OF ELEC: 285.4 MOSMOL/KG (ref 275–300)
PLATELET # BLD AUTO: 216 THOU/MM3 (ref 130–400)
PMV BLD AUTO: 10.5 FL (ref 9.4–12.4)
POTASSIUM SERPL-SCNC: 3.5 MEQ/L (ref 3.5–5.2)
RBC # BLD AUTO: 4.04 MILL/MM3 (ref 4.7–6.1)
SEGMENTED NEUTROPHILS ABSOLUTE COUNT: 9.5 THOU/MM3 (ref 1.8–7.7)
SODIUM SERPL-SCNC: 145 MEQ/L (ref 135–145)
WBC # BLD AUTO: 12.4 THOU/MM3 (ref 4.8–10.8)

## 2024-03-26 PROCEDURE — 2580000003 HC RX 258: Performed by: INTERNAL MEDICINE

## 2024-03-26 PROCEDURE — 2580000003 HC RX 258: Performed by: EMERGENCY MEDICINE

## 2024-03-26 PROCEDURE — 99233 SBSQ HOSP IP/OBS HIGH 50: CPT | Performed by: STUDENT IN AN ORGANIZED HEALTH CARE EDUCATION/TRAINING PROGRAM

## 2024-03-26 PROCEDURE — 6360000002 HC RX W HCPCS: Performed by: EMERGENCY MEDICINE

## 2024-03-26 PROCEDURE — 85520 HEPARIN ASSAY: CPT

## 2024-03-26 PROCEDURE — 6370000000 HC RX 637 (ALT 250 FOR IP)

## 2024-03-26 PROCEDURE — 82533 TOTAL CORTISOL: CPT

## 2024-03-26 PROCEDURE — 83735 ASSAY OF MAGNESIUM: CPT

## 2024-03-26 PROCEDURE — 93306 TTE W/DOPPLER COMPLETE: CPT

## 2024-03-26 PROCEDURE — 2140000000 HC CCU INTERMEDIATE R&B

## 2024-03-26 PROCEDURE — 2580000003 HC RX 258

## 2024-03-26 PROCEDURE — 99232 SBSQ HOSP IP/OBS MODERATE 35: CPT | Performed by: INTERNAL MEDICINE

## 2024-03-26 PROCEDURE — 80177 DRUG SCRN QUAN LEVETIRACETAM: CPT

## 2024-03-26 PROCEDURE — 85025 COMPLETE CBC W/AUTO DIFF WBC: CPT

## 2024-03-26 PROCEDURE — 93010 ELECTROCARDIOGRAM REPORT: CPT | Performed by: INTERNAL MEDICINE

## 2024-03-26 PROCEDURE — 94760 N-INVAS EAR/PLS OXIMETRY 1: CPT

## 2024-03-26 PROCEDURE — 6360000002 HC RX W HCPCS: Performed by: INTERNAL MEDICINE

## 2024-03-26 PROCEDURE — 6360000002 HC RX W HCPCS

## 2024-03-26 PROCEDURE — 94640 AIRWAY INHALATION TREATMENT: CPT

## 2024-03-26 PROCEDURE — 80048 BASIC METABOLIC PNL TOTAL CA: CPT

## 2024-03-26 PROCEDURE — 93306 TTE W/DOPPLER COMPLETE: CPT | Performed by: INTERNAL MEDICINE

## 2024-03-26 PROCEDURE — 93970 EXTREMITY STUDY: CPT

## 2024-03-26 PROCEDURE — 36415 COLL VENOUS BLD VENIPUNCTURE: CPT

## 2024-03-26 RX ORDER — NICOTINE 21 MG/24HR
1 PATCH, TRANSDERMAL 24 HOURS TRANSDERMAL DAILY
Status: DISCONTINUED | OUTPATIENT
Start: 2024-03-26 | End: 2024-03-27 | Stop reason: HOSPADM

## 2024-03-26 RX ORDER — GABAPENTIN 100 MG/1
200 CAPSULE ORAL 3 TIMES DAILY
Status: DISCONTINUED | OUTPATIENT
Start: 2024-03-26 | End: 2024-03-27 | Stop reason: HOSPADM

## 2024-03-26 RX ORDER — PHENYTOIN SODIUM 100 MG/1
100 CAPSULE, EXTENDED RELEASE ORAL 3 TIMES DAILY
Status: DISCONTINUED | OUTPATIENT
Start: 2024-03-26 | End: 2024-03-26

## 2024-03-26 RX ORDER — FOLIC ACID 1 MG/1
1 TABLET ORAL DAILY
Status: DISCONTINUED | OUTPATIENT
Start: 2024-03-26 | End: 2024-03-27 | Stop reason: HOSPADM

## 2024-03-26 RX ORDER — LEVETIRACETAM 500 MG/1
500 TABLET ORAL 2 TIMES DAILY
Status: DISCONTINUED | OUTPATIENT
Start: 2024-03-26 | End: 2024-03-27 | Stop reason: HOSPADM

## 2024-03-26 RX ORDER — SODIUM CHLORIDE, SODIUM LACTATE, POTASSIUM CHLORIDE, AND CALCIUM CHLORIDE .6; .31; .03; .02 G/100ML; G/100ML; G/100ML; G/100ML
500 INJECTION, SOLUTION INTRAVENOUS ONCE
Status: COMPLETED | OUTPATIENT
Start: 2024-03-26 | End: 2024-03-26

## 2024-03-26 RX ORDER — SENNOSIDES A AND B 8.6 MG/1
2 TABLET, FILM COATED ORAL 2 TIMES DAILY
Status: DISCONTINUED | OUTPATIENT
Start: 2024-03-26 | End: 2024-03-27 | Stop reason: HOSPADM

## 2024-03-26 RX ORDER — OXYCODONE HYDROCHLORIDE 5 MG/1
20 TABLET ORAL EVERY 4 HOURS PRN
Status: DISCONTINUED | OUTPATIENT
Start: 2024-03-26 | End: 2024-03-27 | Stop reason: HOSPADM

## 2024-03-26 RX ORDER — ALBUTEROL SULFATE 90 UG/1
2 AEROSOL, METERED RESPIRATORY (INHALATION) EVERY 4 HOURS PRN
Status: DISCONTINUED | OUTPATIENT
Start: 2024-03-26 | End: 2024-03-27 | Stop reason: HOSPADM

## 2024-03-26 RX ORDER — DOXEPIN 3 MG/1
6 TABLET, FILM COATED ORAL NIGHTLY
Status: DISCONTINUED | OUTPATIENT
Start: 2024-03-26 | End: 2024-03-27 | Stop reason: HOSPADM

## 2024-03-26 RX ORDER — HYDROXYZINE PAMOATE 25 MG/1
25 CAPSULE ORAL 3 TIMES DAILY PRN
Status: DISCONTINUED | OUTPATIENT
Start: 2024-03-26 | End: 2024-03-27 | Stop reason: HOSPADM

## 2024-03-26 RX ORDER — MORPHINE SULFATE 60 MG/1
60 TABLET, FILM COATED, EXTENDED RELEASE ORAL 3 TIMES DAILY
Status: DISCONTINUED | OUTPATIENT
Start: 2024-03-26 | End: 2024-03-27 | Stop reason: HOSPADM

## 2024-03-26 RX ORDER — CLONIDINE HYDROCHLORIDE 0.1 MG/1
0.1 TABLET ORAL 2 TIMES DAILY
Status: DISCONTINUED | OUTPATIENT
Start: 2024-03-26 | End: 2024-03-27 | Stop reason: HOSPADM

## 2024-03-26 RX ORDER — PANTOPRAZOLE SODIUM 40 MG/1
40 TABLET, DELAYED RELEASE ORAL
Status: DISCONTINUED | OUTPATIENT
Start: 2024-03-26 | End: 2024-03-27 | Stop reason: HOSPADM

## 2024-03-26 RX ORDER — DEXAMETHASONE 4 MG/1
4 TABLET ORAL 2 TIMES DAILY WITH MEALS
Status: DISCONTINUED | OUTPATIENT
Start: 2024-03-26 | End: 2024-03-26

## 2024-03-26 RX ORDER — LANOLIN ALCOHOL/MO/W.PET/CERES
100 CREAM (GRAM) TOPICAL DAILY
Status: DISCONTINUED | OUTPATIENT
Start: 2024-03-26 | End: 2024-03-27 | Stop reason: HOSPADM

## 2024-03-26 RX ORDER — OLANZAPINE 5 MG/1
5 TABLET ORAL NIGHTLY
Status: DISCONTINUED | OUTPATIENT
Start: 2024-03-26 | End: 2024-03-27 | Stop reason: HOSPADM

## 2024-03-26 RX ORDER — POLYETHYLENE GLYCOL 3350 17 G/17G
17 POWDER, FOR SOLUTION ORAL DAILY
Status: DISCONTINUED | OUTPATIENT
Start: 2024-03-26 | End: 2024-03-27 | Stop reason: HOSPADM

## 2024-03-26 RX ADMIN — SENNOSIDES 17.2 MG: 8.6 TABLET, FILM COATED ORAL at 03:39

## 2024-03-26 RX ADMIN — AZITHROMYCIN MONOHYDRATE 500 MG: 500 INJECTION, POWDER, LYOPHILIZED, FOR SOLUTION INTRAVENOUS at 01:19

## 2024-03-26 RX ADMIN — OLANZAPINE 5 MG: 5 TABLET, FILM COATED ORAL at 21:31

## 2024-03-26 RX ADMIN — HYDROCORTISONE SODIUM SUCCINATE 100 MG: 100 INJECTION, POWDER, FOR SOLUTION INTRAMUSCULAR; INTRAVENOUS at 08:52

## 2024-03-26 RX ADMIN — PANTOPRAZOLE SODIUM 40 MG: 40 TABLET, DELAYED RELEASE ORAL at 06:21

## 2024-03-26 RX ADMIN — SODIUM CHLORIDE, POTASSIUM CHLORIDE, SODIUM LACTATE AND CALCIUM CHLORIDE 500 ML: 600; 310; 30; 20 INJECTION, SOLUTION INTRAVENOUS at 00:12

## 2024-03-26 RX ADMIN — HYDROXYZINE PAMOATE 25 MG: 25 CAPSULE ORAL at 16:33

## 2024-03-26 RX ADMIN — FOLIC ACID 1 MG: 1 TABLET ORAL at 10:17

## 2024-03-26 RX ADMIN — PIPERACILLIN AND TAZOBACTAM 3375 MG: 3; .375 INJECTION, POWDER, LYOPHILIZED, FOR SOLUTION INTRAVENOUS at 14:06

## 2024-03-26 RX ADMIN — SENNOSIDES 17.2 MG: 8.6 TABLET, FILM COATED ORAL at 21:31

## 2024-03-26 RX ADMIN — DOXEPIN HYDROCHLORIDE 6 MG: 3 TABLET ORAL at 21:31

## 2024-03-26 RX ADMIN — SODIUM CHLORIDE, PRESERVATIVE FREE 10 ML: 5 INJECTION INTRAVENOUS at 09:09

## 2024-03-26 RX ADMIN — POTASSIUM CHLORIDE 40 MEQ: 1500 TABLET, EXTENDED RELEASE ORAL at 06:51

## 2024-03-26 RX ADMIN — HEPARIN SODIUM 3850 UNITS: 1000 INJECTION INTRAVENOUS; SUBCUTANEOUS at 18:03

## 2024-03-26 RX ADMIN — SODIUM CHLORIDE, POTASSIUM CHLORIDE, SODIUM LACTATE AND CALCIUM CHLORIDE: 600; 310; 30; 20 INJECTION, SOLUTION INTRAVENOUS at 12:42

## 2024-03-26 RX ADMIN — HEPARIN SODIUM 22 UNITS/KG/HR: 10000 INJECTION, SOLUTION INTRAVENOUS at 23:04

## 2024-03-26 RX ADMIN — LEVETIRACETAM 500 MG: 500 TABLET, FILM COATED ORAL at 21:31

## 2024-03-26 RX ADMIN — LEVETIRACETAM 500 MG: 500 TABLET, FILM COATED ORAL at 10:17

## 2024-03-26 RX ADMIN — GABAPENTIN 200 MG: 100 CAPSULE ORAL at 17:59

## 2024-03-26 RX ADMIN — PIPERACILLIN AND TAZOBACTAM 3375 MG: 3; .375 INJECTION, POWDER, LYOPHILIZED, FOR SOLUTION INTRAVENOUS at 22:27

## 2024-03-26 RX ADMIN — GABAPENTIN 200 MG: 100 CAPSULE ORAL at 10:17

## 2024-03-26 RX ADMIN — LEVETIRACETAM 500 MG: 500 TABLET, FILM COATED ORAL at 03:39

## 2024-03-26 RX ADMIN — OXYCODONE 20 MG: 5 TABLET ORAL at 23:44

## 2024-03-26 RX ADMIN — SODIUM CHLORIDE, POTASSIUM CHLORIDE, SODIUM LACTATE AND CALCIUM CHLORIDE: 600; 310; 30; 20 INJECTION, SOLUTION INTRAVENOUS at 03:20

## 2024-03-26 RX ADMIN — SODIUM CHLORIDE, PRESERVATIVE FREE 10 ML: 5 INJECTION INTRAVENOUS at 01:20

## 2024-03-26 RX ADMIN — MORPHINE SULFATE 60 MG: 60 TABLET, FILM COATED, EXTENDED RELEASE ORAL at 19:59

## 2024-03-26 RX ADMIN — SODIUM CHLORIDE, POTASSIUM CHLORIDE, SODIUM LACTATE AND CALCIUM CHLORIDE 500 ML: 600; 310; 30; 20 INJECTION, SOLUTION INTRAVENOUS at 00:16

## 2024-03-26 RX ADMIN — Medication 100 MG: at 10:16

## 2024-03-26 RX ADMIN — HYDROCORTISONE SODIUM SUCCINATE 100 MG: 100 INJECTION, POWDER, FOR SOLUTION INTRAMUSCULAR; INTRAVENOUS at 23:44

## 2024-03-26 RX ADMIN — SENNOSIDES 17.2 MG: 8.6 TABLET, FILM COATED ORAL at 10:15

## 2024-03-26 RX ADMIN — HYDROCORTISONE SODIUM SUCCINATE 100 MG: 100 INJECTION, POWDER, FOR SOLUTION INTRAMUSCULAR; INTRAVENOUS at 16:25

## 2024-03-26 RX ADMIN — PIPERACILLIN AND TAZOBACTAM 4500 MG: 4; .5 INJECTION, POWDER, FOR SOLUTION INTRAVENOUS at 09:11

## 2024-03-26 RX ADMIN — SODIUM CHLORIDE, POTASSIUM CHLORIDE, SODIUM LACTATE AND CALCIUM CHLORIDE: 600; 310; 30; 20 INJECTION, SOLUTION INTRAVENOUS at 00:51

## 2024-03-26 RX ADMIN — TIOTROPIUM BROMIDE INHALATION SPRAY 2 PUFF: 3.12 SPRAY, METERED RESPIRATORY (INHALATION) at 11:49

## 2024-03-26 RX ADMIN — MORPHINE SULFATE 60 MG: 60 TABLET, FILM COATED, EXTENDED RELEASE ORAL at 12:06

## 2024-03-26 RX ADMIN — SERTRALINE 50 MG: 50 TABLET, FILM COATED ORAL at 10:16

## 2024-03-26 RX ADMIN — SODIUM CHLORIDE, POTASSIUM CHLORIDE, SODIUM LACTATE AND CALCIUM CHLORIDE: 600; 310; 30; 20 INJECTION, SOLUTION INTRAVENOUS at 21:45

## 2024-03-26 RX ADMIN — HEPARIN SODIUM 1920 UNITS: 1000 INJECTION INTRAVENOUS; SUBCUTANEOUS at 05:01

## 2024-03-26 RX ADMIN — ONDANSETRON 4 MG: 2 INJECTION INTRAMUSCULAR; INTRAVENOUS at 19:53

## 2024-03-26 ASSESSMENT — PAIN SCALES - GENERAL
PAINLEVEL_OUTOF10: 6
PAINLEVEL_OUTOF10: 7
PAINLEVEL_OUTOF10: 5
PAINLEVEL_OUTOF10: 9
PAINLEVEL_OUTOF10: 7
PAINLEVEL_OUTOF10: 5

## 2024-03-26 ASSESSMENT — ENCOUNTER SYMPTOMS
PHOTOPHOBIA: 0
COUGH: 1
VOMITING: 0
SORE THROAT: 0
DIARRHEA: 0
SHORTNESS OF BREATH: 1
NAUSEA: 0
ABDOMINAL PAIN: 0

## 2024-03-26 ASSESSMENT — PAIN DESCRIPTION - LOCATION
LOCATION: RIB CAGE
LOCATION: RIB CAGE
LOCATION: LEG
LOCATION: CHEST

## 2024-03-26 ASSESSMENT — PAIN DESCRIPTION - ORIENTATION
ORIENTATION: LEFT
ORIENTATION: RIGHT
ORIENTATION: RIGHT;LEFT
ORIENTATION: LEFT

## 2024-03-26 ASSESSMENT — PAIN DESCRIPTION - DESCRIPTORS
DESCRIPTORS: SHARP

## 2024-03-26 NOTE — PALLIATIVE CARE
Initial Evaluation        Patient:   Sam De La Cruz Jr.  YOB: 1970  Age:  53 y.o.  Room:  [facility]/021-  MRN:  [id number]   Acct: [id number]    Date of Admission:  3/25/2024  7:21 PM  Date of Service:  3/26/2024  Completed By:  Tana Barnett RN                 Reason for Palliative Care Evaluation:-               [] Code Status Discussion              [x] Goals of Care              [] Pain/Symptom Management               [] Emotional Support              [] Other:                    Current Issues:-   [x]  Pain  []  Fatigue  []  Nausea  []  Anxiety  []  Depression  []  Shortness of Breath  []  Constipation  []  Appetite  []  Other:              Advance Directives:-    [] [place] DNR Form  [] Living Will  [x] Medical POA              Current Code Status:-     [x] Full Resuscitation  [] DNR-Comfort Care-Arrest  [] DNR-Comfort Care       [] Limited Resuscitation             [] No CPR            [] No shock            [] No ET intubation/reintubation            [] No resuscitative medications            [] Other limitation:               Palliative Performance Status:-      [] 100%  Full ambulation; normal activity and work; no evidence of disease; able to do own self care; normal intake; fully conscious     [] 90%   Full ambulation; normal activity and work; some evidence of disease; able to do own self care; normal intake; fully conscious    [] 80%   Full ambulation; normal activity with effort; some evidence of disease; able to do own self care; normal or reduced intake; fully conscious    [] 70%  Ambulation reduced; unable to perform normal job/work; significant  disease; able to do own self care; normal or reduced intake; fully conscious    [x] 60%  Ambulation reduced; Significant disease;Can't do hobbies/housework; intake normal or reduced; occasional assist; LOC full/confusion    [] 50%  Mainly sit/lie; Extensive disease; Can't do any work; Considerable assist; intake normal or reduced; LOC

## 2024-03-26 NOTE — PLAN OF CARE
Pain  Goal: Verbalizes/displays adequate comfort level or baseline comfort level  Outcome: Progressing  Flowsheets (Taken 3/26/2024 0238)  Verbalizes/displays adequate comfort level or baseline comfort level:   Encourage patient to monitor pain and request assistance   Administer analgesics based on type and severity of pain and evaluate response   Consider cultural and social influences on pain and pain management   Assess pain using appropriate pain scale   Implement non-pharmacological measures as appropriate and evaluate response  Note: Ongoing assessment & interventions provided throughout shift.  Reminded patient to report any pain, pressure, or shortness of breath to the nurse.  Pain medications provided per physician's orders.      Problem: Skin/Tissue Integrity  Goal: Absence of new skin breakdown  Description: 1.  Monitor for areas of redness and/or skin breakdown  2.  Assess vascular access sites hourly  3.  Every 4-6 hours minimum:  Change oxygen saturation probe site  4.  Every 4-6 hours:  If on nasal continuous positive airway pressure, respiratory therapy assess nares and determine need for appliance change or resting period.  Outcome: Progressing  Note: Ongoing assessment & interventions provided throughout shift.  Skin assessments provided.  Encouraging/assisting patient to turn as needed.      Problem: Chronic Conditions and Co-morbidities  Goal: Patient's chronic conditions and co-morbidity symptoms are monitored and maintained or improved  Outcome: Progressing  Flowsheets (Taken 3/26/2024 0238)  Care Plan - Patient's Chronic Conditions and Co-Morbidity Symptoms are Monitored and Maintained or Improved:   Monitor and assess patient's chronic conditions and comorbid symptoms for stability, deterioration, or improvement   Collaborate with multidisciplinary team to address chronic and comorbid conditions and prevent exacerbation or deterioration   Update acute care plan with appropriate goals if

## 2024-03-26 NOTE — ED PROVIDER NOTES
[facility]  EMERGENCY DEPARTMENT ENCOUNTER          Pt Name: Sam De La Cruz Jr.  MRN: [id number]  Birthdate 1970  Date of evaluation: 3/25/2024  Emergency Physician: Joshua Alvarado DO    CHIEF COMPLAINT   Chief Complaint   Patient presents with    Chest Pain        HPI   Sam De La Cruz Jr. is a 53 y.o. male who presents with chest pain without palpitations, onset was over the last 3 days. The duration has been constant. It is improved with patient's home morphine. Patient wife states he has been getting more short of breath. Not eating or drink very much. Laying in bed most of the day. Cough is productive without blood. The trigger for the pain is patient has a ho metastatic lung CA he follows with Palliative, Rad onc, and oncology. He states he has completed radiation for intracranial lesion approximately 1 month ago. Is being evaluated for chemotherapy currently. See Palliative for pain management.     REVIEW OF SYSTEMS   Cardiac: +Chest Pain, Denies syncope   Respiratory: + cough and hemoptysis   GI: Decreased PO intake. Denies Vomiting or Diarrhea   General: +Fatigue and weight loss.  Denies Fever   All other review of systems are reviewed and are otherwise negative.     PAST MEDICAL & SURGICAL HISTORY   Past Medical History:   Diagnosis Date    Arthritis     Asthma     COPD (chronic obstructive pulmonary disease) (HCC)     GERD (gastroesophageal reflux disease)     Hypertension     Psychiatric problem     Schizophrenia (HCC)     Severe malnutrition (HCC) 01/14/2021    Unspecified cerebral artery occlusion with cerebral infarction       Past Surgical History:   Procedure Laterality Date    COLONOSCOPY      CT NEEDLE BIOPSY LUNG PERCUTANEOUS  10/31/2023    CT NEEDLE BIOPSY LUNG PERCUTANEOUS 10/31/2023 [facility] CT SCAN    DILATATION, ESOPHAGUS      ENDOSCOPY, COLON, DIAGNOSTIC          CURRENT MEDICATIONS   Current Outpatient Rx   Medication Sig Dispense Refill    morphine (MS

## 2024-03-26 NOTE — CARE COORDINATION
Case Management Assessment  Initial Evaluation    Date/Time of Evaluation: 3/26/2024 11:02 AM  Assessment Completed by: Vanessa Griggs RN    If patient is discharged prior to next notation, then this note serves as note for discharge by case management.    Patient Name: Sam De La Cruz Jr.                   YOB: 1970  Diagnosis: Bilateral pulmonary embolism (HCC) [I26.99]  Metastatic lung carcinoma, unspecified laterality (HCC) [C78.00]  Multiple subsegmental pulmonary emboli without acute cor pulmonale (HCC) [I26.94]                   Date / Time: 3/25/2024  7:21 PM  Location: [facility]     Patient Admission Status: Inpatient   Readmission Risk Low 0-14, Mod 15-19), High > 20: Readmission Risk Score: 12.3    Current PCP: Diana Charlton APRN - CNP  PCP verified by CM? Yes    Chart Reviewed: Yes      History Provided by: Patient  Patient Orientation: Alert and Oriented    Patient Cognition: Alert    Hospitalization in the last 30 days (Readmission):  No    If yes, Readmission Assessment in CM Navigator will be completed.    Advance Directives:      Code Status: Full Code   Patient's Primary Decision Maker is: Named in Scanned ACP Document    Primary Decision Maker: DorothyAlecia garcia - Girlfriend - [phone]    Secondary Decision Maker: sharonda mendez - Brother/Sister - [phone]    Discharge Planning:    Patient lives with: Spouse/Significant Other Type of Home: Apartment  Primary Care Giver: Self  Patient Support Systems include: Spouse/Significant Other, Children, Family Members   Current Financial resources: Medicaid  Current community resources: None  Current services prior to admission: None            Current DME:              Type of Home Care services:  None    ADLS  Prior functional level: Independent in ADLs/IADLs  Current functional level: Independent in ADLs/IADLs    Family can provide assistance at DC: Yes  Would you like Case Management to discuss the discharge plan with any other

## 2024-03-26 NOTE — ED NOTES
ED to inpatient nurses report      Chief Complaint:  Chief Complaint   Patient presents with    Chest Pain     Present to ED from: home    MOA:     LOC: alert and orientated to name, place, date  Mobility: Requires assistance * 1  Oxygen Baseline: 92% room air    Current needs required: none     Code Status:   Full Code    What abnormal results were found and what did you give/do to treat them? Multiple PE per CTA. Heparin given per MAR. ABX initiated per MAR  Any procedures or intervention occur? CT/CTA    Mental Status:  Level of Consciousness: Alert (0)    Psych Assessment:        Vitals:  Patient Vitals for the past 24 hrs:   BP Temp Temp src Pulse Resp SpO2 Weight   03/25/24 2250 105/84 -- -- 72 25 95 % --   03/25/24 2208 112/81 -- -- 71 16 94 % --   03/25/24 2123 (!) 127/96 -- -- 67 18 92 % --   03/25/24 1957 104/82 -- -- 68 19 93 % --   03/25/24 1921 96/75 98.5 °F (36.9 °C) Oral 73 18 92 % 48.1 kg (106 lb)        LDAs:   Peripheral IV 03/25/24 Right Antecubital (Active)   Site Assessment Clean, dry & intact 03/25/24 2213       Peripheral IV 03/25/24 Left Antecubital (Active)   Site Assessment Clean, dry & intact 03/25/24 2213       Peripheral IV 03/25/24 Distal;Left;Anterior Forearm (Active)   Site Assessment Clean, dry & intact 03/25/24 2139       Ambulatory Status:  No data recorded    Diagnosis:  DISPOSITION Admitted 03/25/2024 10:20:11 PM   Final diagnoses:   Multiple subsegmental pulmonary emboli without acute cor pulmonale (HCC)   Metastatic lung carcinoma, unspecified laterality (HCC)        Consults:  IP CONSULT TO INTERVENTIONAL RADIOLOGY     Pain Score:  Pain Assessment  Pain Assessment: None - Denies Pain  Pain Level: 8  Pain Location: Chest    C-SSRS:   Risk of Suicide: No Risk    Sepsis Screening:  Sepsis Risk Score: 0.97    Lyerly Fall Risk:       Swallow Screening        Preferred Language:   English      ALLERGIES     Prochlorperazine, Toradol [ketorolac tromethamine], Ketorolac, and Compazine

## 2024-03-26 NOTE — H&P
Markedly increased number and size of pulmonary masses and pulmonary nodules with worsened mediastinal and hilar lymphadenopathy, along with new presumed metastases in the bilateral adrenal glands and within the liver. 2. Multiple acute bilateral pulmonary emboli. Findings of mild right heart strain are present. This document has been electronically signed by: Marito Cheung MD on 03/25/2024 09:35 PM All CTs at this facility use dose modulation techniques and iterative reconstructions, and/or weight-based dosing when appropriate to reduce radiation to a low as reasonably achievable. 3D Post-processing was performed on this study.    CT Head W/O Contrast    Result Date: 3/25/2024  CT head without contrast COMPARISON: CT/SR - CT HEAD WO CONTRAST - 12/15/2023 10:42 PM EST FINDINGS: Global cerebral volume loss and chronic microvascular ischemic changes. No acute intracranial hemorrhage, extra-axial fluid collection, mass effect, or midline shift. Ventricular system and basilar cisterns are patent. Gray-white matter differentiation is maintained. No gross orbital abnormality. No suspicious or acute bone lesion. Mastoid air cells and paranasal sinuses are predominantly clear.     1. No acute intracranial abnormality. 2. Global cerebral volume loss and chronic microvascular ischemic changes. This document has been electronically signed by: Marito Cheung MD on 03/25/2024 09:15 PM All CTs at this facility use dose modulation techniques and iterative reconstructions, and/or weight-based dosing when appropriate to reduce radiation to a low as reasonably achievable.    XR CHEST (2 VW)    Result Date: 3/25/2024  2 view chest x-ray Comparison: CR/SR - XR CHEST PORTABLE - 12/15/2023 10:32 PM EST CR/KO/SR - XR CHEST STANDARD (2 VW) - 11/08/2023 11:20 AM EST CT/SR - CT CHEST W CONTRAST - 10/25/2023 09:02 PM EDT Findings: Peripheral right midlung mass appears increased in size when compared with 12/15/2023 exam. Left midlung nodule is

## 2024-03-26 NOTE — ED NOTES
Patient medicated per MAR and is updated on plan of care at this time. Warm blankets provided. Family at bedside.

## 2024-03-26 NOTE — SIGNIFICANT EVENT
Notified by nursing that upon arrival to the floor, the patient became short of breath and hypoxic with O2 saturations dropping to the 80's.  6 liters supplemental O2 applied via nasal cannula with gradual return of O2 saturations to the mid 90's.  The patient was also hypotensive with systolic pressures in the 70's and 80's.  A fluid bolus was given with some improvement.    Spoke with Dr. Walter who is on call for interventional radiology as there is concern of obstructive shock.  He reviewed imaging and felt that he would not be a candidate for a thrombectomy.  Will give an additional fluid bolus for a total of 2 liters.  May require transfer to intensive care for pressor administration.    Marjorie Joseph, APRN - CNP

## 2024-03-27 VITALS
DIASTOLIC BLOOD PRESSURE: 85 MMHG | SYSTOLIC BLOOD PRESSURE: 122 MMHG | WEIGHT: 110 LBS | HEART RATE: 78 BPM | BODY MASS INDEX: 16.67 KG/M2 | OXYGEN SATURATION: 95 % | TEMPERATURE: 97.8 F | RESPIRATION RATE: 18 BRPM | HEIGHT: 68 IN

## 2024-03-27 LAB
ALBUMIN SERPL BCG-MCNC: 2.6 G/DL (ref 3.5–5.1)
ALP SERPL-CCNC: 90 U/L (ref 38–126)
ALT SERPL W/O P-5'-P-CCNC: 7 U/L (ref 11–66)
ANION GAP SERPL CALC-SCNC: 12 MEQ/L (ref 8–16)
AST SERPL-CCNC: 12 U/L (ref 5–40)
BILIRUB SERPL-MCNC: 0.4 MG/DL (ref 0.3–1.2)
BUN SERPL-MCNC: 6 MG/DL (ref 7–22)
CALCIUM SERPL-MCNC: 8.1 MG/DL (ref 8.5–10.5)
CHLORIDE SERPL-SCNC: 112 MEQ/L (ref 98–111)
CO2 SERPL-SCNC: 22 MEQ/L (ref 23–33)
CREAT SERPL-MCNC: 0.4 MG/DL (ref 0.4–1.2)
DEPRECATED RDW RBC AUTO: 49.6 FL (ref 35–45)
ERYTHROCYTE [DISTWIDTH] IN BLOOD BY AUTOMATED COUNT: 14.2 % (ref 11.5–14.5)
GFR SERPL CREATININE-BSD FRML MDRD: > 90 ML/MIN/1.73M2
GLUCOSE SERPL-MCNC: 157 MG/DL (ref 70–108)
HCT VFR BLD AUTO: 36.4 % (ref 42–52)
HEPARIN UNFRACTIONATED: 0.38 U/ML (ref 0.3–0.7)
HEPARIN UNFRACTIONATED: 0.48 U/ML (ref 0.3–0.7)
HEPARIN UNFRACTIONATED: 0.52 U/ML (ref 0.3–0.7)
HGB BLD-MCNC: 12 GM/DL (ref 14–18)
KEPPRA: 13 UG/ML
MCH RBC QN AUTO: 31.4 PG (ref 26–33)
MCHC RBC AUTO-ENTMCNC: 33 GM/DL (ref 32.2–35.5)
MCV RBC AUTO: 95.3 FL (ref 80–94)
PLATELET # BLD AUTO: 239 THOU/MM3 (ref 130–400)
PMV BLD AUTO: 11 FL (ref 9.4–12.4)
POTASSIUM SERPL-SCNC: 3.5 MEQ/L (ref 3.5–5.2)
PROT SERPL-MCNC: 4.9 G/DL (ref 6.1–8)
RBC # BLD AUTO: 3.82 MILL/MM3 (ref 4.7–6.1)
SODIUM SERPL-SCNC: 146 MEQ/L (ref 135–145)
WBC # BLD AUTO: 10.4 THOU/MM3 (ref 4.8–10.8)

## 2024-03-27 PROCEDURE — 6370000000 HC RX 637 (ALT 250 FOR IP)

## 2024-03-27 PROCEDURE — 36415 COLL VENOUS BLD VENIPUNCTURE: CPT

## 2024-03-27 PROCEDURE — 85520 HEPARIN ASSAY: CPT

## 2024-03-27 PROCEDURE — 6360000002 HC RX W HCPCS: Performed by: INTERNAL MEDICINE

## 2024-03-27 PROCEDURE — 94640 AIRWAY INHALATION TREATMENT: CPT

## 2024-03-27 PROCEDURE — 80053 COMPREHEN METABOLIC PANEL: CPT

## 2024-03-27 PROCEDURE — 99254 IP/OBS CNSLTJ NEW/EST MOD 60: CPT | Performed by: NURSE PRACTITIONER

## 2024-03-27 PROCEDURE — 94760 N-INVAS EAR/PLS OXIMETRY 1: CPT

## 2024-03-27 PROCEDURE — 6360000002 HC RX W HCPCS

## 2024-03-27 PROCEDURE — 2580000003 HC RX 258: Performed by: INTERNAL MEDICINE

## 2024-03-27 PROCEDURE — 99223 1ST HOSP IP/OBS HIGH 75: CPT | Performed by: STUDENT IN AN ORGANIZED HEALTH CARE EDUCATION/TRAINING PROGRAM

## 2024-03-27 PROCEDURE — 85027 COMPLETE CBC AUTOMATED: CPT

## 2024-03-27 RX ORDER — HYDROCORTISONE 10 MG/1
10 TABLET ORAL 2 TIMES DAILY
Qty: 60 TABLET | Refills: 0 | Status: SHIPPED | OUTPATIENT
Start: 2024-03-28 | End: 2024-04-27

## 2024-03-27 RX ORDER — HYDROCORTISONE 10 MG/1
10 TABLET ORAL 2 TIMES DAILY
Qty: 60 TABLET | Refills: 0 | Status: CANCELLED | OUTPATIENT
Start: 2024-03-28 | End: 2024-04-27

## 2024-03-27 RX ORDER — AMOXICILLIN AND CLAVULANATE POTASSIUM 875; 125 MG/1; MG/1
1 TABLET, FILM COATED ORAL 2 TIMES DAILY
Qty: 10 TABLET | Refills: 0 | Status: SHIPPED | OUTPATIENT
Start: 2024-03-27 | End: 2024-04-01

## 2024-03-27 RX ORDER — TRAZODONE HYDROCHLORIDE 100 MG/1
100 TABLET ORAL NIGHTLY
COMMUNITY

## 2024-03-27 RX ORDER — HYDROCORTISONE 10 MG/1
10 TABLET ORAL 2 TIMES DAILY
Status: DISCONTINUED | OUTPATIENT
Start: 2024-03-28 | End: 2024-03-27 | Stop reason: HOSPADM

## 2024-03-27 RX ORDER — QUETIAPINE FUMARATE 100 MG/1
100 TABLET, FILM COATED ORAL DAILY
COMMUNITY

## 2024-03-27 RX ADMIN — Medication 100 MG: at 08:09

## 2024-03-27 RX ADMIN — FOLIC ACID 1 MG: 1 TABLET ORAL at 08:09

## 2024-03-27 RX ADMIN — SERTRALINE 50 MG: 50 TABLET, FILM COATED ORAL at 08:09

## 2024-03-27 RX ADMIN — LEVETIRACETAM 500 MG: 500 TABLET, FILM COATED ORAL at 08:09

## 2024-03-27 RX ADMIN — TIOTROPIUM BROMIDE INHALATION SPRAY 2 PUFF: 3.12 SPRAY, METERED RESPIRATORY (INHALATION) at 07:21

## 2024-03-27 RX ADMIN — GABAPENTIN 200 MG: 100 CAPSULE ORAL at 11:09

## 2024-03-27 RX ADMIN — HYDROCORTISONE SODIUM SUCCINATE 100 MG: 100 INJECTION, POWDER, FOR SOLUTION INTRAMUSCULAR; INTRAVENOUS at 08:09

## 2024-03-27 RX ADMIN — MORPHINE SULFATE 60 MG: 60 TABLET, FILM COATED, EXTENDED RELEASE ORAL at 13:10

## 2024-03-27 RX ADMIN — ONDANSETRON 4 MG: 2 INJECTION INTRAMUSCULAR; INTRAVENOUS at 11:03

## 2024-03-27 RX ADMIN — SENNOSIDES 17.2 MG: 8.6 TABLET, FILM COATED ORAL at 08:09

## 2024-03-27 RX ADMIN — POLYETHYLENE GLYCOL 3350 17 G: 17 POWDER, FOR SOLUTION ORAL at 08:09

## 2024-03-27 RX ADMIN — PIPERACILLIN AND TAZOBACTAM 3375 MG: 3; .375 INJECTION, POWDER, LYOPHILIZED, FOR SOLUTION INTRAVENOUS at 05:51

## 2024-03-27 RX ADMIN — PANTOPRAZOLE SODIUM 40 MG: 40 TABLET, DELAYED RELEASE ORAL at 05:51

## 2024-03-27 RX ADMIN — PIPERACILLIN AND TAZOBACTAM 3375 MG: 3; .375 INJECTION, POWDER, LYOPHILIZED, FOR SOLUTION INTRAVENOUS at 13:16

## 2024-03-27 RX ADMIN — MORPHINE SULFATE 60 MG: 60 TABLET, FILM COATED, EXTENDED RELEASE ORAL at 05:51

## 2024-03-27 RX ADMIN — HYDROCORTISONE SODIUM SUCCINATE 100 MG: 100 INJECTION, POWDER, FOR SOLUTION INTRAMUSCULAR; INTRAVENOUS at 15:27

## 2024-03-27 ASSESSMENT — PAIN SCALES - WONG BAKER: WONGBAKER_NUMERICALRESPONSE: NO HURT

## 2024-03-27 ASSESSMENT — PAIN SCALES - GENERAL
PAINLEVEL_OUTOF10: 8
PAINLEVEL_OUTOF10: 0
PAINLEVEL_OUTOF10: 6

## 2024-03-27 ASSESSMENT — PAIN DESCRIPTION - LOCATION: LOCATION: CHEST

## 2024-03-27 ASSESSMENT — PAIN DESCRIPTION - ORIENTATION: ORIENTATION: RIGHT

## 2024-03-27 ASSESSMENT — PAIN DESCRIPTION - DESCRIPTORS: DESCRIPTORS: SHARP

## 2024-03-27 NOTE — CARE COORDINATION
3/27/24, 2:59 PM EDT    DISCHARGE ON GOING EVALUATION    Sam BRINK Dorothy Jr.       Hospital day: 2  Location: -21/021-A Reason for admit: Bilateral pulmonary embolism (HCC) [I26.99]  Metastatic lung carcinoma, unspecified laterality (HCC) [C78.00]  Multiple subsegmental pulmonary emboli without acute cor pulmonale (HCC) [I26.94]   Procedure:   3/25 CXR:   1. New right midlung and right lung base consolidation worrisome for   postobstructive pneumonia.  2. Slightly increased size of peripheral right midlung mass when compared   with 12/15/2023 exam.  3. Possible right hilar lymphadenopathy.  3/25 CTA chest:   1. Significant disease progression. Markedly increased number and size of   pulmonary masses and pulmonary nodules with worsened mediastinal and hilar lymphadenopathy, along with new presumed metastases in the bilateral   adrenal glands and within the liver.  2. Multiple acute bilateral pulmonary emboli. Findings of mild right heart   strain are present.  3/26 Venous doppler BLE: Left lower extremity deep venous thrombosis.   3/26 Echo: EF 45-50%. Mild global hypokinesis present. Grade I diastolic dysfunction with normal LAP.     Barriers to Discharge: Hospitalist and Palliative Care following. Consulting Oncology for anticoagulant recommendations. DNRCCA. Heparin gtt. Solucortef iv q8hr. Zofran iv prn. Zosyn iv q8hr.     PCP: Diana Charlton APRN - CNP  Readmission Risk Score: 22.1%  Patient Goals/Plan/Treatment Preferences: Plans home with wife. Monitor for needs

## 2024-03-27 NOTE — CONSULTS
controlling his pain.  He would like to continue to undergo extensive medical treatment at this time in efforts to treat his conditions.  He would like to continue following with oncology, and radiation oncology.  Patient also expresses desire to remain full code.  Lengthy discussion regarding risks and benefits of resuscitation, and patient is agreeable to this, and agreeable to CPR, intubation, resuscitative medications.  He has had intermittent episodes of hypotension throughout hospitalization, for which his home dose oxycodone IR was held.  After discussion with patient, his priority is pain control at this time. Will continue to discuss goals of care and Code Status with patient and his family, as patient is at high risk of continued decompensation given concurrent medical conditions. Palliative care will continue to follow throughout admission.         Principal Problem:    Multiple subsegmental pulmonary emboli without acute cor pulmonale (HCC)  Active Problems:    COPD (chronic obstructive pulmonary disease) (HCC)    Adenocarcinoma of right lung, stage 4 (HCC)    Lung cancer metastatic to brain (HCC)    Cancer related pain    Palliative care patient  Resolved Problems:    * No resolved hospital problems. *       Continue current plan of care for chronic diseases per primary and specialist teams  Continue Morphine ER 60 mg TID for cancer related pain   Restart Oxycodone IR 20 mg every 4 hours PRN for cancer related pain   Continue Olanzapine 5 mg nightly for sleep    Continue doxepin 6 mg nightly for sleep.  Continue Senna and Miralax daily - monitor currently.  Continue Solu-Cortef 100 mg IV q8 hours in place of home dose Decadron  Continue Gabapentin 200 mg TID for neuropathy  Patient will continue to follow with the palliative care clinic on discharge  Please PerfectServe or call the Palliative Care team with any questions or concerns      CURRENT CODE STATUS:  Full Code Limited Code details: 
Comparison: CR/SR - XR CHEST PORTABLE - 12/15/2023 10:32 PM EST CR/KO/SR - XR CHEST STANDARD (2 VW) - 11/08/2023 11:20 AM EST CT/SR - CT CHEST W CONTRAST - 10/25/2023 09:02 PM EDT Findings: Peripheral right midlung mass appears increased in size when compared with 12/15/2023 exam. Left midlung nodule is decreased in density/conspicuity but not significantly changed in overall size. There is new consolidative opacity in the right midlung and right lung base, presumably reflecting a postobstructive pneumonia. Right hilar vessels are indistinct and poorly delineated, a nonspecific finding which may possibly indicate right hilar lymphadenopathy. Heart size normal. No pleural effusion or pneumothorax.     1. New right midlung and right lung base consolidation worrisome for postobstructive pneumonia. 2. Slightly increased size of peripheral right midlung mass when compared with 12/15/2023 exam. 3. Possible right hilar lymphadenopathy. This document has been electronically signed by: Marito Cheung MD on 03/25/2024 08:22 PM    MRI BRAIN W WO CONTRAST    Result Date: 3/11/2024  PROCEDURE: MRI BRAIN W WO CONTRAST CLINICAL INFORMATIONLung cancer metastatic to brain (HCC), Lung cancer metastatic to brain (HCC). COMPARISON: CT scan of the brain dated 12/15/2023.. MRI scan of the brain dated 3/26/2023. PET scan dated 1/24/2024. TECHNIQUE: Multiplanar and multiple spin echo T1 and T2-weighted images were obtained through the brain before and after the administration of intravenous contrast. FINDINGS: The diffusion-weighted images demonstrate a small area of restricted diffusion in the right occipital lobe.. The brain volume is normal.There are no intra-or extra-axial collections.  There is no hydrocephalus, midline shift or mass effect. On the FLAIR and T2-weighted sequences, there a punctate areas of increased signal intensity in the white matter most likely representing ischemic changes.. On the gradient echo T2-weighted images,

## 2024-03-27 NOTE — PROGRESS NOTES
Physician Progress Note        Patient:   Sam De La Cruz Jr.  YOB: 1970  Age:  53 y.o.  Room:  [facility]21/021-  MRN:  [id number]   Acct: [id number]  PCP: Diana Charlton APRN - CNP    Date of Admission:  3/25/2024  7:21 PM  Date of Service:  3/27/2024    Reason for Consult: Goals of Care, Symptom Management, and Continuity of Care.    History Obtained From:-  Patient, Electronic Medical Record             Subjective   Sam De La Cruz Jr. was seen in their room today for follow up with the palliative care team. There was not family present at the bedside. Micheal was seen in his room this afternoon.  He is doing well.  He reports his pain medications are controlling his pain. They have Morphine ER 60 mg 3 times a day, Gabapentin 200 mg 3 times a day, Olanzapine 5 mg nightly, Senna 2 tabs twice a day, Miralax 17 g daily, and Doxepin 6 mg nightly  for scheduled symptom relief. From 8 AM yesterday to 8 AM today, they have used Oxycodone IR 20 mg once  for as needed symptom relief. Their medications are working well to control their symptoms. They did get a good night sleep. They are eating their meals. The patient did have a bowel movement in the last 24 hours. Overall he is doing really well and feels like he can go home.     Objective   Vitals:-    /85   Pulse 78   Temp 97.8 °F (36.6 °C) (Oral)   Resp 18   Ht 1.727 m (5' 8\")   Wt 49.9 kg (110 lb)   SpO2 95%   BMI 16.73 kg/m²     Physical Exam  Vitals and nursing note reviewed.   Constitutional:       General: He is awake. He is not in acute distress.     Appearance: He is ill-appearing.   HENT:      Head: Normocephalic and atraumatic.      Right Ear: External ear normal.      Left Ear: External ear normal.      Nose: No rhinorrhea.      Mouth/Throat:      Mouth: Mucous membranes are moist.   Eyes:      General:         Right eye: No discharge.         Left eye: No discharge.   Cardiovascular:      Rate and Rhythm: 
A home oxygen evaluation has been completed.     [x]Patient is an inpatient. It is expected that the patient will be discharged within the next 48 hours. Qualified provider to write order for home prescription if patient qualifies. Social service/care managers will arrange for home oxygen.  If patient is active, arrange for Home Medical supplier to assess for Oxygen Conserving Device per pulse oximetry.  []Patient is an outpatient. Results will be faxed to the ordering provider. Qualified provider to write order for home prescription if patient qualifies and arranges for home oxygen.    Patient was placed on room air for 60 minutes. SpO2 was 94 % on room air at rest. Patients SpO2 was 89% or above and did not qualify for home oxygen. Patient was walked for 10 minutes. SpO2 was 94 % during walking.         Note: For any SpO2 at 89% see policy and procedure for possible qualifications.  
Comprehensive Nutrition Assessment    Type and Reason for Visit:  Initial, Positive Nutrition Screen (Weight loss and decreased appetite)    Nutrition Recommendations/Plan:   Recommend consideration for appetite stimulant  Continue ONS: Ensure Plus High Protein (TID) - ordering milk to dilute to better tolerate  Modify diet: Regular - Plastic silverware to help with metallic taste changes  Pt follows with oncology RD   Palliative care following     Malnutrition Assessment:  Malnutrition Status:  Severe malnutrition (03/27/24 1152)    Context:  Chronic Illness     Findings of the 6 clinical characteristics of malnutrition:  Energy Intake:  75% or less estimated energy requirements for 1 month or longer  Weight Loss:   (8.3% loss within the last year, has gained some weight compared to Feb 2024)     Body Fat Loss:  Severe body fat loss Orbital, Triceps, Fat Overlying Ribs, Buccal region   Muscle Mass Loss:  Severe muscle mass loss Temples (temporalis), Scapula (trapezius), Clavicles (pectoralis & deltoids), Thigh (quadriceps), Calf (gastrocnemius), Hand (interosseous)  Fluid Accumulation:  No significant fluid accumulation     Strength:  Not Performed    Nutrition Assessment:     Pt. severely malnourished AEB criteria listed above.  At risk for further nutritional compromise r/t Acute bilateral pulmonary emboli 2/2 metastatic lung cancer, R postobstructive PNA 2/2 metastatic disease, stage IV adenocarcinoma of R lung with mets to brain, liver, and adrenal glands, and underlying medical condition (PMHx: COPD, GERD, HTN, CVA, psychiatric problem, schizophrenia, severe malnutrition - 1/14/21, hx/o polysubstance abuse).       Nutrition Related Findings:    Pt. Report/Treatments/Miscellaneous: Pt seen, endorses taste changes recently - states that it is metallic like, discussed silverware change to potentially help with these changes. Pt states that he has recently gained some weight back from what he had lost but 
Patient arrived per cart to 3B. Heart monitor applied and vitals taken.  Admission paperwork completed.  Explained to patient that [facility] is not responsible for any lost or stolen items.  Patient verbalized understanding. Oriented to room and use of call light and bed controls.  Patient denies pain or needs. No signs of distress noted.  Bed locked & in low position, side-rails up x2.  Call light in reach.  Reminded patient to call nurse if any needs arise.     2 person skin assessment performed by this nurse and Norberto Stubbs RN.    Explained patients right to have family, representative or physician notified of their admission.  Patient has Declined for physician to be notified.  Patient has Declined for family/representative to be notified.       
Patient expressed his desire to discharge now that his sister is here to transport him. Patient and sister do not want to leave AMA. They would like a full discharge so patient can go home on the proper medications. Writer sent a perfect serve to Dr. Anguiano regarding the situation and asked if he or one of his residents could come speak with the patient regarding the situation.   
Pharmacy Medication History Note      List of current medications patient is taking is complete.    Source of information: patient, dispense report, palliative med list    Changes made to medication list:  Medications removed (include reason, ex. therapy complete or physician discontinued):  Triamcinolone paste- therapy completed    Medications added/doses adjusted:  Quetiapine 100 mg QD  Trazodone 100 mg QD    Other notes (ex. Recent course of antibiotics, Coumadin dosing):  Patient was unsure which medications he's taking. I used his dispense report and palliative med list, which had no discrepancies.   Denies use of other OTC or herbal medications.    Allergies reviewed    Electronically signed by Corie Domingo on 3/27/2024 at 10:20 AM                                                     
Pt laying in bed. Pt is A&0 x4. Speech is appropriate and clear. Oral cavity is pink and moist. Heart rhythm and rate normal. Scattered wheezing in upper lungs. Diminished lung sounds in lower bases. Abdomen is soft and non-tender. Active bowel sounds in all four quadrants. Capillary refill and skin turgor delayed. Skin is dry. IV infusing at right AC- no redness or swelling present. IV infusing heparin at left anterior forearm- no redness or swelling. No redness, pain, or swelling at left AC INT. No edema noted on limbs. Hand grasp equally strong. Pedal push and pull equally strong. Pt moves independently in bed. Call light within reach. Bed alarm on.   
Pt off unit- at Vascular lab.   
Pt sitting up in bed. No changes from prior assessment. Pt still verbalizing wanting wife to take him home AMA.   
Pt visiting with wife. Pt is verbalizing wanting to leave AMA. Bed alarm on. Call light within reach.   
Report given to primary RNRiley   
Report received from Yumiko.   
Oral Supplement      Microbiology: yes - reviewed, pending blood Cx      DVT prophylaxis: [] Lovenox                                 [] SCDs                                 [] SQ Heparin                                 [] Encourage ambulation           [x] Already on Anticoagulation- heparin gtt     Disposition:    [x] Home       [] TCU       [] Rehab       [] Psych       [] SNF       [] Long Term Care Facility       [] Other-    Code Status: Full Code        An electronic signature was used to authenticate this note  - Sayda Amador MD PGY-1 on 3/26/2024 at 9:19 AM

## 2024-03-27 NOTE — DISCHARGE SUMMARY
solution  Commonly known as: SINEQUAN  Take 0.6 mLs by mouth nightly     Ensure Plus High Protein Liqd  Take one after each meal.     folic acid 1 MG tablet  Commonly known as: FOLVITE  Take 1 tablet by mouth daily     gabapentin 100 MG capsule  Commonly known as: NEURONTIN  Take 2 capsules by mouth 3 times daily for 90 days. Intended supply: 30 days     levETIRAcetam 500 MG tablet  Commonly known as: KEPPRA     morphine 60 MG extended release tablet  Commonly known as: MS Contin  Take 1 tablet by mouth 3 times daily for 30 days. Max Daily Amount: 180 mg     nicotine 21 MG/24HR  Commonly known as: NICODERM CQ     OLANZapine 5 MG tablet  Commonly known as: ZYPREXA  Take 1 tablet by mouth nightly     ondansetron 4 MG disintegrating tablet  Commonly known as: ZOFRAN-ODT  Take 1 tablet by mouth 3 times daily as needed for Nausea or Vomiting     oxyCODONE 20 MG immediate release tablet  Commonly known as: ROXICODONE  Take 1 tablet by mouth every 4 hours as needed for Pain for up to 30 days. Max Daily Amount: 120 mg     pantoprazole 40 MG tablet  Commonly known as: PROTONIX  Take 1 tablet by mouth every morning (before breakfast)     polyethylene glycol 17 GM/SCOOP powder  Commonly known as: GLYCOLAX  Take 17 g by mouth daily     promethazine 25 MG tablet  Commonly known as: PHENERGAN     QUEtiapine 100 MG tablet  Commonly known as: SEROQUEL     senna 8.6 MG Tabs tablet  Commonly known as: SENOKOT  Take 2 tablets by mouth 2 times daily     sertraline 50 MG tablet  Commonly known as: ZOLOFT     Spiriva Respimat 2.5 MCG/ACT Aers inhaler  Generic drug: tiotropium     thermotabs Tabs tablet  Take 1 tablet by mouth daily     thiamine 100 MG tablet  Take 1 tablet by mouth daily     traZODone 100 MG tablet  Commonly known as: DESYREL            STOP taking these medications      cloNIDine 0.1 MG tablet  Commonly known as: CATAPRES     dexAMETHasone 4 MG tablet  Commonly known as: Decadron     triamcinolone acetonide 0.1 %

## 2024-03-28 PROBLEM — C78.00 METASTATIC LUNG CARCINOMA, UNSPECIFIED LATERALITY (HCC): Status: ACTIVE | Noted: 2024-03-28

## 2024-03-28 LAB
EKG ATRIAL RATE: 71 BPM
EKG P AXIS: 62 DEGREES
EKG P-R INTERVAL: 142 MS
EKG Q-T INTERVAL: 394 MS
EKG QRS DURATION: 92 MS
EKG QTC CALCULATION (BAZETT): 428 MS
EKG R AXIS: -8 DEGREES
EKG T AXIS: 67 DEGREES
EKG VENTRICULAR RATE: 71 BPM

## 2024-03-28 NOTE — CARE COORDINATION
Late Entry:     3/28/24, 10:06 AM EDT    Patient goals/plan/ treatment preferences discussed by  and .  Patient goals/plan/ treatment preferences reviewed with patient/ family.  Patient/ family verbalize understanding of discharge plan and are in agreement with goal/plan/treatment preferences.  Understanding was demonstrated using the teach back method.  AVS provided by RN at time of discharge, which includes all necessary medical information pertaining to the patients current course of illness, treatment, post-discharge goals of care, and treatment preferences.     Services At/After Discharge: None             Discharged home with wife. No needs.     Electronically signed by Vanessa Griggs RN on 3/28/2024 at 10:07 AM

## 2024-03-30 LAB
BACTERIA BLD AEROBE CULT: NORMAL
BACTERIA BLD AEROBE CULT: NORMAL

## 2024-04-02 ENCOUNTER — TELEMEDICINE (OUTPATIENT)
Dept: PALLATIVE CARE | Age: 54
End: 2024-04-02
Payer: MEDICAID

## 2024-04-02 DIAGNOSIS — C79.31 LUNG CANCER METASTATIC TO BRAIN (HCC): Primary | ICD-10-CM

## 2024-04-02 DIAGNOSIS — G47.09 OTHER INSOMNIA: ICD-10-CM

## 2024-04-02 DIAGNOSIS — F41.9 ANXIETY AND DEPRESSION: ICD-10-CM

## 2024-04-02 DIAGNOSIS — C80.1: ICD-10-CM

## 2024-04-02 DIAGNOSIS — M79.2 NEUROPATHIC PAIN: ICD-10-CM

## 2024-04-02 DIAGNOSIS — C79.31: ICD-10-CM

## 2024-04-02 DIAGNOSIS — Z51.5 PALLIATIVE CARE PATIENT: ICD-10-CM

## 2024-04-02 DIAGNOSIS — R11.2 NAUSEA AND VOMITING, UNSPECIFIED VOMITING TYPE: ICD-10-CM

## 2024-04-02 DIAGNOSIS — C34.90 LUNG CANCER METASTATIC TO BRAIN (HCC): Primary | ICD-10-CM

## 2024-04-02 DIAGNOSIS — G89.3 CANCER RELATED PAIN: ICD-10-CM

## 2024-04-02 DIAGNOSIS — C34.91 ADENOCARCINOMA OF RIGHT LUNG, STAGE 4 (HCC): ICD-10-CM

## 2024-04-02 DIAGNOSIS — R64 CANCER CACHEXIA (HCC): ICD-10-CM

## 2024-04-02 DIAGNOSIS — F32.A ANXIETY AND DEPRESSION: ICD-10-CM

## 2024-04-02 PROCEDURE — 99214 OFFICE O/P EST MOD 30 MIN: CPT | Performed by: STUDENT IN AN ORGANIZED HEALTH CARE EDUCATION/TRAINING PROGRAM

## 2024-04-02 RX ORDER — DULOXETIN HYDROCHLORIDE 30 MG/1
CAPSULE, DELAYED RELEASE ORAL
Qty: 63 CAPSULE | Refills: 0 | Status: SHIPPED | OUTPATIENT
Start: 2024-04-02 | End: 2024-05-07

## 2024-04-02 RX ORDER — DRONABINOL 5 MG/1
5 CAPSULE ORAL
Qty: 60 CAPSULE | Refills: 0 | Status: SHIPPED | OUTPATIENT
Start: 2024-04-02 | End: 2024-05-02

## 2024-04-02 RX ORDER — MORPHINE SULFATE 60 MG/1
60 TABLET, FILM COATED, EXTENDED RELEASE ORAL 3 TIMES DAILY
Qty: 90 TABLET | Refills: 0 | Status: SHIPPED | OUTPATIENT
Start: 2024-04-15 | End: 2024-05-15

## 2024-04-02 RX ORDER — OXYCODONE HYDROCHLORIDE 20 MG/1
20 TABLET ORAL EVERY 4 HOURS PRN
Qty: 180 TABLET | Refills: 0 | Status: SHIPPED | OUTPATIENT
Start: 2024-04-02 | End: 2024-05-02

## 2024-04-02 NOTE — PROGRESS NOTES
Sam De La Cruz Jr. is a 53 y.o. male who presents to the palliative care clinic today for:  Chief Complaint   Patient presents with    Follow-up    Cancer    Chronic Pain       HPI:   Sam De La Cruz Jr. presents to the palliative care clinic today for follow up of his cancer related pain.  He presents as a virtual visit today.    Symptoms:  Pain:   Location- Ribs and Legs  Severity- 6 on scale of 1-10  Current Treatments: They have Morphine ER 60 mg TID and Gabapentin 200 mg TID for scheduled pain relief. From 8 AM yesterday to 8 AM today, they have used Oxycodone IR 20 mg 5 times a day for as needed pain relief.  Effectiveness of Current Treatment: Their pain medications are working well to control their pain.  Shortness of breath:  Up and down  Sleep: They are having trouble staying asleep. They are not sleeping well due to leg pain.  Fatigue/Drowsiness: The patient reports fatigue and drowsiness.   Appetite: Patient reports poor appetite.  Wt. Loss: Patient has lost 10 lbs over the past 2 months  Nausea/Vomiting: Patient denies vomiting. Patient reports nausea.  Constipation/Diarrhea: Their medication is  working well to help with their bowel movements. Just restarted having them this week  He reports worsening anxiety and depression    Current Outpatient Medications   Medication Sig Dispense Refill    DULoxetine (CYMBALTA) 30 MG extended release capsule Take 1 capsule by mouth daily for 7 days, THEN 2 capsules daily for 28 days. 63 capsule 0    droNABinol (MARINOL) 5 MG capsule Take 1 capsule by mouth 2 times daily (before meals) for 30 days. Max Daily Amount: 10 mg 60 capsule 0    [START ON 4/15/2024] morphine (MS CONTIN) 60 MG extended release tablet Take 1 tablet by mouth 3 times daily for 30 days. Max Daily Amount: 180 mg 90 tablet 0    oxyCODONE (ROXICODONE) 20 MG immediate release tablet Take 1 tablet by mouth every 4 hours as needed for Pain for up to 30 days. Max Daily Amount: 120 mg 180 tablet 0

## 2024-04-03 PROBLEM — C79.71 MALIGNANT NEOPLASM METASTATIC TO BOTH ADRENAL GLANDS (HCC): Status: ACTIVE | Noted: 2024-04-03

## 2024-04-03 PROBLEM — C79.72 MALIGNANT NEOPLASM METASTATIC TO BOTH ADRENAL GLANDS (HCC): Status: ACTIVE | Noted: 2024-04-03

## 2024-04-03 PROBLEM — K21.9 GASTROESOPHAGEAL REFLUX DISEASE: Status: ACTIVE | Noted: 2024-04-03

## 2024-04-03 PROBLEM — E87.20 METABOLIC ACIDOSIS: Status: ACTIVE | Noted: 2024-04-03

## 2024-04-03 PROBLEM — J18.9 PNEUMONIA OF RIGHT LOWER LOBE DUE TO INFECTIOUS ORGANISM: Status: ACTIVE | Noted: 2024-04-03

## 2024-04-03 PROBLEM — I95.9 HYPOTENSION: Status: ACTIVE | Noted: 2024-04-03

## 2024-04-03 PROBLEM — E87.0 HYPERNATREMIA: Status: ACTIVE | Noted: 2024-04-03

## 2024-04-03 PROBLEM — J96.01 ACUTE RESPIRATORY FAILURE WITH HYPOXIA (HCC): Status: ACTIVE | Noted: 2024-04-03

## 2024-04-03 PROBLEM — I82.402 LEG DVT (DEEP VENOUS THROMBOEMBOLISM), ACUTE, LEFT (HCC): Status: ACTIVE | Noted: 2024-04-03

## 2024-04-03 PROBLEM — D72.829 LEUKOCYTOSIS: Status: ACTIVE | Noted: 2024-04-03

## 2024-04-03 PROBLEM — D64.9 CHRONIC ANEMIA: Status: ACTIVE | Noted: 2024-04-03

## 2024-04-03 PROBLEM — G40.909 SEIZURE DISORDER (HCC): Status: ACTIVE | Noted: 2023-03-26

## 2024-04-08 DIAGNOSIS — C34.90 LUNG CANCER METASTATIC TO BRAIN (HCC): Primary | ICD-10-CM

## 2024-04-08 DIAGNOSIS — C79.31 LUNG CANCER METASTATIC TO BRAIN (HCC): Primary | ICD-10-CM

## 2024-04-09 ENCOUNTER — PATIENT MESSAGE (OUTPATIENT)
Dept: PALLATIVE CARE | Age: 54
End: 2024-04-09

## 2024-04-09 ENCOUNTER — TELEPHONE (OUTPATIENT)
Dept: ONCOLOGY | Age: 54
End: 2024-04-09

## 2024-04-09 ENCOUNTER — CLINICAL DOCUMENTATION (OUTPATIENT)
Dept: CASE MANAGEMENT | Age: 54
End: 2024-04-09

## 2024-04-09 DIAGNOSIS — C79.31 LUNG CANCER METASTATIC TO BRAIN (HCC): ICD-10-CM

## 2024-04-09 DIAGNOSIS — Z51.5 PALLIATIVE CARE PATIENT: ICD-10-CM

## 2024-04-09 DIAGNOSIS — C34.91 ADENOCARCINOMA OF RIGHT LUNG, STAGE 4 (HCC): ICD-10-CM

## 2024-04-09 DIAGNOSIS — C34.90 LUNG CANCER METASTATIC TO BRAIN (HCC): ICD-10-CM

## 2024-04-09 DIAGNOSIS — G89.3 CANCER RELATED PAIN: ICD-10-CM

## 2024-04-09 NOTE — TELEPHONE ENCOUNTER
Patient's daughter in the clinic today and says that it's her fault patient didn't make it to the appointment today because she had the times wrote down wrong- appointment rescheduled. She also said that she doesn't even know if she could have gotten the patient here because he is very weak, shaky and hasn't been eating much lately. Purvi told her she needs to take him to the ED and she said that she already brings him to the ED so much already and doesn't want to keep doing that. She is wondering if there is anything you would be willing to prescribe for appetite?     Please advise.

## 2024-04-10 RX ORDER — MORPHINE SULFATE 60 MG/1
60 TABLET, FILM COATED, EXTENDED RELEASE ORAL 3 TIMES DAILY
Qty: 90 TABLET | Refills: 0 | Status: SHIPPED | OUTPATIENT
Start: 2024-04-10 | End: 2024-05-10

## 2024-04-10 NOTE — PROGRESS NOTES
Name: Sam De La Cruz Jr.  : 1970  MRN: [id number]    Oncology Navigation Follow-Up Note    Contact Type:  Telephone    Notes: Per Dr. Aguilar request, joaquim called pt. & spoke to his wife Alecia. Confused on appt time, however he wasn't feeling good so not sure if was able to bring in. He has no appetite- contacted Dr. Lopez who prescribe marinol but having difficulty locating at pharmacy. Since she has given him several glasses of orange juice and seems to be perking up. Currently sitting up in a chair. Informed MD wanted to discuss tx plan- chemotherapy vs palliative/hospice care. Spouse states he is wanting chemotherapy but has been running into a lot of issues. Recently out of hospital for PE. Has an apt  but if he feels better in next couple days would like to be seen sooner. Instructed could take him to ED for IV fluids if need. Joaquim spoke to Dr. Aguilar. Wife informed Dr. Aguilar is willing to see him any time this week if feels up to appt. Voiced understanding.     Electronically signed by Kaila Cantu RN on 4/10/2024 at 7:49 AM

## 2024-04-10 NOTE — TELEPHONE ENCOUNTER
Spoke with the Pharmacist at [facility]. Pt is able to  the morphine today however previous script says do not fill until 4/15. Pharmacist is needing a new script for the morphine with fill date of today pt will be out at the end of today.

## 2024-04-10 NOTE — TELEPHONE ENCOUNTER
From: Sam De La Cruz Jr.  To: Dr. Philip Lopez  Sent: 4/9/2024 7:22 PM EDT  Subject: Morphine date    Good morning,  ( assuming you'll be reading this in the morning( lol   This is Merissa Burton's little sister.    I had [facility] tell me that the morphine dates are incorrect.   The 15th is when his oxycodone is due the 11th is when his morphine is due. I think doc accidentally put the 15th on the morphine by mistake. So I thought I would send you a message so they can get those ordered hopefully they will have them in a couple of days.   Micheal has been having a really bad week. His energy level has dropped, His pain has gone up in his ribs, His appetite dropped as well....   I'm trying to find the Miarnol hat Dr Lopez ordered for him for his appetite but no pharmacies have it.  Thank you so much for your help with his morphine prescription.  You have a wonderful day call me if you have any questions.

## 2024-04-17 PROBLEM — J96.01 ACUTE HYPOXIC RESPIRATORY FAILURE (HCC): Status: ACTIVE | Noted: 2024-01-01

## 2024-04-17 NOTE — ED NOTES
Pt placed on heated high flow by respiratory at this time. Pt in bed, eyes open, respirations even and unlabored. Vital signs reassessed. Pt and wife updated on POC, no needs voiced at this time.

## 2024-04-17 NOTE — H&P
[facility]  Sedation/Analgesia History & Physical    Pt Name: Sam De La Cruz Jr.  MRN: [id number]  YOB: 1970  Provider Performing Procedure: Paul Berry MD, MD  Primary Care Physician: Diana Charlton, RINA - BHAVANI    Formulation and discussion of sedation / procedure plans, risks, benefits, side effects and alternatives with patient and/or responsible adult completed.    PRE-PROCEDURE   DNR-CCA/DNR-CC []Yes [x]No  Brief History/Pre-Procedure Diagnosis: pulmonary emboli           MEDICAL HISTORY  []CAD/Valve  []Liver Disease  []Lung Disease []Diabetes  []Hypertension []Renal Disease  []Additional information:       has a past medical history of Arthritis, Asthma, COPD (chronic obstructive pulmonary disease) (HCC), GERD (gastroesophageal reflux disease), Hypertension, Psychiatric problem, Schizophrenia (HCC), Severe malnutrition (HCC), and Unspecified cerebral artery occlusion with cerebral infarction.    SURGICAL HISTORY   has a past surgical history that includes CT NEEDLE BIOPSY LUNG PERCUTANEOUS (10/31/2023); Dilatation, esophagus; Endoscopy, colon, diagnostic; and Colonoscopy.  Additional information:       ALLERGIES   Allergies as of 04/17/2024 - Fully Reviewed 04/17/2024   Allergen Reaction Noted    Prochlorperazine Other (See Comments) 04/01/2016    Toradol [ketorolac tromethamine] Itching and Rash 01/18/2015    Ketorolac  04/09/2018    Compazine [prochlorperazine maleate] Anxiety 01/18/2015     Additional information:       MEDICATIONS   Coumadin Use Last 5 Days [x]No []Yes  Antiplatelet drug therapy use last 5 days  [x]No []Yes  Other anticoagulant use last 5 days  []No [x]Yes    Current Facility-Administered Medications:     azithromycin (ZITHROMAX) 500 mg in sodium chloride 0.9 % 250 mL IVPB ([id number]), 500 mg, IntraVENous, Once, Dejon Mustafa MD    ceFEPIme (MAXIPIME) 2,000 mg in sodium chloride 0.9 % 100 mL IVPB (mini-bag), 2,000 mg, IntraVENous, Once, 
Formulation and discussion of sedation / procedure plans, risks, benefits, side effects and alternatives with patient and/or responsible adult completed.    History and Physical reviewed and unchanged.    Electronically signed by Paul Berry MD on 4/17/24 at 4:03 PM EDT   
muscular development  EXTREMITIES: No significant deformity or joint abnormality, no edema, peripheral pulses intact 2/4, no varicosities.  NEUROLOGICAL: CN II-XII intact, strength and sensation symmetric and intact throughout. Cerebellar function intact  SKIN: Skin normal color, texture and turgor  PSYCHIATRIC: AOx3, able to demonstrate good judgement & reason     Electronically signed by Benson Doyle M.D. on 4/17/2024 at 3:28 PM

## 2024-04-17 NOTE — ED TRIAGE NOTES
Pt presents to ED with complaints of shortness of breath. Pt notes this began yesterday, he has end stage lung cancer that he was dx with a couple weeks ago. EMS reports pt to be 81% on room air, nonrebreather applied at 15L, pt 95%. Pt tachycardic on arrival. EKG complete.

## 2024-04-17 NOTE — ED NOTES
RN and lab at bedside attempting to get labs. Pt medicated per MAR. Vital signs reassessed, respirations even and unlabored. No needs voiced.

## 2024-04-17 NOTE — ED NOTES
Central line placed by dr. Lambert and Dr. Mustafa. Pt tolerated. Xray at bedside to confirm placement.

## 2024-04-17 NOTE — ED NOTES
Pt in bed, eyes open, respirations even. Vital signs reassessed. Lab at bedside attempting to get second set of cultures. No needs voiced.

## 2024-04-17 NOTE — ED NOTES
Pt in bed, eyes closed, respirations even and unlabored. Vital signs reassessed, no needs voiced.

## 2024-04-18 NOTE — PROCEDURES
PROCEDURE NOTE  Date: 4/18/2024   Name: Sam De La Cruz Jr.  YOB: 1970    Insert Arterial Line    Date/Time: 4/18/2024 9:17 AM    Performed by: Kamla Montes DO  Authorized by: Mansoor Lozano MD  Consent: Verbal consent obtained. Written consent obtained.  Risks and benefits: risks, benefits and alternatives were discussed  Consent given by: Brother, at the bedside.  Patient understanding: patient states understanding of the procedure being performed  Patient consent: the patient's understanding of the procedure matches consent given  Procedure consent: procedure consent matches procedure scheduled  Relevant documents: relevant documents present and verified  Test results: test results available and properly labeled  Site marked: the operative site was marked  Patient identity confirmed: arm band and hospital-assigned identification number  Time out: Immediately prior to procedure a \"time out\" was called to verify the correct patient, procedure, equipment, support staff and site/side marked as required.  Preparation: Patient was prepped and draped in the usual sterile fashion.  Indications: multiple ABGs, respiratory failure and hemodynamic monitoring  Location: left radial  Anesthesia: local infiltration    Anesthesia:  Local Anesthetic: lidocaine 1% without epinephrine  Anesthetic total: 2 mL  Seldinger technique: Seldinger technique used  Number of attempts: 1  Post-procedure: line sutured and dressing applied  Patient tolerance: patient tolerated the procedure well with no immediate complications  Comments: Prior to procedure, checked with ultrasound both radial and ulnar arteries on the left.  Both arteries are patent, pulsating well.  Procedure performed under ultrasound guidance.  1 attempt.  Total 2.0 mL blood loss.  Secondary cleansing.  Tegaderm with d and patch applied  System connected, obtained appropriate waveforms.      Supervising attending.  Dr. COSME Lozano.  MD  Performed by

## 2024-04-18 NOTE — PROCEDURES
ICU PROCEDURE - ENDOTRACHEAL INTUBATION    Sam De La Cruz Jr.     MRN#: [id number]  24      Acct #:[unfilled]     : 1970      INDICATION: acute hypoxic respiratory failure, submassive pulmonary embolism, lung cancer, pneumonia    TIME OUT: taken    Permission obtained, risks/benefits reviewed:    ANESTHESIA:   []Ketamine  []Ativan  [] Morphine  []Propofol  [x]Other medications: etomidate and versed       ESTIMATED BLOOD LOSS:  None.    COMPLICATIONS:  [x]N/A  [] Other:    LARYNGOSCOPIC AIRWAY GRADE (CORMACK-LEHANE):[x]1  []2a  []2b []3  []4        INTUBATION EQUIPMENT USED:  [x] Video laryngoscope only    OUTCOME: Successful placement of #  7.5  Taperguard Evac endotracheal via   [x]Oral route    INSERTION DEPTH:  24    cm from   [x]lip       CONFIRMATION OF TUBE POSITION:   [x]Capnography - Strong & repeatable exhaled CO2 detection   [x]Multiple point auscultation   [x]SpO2 response   [x]STAT X-ray ordered   []Bronchoscopic assessment    UNUSUAL FINDINGS: n/a    PROCEDURE:     Using video laryngoscopy, the vocal cords were visualized and the endotracheal tube was placed through the cords under direct vision.     Good breath sounds were auscultated bilaterally without sounds over abdomen.  Appropriate strong & repeatable exhaled CO2 detection was confirmed.       Electronically signed by Camron Gómez DO on 2024 at 12:52 AM

## 2024-04-18 NOTE — SIGNIFICANT EVENT
Called family, spouse but no answer over the phone.  Discussed with the brother at the bedside regarding art line.  Explained rationale only.  Discussed that.  Benefits and risks reviewed.  Obtained informed consent.

## 2024-04-18 NOTE — ED PROVIDER NOTES
[facility]  EMERGENCY DEPARTMENT ENCOUNTER   PHYSICIAN ATTESTATION    Pt Name: Sam De La Cruz Jr.  MRN: [id number]  Birthdate 1970  Date of evaluation: 9/12/20      I personally saw and examined the patient. I have reviewed and agree with the Resident findings, including all diagnostic interpretations and treatment plans as written. I was present for the key portion of any procedures performed and the inclusive time noted in any critical care statement.       History:       53-year-old male who is chronically ill-appearing with diagnosis of end-stage lung cancer.  Reportedly recent diagnosis.  He is hypoxic beyond baseline today.  We have sent him for a CT of the chest suggestive of worsening pulmonary emboli.  He was not on anticoagulation prior to his arrival in the department today.  He is going to go to interventional radiology for retrieval.  His blood pressure has dropped, we are planning on pressors.  My resident is setting up to do a central line in the right IJ.      The case is being checked out to the next physician Dr Lambert.    CRITICAL CARE: There was a high probability of clinically significant/life threatening deterioration in this patient's condition which required my urgent intervention.  Total critical care time was 30 minutes.  This excludes any time for separately reportable procedures.      Diagnosis: Pulmonary embolism with physiological compromise, end-stage lung cancer  Plan is for admission                DO Barrie Herr, DO Deep  04/17/24 1933    
I received signed-out from Dr. Sood and his resident for this 53-yo male with PMH of lung CA c/o SOB and CTA chest submassive PE. He is hypotensive and hypoxic. HFNC is given and vaso pressure is started. A CVC is inserted by resident physician with my assistance and he is taken to IR for emergency mechanical thrombectomy after hemodynamically stable then ICU. He maintains his airway in ED with SaO2 above 93%. ICU is discussed he may require intubation eventually.      Miguel Lambert MD  04/18/24 [id number]    
mediastinal and hilar lymphadenopathy.  5. Chronic lung disease.   6. Poorly visualized hypoattenuating lesion in the right hepatic lobe.   [JW]   8490 I was notified by nursing staff patient's blood pressure 70/58.  Advised continue fluids, continue magnesium, continue antibiotics, continue heparin, administer Solu-Cortef.  With patient's history of lung cancer with metastasis to adrenal glands, previous providers have expressed concern for adrenal insufficiency. [JW]      ED Course User Index  [JW] Dejon Mustafa MD         PROCEDURES: (None if blank)  Central Line    Date/Time: 4/17/2024 6:24 PM    Performed by: Dejon Mustafa MD  Authorized by: Deep Sood DO    Consent:     Consent obtained:  Verbal    Consent given by:  Patient    Risks, benefits, and alternatives were discussed: yes      Risks discussed:  Arterial puncture, incorrect placement, pneumothorax, infection and bleeding  Universal protocol:     Procedure explained and questions answered to patient or proxy's satisfaction: yes      Relevant documents present and verified: yes      Test results available: yes      Imaging studies available: yes      Required blood products, implants, devices, and special equipment available: yes      Immediately prior to procedure, a time out was called: yes      Patient identity confirmed:  Verbally with patient and arm band  Pre-procedure details:     Indication(s): central venous access and hemodynamic monitoring      Skin preparation:  Chlorhexidine    Skin preparation agent: Skin preparation agent completely dried prior to procedure    Sedation:     Sedation type:  None  Anesthesia:     Anesthesia method:  Local infiltration    Local anesthetic:  Lidocaine 1% WITH epi  Procedure details:     Location: Initially R IJ, converted to R subclavian.    Procedural supplies:  Triple lumen    Catheter size:  7 Fr    Number of attempts:  3    Successful placement: yes    Post-procedure details:

## 2024-04-18 NOTE — OP NOTE
Department of Radiology  Post Procedure Progress Note      Pre-Procedure Diagnosis:  pulmonary emboli     Procedure Performed:  pulmonary thrombectomy     Anesthesia: local / versed and fentanyl    Findings: successful, prePAP 51/5 mmHg, post PAP 43/20mmHg     Immediate Complications:  None    Estimated Blood Loss: minimal    SEE DICTATED PROCEDURE NOTE FOR COMPLETE DETAILS.    Paul Berry MD   4/17/2024 9:19 PM

## 2024-04-18 NOTE — CONSULTS
Comprehensive Nutrition Assessment    Type and Reason for Visit:  Initial, Consult (TF Order and Management)    Nutrition Recommendations/Plan:   Recommend start Vital 1.2 at 10 ml/hr, increasing by 10 ml every 4 hours as tolerated until reach goal rate of 60 ml/hr x 18 hours daily, hold tube feed before and after each Dilantin dose.  Additional free water flushes per Physician.     Malnutrition Assessment:  Malnutrition Status:  Severe malnutrition (04/18/24 0951)    Context:  Chronic Illness     Findings of the 6 clinical characteristics of malnutrition:  Energy Intake:  Unable to assess (pt intubated at present with no family present)  Weight Loss:  Greater than 7.5% over 3 months (-17.5% weight loss in 3 months per EMR)     Body Fat Loss:  Severe body fat loss Orbital, Triceps, Fat Overlying Ribs, Buccal region   Muscle Mass Loss:  Severe muscle mass loss Temples (temporalis), Clavicles (pectoralis & deltoids), Thigh (quadriceps), Calf (gastrocnemius), Scapula (trapezius)  Fluid Accumulation:  No significant fluid accumulation     Strength:  Not Performed    Nutrition Assessment: Pt improving from a nutritional standpoint AEB pt NPO, intubated and sedated at present with need for tube feeds to meet estimated nutritional needs during LOS.  Remains at risk for further nutritional compromise r/t admit d/t acute hypoxic respiratory failure, acute right submassive pulmonary embolism and DVTs, pneumonia, stage IV metastatic right lung adeno cancer with mets to adrenal, brain and liver, HAGMA, hypotension and underlying medical condition (Hx: Tobacco abuse, CHF, Paranoid Schizophrenia, Bipolar).       Nutrition Related Findings:    Pt. Report/Treatments/Miscellaneous: Pt seen- appears cachetic. Pt intubated and sedated at present. Okay to start tube feed per Dr. Montes.   GI Status: No BM yet.  Pertinent Labs: Sodium 152, Potassium 4.7, BUN 12, Cr. 0.6, Glucose 209  Pertinent Meds: Folic Acid, Protonix, Dilantin 
Patient:  Sam De La Cruz Jr.    Unit/Bed:GANESH /GANESH  MRN: [id number]   PCP: Diana Charlton APRN - CNP  Date of Admission: 4/17/2024    Assessment and Plan(All pulmonary edema, renal failure, PE, and respiratory failure diagnoses are acute in nature unless otherwise specified):        Acute hypoxic respiratory failure: Likely secondary to increased PE burden, lung cancer, pneumonia and possible aspiration event.  Initially was on HFNC and was escalated to MV.  Continue vent with lung protective strategies.  Acute right submassive pulmonary embolism & DVTs: CTA Chest (+) New right (right main PA extending into right middle and lower lung segmental / subsegmental branches) and persistent left-sided pulmonary artery thromboemboli and persistent Lt PE. During prior admission was diagnosed with bilateral segmental/subsegmental PE/DVTs in left femoral, common femoral, popliteal, PT, peroneal, anterior tibial and gastrocnemius veins.   S/p thrombectomy by IR on 4/17/2024.  Continue heparin drip and transition back to Eliquis when appropriate.  Pneumonia, YONAS: CXR / CTA chest (+) new alveolar and reticular opacities in the left mid lung; Post-obstructive atelectasis again seen; (+) malignancy and RF for PsA. PCT 0.43  MRSA nares + PNA culture / Molecular panel + blood cultures ordered   C/w Azithromycin 500mg IV q24hrs x5 days total + Cefepime 2g q8hrs x7days. Adjust based off culture ID / sensitivities.   Metastatic Rt lung AdenoCa w/mets to Adrenals/Brain/Liver: Stage IVB (cT4, cN2, cM1c); 10/23 - multiple brain metastases, spiculated 4.3cm RUL mass, multiple b/l pulmonary metastatic nodules, and metastatic LAD (right hilar /subcarinal stations). S/p lung Bx 10/23 (+) adenocarcinoma; s/p WBRT completed 11/2023; PDL1 > 1%; TPS 11-20%; Has not started Chemotherapy as of yet.   CXR / CTA chest show stable disease - right hilar mass and multiple b/l pulmonary nodules + mediastinal / hilar LAD   Cancer-cachexia / Pain 
there is a chance that Micheal is not able to make it out of the hospital due to his condition.  His son states that he understands that.  He would be interested in signing out with hospice regardless of what happens over the next few days.  We came to the agreement that we will have a further goals of care discussion on  Monday.  It is likely that he will or will not show improvement by then and we can make the decision of how many would like to proceed going forward.  He would meet criteria for inpatient hospice.  Palliative care will continue to follow patient while he is hospitalized.  Case was discussed with the ICU team.         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 *       Continue current plan of care for chronic diseases per primary and specialist teams  Would recommend continuing fentanyl infusion for pain control while he is unable to take his extended release morphine, agree with the plan to have breakthrough morphine available for breakthrough pain.  This may need to be increased due to him being on 20 mg of oxycodone for breakthrough pain at home  Will have further goals of care discussions with the patient's family on Monday to decide whether or not to continue with the current plan of care or proceed with hospice.  Palliative Care will continue to have goals of care discussions with the patient and/or family  Palliative Care will continue to follow the patient while they are hospitalized   Please [employer] or call the Palliative Care team with any questions or concerns    CURRENT CODE

## 2024-04-20 NOTE — RT PROTOCOL NOTE
RT Inhaler-Nebulizer Bronchodilator Protocol Note    There is a bronchodilator order in the chart from a provider indicating to follow the RT Bronchodilator Protocol and there is an “Initiate RT Inhaler-Nebulizer Bronchodilator Protocol” order as well (see protocol at bottom of note).    CXR Findings:  XR CHEST PORTABLE    Result Date: 4/19/2024  1. Improved aeration of the right upper lobe. 2. Persistent opacification the right midlung zone. 3. Persistent moderate severity infiltrates in the left mid and lower lung zone. **This report has been created using voice recognition software. It may contain minor errors which are inherent in voice recognition technology.** Final report electronically signed by Dr. Rain Pringle on 4/19/2024 7:17 AM      The findings from the last RT Protocol Assessment were as follows:   History Pulmonary Disease: Chronic pulmonary disease  Respiratory Pattern: Regular pattern and RR 12-20 bpm  Breath Sounds: Clear breath sounds  Cough: Strong, productive  Indication for Bronchodilator Therapy:    Bronchodilator Assessment Score: 3    Aerosolized bronchodilator medication orders have been revised according to the RT Inhaler-Nebulizer Bronchodilator Protocol below.    Respiratory Therapist to perform RT Therapy Protocol Assessment initially then follow the protocol.  Repeat RT Therapy Protocol Assessment PRN for score 0-3 or on second treatment, BID, and PRN for scores above 3.    No Indications - adjust the frequency to every 6 hours PRN wheezing or bronchospasm, if no treatments needed after 48 hours then discontinue using Per Protocol order mode.     If indication present, adjust the RT bronchodilator orders based on the Bronchodilator Assessment Score as indicated below.  Use Inhaler orders unless patient has one or more of the following: on home nebulizer, not able to hold breath for 10 seconds, is not alert and oriented, cannot activate and use MDI correctly, or respiratory rate 25

## 2024-04-20 NOTE — RT PROTOCOL NOTE
RT Inhaler-Nebulizer Bronchodilator Protocol Note    There is a bronchodilator order in the chart from a provider indicating to follow the RT Bronchodilator Protocol and there is an “Initiate RT Inhaler-Nebulizer Bronchodilator Protocol” order as well (see protocol at bottom of note).    CXR Findings:  XR CHEST PORTABLE    Result Date: 4/19/2024  1. Improved aeration of the right upper lobe. 2. Persistent opacification the right midlung zone. 3. Persistent moderate severity infiltrates in the left mid and lower lung zone. **This report has been created using voice recognition software. It may contain minor errors which are inherent in voice recognition technology.** Final report electronically signed by Dr. Rain Pringle on 4/19/2024 7:17 AM      The findings from the last RT Protocol Assessment were as follows:   History Pulmonary Disease: Chronic pulmonary disease  Respiratory Pattern: Regular pattern and RR 12-20 bpm  Breath Sounds: Slightly diminished and/or crackles  Cough: Strong, spontaneous, non-productive  Indication for Bronchodilator Therapy:    Bronchodilator Assessment Score: 4    Aerosolized bronchodilator medication orders have been revised according to the RT Inhaler-Nebulizer Bronchodilator Protocol below.    Respiratory Therapist to perform RT Therapy Protocol Assessment initially then follow the protocol.  Repeat RT Therapy Protocol Assessment PRN for score 0-3 or on second treatment, BID, and PRN for scores above 3.    No Indications - adjust the frequency to every 6 hours PRN wheezing or bronchospasm, if no treatments needed after 48 hours then discontinue using Per Protocol order mode.     If indication present, adjust the RT bronchodilator orders based on the Bronchodilator Assessment Score as indicated below.  Use Inhaler orders unless patient has one or more of the following: on home nebulizer, not able to hold breath for 10 seconds, is not alert and oriented, cannot activate and use MDI

## 2024-04-22 NOTE — PROCEDURES
EEG REPORT       Patient: Sam De La Cruz Jr. Age: 53 y.o.  MRN: [id number]      Referring Provider: No ref. provider found    History: This routine 3 hour 1 minute scalp EEG was recorded with video- monitoring for a 53 y.o.. male who presented with encephalopathy. This EEG was performed to evaluate for focal and epileptiform abnormalities.     Sam De La Cruz Jr.   Current Facility-Administered Medications   Medication Dose Route Frequency Provider Last Rate Last Admin    potassium phosphate 15 mmol in sodium chloride 0.9 % 250 mL IVPB  15 mmol IntraVENous PRN Bibiana Marie APRN - CNP   Stopped at 04/22/24 0919    dextrose 5 % solution   IntraVENous Continuous Luis M Clinton  mL/hr at 04/22/24 0521 Restarted at 04/22/24 0521    meropenem (MERREM) 1,000 mg in sodium chloride 0.9 % 100 mL IVPB (mini-bag)  1,000 mg IntraVENous Q8H Camron Gómez DO   Stopped at 04/22/24 1327    ipratropium 0.5 mg-albuterol 2.5 mg (DUONEB) nebulizer solution 1 Dose  1 Dose Inhalation Q4H PRN Mansoor Lozano MD   1 Dose at 04/21/24 1539    glucose chewable tablet 16 g  4 tablet Oral PRN Kamla Montes,         dextrose bolus 10% 125 mL  125 mL IntraVENous PRN Kamla Montes, DO        Or    dextrose bolus 10% 250 mL  250 mL IntraVENous PRN Kamla Montes, DO        glucagon injection 1 mg  1 mg SubCUTAneous PRN Kamla Montes DO        dextrose 10 % infusion   IntraVENous Continuous PRN Kamla Montes DO        insulin lispro (HUMALOG) injection vial 0-16 Units  0-16 Units SubCUTAneous TID WC Kamla Montes DO   4 Units at 04/21/24 0809    insulin lispro (HUMALOG) injection vial 0-4 Units  0-4 Units SubCUTAneous Nightly Kamla Montes DO        insulin glargine (LANTUS) injection vial 5 Units  5 Units SubCUTAneous Daily Kamla Montes DO   5 Units at 04/22/24 0804    folic acid injection 1 mg  1 mg IntraVENous Daily Camron Gómez DO   1 mg at 04/22/24 1005

## 2024-04-23 PROBLEM — Z51.5 HOSPICE CARE PATIENT: Status: ACTIVE | Noted: 2024-01-01

## 2024-04-23 PROBLEM — R69 LATE STAGE TERMINAL ILLNESS: Status: ACTIVE | Noted: 2024-01-01

## 2024-04-23 PROBLEM — R06.03 RESPIRATORY DISTRESS: Status: ACTIVE | Noted: 2024-01-01

## 2024-04-23 NOTE — PROGRESS NOTES
Patient Weaning Progress    The patient's vent settings was not able to be weaned this shift.    Spontaneous weaning trial was not attempted due to defined parameters for SBT (spontaneous breathing trial) not being met.    Reason that defined ventilator parameters for SBT was not met              [] Patient condition requires increased ventilator settings  [] Requires increased sedation   [x] Settings not within weaning range   [] SAT not completed   [] Physician orders    Unable to get agreement for goals because no family is present and patient cannot respond  
                                             Patient Weaning Progress    The patient's vent settings was not able to be weaned this shift.    Spontaneous weaning trial was not attempted.    Unable to get agreement for goals because no family is present and patient cannot respond.  
    [facility]     Neurodiagnostic Laboratory Technician Worksheet      EEG Date: 2024    Name: Sam De La Cruz Jr.  : 1970  Age: 53 y.o.  Sex: male  MRN: [id number]  CSN: [id number]    Room/Location: [facility]  Ordering Provider: ALBERTINA Gómez    EEG Number: [id number]    Time In: 0951  Time Out: 1256  Total Treatment Time: 3 hours    Clinical History: HX asthma,COPD and Schizophrenia and Lung adenocarcinoma w, Mets to Brain,Liver and bone. PT was unresponsive when off sedation, Seizure rule out.     Hand Dominance: intubated   Sedation: No   H.V. Completed: No, on Vent   Photic: Yes   Sleep: Yes   Drowsy: No   Sleep Deprived: No   Seizures Observed: No   Mentality: obtunded     Technician: Pavithra Lara 2024             
  CRITICAL CARE  PROGRESS NOTE       Patient:  Sam De La Cruz Jr.    Unit/Bed:4D-11/011-A  YOB: 1970  MRN: [id number]   PCP: Diana Charlton APRN - CNP  Date of Admission: 4/17/2024  Chief Complaint:- SOB    Assessment and Plan:    Acute respiratory failure with hypoxia/hypercapnia.  Multifactorial.  Acute multilobar PNA, saddle PE, s/p thrombectomy, mild pulmonary hypertension : PA thrombectomy by IR on 4/17, will continue heparin drip for PE.  On broad-spectrum antibiotics to cefepime.  Continue breathing treatments including nebulizers-DuoNeb/Pulmicort/Brovana.  Will wean off from vent/sedation as able.  Currently critical illness/high pressure requirement precludes extubation.  Continue vent with lung protective strategies.  Septic shock from acute multilobar PNA: There was a obstructive component from cancer. Will continue steroid therapy for concomitant adrenal insufficiency.  On maintenance IVF.  MAP goal over 65.  Blood cultures x 1 positive for Serratia.  Respiratory panel shows Serratia/Staph aureus MSSA. Will continue cefepime for now.   Hypernatremia: 3-4 L free water deficit.  Possible diabetes insipidus due to brain metastasis.  Will increase free water flushes to 200 every 4 hours.  Given D5 bolus as well as continuous infusion.  Monitor sodium every 4 hours.  Check serum osmolality and urine electrolytes.  Hypophosphatemia: Will repeat level and replete as indicated.  Saddle PE, s/p thrombectomy by IR.:  On heparin drip. Metastatic cancer disease is likely cause for PE.  Metastatic lung adenocarcinoma, with mets to liver/brain/possible adrenal, stage IV:  S/p lung Bx 10/23 (+) adenocarcinoma; s/p WBRT completed 11/2023; PDL1 > 1%; TPS 11-20%; Has not started Chemotherapy as of yet.  Severe malnutrition, metastatic cancer disease.:  Dietitian consulted for tube feeding recommendations since patient is intubated/sedated on vent. Moderate hypoalbuminemia.  History of tobacco abuse: 
  CRITICAL CARE  PROGRESS NOTE       Patient:  Sam De La Cruz Jr.    Unit/Bed:4D-11/011-A  YOB: 1970  MRN: [id number]   PCP: Diana Charlton APRN - CNP  Date of Admission: 4/17/2024  Chief Complaint:- SOB    Assessment and Plan:    Acute combined respiratory failure.  Multifactorial.  Acute pneumonia, bilateral saddle PE s/p thrombectomy by IR, suspected PAH: Underwent thrombectomy by IR, continue heparin drip for PE.    On antibiotics broad-spectrum cefepime plus vancomycin, respiratory culture positive for Serratia.  Awaiting blood cultures 1 and 2 for the sensitivity to optimize antibiotic therapy.  On high-dose steroid therapy.  Breathing treatments.  Remains on the vent/intubated on sedation.  Will wean off from sedation/vent as able.  Lung protective strategies.  Telemetry pulse ox.  Daily weights EDVIN's.  Daily CBC BMP, liver panel.  Shock, combined.  Septic from acute PNA, component obstructive due to PE:  Requiring 2 vasopressors including Levophed plus vasopressin.  On high-dose steroid therapy.  Judicious IVF since pulmonary hypertension suspected.  Awaiting echo results.  Daily weights, EDVIN's.  Telemetry pulse ox.  MAP goal over 65.  Daily CBC BMP liver panel.  Acute pneumonia.  CAP versus cancer related, in setting of immunocompromise state due to metastatic cancer disease.:  As above, on antibiotics, high-dose steroid therapy, IVF, awaiting culture results.  Breathing treatments.  Saddle PE, s/p thrombectomy by IR.:  On heparin drip.  Awaiting echo for right heart strain.  Metastatic cancer disease is likely cause for PE.  Metastatic lung adenocarcinoma, with a mets and liver/brain/possible adrenal, stage IV:  S/p lung Bx 10/23 (+) adenocarcinoma; s/p WBRT completed 11/2023; PDL1 > 1%; TPS 11-20%; Has not started Chemotherapy as of yet.    Severe malnutrition, metastatic cancer disease.:  Dietitian consulted for tube feeding recommendations since patient is intubated/sedated on 
  Pharmacy Note - Extended Infusion Beta-Lactam Dose Adjustment    Cefepime 2000 mg q12h intermittent infusion for treatment of Community acquired pneumonia. Per [facility] Extended Infusion Beta-Lactam Policy, cefepime will be changed to   2000 mg q8h extended infusion     Estimated Creatinine Clearance: Estimated Creatinine Clearance: 85 mL/min (based on SCr of 0.7 mg/dL).    Dialysis Status, WONG, CKD: Normal    BMI: Body mass index is 16.73 kg/m².    Rationale for Adjustment: Dose adjusted per [facility] Extended Infusion Policy based on renal function and indication. The above medication is renally eliminated and demonstrates time-dependent effects on bacterial eradication. Extended-infusion dosing strategy aims to enhance microbiologic and clinical efficacy.     Pharmacy will monitor renal function daily and adjust dose as necessary.      Please call with any questions.    Thank you,  Rohini Pagan, PharmD      
1000 dr. Chery and brian np at bedside orders to wean dubutimin and milrinone every hour till off and do hemodynamics and vbg with titrations  
1200 family talking with hospice nurse, plans to transition to comfort care  
2015 Pt arrived in IR for pulmonary thrombectomy.   2018 Procedure reviewed with pt and pt agreed to site of procedure and procedure to be performed today.  2022 Pt positioned supine on table, monitor applied  2025 Right groin prepped for procedure with chloraprep   2034 Procedure started with Dr. Berry  2041 PA pressure to main pulmonary trunk- 51\5(21)  2044 Inari [id number] to left pulmonary artery  2051 Pt very agitated, attempting to sit up, states that he has to \"pee\". Urinal provided for pt and he still attempts to sit up.  2100 Pt voided small amount and is now resting quietly.   2107 Inari [id number] to right pulmonary artery  2115 PA pressure to main pulmonary trunk - 43/20 (27)  2118 Procedure complete. Pt tolerated well. Slip not to right groin venous puncture site, manual pressure held per BRIANA Tamayo, RT  2130 Manual pressure discontinued, slip not, quickclot, gauze and opsite to right femoral venous puncture site. Site without bleeding or hematoma  2134 Pt back to bed. Positioned for comfort. Femo stop applied  Report called to 4D  2139 Pt transported to 4D11 via bed. No complaints voiced at this time. Right groin site with dressing dry and intact, no bleeding or hematoma to site.   
2145: Patient arrived to unit from IR. Assessment completed at this time. See flowsheets for assessment details. Patient on NRB at 15 L/min at time of arrival to unit. Fem-stop in place on right femoral cath site. Site checks initiated at this time. Patient placed on HHFNC at time of arrival to unit.     0548-0796: Patient requiring increase in HHFNC. See flowsheets. Patient on 60 L with FiO2 at 100%.     2337: Scheduled medications administered at this time. HOB elevated and patient given sips of water prior to administering medications. Patient tolerates sips of water without difficulty. While administering medications, patient noted to be holding oral pills in cheek and not swallowing pills. Several pills removed from patient's oral cavity. Dr. Gómez notified.     1207-4280: Patient continues to have tachypnea and increase in work of breathing. Patient remains on HHFNC at 60 L and 100%. Discussed with Dr. Gómez and new orders received for an ABG. ABG obtained by RT and results reviewed with Dr. Gómez.     0037: Dr. Gómez at bedside. Provider contacted patient's spouse to discuss patient's change in condition and oxygenation needs.     0041: Dr. Gómez preparing for intubation at this time. This RN, Nahomy RN, Steve RT and Maria C RN assisting with intubation procedure. Time out completed at this time. Correct patient identified utilizing two patient identifiers. Allergies reviewed prior to starting procedure for intubation    0043: Versed 4 mg IVP administered at this time per verbal order from Dr. Gómez.     0046: Etomidate 20 mg IVP administered at this time per verbal order from Dr. Gómez.     0047: Patient intubated with 7.5 ETT, measuring 25 at patient's lips. Positive color change noted on CO2 detector. Bilateral breath sounds auscultated.    0058: NG tube placed to right nare at this time.     0136: HR continues to remains elevated in the 140's. Lopressor 5 mg IVP administered at this time per order. 
Comprehensive Nutrition Assessment    Type and Reason for Visit:  Reassess (TF monitoring)    Nutrition Recommendations/Plan:   Change TF to continuous gtt - Vital 1.2 50ml/hour - no hold needed for IV dilantin  Free H20 per Physician - 4/21 ordered at 300ml every 4h per Dr. Gómez  Continue phosphorus replacement  - pt. Showing signs of  refeeding syndrome      Malnutrition Assessment:  Malnutrition Status:  Severe malnutrition (04/18/24 0951)    Context:  Chronic Illness     Findings of the 6 clinical characteristics of malnutrition:  Energy Intake:  Unable to assess (pt intubated at present with no family present)  Weight Loss:  Greater than 7.5% over 3 months (-17.5% weight loss in 3 months per EMR)     Body Fat Loss:  Severe body fat loss Orbital, Triceps, Fat Overlying Ribs, Buccal region   Muscle Mass Loss:  Severe muscle mass loss Temples (temporalis), Clavicles (pectoralis & deltoids), Thigh (quadriceps), Calf (gastrocnemius), Scapula (trapezius)  Fluid Accumulation:  No significant fluid accumulation     Strength:  Not Performed    Nutrition Assessment:     Pt severely malnourished as per criteria above.  Remains at risk for further nutritional compromise r/t admit d/t acute hypoxic respiratory failure, acute right submassive pulmonary embolism and DVTs, pneumonia, stage IV metastatic right lung adeno cancer with mets to adrenal, brain and liver, HAGMA, intubated , LOS day 5 and underlying medical condition (Hx: Tobacco abuse, CHF, Paranoid Schizophrenia, Bipolar     Nutrition Related Findings:    Pt. Report/Treatments/Miscellaneous: intubated; tolerating TF - changed to continuous gtt as on IV dilantin; poor prognosis; family to meet; free H20 adjusted per MDs; UO 2425ml;   GI Status: BM x1 4/21  Pertinent Labs: glucose 189  BUN 20  creatinine 0.4  Na 160  K+ 3.4  phosphorus 1.8  Pertinent Meds: folic acid, lantus, humalog, keppra, merrem, protonix, KPhos, IV dilantin, thaimine, precedex, fentanyl, 
Hospice met with POA/wife Alecia De La Cruz  and patient's sister Merissa Kohler in patient's room. Discussed plan as discussed with Dr. Lopez. They both voiced understanding of how extubation will go. Denied questions for provider. Went over medications to be given IV push prior to removal of vent/tube with close monitoring of symptoms.   Consents signed, Dr. Lopez updated.   
Patient arrived to unit from IR via bed. Patient transferred to ICU bed and placed on continuous ICU bedside monitor. Patient admitted for Malignant neoplasm of lung, unspecified laterality, unspecified part of lung (HCC) [C34.90]  Pneumonia of left lung due to infectious organism, unspecified part of lung [J18.9]  Pulmonary embolism without acute cor pulmonale, unspecified chronicity, unspecified pulmonary embolism type (HCC) [I26.99]  Acute hypoxic respiratory failure (HCC) [J96.01]. Vitals obtained. See flowsheets. Patient's IV access includes 22 left wrist, 22g R wrist. Current infusions and rates of infusion include heparin at 10 units/kg/hr and levophed at 6.6mL/hr. Assessment completed by MYRA Li. Two nurse skin assessment completed by Guillermo RN and Arnold RN. See flowsheets for assessment details. Policies and procedures of ICU unable to be explained to patient at this time. Family member(s)/representative(s) present at time of admission include na. Patient rights explained to family member(s)/representatives and patient, as able. Patient/patient's family member(s)/representative(s) N/A to have physician notified of their admission. All questions posed by patient's family member(s)/representative(s) and patient answered at this time.     
Pharmacy Medication History Note    List of current medications patient is taking is complete.    Source of information: Patient's wife, sister, and surescripts    Changes made to medication list:  Medications removed (include reason, ex. therapy complete or physician discontinued):  Dronabinol 5 mg cap- no longer taking  Duloxetine 30 mg cap- no longer taking  Nicotine 21 mg patch- does not take  Phenytoin 100 mg ER cap- patient does not take  Polyethylene glycol 17 g powder- does not take  Thermotabs- patient does not take  Eliquis 5 mg tab 2 tab po BID- patient finished course of therapy  Medications added/doses adjusted:  Added  Clonidine 0.1 mg Take 1 tab po BID  Dexamethasone 4 mg Take 1 tab po BID w/meals  RA vitamin A and D ointment Apply thin layer to affected area TID prn irritation  Docusate 100 mg cap Take 1 cap po BID  Changed  Doxepin 0.6 mL nightly changed to nightly prn   Ensure plus high protein Take 1 with each meal changed to 1-2 daily  Ondansetron ODT 4 mg Take 1 tab po TID prn changed to Take 2 tab po TID prn  Other notes (ex. Recent course of antibiotics, Coumadin dosing):  Patient uses albuterol and spiriva inhaler 1-2 times per week  Patient was taking his eliquis as directed at home  Denies use of other OTC or herbal medications.    Allergies reviewed    Electronically signed by Rosemarie Ruby on 4/18/2024 at 3:52 PM                                                    
Pharmacy Note  BioFire Result    Sam De La Cruz Jr. is a 53 y.o. male, with a positive blood culture result    Communication received from Jabier, laboratory employee on 4/18/2024 at 9:58 AM    First Gram stain result: gram negative bacilli    BioFire BCID result: Serratia marcescens no resistance genes detected    BioFire BCID and gram stain congruent? Yes    Suspected source? PNA    Patient chart has been reviewed for signs/symptoms of infection: Yes  BP 92/74   Pulse (!) 112   Temp (!) 100.8 °F (38.2 °C) (Bladder)   Resp 15   Ht 1.727 m (5' 8\")   Wt 45.2 kg (99 lb 10.4 oz)   SpO2 100%   BMI 15.15 kg/m²   Lab Results   Component Value Date    WBC 18.3 (H) 04/18/2024     Allergies reviewed  Prochlorperazine, Toradol [ketorolac tromethamine], Ketorolac, and Compazine [prochlorperazine maleate]    Renal function reviewed  Estimated Creatinine Clearance: 90 mL/min (based on SCr of 0.6 mg/dL).    Current antibiotic regimen: azithromycin and cefepime    Intervention needed: Yes    Individual contacted: Dr Montes    Recommendations: stop hal    Recommendations accepted? Yes    Yanelis Mario [facility]  4/18/2024 9:58 AM    
Pt was unresponsive but was blessed.    04/18/24 1437   Encounter Summary   Encounter Overview/Reason  Initial Encounter   Service Provided For: Patient   Referral/Consult From: Rounding   Last Encounter  04/18/24  (NR)   Complexity of Encounter Low   Begin Time 1015   End Time  1020   Total Time Calculated 5 min   Spiritual/Emotional needs   Type Spiritual Support   Assessment/Intervention/Outcome   Assessment Unable to assess       
Pt was unresponsive but was blessed. His nurse said that care would be withdrawn this afternoon. He was blessed.    04/23/24 1146   Encounter Summary   Service Provided For: Patient   Referral/Consult From: Jessica   Last Encounter  04/23/24  (NR)   Complexity of Encounter Low   Begin Time 1115   End Time  1120   Total Time Calculated 5 min   Spiritual/Emotional needs   Type Spiritual Support   Assessment/Intervention/Outcome   Assessment Unable to assess        
Spoke to primary RN Benson, patient transitioning to hospice at this time. Will hold off on CVC dressing change.   
  Cardiovascular:      Rate and Rhythm: Tachycardia present.   Pulmonary:      Effort: Pulmonary effort is normal. Tachypnea present. No respiratory distress. He is intubated.   Neurological:      Mental Status: He is unresponsive.   Psychiatric:         Speech: He is noncommunicative.         Behavior: Behavior is not agitated, aggressive or hyperactive.         Cognition and Memory: Cognition is impaired.          Palliative Performance Scale   10%  Bed Bound; Extensive disease; Total care; Mouth care only; Drowsy/coma     Assessment and Plan   Sam De La Cruz Jr. is a 53 y.o. who is admitted to [facility] for Acute hypoxic respiratory failure (HCC). Palliative care is consulted for Goals of Care, Symptom Management, End Stage Disease, and Continuity of Care.  Micheal has not shown much improvement over the weekend despite maximal medical intervention.  Even if he is to get through this acute phase, his long-term prognosis is poor.  He likely will never be able to receive further treatment for his cancer.  I believe that he is in the dying process at this time.  His family has elected to transition to comfort care after talking amongst themselves.  They would like to proceed with this transition tomorrow at 1 PM.  Hospice will meet with the patient's family tomorrow to get paperwork signed.  We will admit to GIP prior to extubation.  In the meantime I would recommend continuing with his fentanyl infusion.  This is helping to cover for his cancer related pain.  Palliative care will continue to follow patient while he is hospitalized for family support in addition to assisting with transition to comfort care.         Principal Problem:    Acute hypoxic respiratory failure (HCC)  Active Problems:    Severe malnutrition (HCC)    Stage IV adenocarcinoma of lung, right (HCC)    Metastasis to brain (HCC)    Chronic pain due to neoplasm    Palliative care patient    Metastatic lung carcinoma, unspecified 
PDL1 > 1%; TPS 11-20%; Has not started Chemotherapy as of yet.  Severe malnutrition, metastatic cancer disease:  On tube feeds.  History of tobacco, alcohol and polysubstance abuse: Noted.  Mild QTc prolongation: Avoiding QTc prolonging medications. Home medication Seroquel is held.    INITIAL H AND P AND ICU COURSE:  Per initial HPI \"Sam De La Cruz Jr. Is a 53 y.o. male with  has a past medical history of Arthritis, Asthma, COPD (chronic obstructive pulmonary disease) ([facility]), GERD (gastroesophageal reflux disease), Hypertension, Psychiatric problem, Schizophrenia (HCC), Severe malnutrition ([facility]), and Unspecified cerebral artery occlusion with cerebral infarction. He presented to the emergency room today due to worsening shortness of breath.  He was recently admitted and treated for pneumonia and PE/DVT.  Again in the emergency room he was found to have worsening pulmonary embolism burden.  He was taken to IR for thrombectomy which was performed.  He subsequently became hypotensive and required Levophed.  His breathing status also continued to worsen overnight and decision was made to intubate after speaking to his wife who expressed that her and her  are not ready for comfort care at this time.  She would like to see how he does on the ventilator for couple days before making any decisions.\"     24: Palliative care following.  Plan to transition to comfort care admitted to [facility] today.  Family meeting in the afternoon and will transition to comfort care after that.    Past Medical History:  Per HPI.  Family History:  Mother ()- lung cancer, COPD, heart failure. Father ()- Lung cancer.   Social History:  Some day smoker, previous history of cocaine, marijuana, and opiate use.    ROS   Unable to assess as patient is intubated sedated.    Scheduled Meds:   cefepime  2,000 mg IntraVENous Q8H    insulin lispro  0-16 Units SubCUTAneous TID WC    insulin lispro  0-4 Units SubCUTAneous Nightly    
hydrocortisone  10 mg Oral BID    [Held by provider] tiotropium  2 puff Inhalation Daily RT    phenytoin  100 mg IntraVENous Q8H    cefepime  2,000 mg IntraVENous Q8H    arformoterol tartrate  15 mcg Nebulization BID RT    budesonide  0.5 mg Nebulization BID RT    [Held by provider] DULoxetine  20 mg Oral BID    hydrocortisone sodium succinate PF  100 mg IntraVENous Q8H    ipratropium 0.5 mg-albuterol 2.5 mg  1 Dose Inhalation 4x Daily RT    sodium chloride flush  5-40 mL IntraVENous 2 times per day    nicotine  1 patch TransDERmal Daily    [Held by provider] OLANZapine  5 mg Oral Nightly    [Held by provider] senna  2 tablet Oral BID    [Held by provider] doxepin  6 mg Oral Nightly    [Held by provider] droNABinol  5 mg Oral BID AC    [Held by provider] gabapentin  200 mg Oral TID    [Held by provider] morphine  60 mg Oral TID    [Held by provider] QUEtiapine  100 mg Oral Nightly    [Held by provider] traZODone  100 mg Oral Nightly     Continuous Infusions:   lactated ringers IV soln 50 mL/hr at 04/19/24 0631    fentaNYL 75 mcg/hr (04/19/24 0632)    dexmedeTOMIDine 0.9 mcg/kg/hr (04/19/24 0632)    midazolam Stopped (04/19/24 0306)    [Held by provider] dextrose Stopped (04/18/24 1058)    VASOpressin 20 Units in sodium chloride 0.9 % 100 mL infusion Stopped (04/19/24 0626)    sodium chloride      heparin (PORCINE) Infusion 22 Units/kg/hr (04/19/24 0632)    norepinephrine 11 mcg/min (04/19/24 0632)       PHYSICAL EXAMINATION:  T: 100.8.  P: 1 patient was 10. RR: 18. B/P: 92/74..  FiO2:  100%. O2 Sat:  99%.  I/O:  +2L cumiliative  Body mass index is 15.15 kg/m².   GCS:   Intubated sedated on the vent  Mode: PVC+ Rate: 14  Pressure support  PEEP: 10 FiO2 50%  General:   Acutely ill looking.  Cachectic.  On the vent/sedated/intubated  HEENT:  normocephalic and atraumatic.  No scleral icterus. PERR  Neck: supple.  No Thyromegaly.  Lungs: Bilateral crackles.  Cardiac: RRR.  No JVD.  Distant heart sounds  Abdomen: soft.  
HCO3 29    Electronically signed by Camron Gómez DO on 4/21/2024 at 10:37 AM  Case and plan developed in coordination with Mansoor Lozano MD   
otherwise noted).   4/22/24 BMP: Sodium 160, potassium 3.4, chloride 123, CO2 30, BUN 20, creatinine 0.4, glucose 134, calcium 8.3, anion gap 7, GFR >90  4/22/24 CBC: WBC 20.6, hemoglobin 9.3, hematocrit 20.8, platelets 139  Telemetry shows  Portable chest x-ray from 4/21/2024 showed no interval change.      Seen with multidisciplinary ICU team.  Meets Continued ICU Level Care Criteria:    [x] Yes   [] No - Transfer Planned to listed location:  [] HOSPITALIST CONTACTED-      Case and plan discussed with Dr. Salvador.      Electronically signed by Aarti Enamorado DO  CRITICAL CARE SPECIALIST

## 2024-04-23 NOTE — DEATH NOTES
[facility]  Notice of Patient Passing      Patient Name- Sam De La Cruz Jr.   Acct Number- [id number]   Attending Physician- Philip Lopez MD    Admitted on-2024  1:33 PM     On 2024 at 1727 patient was found in 4D11 with:   Absence of vital signs.   Absence of neurological response.    Confirmed time of death at 1727.   Physician or On-call Physician notified of time of death- yes    Family present at time of death- yes, multiple   Spiritual care present at time of death- no    Physician was notified and orders were obtained to release the body.   Post-Mortem documentation completed; form printed, signed, and given to admitting.    Merissa Yates RN RN Nursing Supervisor/ Manager  24   6:00 PM        Name of  Home: [place]   Home Phone Number: [phone]    Who Will Sign Death Certificate:     [x] Dr. Lopez

## 2024-04-23 NOTE — DISCHARGE SUMMARY
[facility] Death Summary    Date: 4/23/2024  Name: Sam De La Cruz Jr.  MRN: [id number]  YOB: 1970     Patient's PCP: Diana Charlton APRN - CNP  Admit Date: 4/23/2024 to General Inpatient Hospice  Date of Death:  4/23/24 at 1727  Acute care admission: 4/17/24-4/23/24  Admitting Physician: Philip Lopez MD   Discharge Physician: Philip Lopez MD   Consultation: none during General Inpatient Hospice stay    Invasive procedures: none during General Inpatient Hospice stay    Discharge Diagnoses:   Principal Problem:    Acute hypoxic respiratory failure (HCC)  Active Problems:    COPD without exacerbation (HCC)    Paranoid schizophrenia (HCC)    Seizure disorder (HCC)    Stage IV adenocarcinoma of lung, right (HCC)    Metastasis to brain (HCC)    Chronic pain due to neoplasm    Multiple subsegmental pulmonary emboli without acute cor pulmonale (HCC)    Metastatic lung carcinoma, unspecified laterality (HCC)    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 *      Brief HPI: Please refer to H&P for full HPI.  Briefly,  He was recently hospitalized at [facility] at the end of March 2024 for a pulmonary embolism.  He was started on Eliquis on discharge.  Over the last several days prior to admission, his wife noticed that he was not acting quite right.  He had increasing work of breathing and confusion.  She became concerned that it was his pain medication so she cut back on them.  When this did not improve she called EMS to bring him to the emergency department for further evaluation.  Workup in the emergency department showed acute bilateral saddle pulmonary embolism.  He was then admitted to [facility] for further care.  He underwent thrombectomy with interventional radiology.  Following the thrombectomy

## 2024-04-23 NOTE — DEATH NOTES
Death Pronouncement Note  Patient's Name: Sam De La Cruz Jr.   Patient's YOB: 1970  MRN Number: [id number]    Admitting Provider: Philip Lopez MD  Attending Provider: Philip Lopez MD    Patient was examined and the following were absent: Pulses, Blood Pressure, and Respiratory effort    I declared the patient dead on 4/23/2024 at 5:27 PM    Preliminary Cause of Death: Acute hypoxemic respiratory failure (HCC)     Electronically signed by Philip Lopez MD on 4/23/24 at 5:37 PM EDT

## 2024-04-23 NOTE — PLAN OF CARE
Patient Weaning Progress    The patient's vent settings was able to be weaned this shift.      Ventilator settings that were weaned              [x] Mode   [] Pressure support weaned   [] Fio2 weaned   [] Peep weaned      Spontaneous weaning trial  was attempted.     due to defined parameters for SBT (spontaneous breathing trial) not being met.     *Results of SBT documented under SBTOUTCOME note.    Reason that defined ventilator parameters for SBT was not met              [] Patient condition requires increased ventilator settings  [] Requires increased sedation   [] Settings not within weaning range   [] SAT not completed   [] Physician orders    The patient was able to tolerate SBT.      Evac tube was  hooked up with continuous low suction(20-30mmHg)      Cuff was  deflated to determine cuff leak. A leak  was detected.       Unable to get agreement for goals because no family is present and patient cannot respond.     
                                             Patient Weaning Progress    The patient's vent settings was not able to be weaned this shift.      Ventilator settings that were weaned              [] Mode   [] Pressure support weaned   [] Fio2 weaned   [] Peep weaned      Spontaneous weaning trial  was not attempted.     due to defined parameters for SBT (spontaneous breathing trial) not being met.     *Results of SBT documented under SBTOUTCOME note.    Reason that defined ventilator parameters for SBT was not met              [x] Patient condition requires increased ventilator settings  [] Requires increased sedation   [x] Settings not within weaning range   [] SAT not completed   [] Physician orders        Evac tube was  hooked up with continuous low suction(20-30mmHg)      Cuff was  deflated to determine cuff leak. A leak  was detected.     Family mutually agreed on goals.    Unable to get agreement for goals because no family is present and patient cannot respond.     
  Problem: Discharge Planning  Goal: Discharge to home or other facility with appropriate resources  4/19/2024 0955 by Benson Lee, RN  Outcome: Progressing  Flowsheets (Taken 4/18/2024 2100 by Sandie Wallace RN)  Discharge to home or other facility with appropriate resources:   Identify barriers to discharge with patient and caregiver   Arrange for needed discharge resources and transportation as appropriate   Identify discharge learning needs (meds, wound care, etc)   Refer to discharge planning if patient needs post-hospital services based on physician order or complex needs related to functional status, cognitive ability or social support system  4/19/2024 0549 by Sandie Wallace RN  Outcome: Progressing  Flowsheets (Taken 4/18/2024 2100)  Discharge to home or other facility with appropriate resources:   Identify barriers to discharge with patient and caregiver   Arrange for needed discharge resources and transportation as appropriate   Identify discharge learning needs (meds, wound care, etc)   Refer to discharge planning if patient needs post-hospital services based on physician order or complex needs related to functional status, cognitive ability or social support system     Problem: Pain  Goal: Verbalizes/displays adequate comfort level or baseline comfort level  4/19/2024 0955 by Benson Lee, RN  Outcome: Progressing  Flowsheets (Taken 4/18/2024 2100 by Sandie Wallace RN)  Verbalizes/displays adequate comfort level or baseline comfort level:   Assess pain using appropriate pain scale   Administer analgesics based on type and severity of pain and evaluate response   Implement non-pharmacological measures as appropriate and evaluate response   Consider cultural and social influences on pain and pain management   Notify Licensed Independent Practitioner if interventions unsuccessful or patient reports new pain  4/19/2024 0549 by Sandie Wallace RN  Outcome: Progressing  Flowsheets (Taken 4/18/2024 
  Problem: Discharge Planning  Goal: Discharge to home or other facility with appropriate resources  Outcome: Progressing  Flowsheets (Taken 4/18/2024 2100 by Sandie Wallace, RN)  Discharge to home or other facility with appropriate resources:   Identify barriers to discharge with patient and caregiver   Arrange for needed discharge resources and transportation as appropriate   Identify discharge learning needs (meds, wound care, etc)   Refer to discharge planning if patient needs post-hospital services based on physician order or complex needs related to functional status, cognitive ability or social support system     Problem: Pain  Goal: Verbalizes/displays adequate comfort level or baseline comfort level  Outcome: Progressing  Flowsheets (Taken 4/18/2024 2100 by Sandie Wallace, RN)  Verbalizes/displays adequate comfort level or baseline comfort level:   Assess pain using appropriate pain scale   Administer analgesics based on type and severity of pain and evaluate response   Implement non-pharmacological measures as appropriate and evaluate response   Consider cultural and social influences on pain and pain management   Notify Licensed Independent Practitioner if interventions unsuccessful or patient reports new pain     Problem: Safety - Adult  Goal: Free from fall injury  Outcome: Progressing  Flowsheets (Taken 4/19/2024 0549 by Sandie Wallace, RN)  Free From Fall Injury: Instruct family/caregiver on patient safety     Problem: Chronic Conditions and Co-morbidities  Goal: Patient's chronic conditions and co-morbidity symptoms are monitored and maintained or improved  Outcome: Progressing  Flowsheets (Taken 4/18/2024 2100 by Sandie Wallace, RN)  Care Plan - Patient's Chronic Conditions and Co-Morbidity Symptoms are Monitored and Maintained or Improved:   Monitor and assess patient's chronic conditions and comorbid symptoms for stability, deterioration, or improvement   Collaborate with multidisciplinary team to address 
  Problem: Discharge Planning  Goal: Discharge to home or other facility with appropriate resources  Outcome: Progressing  Flowsheets (Taken 4/18/2024 2100 by Sandie Wallace, RN)  Discharge to home or other facility with appropriate resources:   Identify barriers to discharge with patient and caregiver   Arrange for needed discharge resources and transportation as appropriate   Identify discharge learning needs (meds, wound care, etc)   Refer to discharge planning if patient needs post-hospital services based on physician order or complex needs related to functional status, cognitive ability or social support system     Problem: Pain  Goal: Verbalizes/displays adequate comfort level or baseline comfort level  Outcome: Progressing  Flowsheets (Taken 4/18/2024 2100 by Sandie Wallace, RN)  Verbalizes/displays adequate comfort level or baseline comfort level:   Assess pain using appropriate pain scale   Administer analgesics based on type and severity of pain and evaluate response   Implement non-pharmacological measures as appropriate and evaluate response   Consider cultural and social influences on pain and pain management   Notify Licensed Independent Practitioner if interventions unsuccessful or patient reports new pain     Problem: Safety - Adult  Goal: Free from fall injury  Outcome: Progressing  Flowsheets (Taken 4/19/2024 0549 by Sandie Wallace, RN)  Free From Fall Injury: Instruct family/caregiver on patient safety     Problem: Chronic Conditions and Co-morbidities  Goal: Patient's chronic conditions and co-morbidity symptoms are monitored and maintained or improved  Outcome: Progressing  Flowsheets (Taken 4/18/2024 2100 by Sandie Wallace, RN)  Care Plan - Patient's Chronic Conditions and Co-Morbidity Symptoms are Monitored and Maintained or Improved:   Monitor and assess patient's chronic conditions and comorbid symptoms for stability, deterioration, or improvement   Collaborate with multidisciplinary team to address 
  Problem: Discharge Planning  Goal: Discharge to home or other facility with appropriate resources  Outcome: Progressing  Flowsheets (Taken 4/18/2024 2100)  Discharge to home or other facility with appropriate resources:   Identify barriers to discharge with patient and caregiver   Arrange for needed discharge resources and transportation as appropriate   Identify discharge learning needs (meds, wound care, etc)   Refer to discharge planning if patient needs post-hospital services based on physician order or complex needs related to functional status, cognitive ability or social support system     Problem: Pain  Goal: Verbalizes/displays adequate comfort level or baseline comfort level  Outcome: Progressing  Flowsheets (Taken 4/18/2024 2100)  Verbalizes/displays adequate comfort level or baseline comfort level:   Assess pain using appropriate pain scale   Administer analgesics based on type and severity of pain and evaluate response   Implement non-pharmacological measures as appropriate and evaluate response   Consider cultural and social influences on pain and pain management   Notify Licensed Independent Practitioner if interventions unsuccessful or patient reports new pain     Problem: Safety - Adult  Goal: Free from fall injury  Outcome: Progressing  Flowsheets (Taken 4/19/2024 0549)  Free From Fall Injury: Instruct family/caregiver on patient safety     Problem: Chronic Conditions and Co-morbidities  Goal: Patient's chronic conditions and co-morbidity symptoms are monitored and maintained or improved  Outcome: Progressing  Flowsheets (Taken 4/18/2024 2100)  Care Plan - Patient's Chronic Conditions and Co-Morbidity Symptoms are Monitored and Maintained or Improved:   Monitor and assess patient's chronic conditions and comorbid symptoms for stability, deterioration, or improvement   Collaborate with multidisciplinary team to address chronic and comorbid conditions and prevent exacerbation or deterioration   
  Problem: Respiratory - Adult  Goal: Achieves optimal ventilation and oxygenation  4/21/2024 0515 by Zahra Cartwright RCP  Outcome: Progressing                                                  Patient Weaning Progress    The patient's vent settings was able to be weaned this shift.      Ventilator settings that were weaned              [] Mode   [] Pressure support weaned   [x] Fio2 weaned   [] Peep weaned      Spontaneous weaning trial  was not attempted.  Due to defined parameters for SBT (spontaneous breathing trial) not being met.     *Results of SBT documented under SBTOUTCOME note.    Reason that defined ventilator parameters for SBT was not met              [x] Patient condition requires increased ventilator settings  [] Requires increased sedation   [x] Settings not within weaning range   [] SAT not completed   [] Physician orders    Evac tube was not  hooked up with continuous low suction(20-30mmHg)      Cuff was not  deflated to determine cuff leak.     Unable to get agreement for goals because no family is present and patient cannot respond.     
  Problem: Respiratory - Adult  Goal: Achieves optimal ventilation and oxygenation  Outcome: Progressing  Flowsheets (Taken 4/18/2024 2100 by Sandie Wallace RN)  Achieves optimal ventilation and oxygenation:   Assess for changes in respiratory status   Assess for changes in mentation and behavior   Position to facilitate oxygenation and minimize respiratory effort   Oxygen supplementation based on oxygen saturation or arterial blood gases   Assess the need for suctioning and aspirate as needed   Assess and instruct to report shortness of breath or any respiratory difficulty   Respiratory therapy support as indicated                                                Patient Weaning Progress    The patient's vent settings was not able to be weaned this shift.      Ventilator settings that were weaned              [] Mode   [] Pressure support weaned   [] Fio2 weaned   [] Peep weaned      Spontaneous weaning trial  was not attempted.     due to defined parameters for SBT (spontaneous breathing trial) not being met.     *Results of SBT documented under SBTOUTCOME note.    Reason that defined ventilator parameters for SBT was not met              [] Patient condition requires increased ventilator settings  [] Requires increased sedation   [x] Settings not within weaning range   [] SAT not completed   [] Physician orders    Evac tube was not  hooked up with continuous low suction(20-30mmHg)      Cuff was not  deflated to determine cuff leak.     Unable to get agreement for goals because no family is present and patient cannot respond.     
Update acute care plan with appropriate goals if chronic or comorbid symptoms are exacerbated and prevent overall improvement and discharge     Problem: Nutrition Deficit:  Goal: Optimize nutritional status  4/22/2024 2345 by Sandie Wallace, RN  Outcome: Progressing  Flowsheets (Taken 4/22/2024 2345)  Nutrient intake appropriate for improving, restoring, or maintaining nutritional needs: Monitor oral intake, labs, and treatment plans     Problem: Neurosensory - Adult  Goal: Achieves stable or improved neurological status  Outcome: Progressing  Flowsheets (Taken 4/22/2024 2100)  Achieves stable or improved neurological status:   Assess for and report changes in neurological status   Initiate measures to prevent increased intracranial pressure   Maintain blood pressure and fluid volume within ordered parameters to optimize cerebral perfusion and minimize risk of hemorrhage   Monitor temperature, glucose, and sodium. Initiate appropriate interventions as ordered     Problem: Respiratory - Adult  Goal: Achieves optimal ventilation and oxygenation  Outcome: Progressing  Flowsheets (Taken 4/22/2024 2100)  Achieves optimal ventilation and oxygenation:   Assess for changes in respiratory status   Position to facilitate oxygenation and minimize respiratory effort   Oxygen supplementation based on oxygen saturation or arterial blood gases   Assess the need for suctioning and aspirate as needed   Respiratory therapy support as indicated     Problem: Cardiovascular - Adult  Goal: Maintains optimal cardiac output and hemodynamic stability  Outcome: Progressing  Flowsheets (Taken 4/22/2024 2100)  Maintains optimal cardiac output and hemodynamic stability:   Monitor blood pressure and heart rate   Monitor urine output and notify Licensed Independent Practitioner for values outside of normal range   Assess for signs of decreased cardiac output   Administer fluid and/or volume expanders as ordered   Administer vasoactive medications 
function   Administer ordered medications as needed   Administer IV fluids as ordered to ensure adequate hydration   Nutrition consult to assist patient with appropriate food choices     Problem: Gastrointestinal - Adult  Goal: Maintains adequate nutritional intake  Outcome: Progressing  Flowsheets (Taken 4/18/2024 1344)  Maintains adequate nutritional intake:   Monitor intake and output, weight and lab values   Obtain nutritional consult as needed     Problem: Metabolic/Fluid and Electrolytes - Adult  Goal: Electrolytes maintained within normal limits  Outcome: Progressing  Flowsheets (Taken 4/18/2024 1349)  Electrolytes maintained within normal limits:   Monitor labs and assess patient for signs and symptoms of electrolyte imbalances   Monitor response to electrolyte replacements, including repeat lab results as appropriate   Administer electrolyte replacement as ordered     
Administer IV fluids as ordered to ensure adequate hydration   Administer ordered medications as needed   Nutrition consult to assist patient with appropriate food choices  Goal: Maintains adequate nutritional intake  4/23/2024 1240 by Benson Lee RN  Outcome: Completed  4/22/2024 2345 by Sandie Wallace RN  Outcome: Progressing  Flowsheets (Taken 4/22/2024 2100)  Maintains adequate nutritional intake:   Identify factors contributing to decreased intake, treat as appropriate   Monitor intake and output, weight and lab values   Obtain nutritional consult as needed     Problem: Metabolic/Fluid and Electrolytes - Adult  Goal: Electrolytes maintained within normal limits  4/23/2024 1240 by Benson Lee, RN  Outcome: Completed  4/22/2024 2345 by Sandie Wallace RN  Outcome: Progressing  Flowsheets (Taken 4/22/2024 2100)  Electrolytes maintained within normal limits:   Monitor labs and assess patient for signs and symptoms of electrolyte imbalances   Administer electrolyte replacement as ordered   Monitor response to electrolyte replacements, including repeat lab results as appropriate   Fluid restriction as ordered   Instruct patient on fluid and nutrition restrictions as appropriate     Problem: Skin/Tissue Integrity  Goal: Absence of new skin breakdown  Description: 1.  Monitor for areas of redness and/or skin breakdown  2.  Assess vascular access sites hourly  3.  Every 4-6 hours minimum:  Change oxygen saturation probe site  4.  Every 4-6 hours:  If on nasal continuous positive airway pressure, respiratory therapy assess nares and determine need for appliance change or resting period.  4/23/2024 1240 by Benson Lee, RN  Outcome: Completed  4/22/2024 2345 by Sandie Wallace RN  Outcome: Progressing  Note: No new breakdown noted.  Q2 hour and prn turns with pillow support.

## 2024-04-23 NOTE — DISCHARGE SUMMARY
CRITICAL CARE DISCHARGE NOTE      Patient:  Sam De La Cruz Jr.    Unit/Bed:4D-11/011-A  YOB: 1970  MRN: [id number]   PCP: Diana Charlton APRN - CNP  Date of Admission: 4/17/2024  Chief Complaint:- Shortness of breath      Assessment and Plan:    Acute respiratory failure with hypoxia/hypercapnia, multifactorial secondary to acute multilobar PNA, saddle PE s/p thrombectomy, mild pulmonary hypertension: Thrombectomy by IR on 4/17, will continue heparin drip for PE. Continue breathing treatments including nebulizers- DuoNeb/Pulmicort/Brovana. Will wean off from vent/sedation as able. Continue vent with lung protective strategies.  MRI brain with and without contrast did not show any acute intracranial bleeding or stroke.  Family plans to transition care to comfort care today after family meeting afternoon.  Palliative care following.    Septic shock from acute multilobar PNA: There was a obstructive component from cancer. Will continue steroid therapy for concomitant adrenal insufficiency.  MAP goal over 65.  Blood cultures x 1 positive for Serratia.  Respiratory panel shows Serratia/Staph aureus MSSA. On broad-spectrum antibiotics -change cefepime to meropenem. Continue meropenem (4/21/24 to 4/4/27/24).  Hypernatremia: Improving. 3-4 L free water deficit.  Possible diabetes insipidus due to brain metastasis. Continue free water flushes to 400 every 4 hours. D5 drip was initially stopped due to third spacing on 4/21/24 however was restarted overnight due to sodium levels increasing. Monitor sodium every 4 hours.  Hypokalemia and Hypophosphatemia: Potassium 3.4 and phosphorus 1.8 this morning. Replete potassium and phosphate.  Recheck levels.  Saddle PE, s/p thrombectomy by IR.:  On heparin drip. Metastatic cancer disease is likely cause for PE.  Metastatic lung adenocarcinoma, with mets to liver/brain/possible adrenal, stage IV:  S/p lung Bx 10/23 (+) adenocarcinoma; s/p WBRT completed 11/2023;

## 2024-04-23 NOTE — CARE COORDINATION
04/18/24 1158   Readmission Assessment   Number of Days since last admission? 8-30 days   Previous Disposition Home with Family   Who is being Interviewed Caregiver  (Sister/Alt KG Ordonez)   What was the patient's/caregiver's perception as to why they think they needed to return back to the hospital? Other (Comment)  (Increasing SOB)   Did you visit your Primary Care Physician after you left the hospital, before you returned this time? Yes   Did you see a specialist, such as Cardiac, Pulmonary, Orthopedic Physician, etc. after you left the hospital? Yes   Who advised the patient to return to the hospital? Self-referral  (along with wife)   Does the patient report anything that got in the way of taking their medications? No   In our efforts to provide the best possible care to you and others like you, can you think of anything that we could have done to help you after you left the hospital the first time, so that you might not have needed to return so soon? Other (Comment)  (No)       
4/23/24, 9:58 AM EDT    DISCHARGE PLANNING EVALUATION    Order received due to pt being on [place] [facility]. TEE spoke with Ivelisse CM states they are having a family meeting with family today to discuss Comfort Care.  SW spoke with wife, she confirmed they are changing pt to comfort care today and prognosis is poor. SW offered support.  TEE called [place] [facility] and left detailed message for COURTNEY Montero.   
discharged to: Apartment   Services At/After Discharge   Confirm Follow Up Transport Family   Condition of Participation: Discharge Planning   The Plan for Transition of Care is related to the following treatment goals: Be able to go home

## 2024-04-23 NOTE — PROGRESS NOTES
24 1735   Encounter Summary   Encounter Overview/Reason  Grief, Loss, and Adjustments   Service Provided For: Family   Referral/Consult From: Physician   Support System Children;Family members   Last Encounter  24  (Pt is )   Complexity of Encounter Moderate   Begin Time 1725   End Time  1735   Total Time Calculated 10 min   Spiritual/Emotional needs   Type Spiritual Support   Grief, Loss, and Adjustments   Type Death   Assessment/Intervention/Outcome   Assessment Tearful   Intervention Prayer (assurance of)/Ambia;Sustaining Presence/Ministry of presence   Outcome Grieving     At the time of death the family was tearfully present. I provided prayer and emotional support to the bereaving family. The  was a sustaining presence of comfort for the bereaving family.  As I prayed the “Lord's Prayer” the family joined me.  The pt's family expressed their thanks for the spiritual support.     Plan:   No further support is needed at this time.

## 2024-04-23 NOTE — PROGRESS NOTES
[facility] Admit Date: 04.23.2024    Terminal Diagnosis: Acute hypoxic respiratory failure     Secondary Diagnosis: Arthritis, Asthma, COPD (chronic obstructive pulmonary disease) ([facility]), GERD (gastroesophageal reflux disease), Hypertension, Psychiatric problem, Schizophrenia ([facility]), Severe malnutrition ([facility]), and Unspecified cerebral artery occlusion with cerebral infarction.     Reviewed Nursing Notes for Previous 24 hours of Inpatient Charting:       Pain Scale:   no noted pain      Graphics:   /74   Pulse 100   Resp (!) 33   SpO2 (!) 65%     Assessment (Head to Toe):   RESPIRATORY:  clear, tachypneic   CARDIOVASCULAR:  regular rate and rhythm  GASTROINTESTINAL:  abdomen soft, unable to osculate bowel sounds   GENITOURINARY:  villaseñor catheter in place, patent   SKIN: Dry, warm   NEUROLOGICAL:  Unresponsive     Current Medications:    Current Facility-Administered Medications: acetaminophen (TYLENOL) tablet 650 mg, 650 mg, Oral, Q6H PRN **OR** acetaminophen (TYLENOL) suppository 650 mg, 650 mg, Rectal, Q6H PRN  albuterol sulfate HFA (PROVENTIL;VENTOLIN;PROAIR) 108 (90 Base) MCG/ACT inhaler 2 puff, 2 puff, Inhalation, Q6H PRN  [Held by provider] DULoxetine (CYMBALTA) extended release capsule 20 mg, 20 mg, Oral, BID  [Held by provider] droNABinol (MARINOL) capsule 5 mg, 5 mg, Oral, BID AC  [Held by provider] doxepin (SILENOR) tablet 6 mg, 6 mg, Oral, Nightly  [Held by provider] gabapentin (NEURONTIN) capsule 200 mg, 200 mg, Oral, TID  hydrocortisone sodium succinate PF (SOLU-CORTEF) injection 100 mg, 100 mg, IntraVENous, Q8H  ipratropium 0.5 mg-albuterol 2.5 mg (DUONEB) nebulizer solution 1 Dose, 1 Dose, Inhalation, Q4H PRN  levETIRAcetam (KEPPRA) 500 mg in sodium chloride 0.9 % 100 mL IVPB, 500 mg, IntraVENous, Q12H  [Held by provider] morphine (MS CONTIN) extended release tablet 60 mg, 60 mg, Oral, TID  [Held by provider] OLANZapine (ZYPREXA) tablet 5 mg, 5 mg, Oral, Nightly  ondansetron (ZOFRAN-ODT) disintegrating

## 2024-04-23 NOTE — H&P
is ill-appearing.      Interventions: He is sedated and intubated.   HENT:      Head: Normocephalic and atraumatic.      Right Ear: External ear normal.      Left Ear: External ear normal.      Nose: Nose normal.      Mouth/Throat:      Mouth: Mucous membranes are moist.   Eyes:      General:         Right eye: No discharge.         Left eye: No discharge.   Cardiovascular:      Rate and Rhythm: Tachycardia present.   Pulmonary:      Effort: Pulmonary effort is normal. Tachypnea present. No respiratory distress. He is intubated.   Neurological:      Mental Status: He is unresponsive.   Psychiatric:         Speech: He is noncommunicative.         Behavior: Behavior is not agitated, aggressive or hyperactive.         Cognition and Memory: Cognition is impaired.       Data:-  CBC:   Recent Labs     04/21/24  0250 04/22/24  0250 04/23/24  0515   WBC 32.0* 23.6* 15.2*   HGB 9.1* 9.3* 8.7*   HCT 29.1* 29.8* 28.9*   .1* 99.3* 102.5*    139 143     BMP:    Recent Labs     04/22/24  2140 04/23/24  0200 04/23/24  0515   * 162* 162*   K 3.4* 3.3* 4.3   * 125* 124*   CO2 30 30 31   BUN 20 19 18   CREATININE 0.3* 0.3* 0.3*   GLUCOSE 142* 137* 185*     LIVER PROFILE: No results for input(s): \"AST\", \"ALT\", \"LIPASE\", \"AMYLASE\", \"ALB\", \"BILIDIR\", \"BILITOT\", \"ALKPHOS\" in the last 72 hours.  PT/INR: No results for input(s): \"PROTIME\", \"INR\" in the last 72 hours.     No orders to display        Palliative Performance Scale   10%  Bed Bound; Extensive disease; Total care; Mouth care only; Drowsy/coma    Assessment and Plan   Sam De La Cruz JrZi  is a 53 y.o. male who presents for their first benefit period and who  has a past medical history of Arthritis, Asthma, COPD (chronic obstructive pulmonary disease) ([facility]), GERD (gastroesophageal reflux disease), Hypertension, Psychiatric problem, Schizophrenia ([facility]), Severe malnutrition ([facility]), and Unspecified cerebral artery occlusion with cerebral infarction. They have

## 2024-04-23 NOTE — PROGRESS NOTES
End of life. Treatment would be withdrawn. Family was devastated. They were consoled. Prayer was offered. It was appreciated.    04/23/24 3118   Encounter Summary   Service Provided For: Patient and family together   Referral/Consult From: Nurse   Support System Children;Family members   Last Encounter  04/23/24   Complexity of Encounter Moderate   Spiritual/Emotional needs   Type Spiritual Support   Grief, Loss, and Adjustments   Type End of Life;Grief and loss   Assessment/Intervention/Outcome   Assessment Unable to assess

## 2024-04-24 LAB
EKG ATRIAL RATE: 91 BPM
EKG P AXIS: -157 DEGREES
EKG P-R INTERVAL: 150 MS
EKG Q-T INTERVAL: 392 MS
EKG QRS DURATION: 126 MS
EKG QTC CALCULATION (BAZETT): 482 MS
EKG R AXIS: 60 DEGREES
EKG T AXIS: -6 DEGREES
EKG VENTRICULAR RATE: 91 BPM

## 2024-04-26 ENCOUNTER — TELEPHONE (OUTPATIENT)
Dept: PALLATIVE CARE | Age: 54
End: 2024-04-26

## 2024-06-10 NOTE — TELEPHONE ENCOUNTER
Pt's sister Mery called states that the pharmacy only had 120 tablets of the Oxycodone 20 mg. Encourage sister to  the 120 tablets and this nurse will inform Dr Lopez that the pt will be out of the oxycodone 20mg early due to pharmacy shortage. Sister Mery statse that she will call the office when then pt gets to about 3 days left.   
consult

## 2024-10-29 NOTE — PATIENT INSTRUCTIONS
Continue current plan of care for chronic diseases per primary and specialist teams  Continue Morphine ER 60 mg TID for cancer related pain   Continue Oxycodone IR 20 mg every 4 hours PRN for cancer related pain   Continue Olanzapine 5 mg nightly for sleep, nausea and vomiting  Continue doxepin 6 mg nightly for sleep.  Continue Senna and Miralax daily  Continue Gabapentin 200 mg TID for neuropathy  Start Cymbalta 30 mg daily for 7 days then 60 mg daily moving forward for cancer-related pain  Start dronabinol 5 mg twice daily  PDMP reviewed  Please call the office if your symptoms worsen or if you were to develop new symptoms  Please call the palliative care office when you need refills  
29-Oct-2024 12:07

## 2024-11-22 NOTE — PLAN OF CARE
Patient has not attended any groups this shift and has not been out of his room for socialization today so he has not met his socialization goal. Patient will be encouraged to attend all of the groups and to socialize with others out on the unit daily. Quality 226: Preventive Care And Screening: Tobacco Use: Screening And Cessation Intervention: Patient screened for tobacco use and is an ex/non-smoker Detail Level: Detailed Quality 47: Advance Care Plan: Advance Care Planning discussed and documented; advance care plan or surrogate decision maker documented in the medical record.